# Patient Record
Sex: MALE | Race: OTHER | HISPANIC OR LATINO | Employment: OTHER | URBAN - METROPOLITAN AREA
[De-identification: names, ages, dates, MRNs, and addresses within clinical notes are randomized per-mention and may not be internally consistent; named-entity substitution may affect disease eponyms.]

---

## 2017-12-21 LAB — HCV AB SER-ACNC: NONREACTIVE

## 2018-03-23 LAB
ALBUMIN SERPL BCP-MCNC: 4.5 G/DL (ref 3–5.2)
ALP SERPL-CCNC: 78 U/L (ref 43–122)
ALT SERPL W P-5'-P-CCNC: 37 U/L (ref 9–52)
ANION GAP SERPL CALCULATED.3IONS-SCNC: 10 MMOL/L (ref 5–14)
AST SERPL W P-5'-P-CCNC: 27 U/L (ref 17–59)
BILIRUB SERPL-MCNC: 0.4 MG/DL
BILIRUBIN DIRECT (HISTORICAL): 0.1 MG/DL
BUN SERPL-MCNC: 12 MG/DL (ref 5–25)
CALCIUM SERPL-MCNC: 9.9 MG/DL (ref 8.4–10.2)
CHLORIDE SERPL-SCNC: 106 MEQ/L (ref 97–108)
CHOLEST SERPL-MCNC: 179 MG/DL
CHOLEST/HDLC SERPL: 3.2 {RATIO}
CO2 SERPL-SCNC: 26 MMOL/L (ref 22–30)
CREATINE, SERUM (HISTORICAL): 1 MG/DL (ref 0.7–1.5)
EGFR (HISTORICAL): >60 ML/MIN/1.73 M2
EST. AVERAGE GLUCOSE BLD GHB EST-MCNC: 103 MG/DL
GLUCOSE SERPL-MCNC: 87 MG/DL (ref 70–99)
HBA1C MFR BLD HPLC: 5.2 %
HDLC SERPL-MCNC: 56 MG/DL
LDL/HDL RATIO (HISTORICAL): 1.8
LDLC SERPL CALC-MCNC: 101 MG/DL
POTASSIUM SERPL-SCNC: 4.3 MEQ/L (ref 3.6–5)
SODIUM SERPL-SCNC: 142 MEQ/L (ref 137–147)
TOTAL PROTEIN (HISTORICAL): 7.6 G/DL (ref 5.9–8.4)
TRIGL SERPL-MCNC: 112 MG/DL
TSH SERPL DL<=0.05 MIU/L-ACNC: 1.64 UIU/ML (ref 0.47–4.68)
VLDLC SERPL CALC-MCNC: 22 MG/DL (ref 0–40)

## 2018-07-24 RX ORDER — TRAZODONE HYDROCHLORIDE 100 MG/1
TABLET ORAL
COMMUNITY
Start: 2017-06-12 | End: 2021-03-30 | Stop reason: HOSPADM

## 2018-07-24 RX ORDER — ARIPIPRAZOLE 5 MG/1
5 TABLET ORAL DAILY
COMMUNITY
Start: 2017-06-12 | End: 2020-01-01 | Stop reason: DRUGHIGH

## 2018-07-24 RX ORDER — SENNOSIDES 8.6 MG
CAPSULE ORAL EVERY 8 HOURS
COMMUNITY
Start: 2016-07-19 | End: 2019-01-16 | Stop reason: ALTCHOICE

## 2018-07-24 RX ORDER — TOPIRAMATE 25 MG/1
CAPSULE, COATED PELLETS ORAL
COMMUNITY
Start: 2018-05-23 | End: 2018-07-25 | Stop reason: SDUPTHER

## 2018-07-24 RX ORDER — MELOXICAM 15 MG/1
TABLET ORAL EVERY 24 HOURS
COMMUNITY
Start: 2016-12-21 | End: 2019-01-16 | Stop reason: ALTCHOICE

## 2018-07-24 RX ORDER — ZOLPIDEM TARTRATE 5 MG/1
TABLET ORAL EVERY 24 HOURS
COMMUNITY
End: 2019-04-18

## 2018-07-24 RX ORDER — LIDOCAINE AND PRILOCAINE 25; 25 MG/G; MG/G
CREAM TOPICAL
COMMUNITY
Start: 2017-05-04 | End: 2018-12-19 | Stop reason: ALTCHOICE

## 2018-07-25 ENCOUNTER — TRANSCRIBE ORDERS (OUTPATIENT)
Dept: ADMINISTRATIVE | Facility: HOSPITAL | Age: 53
End: 2018-07-25

## 2018-07-25 ENCOUNTER — OFFICE VISIT (OUTPATIENT)
Dept: ENDOCRINOLOGY | Facility: CLINIC | Age: 53
End: 2018-07-25
Payer: COMMERCIAL

## 2018-07-25 ENCOUNTER — TELEPHONE (OUTPATIENT)
Dept: ENDOCRINOLOGY | Facility: CLINIC | Age: 53
End: 2018-07-25

## 2018-07-25 ENCOUNTER — APPOINTMENT (OUTPATIENT)
Dept: LAB | Facility: HOSPITAL | Age: 53
End: 2018-07-25
Payer: COMMERCIAL

## 2018-07-25 VITALS
HEART RATE: 62 BPM | TEMPERATURE: 98 F | DIASTOLIC BLOOD PRESSURE: 82 MMHG | HEIGHT: 67 IN | BODY MASS INDEX: 32.96 KG/M2 | SYSTOLIC BLOOD PRESSURE: 130 MMHG | WEIGHT: 210 LBS

## 2018-07-25 DIAGNOSIS — E66.9 OBESITY (BMI 30-39.9): ICD-10-CM

## 2018-07-25 DIAGNOSIS — F50.81 BINGE-EATING DISORDER, MILD: ICD-10-CM

## 2018-07-25 DIAGNOSIS — E66.9 OBESITY (BMI 30-39.9): Primary | ICD-10-CM

## 2018-07-25 LAB
ALBUMIN SERPL BCP-MCNC: 4.1 G/DL (ref 3–5.2)
ALP SERPL-CCNC: 71 U/L (ref 43–122)
ALT SERPL W P-5'-P-CCNC: 36 U/L (ref 9–52)
ANION GAP SERPL CALCULATED.3IONS-SCNC: 8 MMOL/L (ref 5–14)
AST SERPL W P-5'-P-CCNC: 29 U/L (ref 17–59)
BILIRUB SERPL-MCNC: 0.6 MG/DL
BUN SERPL-MCNC: 14 MG/DL (ref 5–25)
CALCIUM SERPL-MCNC: 9.5 MG/DL (ref 8.4–10.2)
CHLORIDE SERPL-SCNC: 107 MMOL/L (ref 97–108)
CHOLEST SERPL-MCNC: 182 MG/DL
CO2 SERPL-SCNC: 28 MMOL/L (ref 22–30)
CREAT SERPL-MCNC: 1.06 MG/DL (ref 0.7–1.5)
GFR SERPL CREATININE-BSD FRML MDRD: 79 ML/MIN/1.73SQ M
GLUCOSE P FAST SERPL-MCNC: 94 MG/DL (ref 70–99)
HDLC SERPL-MCNC: 56 MG/DL (ref 40–59)
LDLC SERPL CALC-MCNC: 106 MG/DL
NONHDLC SERPL-MCNC: 126 MG/DL
POTASSIUM SERPL-SCNC: 4.9 MMOL/L (ref 3.6–5)
PROT SERPL-MCNC: 7.9 G/DL (ref 5.9–8.4)
SODIUM SERPL-SCNC: 143 MMOL/L (ref 137–147)
TRIGL SERPL-MCNC: 101 MG/DL
TSH SERPL DL<=0.05 MIU/L-ACNC: 1.55 UIU/ML (ref 0.47–4.68)

## 2018-07-25 PROCEDURE — 36415 COLL VENOUS BLD VENIPUNCTURE: CPT

## 2018-07-25 PROCEDURE — 84443 ASSAY THYROID STIM HORMONE: CPT

## 2018-07-25 PROCEDURE — 99213 OFFICE O/P EST LOW 20 MIN: CPT | Performed by: INTERNAL MEDICINE

## 2018-07-25 PROCEDURE — 80061 LIPID PANEL: CPT

## 2018-07-25 PROCEDURE — 80053 COMPREHEN METABOLIC PANEL: CPT

## 2018-07-25 RX ORDER — TOPIRAMATE 25 MG/1
50 CAPSULE, COATED PELLETS ORAL DAILY
Qty: 60 CAPSULE | Refills: 2 | Status: SHIPPED | OUTPATIENT
Start: 2018-07-25 | End: 2018-07-26 | Stop reason: SDUPTHER

## 2018-07-25 NOTE — PROGRESS NOTES
Nitin Montesinos 45 y o  male presents for follow up today  Last seen in 5 2018    Interim history    No new medical problems or hospitalizations  Taking topiramate for binge eating disorder  She is tolerating this well  medication compliance: compliant all of the time,   diabetic diet compliance: compliant most of the time,   Started topiramate 25 bid for binge eating in 1 2018- lost 19 lbs wt since, has modified diet, reduced portion size,  increased vegetable intake  weight has reduced by 6 lbs since 5 2018  Total wt loss since 1 2018- 25lbs    HPI obesity  Onset: gradually  The severity of the problem is moderate  The patient is losing weight  Risk factors include family  history of obesity  Prior h/o RYGB surgery +  Pertinent negatives include abdominal pain, acne, anhidrosis, anorexia, anxiety, cold intolerance, constipation, depression, delayed development,  facial plethora, fatigue, generalized weakness, hair loss, headache, hirsutism, hoarseness, lethargy, low self-esteem,  paresthesias, striae or vision changes  Prior weight loss efforts  RYGB 6 yrs ago, lost about 120lbs then regained about 75 lbs  Diet- 3 meals, eliminated carb rich, high fat foods  Exercise-none, he states he tried walking about 60 mins 5 times a week for 6 months, but did not lose any weight so  gave up      Comorbidities- Depression, No known diabetes, HTN CAD, heart failure, tachycardia/abnormal rhythm, stroke, GERD,  TINO, NAFLD, Cholelithiasis, seizure disorder, glaucoma, hyperthyroidism, kidney stones  No history of drug abuse;    Review of labs so far reveals 3 23 2018-  normal electrolytes and GFR, normal HgbA1c  no evidence of topiramate toxicity- normal CO2 level     Review of systems  Constitutional- denies fatigue, fever  Eyes:denies redness/swelling/itching/visual loss/diplopia  Ears:denies ear pain/hearing difficulty  Neck: denies neck swelling/pain/stridor  RS: denies wheezing/chest pain/cough/difficulty breathing  CVS: denies palpitations/chest pain/leg swelling/orthopnea/syncope  GI: denies abdominal pain/changes in bowel movements/changes in appetite/refux/nausea  : denies changes in urine color/flow/nocturia  Musculoskeletal: denies difficulty walking/ leg pain/back pain/lfocal weakness  Neuro: denies numbness/tingling/dysesthesias/tremors  Skin/hair: denies rash/dry skin/hair loss       Current Outpatient Prescriptions:     acetaminophen (TYLENOL) 650 mg CR tablet, every 8 (eight) hours, Disp: , Rfl:     ARIPiprazole (ABILIFY) 5 mg tablet, , Disp: , Rfl:     lidocaine-prilocaine (EMLA) cream, , Disp: , Rfl:     meloxicam (MOBIC) 15 mg tablet, every 24 hours, Disp: , Rfl:     Multiple Vitamins-Minerals (MULTIVITAMIN ADULT PO), take 2 tablets by oral route daily, Disp: , Rfl:     topiramate (TOPAMAX) 25 mg sprinkle capsule, Take 2 capsules (50 mg total) by mouth daily, Disp: 60 capsule, Rfl: 2    traZODone (DESYREL) 100 mg tablet, take 2 tablet by oral route  every night, Disp: , Rfl:     zolpidem (AMBIEN) 5 mg tablet, every 24 hours, Disp: , Rfl:     No Known Allergies    There is no problem list on file for this patient  No family history on file  Social History     Social History    Marital status: /Civil Union     Spouse name: N/A    Number of children: N/A    Years of education: N/A     Occupational History    Not on file  Social History Main Topics    Smoking status: Not on file    Smokeless tobacco: Not on file    Alcohol use Not on file    Drug use: Unknown    Sexual activity: Not on file     Other Topics Concern    Not on file     Social History Narrative    No narrative on file       PHYSICAL EXAM    Vitals:    07/25/18 1003   BP: 130/82   BP Location: Left arm   Patient Position: Sitting   Cuff Size: Adult   Pulse: 62   Temp: 98 °F (36 7 °C)   Weight: 95 3 kg (210 lb)   Height: 5' 7" (1 702 m)   Body mass index is 32 89 kg/m²        General: AAOx3, NAD, obese  Eyes; no conjunctival redness, cornea clear, no proptosis, pupils round, reactive to ight  Ears: normal external ears  Mouth/Throat: buccal mucosa moist, no patches, poor dental hygiene uvula midline  Neck: no abnormal cervical LN  Thyroid: no visible goiter, nontender, smooth surface, no palpable nodules, no thrill/bruit  RS: chest clear to auscultation, no added sounds  CVS: no JVD, RRR, no murmurs  Abdomen: obese, no palpable masses/free fluid, percussion tympanitic, normal BS  Extremities: no pedal edema, no cyanosis/clubbing  Neuro: no gross focal neurologic deficits, no tremors  Dermatologic: no skin rash/no wide purple striae, no ecchymoses, no focal hair loss, skin moist to touch  Psych: appropriate mood and affect      ASSESSMENT AND PLAN    1  OBESITY- BMI 33  Losing wt with topiramate rx for binge eating, and lifestyle measures  No evidence of toxicity- check labs now, encouraged hydration, counseled pt about adverse effects  Lifestyle changes:  -I counseled patient regarding recommendation to lose 5 to 10% of initial body weight through lifestyle changes, which should include a combination of dietary changes and regular exercise   -I recommend starting a food diary as a first step to beginning dietary changes  - Goal daily caloric intake is not to exceed 1200 calories  - There are multiple free websites available that can also help with counting calories  I gave some specific examples to the patient   -I also recommended he continue exercise regimen of moderate physical activity of 60 mins daily      2  Binge eating disorder responding well to topiramate rx      Orders Placed This Encounter   Procedures    Comprehensive metabolic panel     This is a patient instruction: Patient fasting for 8 hours or longer recommended  Standing Status:   Future     Number of Occurrences:   1     Standing Expiration Date:   7/25/2019    Lipid panel     This is a patient instruction:  This test requires patient fasting for 10-12 hours or longer  Drinking of black coffee or black tea is acceptable  Standing Status:   Future     Number of Occurrences:   1     Standing Expiration Date:   7/25/2019    TSH, 3rd generation     This is a patient instruction: This test is non-fasting  Please drink two glasses of water morning of bloodwork          Standing Status:   Future     Number of Occurrences:   1     Standing Expiration Date:   7/25/2019       RTC in 3 months

## 2018-07-26 RX ORDER — TOPIRAMATE 25 MG/1
50 CAPSULE, COATED PELLETS ORAL DAILY
Qty: 60 CAPSULE | Refills: 1 | Status: SHIPPED | OUTPATIENT
Start: 2018-07-26 | End: 2018-09-14 | Stop reason: RX

## 2018-08-28 ENCOUNTER — TELEPHONE (OUTPATIENT)
Dept: BARIATRICS | Facility: CLINIC | Age: 53
End: 2018-08-28

## 2018-09-13 PROBLEM — E66.9 OBESITY, CLASS II, BMI 35-39.9: Status: ACTIVE | Noted: 2018-09-13

## 2018-09-13 PROBLEM — Z98.84 S/P GASTRIC BYPASS: Status: ACTIVE | Noted: 2018-09-13

## 2018-09-13 PROBLEM — K91.2 POSTSURGICAL MALABSORPTION: Status: ACTIVE | Noted: 2018-09-13

## 2018-09-13 NOTE — ASSESSMENT & PLAN NOTE
-Last lipid panel July 2018 shows lipids WNL  -Continue with healthy diet/lifestyle changes  -To be monitored by PCP

## 2018-09-13 NOTE — PROGRESS NOTES
Assessment/Plan:    S/P gastric bypass  -s/p Daquan-En-Y Gastric Bypass in June 2008 (7 years ago) Dr Minna Martinez  Patient lost 120lbs s/p RYGB and slowly regained about 75lbs  Started Topiramate in January 2018 and lost 19 pounds as of May but now has regained 12# since his last visit with Dr Ashley Culp on 07/25/18  He has not had refills of Topomax in about a month  Per diet recall he is eating excessive refined CHO, limited fiber rich vegetables, snacking and grazing throughout the day, mindless eating, and skipping meals  He follows the 30/60 minute rule and drinks about 64oz  Fluids daily  He avoids NSAIDS, no smoking, but drinks occasional alcohol  Advised strict avoidance of the above  He does not exercise - encouraged walking program     -Patient agrees to f/u with RD and keep food records  Lengthy dietary education provided today (eat balanced regular meals, avoid grazing, increase exercise)  -F/U with [facility] for additional support      Postsurgical malabsorption  -At risk for malabsorption of vitamins/minerals secondary to malabsorption and restriction of intake from bariatric surgery  -Currently taking adequate postop bariatric surgery vitamin supplementation: MVI, calcium, B12, vitamin D  -No bariatric labs available for review  -Next set of bariatric labs ordered for approximately 2 weeks  -Patient received education about the importance of adhering to a lifelong supplementation regimen to avoid vitamin/mineral deficiencies     Mixed hyperlipidemia  -Last lipid panel July 2018 shows lipids WNL  -Continue with healthy diet/lifestyle changes  -To be monitored by PCP       Diagnoses and all orders for this visit:    S/P gastric bypass  -     CBC and differential; Future  -     Copper Level; Future  -     Ferritin; Future  -     Folate; Future  -     Iron; Future  -     PTH, intact; Future  -     Vitamin A; Future  -     Vitamin B1, whole blood;  Future  -     Vitamin B12; Future  -     Vitamin D 25 hydroxy; Future  -     Zinc; Future    Postsurgical malabsorption  -     CBC and differential; Future  -     Copper Level; Future  -     Ferritin; Future  -     Folate; Future  -     Iron; Future  -     PTH, intact; Future  -     Vitamin A; Future  -     Vitamin B1, whole blood; Future  -     Vitamin B12; Future  -     Vitamin D 25 hydroxy; Future  -     Zinc; Future    Mixed hyperlipidemia    Obesity (BMI 30 0-34  9)    Weight gain following gastric bypass surgery    Other orders  -     DULoxetine (CYMBALTA) 60 mg delayed release capsule; Take 20 mg by mouth daily  -     calcium citrate (CALCITRATE) 950 MG tablet; Take 1 tablet by mouth daily  -     cholecalciferol (VITAMIN D3) 1,000 units tablet; Take 1,000 Units by mouth daily  -     cyanocobalamin 500 MCG tablet; Take 500 mcg by mouth daily          Subjective:      Patient ID: Kai Castelan is a 45 y o  male  -s/p Daquan-En-Y Gastric Bypass with Dr Nereida Byers on 7 years ago  Patient lost about 120lbs s/p RYGB and slowly regained about 75lbs  Started Topiramate in January 2018 and lost 19 pounds in May but now has regained 12# since his last visit with Dr Angel Argueta on 07/25/18  He has not had refills of Topomax in about a month  Tolerating diet without issues; denies N/V, dysphagia, reflux  Initial: 290  Current: 222lbs  EWL: 47 8%  [place]: 170lbs  Current BMI is Body mass index is 34 77 kg/m²  B - 2 pieces toast with ham and cheese, iced tea unsweet  L - skips  D - snacks or eats rice, beans, chicken  Snacks - bread and butter, crackers    The following portions of the patient's history were reviewed and updated as appropriate: allergies, current medications, past family history, past medical history, past social history, past surgical history and problem list     Review of Systems   Constitutional: Positive for unexpected weight change (12lbs weight gain in last 1 5 months)  Negative for chills and fever  HENT: Negative for trouble swallowing  Respiratory: Negative for cough and shortness of breath  Cardiovascular: Negative for chest pain and palpitations  Gastrointestinal: Negative for abdominal pain, constipation, diarrhea, nausea and vomiting  Neurological: Negative for dizziness  Psychiatric/Behavioral:        Denies anxiety and depression         Objective:      /89 (BP Location: Left arm, Patient Position: Sitting, Cuff Size: Adult)   Pulse 89   Temp 98 6 °F (37 °C)   Resp 18   Ht 5' 7" (1 702 m)   Wt 101 kg (222 lb)   BMI 34 77 kg/m²          Physical Exam   Constitutional: He is oriented to person, place, and time  He appears well-developed and well-nourished  No distress  HENT:   Head: Normocephalic and atraumatic  Eyes: Pupils are equal, round, and reactive to light  No scleral icterus  Cardiovascular: Normal rate, regular rhythm and normal heart sounds  Pulmonary/Chest: Effort normal and breath sounds normal  No respiratory distress  Abdominal: Soft  Bowel sounds are normal  He exhibits no distension  There is no tenderness  No incisional hernias appreciated   Musculoskeletal: He exhibits no edema  Neurological: He is alert and oriented to person, place, and time  Skin: Skin is warm and dry  Psychiatric: He has a normal mood and affect  Nursing note and vitals reviewed  BARRIERS: none identified    GOALS:   · Continued/Maintain healthy weight loss with good nutrition intakes  · Adequate hydration with at least 64oz  fluid intake  · Normal vitamin and mineral levels  · Exercise as tolerated  · Follow up with RD  · Follow up with medical weight management in [facility] weight management    · Follow-up in 3 months unless you are connected with [facility]  We kindly ask that your arrive 15 minutes before your scheduled appointment time with your provider to allow our staff to room you, get your vital signs and update your chart  · Follow diet as discussed  · Get lab work done in the next 2 weeks  You have been given a lab slip today  Please call the office if you need a replacement  It is recommended to check with your insurance BEFORE getting labs done to make sure they are covered by your policy  Also, please check with your PCP and other providers before getting labs to avoid duplicate labs  Make sure to HOLD any multivitamins that may contain biotin and any biotin supplements FOR 5 DAYS before any labs since it can affect the results  · Follow vitamin and mineral recommendations as reviewed with you  · Call our office if you have any problems with abdominal pain especially associated with fever, chills, nausea, vomiting or any other concerns  · All  Post-bariatric surgery patients should be aware that very small quantities of any alcohol can cause impairment and it is very possible not to feel the effect  The effect can be in the system for several hours  It is also a stomach irritant  · It is advised to AVOID alcohol, Nonsteroidal antiinflammatory drugs (NSAIDS) and nicotine of all forms   Any of these can cause stomach irritation/pain

## 2018-09-13 NOTE — ASSESSMENT & PLAN NOTE
-At risk for malabsorption of vitamins/minerals secondary to malabsorption and restriction of intake from bariatric surgery  -Currently taking adequate postop bariatric surgery vitamin supplementation: MVI, calcium, B12, vitamin D  -No bariatric labs available for review  -Next set of bariatric labs ordered for approximately 2 weeks  -Patient received education about the importance of adhering to a lifelong supplementation regimen to avoid vitamin/mineral deficiencies

## 2018-09-13 NOTE — ASSESSMENT & PLAN NOTE
-s/p Daquan-En-Y Gastric Bypass in June 2008 (7 years ago) Dr Kai Hicks  Patient lost 120lbs s/p RYGB and slowly regained about 75lbs  Started Topiramate in January 2018 and lost 19 pounds as of May but now has regained 12# since his last visit with Dr Payal Hawk on 07/25/18  He has not had refills of Topomax in about a month    -Had an EGD with Dr Carlos Irwin on 09/14/15 that showed 5cm pouch and no G-G fistula, no abnormalities   -Per diet recall he is eating excessive refined CHO, limited fiber rich vegetables, snacking and grazing throughout the day, mindless eating, and skipping meals  He follows the 30/60 minute rule and drinks about 64oz  Fluids daily  He avoids NSAIDS, no smoking, but drinks occasional alcohol  Advised strict avoidance of the above  He does not exercise - encouraged walking program     -Patient agrees to f/u with RD and keep food records  Lengthy dietary education provided today (eat balanced regular meals, avoid grazing, increase exercise)     -F/U with [facility] for additional support

## 2018-09-14 ENCOUNTER — OFFICE VISIT (OUTPATIENT)
Dept: BARIATRICS | Facility: CLINIC | Age: 53
End: 2018-09-14
Payer: COMMERCIAL

## 2018-09-14 VITALS
RESPIRATION RATE: 18 BRPM | DIASTOLIC BLOOD PRESSURE: 89 MMHG | WEIGHT: 222 LBS | TEMPERATURE: 98.6 F | HEART RATE: 89 BPM | HEIGHT: 67 IN | BODY MASS INDEX: 34.84 KG/M2 | SYSTOLIC BLOOD PRESSURE: 162 MMHG

## 2018-09-14 DIAGNOSIS — R51.9 INCREASED SEVERITY OF HEADACHES: Primary | ICD-10-CM

## 2018-09-14 DIAGNOSIS — K91.2 POSTSURGICAL MALABSORPTION: ICD-10-CM

## 2018-09-14 DIAGNOSIS — Z98.84 S/P GASTRIC BYPASS: Primary | ICD-10-CM

## 2018-09-14 DIAGNOSIS — E66.9 OBESITY (BMI 30.0-34.9): ICD-10-CM

## 2018-09-14 DIAGNOSIS — R63.5 WEIGHT GAIN FOLLOWING GASTRIC BYPASS SURGERY: ICD-10-CM

## 2018-09-14 DIAGNOSIS — E78.2 MIXED HYPERLIPIDEMIA: ICD-10-CM

## 2018-09-14 DIAGNOSIS — Z98.84 WEIGHT GAIN FOLLOWING GASTRIC BYPASS SURGERY: ICD-10-CM

## 2018-09-14 PROCEDURE — 99214 OFFICE O/P EST MOD 30 MIN: CPT | Performed by: PHYSICIAN ASSISTANT

## 2018-09-14 RX ORDER — DULOXETIN HYDROCHLORIDE 60 MG/1
60 CAPSULE, DELAYED RELEASE ORAL 2 TIMES DAILY
COMMUNITY
End: 2021-03-30 | Stop reason: HOSPADM

## 2018-09-14 RX ORDER — IBUPROFEN 200 MG
1 CAPSULE ORAL DAILY
COMMUNITY
End: 2021-03-30 | Stop reason: HOSPADM

## 2018-09-14 RX ORDER — MELATONIN
1000 DAILY
COMMUNITY
End: 2019-01-16 | Stop reason: ALTCHOICE

## 2018-09-14 NOTE — PATIENT INSTRUCTIONS
GOALS:   · Continued/Maintain healthy weight loss with good nutrition intakes  · Adequate hydration with at least 64oz  fluid intake  · Normal vitamin and mineral levels  · Exercise as tolerated  · Follow up with RD  · Follow up with medical weight management in [facility] weight management    · Follow-up in 3 months  We kindly ask that your arrive 15 minutes before your scheduled appointment time with your provider to allow our staff to room you, get your vital signs and update your chart  · Follow diet as discussed  · Get lab work done in the next 2 weeks  You have been given a lab slip today  Please call the office if you need a replacement  It is recommended to check with your insurance BEFORE getting labs done to make sure they are covered by your policy  Also, please check with your PCP and other providers before getting labs to avoid duplicate labs  Make sure to HOLD any multivitamins that may contain biotin and any biotin supplements FOR 5 DAYS before any labs since it can affect the results  · Follow vitamin and mineral recommendations as reviewed with you  · Call our office if you have any problems with abdominal pain especially associated with fever, chills, nausea, vomiting or any other concerns  · All  Post-bariatric surgery patients should be aware that very small quantities of any alcohol can cause impairment and it is very possible not to feel the effect  The effect can be in the system for several hours  It is also a stomach irritant  · It is advised to AVOID alcohol, Nonsteroidal antiinflammatory drugs (NSAIDS) and nicotine of all forms   Any of these can cause stomach irritation/pain

## 2018-09-18 ENCOUNTER — APPOINTMENT (OUTPATIENT)
Dept: LAB | Facility: HOSPITAL | Age: 53
End: 2018-09-18
Payer: COMMERCIAL

## 2018-09-18 DIAGNOSIS — R51.9 INCREASED SEVERITY OF HEADACHES: ICD-10-CM

## 2018-09-18 DIAGNOSIS — K91.2 POSTSURGICAL MALABSORPTION: ICD-10-CM

## 2018-09-18 DIAGNOSIS — Z98.84 S/P GASTRIC BYPASS: ICD-10-CM

## 2018-09-18 LAB
25(OH)D3 SERPL-MCNC: 27 NG/ML (ref 30–100)
BASOPHILS # BLD AUTO: 0 THOUSANDS/ΜL (ref 0–0.1)
BASOPHILS NFR BLD AUTO: 1 % (ref 0–1)
EOSINOPHIL # BLD AUTO: 0.1 THOUSAND/ΜL (ref 0–0.4)
EOSINOPHIL NFR BLD AUTO: 1 % (ref 0–6)
ERYTHROCYTE [DISTWIDTH] IN BLOOD BY AUTOMATED COUNT: 17.5 %
FERRITIN SERPL-MCNC: 13 NG/ML (ref 8–388)
FOLATE SERPL-MCNC: 17.1 NG/ML (ref 3.1–17.5)
HCT VFR BLD AUTO: 44 % (ref 41–53)
HGB BLD-MCNC: 14.2 G/DL (ref 13.5–17.5)
IRON SERPL-MCNC: 75 UG/DL (ref 65–175)
LYMPHOCYTES # BLD AUTO: 1.3 THOUSANDS/ΜL (ref 0.5–4)
LYMPHOCYTES NFR BLD AUTO: 24 % (ref 20–50)
MCH RBC QN AUTO: 27.2 PG (ref 26–34)
MCHC RBC AUTO-ENTMCNC: 32.2 G/DL (ref 31–36)
MCV RBC AUTO: 85 FL (ref 80–100)
MONOCYTES # BLD AUTO: 0.6 THOUSAND/ΜL (ref 0.2–0.9)
MONOCYTES NFR BLD AUTO: 11 % (ref 1–10)
NEUTROPHILS # BLD AUTO: 3.5 THOUSANDS/ΜL (ref 1.8–7.8)
NEUTS SEG NFR BLD AUTO: 64 % (ref 45–65)
PLATELET # BLD AUTO: 133 THOUSANDS/UL (ref 150–450)
PMV BLD AUTO: 10 FL (ref 8.9–12.7)
PTH-INTACT SERPL-MCNC: 83.7 PG/ML (ref 16.7–78.9)
RBC # BLD AUTO: 5.2 MILLION/UL (ref 4.5–5.9)
VIT B12 SERPL-MCNC: 275 PG/ML (ref 100–900)
WBC # BLD AUTO: 5.4 THOUSAND/UL (ref 4.5–11)

## 2018-09-18 PROCEDURE — 36415 COLL VENOUS BLD VENIPUNCTURE: CPT

## 2018-09-18 PROCEDURE — 84590 ASSAY OF VITAMIN A: CPT

## 2018-09-18 PROCEDURE — 82607 VITAMIN B-12: CPT

## 2018-09-18 PROCEDURE — 84425 ASSAY OF VITAMIN B-1: CPT

## 2018-09-18 PROCEDURE — 82525 ASSAY OF COPPER: CPT

## 2018-09-18 PROCEDURE — 83970 ASSAY OF PARATHORMONE: CPT

## 2018-09-18 PROCEDURE — 82728 ASSAY OF FERRITIN: CPT

## 2018-09-18 PROCEDURE — 82746 ASSAY OF FOLIC ACID SERUM: CPT

## 2018-09-18 PROCEDURE — 85025 COMPLETE CBC W/AUTO DIFF WBC: CPT

## 2018-09-18 PROCEDURE — 83540 ASSAY OF IRON: CPT

## 2018-09-18 PROCEDURE — 84630 ASSAY OF ZINC: CPT

## 2018-09-18 PROCEDURE — 82306 VITAMIN D 25 HYDROXY: CPT

## 2018-09-18 NOTE — TELEPHONE ENCOUNTER
Medication sent to wrong pharmacy please send to [facility]

## 2018-09-19 DIAGNOSIS — R51.9 INCREASED SEVERITY OF HEADACHES: ICD-10-CM

## 2018-09-20 ENCOUNTER — OFFICE VISIT (OUTPATIENT)
Dept: BARIATRICS | Facility: CLINIC | Age: 53
End: 2018-09-20

## 2018-09-20 ENCOUNTER — TELEPHONE (OUTPATIENT)
Dept: BARIATRICS | Facility: CLINIC | Age: 53
End: 2018-09-20

## 2018-09-20 DIAGNOSIS — E66.9 OBESITY, CLASS I, BMI 30-34.9: Primary | ICD-10-CM

## 2018-09-20 DIAGNOSIS — K91.2 POSTSURGICAL MALABSORPTION: ICD-10-CM

## 2018-09-20 DIAGNOSIS — E53.8 VITAMIN B12 DEFICIENCY: Primary | ICD-10-CM

## 2018-09-20 LAB
COPPER SERPL-MCNC: 88 UG/DL (ref 72–166)
ZINC SERPL-MCNC: 66 UG/DL (ref 56–134)

## 2018-09-20 PROCEDURE — RECHECK

## 2018-09-20 NOTE — PROGRESS NOTES
Bariatric Follow Up Nutrition Note    Preop  None    Type of surgery  Gastric bypass: laparoscopic  Surgery Date: 06/2008  8 yrs  post-op  Surgeon: Vilma Pires    Nutrition Assessment   RENE Tory Gosselin  45 y o   male  There were no vitals taken for this visit      Weight on Day of Weight Loss Surgery: 290#  Weight in (lb) to have BMI = 25: 163#  Pre-Op Excess Wt: 127#  Post-Op Wt Loss: 120#initially 47 8% EBWL in 18 months    Review of History and Medications   Past Medical History:   Diagnosis Date    Hyperlipidemia     Hypertension     Vitamin B12 deficiency 2014    Vitamin D deficiency 2014     Past Surgical History:   Procedure Laterality Date    GASTRIC BYPASS      HEMORROIDECTOMY  06/2016    VASECTOMY       Social History     Social History    Marital status: /Civil Union     Spouse name: N/A    Number of children: N/A    Years of education: N/A     Social History Main Topics    Smoking status: Never Smoker    Smokeless tobacco: Never Used      Comment: NO TOBACCO USE    Alcohol use 1 8 oz/week     3 Cans of beer per week      Comment: "socially"    Drug use: No    Sexual activity: Not on file      Comment: HAD VASECTOMY     Other Topics Concern    Not on file     Social History Narrative    No narrative on file       Current Outpatient Prescriptions:     acetaminophen (TYLENOL) 650 mg CR tablet, every 8 (eight) hours, Disp: , Rfl:     ARIPiprazole (ABILIFY) 5 mg tablet, , Disp: , Rfl:     calcium citrate (CALCITRATE) 950 MG tablet, Take 1 tablet by mouth daily, Disp: , Rfl:     cholecalciferol (VITAMIN D3) 1,000 units tablet, Take 1,000 Units by mouth daily, Disp: , Rfl:     cyanocobalamin 500 MCG tablet, Take 500 mcg by mouth daily, Disp: , Rfl:     DULoxetine (CYMBALTA) 60 mg delayed release capsule, Take 20 mg by mouth daily, Disp: , Rfl:     lidocaine-prilocaine (EMLA) cream, , Disp: , Rfl:     meloxicam (MOBIC) 15 mg tablet, every 24 hours, Disp: , Rfl:     Multiple Vitamins-Minerals (MULTIVITAMIN ADULT PO), take 2 tablets by oral route daily, Disp: , Rfl:     Topiramate ER 25 MG CP24, Take 1 capsule (25 mg total) by mouth daily, Disp: 60 capsule, Rfl: 5    traZODone (DESYREL) 100 mg tablet, take 2 tablet by oral route  every night, Disp: , Rfl:     zolpidem (AMBIEN) 5 mg tablet, every 24 hours, Disp: , Rfl:     Food Intake and Lifestyle Assessment   Food Intake Assessment completed via usual diet recall  Breakfast: 2 pieces of bread, iced tea plain or water  Snack: crackers, cookies, bread   Lunch: none  Snack: crackers, cookies, bread  Dinner: none  Snack: picking  Beverage intake: water and sugar free beverages  Diet texture/stage: regular  Protein supplement: none  Estimated protein intake per day: 20gm  Estimated fluid intake per day: 90 oz/day  Meals eaten away from home: none  Typical meal pattern: 1 meals per day and 4 snacks per day  Eating Behaviors: Does not drink with meals and waits 30-minutes after meal before resuming drinking, Consumption of high calorie/ high fat foods, Mindless eating and Craves sweet foods    Food allergies or intolerances: none  Cultural or Sikhism considerations: none    Physical Assessment  Nutrition Related Findings  Return of Hunger    Physical Activity  Types of exercise: None  Current physical limitations: none    Psychosocial Assessment   Support systems: none  Socioeconomic factors: financial, no support    Nutrition Diagnosis  Diagnosis: Excessive energy intake (NI-1 5)  Related to: Limited adherence to nutrition-related recommendations  As Evidenced by: Unintentional weight gain     Interventions and Teaching   Patient educated on post-op nutrition guidelines  Patient educated and handouts provided    Exercise  Nutrition considerations after surgery  Meal planning and preparation  Dietary and lifestyle changes    Education provided to: patient    Barriers to learning: Desire/Motivation    Readiness to change: relapsing    Comprehension: verbalizes understanding     Expected Compliance: poor    Goals  Food journal, Exercise 30 minutes 5 times per week and Eat 3 meals per day     Time Spent:   30 Minutes

## 2018-09-20 NOTE — TELEPHONE ENCOUNTER
Spoke with the patient regarding most recent lab results  Advised patient to increase vitamin D to 3,-4,000IU daily with food and increase B12 to 1000mcg daily  Advised patient to change to MVI with iron, as iron and ferritin are low normal  Patient verbalizes understanding  Will f/u with thiamin and vitamin A when available

## 2018-09-21 LAB — VIT A SERPL-MCNC: 45.3 UG/DL (ref 33.1–100)

## 2018-09-22 LAB — VIT B1 BLD-SCNC: 118.4 NMOL/L (ref 66.5–200)

## 2018-10-10 DIAGNOSIS — Z20.2 EXPOSURE TO SEXUALLY TRANSMITTED DISEASE (STD): Primary | ICD-10-CM

## 2018-10-11 ENCOUNTER — TRANSCRIBE ORDERS (OUTPATIENT)
Dept: ADMINISTRATIVE | Facility: HOSPITAL | Age: 53
End: 2018-10-11

## 2018-10-11 ENCOUNTER — APPOINTMENT (OUTPATIENT)
Dept: LAB | Facility: HOSPITAL | Age: 53
End: 2018-10-11
Payer: COMMERCIAL

## 2018-10-11 DIAGNOSIS — Z20.2 EXPOSURE TO SEXUALLY TRANSMITTED DISEASE (STD): ICD-10-CM

## 2018-10-11 LAB
CHLAMYDIA DNA CVX QL NAA+PROBE: NORMAL
N GONORRHOEA DNA GENITAL QL NAA+PROBE: NORMAL

## 2018-10-11 PROCEDURE — 36415 COLL VENOUS BLD VENIPUNCTURE: CPT

## 2018-10-11 PROCEDURE — 87591 N.GONORRHOEAE DNA AMP PROB: CPT

## 2018-10-11 PROCEDURE — 87389 HIV-1 AG W/HIV-1&-2 AB AG IA: CPT

## 2018-10-11 PROCEDURE — 86592 SYPHILIS TEST NON-TREP QUAL: CPT

## 2018-10-11 PROCEDURE — 87491 CHLMYD TRACH DNA AMP PROBE: CPT

## 2018-10-12 ENCOUNTER — TELEPHONE (OUTPATIENT)
Dept: FAMILY MEDICINE CLINIC | Facility: CLINIC | Age: 53
End: 2018-10-12

## 2018-10-12 LAB
HIV 1+2 AB+HIV1 P24 AG SERPL QL IA: NORMAL
RPR SER QL: NORMAL

## 2019-01-16 ENCOUNTER — OFFICE VISIT (OUTPATIENT)
Dept: FAMILY MEDICINE CLINIC | Facility: CLINIC | Age: 54
End: 2019-01-16
Payer: COMMERCIAL

## 2019-01-16 VITALS
HEIGHT: 67 IN | WEIGHT: 221 LBS | RESPIRATION RATE: 16 BRPM | HEART RATE: 59 BPM | DIASTOLIC BLOOD PRESSURE: 70 MMHG | TEMPERATURE: 98 F | SYSTOLIC BLOOD PRESSURE: 120 MMHG | OXYGEN SATURATION: 96 % | BODY MASS INDEX: 34.69 KG/M2

## 2019-01-16 DIAGNOSIS — M25.562 CHRONIC PAIN OF LEFT KNEE: ICD-10-CM

## 2019-01-16 DIAGNOSIS — G89.29 CHRONIC PAIN OF LEFT KNEE: ICD-10-CM

## 2019-01-16 DIAGNOSIS — Z23 NEEDS FLU SHOT: Primary | ICD-10-CM

## 2019-01-16 DIAGNOSIS — Z00.01 ENCOUNTER FOR GENERAL ADULT MEDICAL EXAMINATION WITH ABNORMAL FINDINGS: ICD-10-CM

## 2019-01-16 DIAGNOSIS — E11.65 UNCONTROLLED TYPE 2 DIABETES MELLITUS WITH HYPERGLYCEMIA (HCC): ICD-10-CM

## 2019-01-16 DIAGNOSIS — E78.2 MIXED HYPERLIPIDEMIA: ICD-10-CM

## 2019-01-16 DIAGNOSIS — Z11.59 NEED FOR HEPATITIS C SCREENING TEST: ICD-10-CM

## 2019-01-16 PROCEDURE — 93000 ELECTROCARDIOGRAM COMPLETE: CPT | Performed by: FAMILY MEDICINE

## 2019-01-16 PROCEDURE — G0008 ADMIN INFLUENZA VIRUS VAC: HCPCS | Performed by: FAMILY MEDICINE

## 2019-01-16 PROCEDURE — 99213 OFFICE O/P EST LOW 20 MIN: CPT | Performed by: FAMILY MEDICINE

## 2019-01-16 PROCEDURE — G0439 PPPS, SUBSEQ VISIT: HCPCS | Performed by: FAMILY MEDICINE

## 2019-01-16 PROCEDURE — 90682 RIV4 VACC RECOMBINANT DNA IM: CPT | Performed by: FAMILY MEDICINE

## 2019-01-16 RX ORDER — ERGOCALCIFEROL 1.25 MG/1
50000 CAPSULE ORAL WEEKLY
Qty: 4 CAPSULE | Refills: 3 | Status: SHIPPED | OUTPATIENT
Start: 2019-01-16 | End: 2019-04-23

## 2019-01-16 RX ORDER — ERGOCALCIFEROL 1.25 MG/1
50000 CAPSULE ORAL WEEKLY
Qty: 4 CAPSULE | Refills: 5 | Status: SHIPPED | OUTPATIENT
Start: 2019-01-16 | End: 2019-01-16 | Stop reason: SDUPTHER

## 2019-01-16 NOTE — PATIENT INSTRUCTIONS
Tramadol (Por la boca)   Se [place] para tratar el dolor de moderado a severo  Velma medicamento es un narcótico para el dolor  Rasheeda(s) : ConZip, FusePaq Synapryn, [place], Ultram, Ultram ER, traMADol HCl   Existen muchas otras marcas de [employer]  Velma medicamento no debe ser usado cuando:   Velma medicamento no es adecuado para todas las personas  No lo use si ha tenido heather reacción alérgica a tramadol u otro medicamento narcótico, o si usted tiene heather obstrucción intestinal o estomacal (incluyendo íleo paralítico)  Forma de usar velma medicamento:   Cá[place] de liberación prolongada, [place], [place], [place] para disolver, Tableta de liberación prolongada  · [place] taqueria medicamentos maya se le haya indicado  Es probable que sea necesario cambiar rios dosis varias veces hasta encontrar la que funciona mejor para usted  · Si usted está usando la tableta desintegrable, asegúrese de e secar taqueria roselia antes de tocar la tableta  Despegue hacia atrás el aluminio del paquete y saque la tableta  No empuje la tableta a través del aluminio  Coloque la tableta en rios boca  Sorto pronto la tableta se haya derretido, tome un poco de agua  · [place] la tableta de liberación prolongada entera  No triture, rompa o mastique  · [place] muchos líquidos para evitar el estreñimiento  · [employer] debe venir con Marito Perryía del medicamento  Solicite heather copia con rios farmacéutico en thi de no tener la guía  · Si olvida heather dosis: Si olvida heather dosis de rios medicamento, tómelo lo más pronto posible  Si es aliza la hora para rios próxima dosis, espere hasta entonces para sboia rios dosis regular  No use medicamento adicional para reponer la dosis olvidada  · Guarde el medicamento en un recipiente cerrado a temperatura ambiente y alejado del calor, la humedad y la elmo directa    Medicamentos y [employer] que debe evitar:   Consulte con rios médico o farmacéutico antes de usar cualquier JW Lord que compra sin receta [facility] vitaminas y los productos herbales  · No use velma medicamento si usted ha usado un inhibidor de la monoaminooxidasa (IMAO) en los últimos 14 días  · Algunos medicamentos pueden afectar la eficacia de tramadol  Informe a rios médico si está usando cualquiera de los siguientes:  ¨ Hico, ciclobenzaprina, digoxina, eritromicina, ketoconazol, litio, mirtazapina, fenitoína, prometazina, rifampicina, ritonavir, quinidina o trazodona  ¨ Medicamentos para la presión arterial  ¨ Diurético  ¨ Medicamentos para la depresión (incluyendo bupropion, fluoxetina, paroxetina, quinidina)  ¨ Medicamentos con fenotiazina  ¨ Medicamento de triptán para tratar migrañas  · Informe a rios médico si usted [place] cualquier cosa que le provoca sueño  [place] son medicamentos para alergia o medicamentos narcóticos para el dolor y el alcohol  Infórmele también, si usted está usando relajantes musculares  · No consuma alcohol mientras esté   Precauciones radha el uso de velma medicamento:   · Dígale a rios médico si está embarazada o dando de mamar, o si usted tiene enfermedad renal, enfermedad hepática (incluyendo cirrosis), cálculos biliares, problemas respiratorios o pulmonares, problemas de páncreas o un antecedente de lesión en la kallie, convulsiones, adicción a las drogas, o depresión o problemas emocionales similares  Informe a rios médico si usted sufre de fenilcetonuria  · Velma medicamento puede causar los siguientes problemas:  ¨ Mayor riesgo de sobredosis, que puede conducir a la muerte  ¨ La depresión respiratoria (problema respiratorio grave que puede ser mortal)  ¨ Síndrome de la serotonina (cuando se [place] con otros medicamentos)  ¨ Cambios inusuales en el estado de ánimo o en el comportamiento  · Velma medicamento puede hacerlo sentir mareado, somnoliento o aturdido  No maneje ni roselyn nada que pueda ser peligroso hasta que usted sepa maya le afecta velma medicamento    · [employer] convertirse en un hábito  No use más de la dosis prescrita  Llame a rios médico si usted siente que el medicamento no le está funcionando  · No suspenda el uso de velma medicamento súbitamente  Rios médico necesitará disminuir rios dosis poco a poco antes de suspender el medicamento por completo  · Dígale a todo médico o dentista encargado de atenderle que usted está usando [place]  · Velma medicamento puede causar el estreñimiente, especialmente si usted lo [place] radha [employer]  Pregúntele a rios médico si usted también debería usar un laxante para prevenir y tratar el estreñimiento  · Puede que velma medicamento cause infertilidad  Hable con rios médico antes de usar velma medicamento si usted planea tener hijos  · Guarde todos los medicamentos fuera del alcance de los niños  Nunca comparta taqueria medicamentos con [employer]  Efectos secundarios que pueden presentarse radha el uso de velma medicamento:   Consulte inmediatamente con el médico si nota cualquiera de estos efectos secundarios:  · Reacción alérgica: Comezón o ronchas, hinchazón del daniele o las orselia, hinchazón u hormigueo en la boca o garganta, opresión en el pecho, dificultad para respirar  · Ansiedad, intranquilidad, latidos rápidos, fiebre, sudoración, espasmos musculares, náuseas, vómito, diarrea, oír o tello cosas que no existen  · Ampollas, despellejamiento, sarpullido nicole en la piel    · Piel, labios o uñas azuladas  · Mareos, somnolencia o debilidad extremos, respiración superficial, ritmo cardíaco lento, convulsiones y piel fría y pegajosa  · Desvanecimientos, mareos, desmayos  · Convulsiones  · Estado de ánimo o comportamiento inusual, pensamientos de lastimarse a sí mismo o a los demás  · Dificultad para respirar  Consulte con el médico si nota los siguientes efectos secundarios menos graves:   · Estreñimiento, pérdida del apetito, malestar estomacal  · La boca seca  · Dolor de kallie  Consulte con el médico si nota otros efectos secundarios que mikala son causados por salo medicamento  Llame a silvestre médico para consultarle Celina Sosa puede notificar taqueria efectos secundarios al FDA al [id number]  © 2017 [address] Information is for End User's use only and may not be sold, redistributed or otherwise used for commercial purposes  Esta información es sólo para uso en educación  Silvestre intención no es darle un consejo médico sobre enfermedades o tratamientos  Colsulte con silvestre [place] farmacéutico antes de seguir cualquier régimen médico para saber si es seguro y efectivo para usted

## 2019-01-16 NOTE — PROGRESS NOTES
Assessment and Plan:    Problem List Items Addressed This Visit     None      Visit Diagnoses     Needs flu shot    -  Primary    Encounter for general adult medical examination with abnormal findings            Health Maintenance Due   Topic Date Due    Hepatitis C Screening  1965    Depression Screening PHQ  1965    Medicare Annual Wellness Visit (AWV)  1965    DTaP,Tdap,and Td Vaccines (1 - Tdap) 10/30/1984    INFLUENZA VACCINE  07/01/2018         HPI:  Marsha Pritchett is a 52 y o  male here for his Subsequent Wellness Visit  Patient Active Problem List   Diagnosis    S/P gastric bypass    Obesity (BMI 30 0-34  9)    Postsurgical malabsorption    Mixed hyperlipidemia    Weight gain following gastric bypass surgery     Past Medical History:   Diagnosis Date    Hyperlipidemia     Hypertension     Vitamin B12 deficiency 2014    Vitamin D deficiency 2014     Past Surgical History:   Procedure Laterality Date    COLONOSCOPY  2014    normal exam    ESOPHAGOGASTRODUODENOSCOPY  2015    biopsy taken,     GASTRIC BYPASS      HEMORROIDECTOMY  06/2016    VASECTOMY       Family History   Problem Relation Age of Onset    Asthma Mother     Hypertension Mother     Diabetes Mother         MELLITUS    Coronary artery disease Mother     Lung cancer Mother         COD    Diabetes Father         MELLITUS    Hypertension Father     Cancer Sister     Cancer Brother      History   Smoking Status    Never Smoker   Smokeless Tobacco    Never Used     Comment: NO TOBACCO USE     History   Alcohol Use    1 8 oz/week    3 Cans of beer per week     Comment: "socially"      History   Drug Use No       Current Outpatient Prescriptions   Medication Sig Dispense Refill    acetaminophen (TYLENOL) 650 mg CR tablet every 8 (eight) hours      ARIPiprazole (ABILIFY) 5 mg tablet       calcium citrate (CALCITRATE) 950 MG tablet Take 1 tablet by mouth daily      cholecalciferol (VITAMIN D3) 1,000 units tablet Take 1,000 Units by mouth daily      cyanocobalamin 500 MCG tablet Take 500 mcg by mouth daily      DULoxetine (CYMBALTA) 60 mg delayed release capsule Take 20 mg by mouth daily      meloxicam (MOBIC) 15 mg tablet every 24 hours      Multiple Vitamins-Minerals (MULTIVITAMIN ADULT PO) take 2 tablets by oral route daily      Topiramate ER 25 MG CP24 Take 1 capsule (25 mg total) by mouth daily 60 capsule 5    traZODone (DESYREL) 100 mg tablet take 2 tablet by oral route  every night      zolpidem (AMBIEN) 5 mg tablet every 24 hours       No current facility-administered medications for this visit  No Known Allergies  Immunization History   Administered Date(s) Administered    Influenza 10/12/2015, 12/21/2017    Influenza Split 11/06/2013    Influenza Split High Dose Preservative Free IM 12/21/2016    Influenza TIV (IM) 10/06/2014       Patient Care Team:  Luis Street MD as PCP - General (Family Medicine)    Medicare Screening Tests and Risk Assessments:  LACY is here for his Subsequent Wellness visit  Health Risk Assessment:  Patient rates overall health as good  Patient feels that their physical health rating is Same  Eyesight was rated as Same  Hearing was rated as Same  Pain experienced by patient in the last 7 days has been A lot  Patient's pain rating has been 6/10  Patient states that he has experienced weight loss or gain in last 6 months  Emotional/Mental Health:  Patient has been feeling nervous/anxious  PHQ-9 Depression Screening:    Frequency of the following problems over the past two weeks:      1  Little interest or pleasure in doing things: 0 - not at all      2  Feeling down, depressed, or hopeless: 0 - not at all  PHQ-2 Score: 0          Broken Bones/Falls: Fall Risk Assessment:    In the past year, patient has experienced: No history of falling in past year          Bladder/Bowel:  Patient has not leaked urine accidently in the last six months  Patient reports no loss of bowel control  Immunizations:  Patient has had a flu vaccination within the last year  Patient has not received a pneumonia shot  Patient has not received a shingles shot  Patient has received tetanus/diphtheria shot  (Additional Comments: Patient believes he has Tdap over 10 years ago)    Home Safety:  Patient has trouble with stairs inside or outside of their home  Patient currently reports that there are no safety hazards present in home, working smoke alarms, working carbon monoxide detectors  Preventative Screenings:   no prostate cancer screen performed, no colon cancer screen completed, cholesterol screen completed, glaucoma eye exam completed, (Additional Comments: Patient states that he is due for colonoscopy this year)    Nutrition:  Current diet: Regular with servings of the following:    Medications:  Patient is not currently taking any over-the-counter supplements  Patient is able to manage medications  Lifestyle Choices:  Patient reports no tobacco use  Patient has smoked or used tobacco in the past   Patient has not stopped tobacco use  Patient reports alcohol use  Alcohol use per week: social  Patient drives a vehicle  Patient does not wear seat belt  Current level of exercise of physical activity described by patient as: Patient walks a couple hours a week  (Additional Comments: Patient smokes Hookah)    Activities of Daily Living:  Can get out of bed by his or her self, able to dress self, able to make own meals, able to do own shopping, able to bathe self, can do own laundry/housekeeping, can manage own money, pay bills and track expenses    Previous Hospitalizations:  No hospitalization or ED visit in past 12 months        Advanced Directives:  Patient has decided on a power of   Patient has spoken to designated power of   Patient has not completed advanced directive          Preventative Screening/Counseling: Cardiovascular:      General: Risks and Benefits Discussed      Counseling: Healthy Diet, Healthy Weight and Improve Cholesterol     Due for Labs/Analytes/Optional EKG: Lipid Panel          Diabetes:      General: Risks and Benefits Discussed      Counseling: Healthy Diet, Healthy Weight and Improve Physical Activity      Due for labs: Blood Glucose          Colorectal Cancer:      General: Screening Current      Counseling: high fiber diet          Prostate Cancer:      General: Risks and Benefits Discussed      Due for labs: PSA          Osteoporosis:      General: Screening Not Indicated          AAA:      General: Screening Not Indicated          Glaucoma:      General: Screening Current      Comments: Patient has glaucoma and was treated with laser  HIV:      General: Screening Current      Counseling: HIV Prevention and Condom Use          Hepatitis C:      General: Risks and Benefits Discussed      Counseling: has received general HCV counseling        Advanced Directives:   Patient has no living will for healthcare, does not have durable POA for healthcare, patient does not have an advanced directive  Information on ACP and/or AD not provided  5 wishes given  No end of life assessment reviewed with patient  Provider does not agree with end of life deisions  Additional Comments: · Wish 1: The Person I Want to Make Care Decisions for Me When I Can't    Wife  · Wish 2: The Kind of Medical Treatment I Want or Don't Want    none  · Wish 3: How Comfortable I Want to Be   no pain  · Wish 4: How I Want People to Treat Me   fair  · Wish 5: What I Want My Loved Ones to Know   If I have cancer      Immunizations:  Patient reviewed and up to date      Other Preventative Counseling (Non-Medicare):  Alcohol Use, Increase physical activity, Nutrition Counseling and Weight reduction discussed

## 2019-01-16 NOTE — PROGRESS NOTES
Assessment/Plan:          1  Chronic pain of left knee  I passed surgery in the past, is unable to take NSAIDs  Acetaminophen is not helping with pain of arms and knees  Combined some FM with tramadol for better pain control   - traMADol-acetaminophen (ULTRACET) 37 5-325 mg per tablet; Take 1 tablet by mouth every 8 (eight) hours as needed for moderate pain  Dispense: 60 tablet; Refill: 5  - ergocalciferol (VITAMIN D2) 50,000 units; Take 1 capsule (50,000 Units total) by mouth once a week  Dispense: 4 capsule; Refill: 5    2  Uncontrolled type 2 diabetes mellitus with hyperglycemia ([address] )  I explained to patient the goals of blood sugar levels during fasting  and after meals  Education given verbally and with written materials  Change of life style modification again stressed  The vascular complications secondary to diabetes poor control were explained with details  The renal function protection and the prevention of major vascular disease with the use of ACEI's and/or ARB  and the use of statins respectively and exercise/diet was also discussed  - HEMOGLOBIN A1C W/ EAG ESTIMATION; Future  - Microalbumin / creatinine urine ratio    No problem-specific Assessment & Plan notes found for this encounter  Diagnoses and all orders for this visit:    Needs flu shot  -     PREFERRED: influenza vaccine, [id number], quadrivalent, recombinant, PF, 0 5 mL, for patients 18 yr+ (FLUBLOK)    Encounter for general adult medical examination with abnormal findings  -     POCT ECG  -     Comprehensive metabolic panel; Future  -     CBC and differential; Future  -     Lipid panel; Future    Need for hepatitis C screening test  -     Hepatitis C antibody; Future    Mixed hyperlipidemia    Chronic pain of left knee  -     traMADol-acetaminophen (ULTRACET) 37 5-325 mg per tablet; Take 1 tablet by mouth every 8 (eight) hours as needed for moderate pain  -     ergocalciferol (VITAMIN D2) 50,000 units;  Take 1 capsule (50,000 Units total) by mouth once a week    Uncontrolled type 2 diabetes mellitus with hyperglycemia (HCC)  -     HEMOGLOBIN A1C W/ EAG ESTIMATION; Future  -     Microalbumin / creatinine urine ratio          Subjective:      Patient ID: Everardo Lema is a 52 y o  male  Patient suffers of osteoarthritis for 34 years  Today his major complaint is left knee pain  He also suffers of hands pain that is worsening with his new job  Level pain is 7/10  Despite the use of acetaminophen and meloxicam the is causing stomach issues  Patient has history of gastric bypass  The following portions of the patient's history were reviewed and updated as appropriate: allergies, current medications, past family history, past medical history, past social history, past surgical history and problem list     Review of Systems   Constitutional: Negative  HENT: Negative  Eyes: Negative  Respiratory: Negative  Cardiovascular: Negative  Musculoskeletal: Positive for arthralgias, back pain, gait problem, joint swelling, myalgias, neck pain and neck stiffness (All above from osteoarthritis  I reviewed three views bone scan that he has copy of the films that shows active osteoarthritis multiple places including cervical spine shoulders hands and knees)  Skin: Negative  Psychiatric/Behavioral: Negative  Objective:      /70 (BP Location: Left arm, Patient Position: Sitting, Cuff Size: Standard)   Pulse 59   Temp 98 °F (36 7 °C) (Oral)   Resp 16   Ht 5' 7" (1 702 m)   Wt 100 kg (221 lb)   SpO2 96%   BMI 34 61 kg/m²          Physical Exam   Constitutional: He is oriented to person, place, and time  He is not intubated  Neck: No JVD present  No tracheal tenderness present  Carotid bruit is not present  No neck rigidity  No edema present  No thyroid mass and no thyromegaly present  Cardiovascular: Normal rate, regular rhythm, normal heart sounds and normal pulses     No extrasystoles are present  Exam reveals no distant heart sounds and no friction rub  Pulmonary/Chest: Effort normal and breath sounds normal  No stridor  No apnea, no tachypnea and no bradypnea  He is not intubated  Abdominal: Soft  Bowel sounds are normal  He exhibits no abdominal bruit  There is no hepatosplenomegaly, splenomegaly or hepatomegaly  There is no CVA tenderness  No hernia  Musculoskeletal:        Right wrist: He exhibits decreased range of motion, tenderness and deformity  Left knee: He exhibits decreased range of motion, swelling and deformity  Tenderness found  Neurological: He is alert and oriented to person, place, and time  He has normal reflexes  Skin: Skin is warm and dry  Psychiatric: He has a normal mood and affect   His behavior is normal  Judgment and thought content normal

## 2019-01-17 ENCOUNTER — TELEPHONE (OUTPATIENT)
Dept: FAMILY MEDICINE CLINIC | Facility: CLINIC | Age: 54
End: 2019-01-17

## 2019-02-28 ENCOUNTER — HOSPITAL ENCOUNTER (EMERGENCY)
Facility: HOSPITAL | Age: 54
Discharge: HOME | End: 2019-02-28
Attending: EMERGENCY MEDICINE | Admitting: EMERGENCY MEDICINE
Payer: COMMERCIAL

## 2019-02-28 ENCOUNTER — APPOINTMENT (EMERGENCY)
Dept: CT IMAGING | Facility: HOSPITAL | Age: 54
End: 2019-02-28
Payer: COMMERCIAL

## 2019-02-28 VITALS
TEMPERATURE: 97.8 F | BODY MASS INDEX: 32.43 KG/M2 | HEART RATE: 72 BPM | OXYGEN SATURATION: 98 % | DIASTOLIC BLOOD PRESSURE: 66 MMHG | WEIGHT: 214 LBS | RESPIRATION RATE: 16 BRPM | SYSTOLIC BLOOD PRESSURE: 120 MMHG | HEIGHT: 68 IN

## 2019-02-28 DIAGNOSIS — K52.9 GASTROENTERITIS: Primary | ICD-10-CM

## 2019-02-28 LAB
ALBUMIN SERPL BCP-MCNC: 4.2 G/DL (ref 3–5.2)
ALP SERPL-CCNC: 83 U/L (ref 43–122)
ALT SERPL W P-5'-P-CCNC: 22 U/L (ref 9–52)
ANION GAP SERPL CALCULATED.3IONS-SCNC: 7 MMOL/L (ref 5–14)
AST SERPL W P-5'-P-CCNC: 28 U/L (ref 17–59)
BILIRUB SERPL-MCNC: 0.9 MG/DL
BUN SERPL-MCNC: 10 MG/DL (ref 5–25)
CALCIUM SERPL-MCNC: 9.2 MG/DL (ref 8.4–10.2)
CHLORIDE SERPL-SCNC: 101 MMOL/L (ref 97–108)
CO2 SERPL-SCNC: 28 MMOL/L (ref 22–30)
CREAT SERPL-MCNC: 1.19 MG/DL (ref 0.7–1.5)
ERYTHROCYTE [DISTWIDTH] IN BLOOD BY AUTOMATED COUNT: 16.3 %
GFR SERPL CREATININE-BSD FRML MDRD: 68 ML/MIN/1.73SQ M
GLUCOSE SERPL-MCNC: 84 MG/DL (ref 70–99)
HCT VFR BLD AUTO: 42.8 % (ref 41–53)
HGB BLD-MCNC: 14.1 G/DL (ref 13.5–17.5)
LIPASE SERPL-CCNC: 54 U/L (ref 23–300)
LYMPHOCYTES # BLD AUTO: 1.89 THOUSAND/UL (ref 0.5–4)
LYMPHOCYTES # BLD AUTO: 41 % (ref 25–45)
MCH RBC QN AUTO: 28.8 PG (ref 26–34)
MCHC RBC AUTO-ENTMCNC: 32.9 G/DL (ref 31–36)
MCV RBC AUTO: 88 FL (ref 80–100)
MONOCYTES # BLD AUTO: 0.69 THOUSAND/UL (ref 0.2–0.9)
MONOCYTES NFR BLD AUTO: 15 % (ref 1–10)
NEUTS BAND NFR BLD MANUAL: 6 % (ref 0–8)
NEUTS SEG # BLD: 2.02 THOUSAND/UL (ref 1.8–7.8)
NEUTS SEG NFR BLD AUTO: 38 %
PLATELET # BLD AUTO: 163 THOUSANDS/UL (ref 150–450)
PLATELET BLD QL SMEAR: ADEQUATE
PMV BLD AUTO: 8.8 FL (ref 8.9–12.7)
POTASSIUM SERPL-SCNC: 3.8 MMOL/L (ref 3.6–5)
PROT SERPL-MCNC: 7.7 G/DL (ref 5.9–8.4)
RBC # BLD AUTO: 4.88 MILLION/UL (ref 4.5–5.9)
RBC MORPH BLD: NORMAL
SODIUM SERPL-SCNC: 136 MMOL/L (ref 137–147)
TOTAL CELLS COUNTED SPEC: 100
WBC # BLD AUTO: 4.6 THOUSAND/UL (ref 4.5–11)

## 2019-02-28 PROCEDURE — 80053 COMPREHEN METABOLIC PANEL: CPT | Performed by: PHYSICIAN ASSISTANT

## 2019-02-28 PROCEDURE — 96361 HYDRATE IV INFUSION ADD-ON: CPT

## 2019-02-28 PROCEDURE — 99284 EMERGENCY DEPT VISIT MOD MDM: CPT

## 2019-02-28 PROCEDURE — 85007 BL SMEAR W/DIFF WBC COUNT: CPT | Performed by: PHYSICIAN ASSISTANT

## 2019-02-28 PROCEDURE — 96374 THER/PROPH/DIAG INJ IV PUSH: CPT

## 2019-02-28 PROCEDURE — 83690 ASSAY OF LIPASE: CPT | Performed by: PHYSICIAN ASSISTANT

## 2019-02-28 PROCEDURE — 36415 COLL VENOUS BLD VENIPUNCTURE: CPT | Performed by: PHYSICIAN ASSISTANT

## 2019-02-28 PROCEDURE — 85027 COMPLETE CBC AUTOMATED: CPT | Performed by: PHYSICIAN ASSISTANT

## 2019-02-28 PROCEDURE — 74177 CT ABD & PELVIS W/CONTRAST: CPT

## 2019-02-28 RX ORDER — MAGNESIUM HYDROXIDE/ALUMINUM HYDROXICE/SIMETHICONE 120; 1200; 1200 MG/30ML; MG/30ML; MG/30ML
30 SUSPENSION ORAL ONCE
Status: COMPLETED | OUTPATIENT
Start: 2019-02-28 | End: 2019-02-28

## 2019-02-28 RX ORDER — SODIUM CHLORIDE 9 MG/ML
250 INJECTION, SOLUTION INTRAVENOUS CONTINUOUS
Status: DISCONTINUED | OUTPATIENT
Start: 2019-02-28 | End: 2019-02-28 | Stop reason: HOSPADM

## 2019-02-28 RX ORDER — ACETAMINOPHEN 325 MG/1
650 TABLET ORAL ONCE
Status: COMPLETED | OUTPATIENT
Start: 2019-02-28 | End: 2019-02-28

## 2019-02-28 RX ADMIN — IOHEXOL 50 ML: 240 INJECTION, SOLUTION INTRATHECAL; INTRAVASCULAR; INTRAVENOUS; ORAL at 16:58

## 2019-02-28 RX ADMIN — IOHEXOL 100 ML: 350 INJECTION, SOLUTION INTRAVENOUS at 18:21

## 2019-02-28 RX ADMIN — ALUMINUM HYDROXIDE, MAGNESIUM HYDROXIDE, AND SIMETHICONE 30 ML: 200; 200; 20 SUSPENSION ORAL at 16:35

## 2019-02-28 RX ADMIN — SODIUM CHLORIDE 250 ML/HR: 9 INJECTION, SOLUTION INTRAVENOUS at 16:42

## 2019-02-28 RX ADMIN — ACETAMINOPHEN 650 MG: 325 TABLET ORAL at 16:35

## 2019-02-28 RX ADMIN — FAMOTIDINE 20 MG: 10 INJECTION, SOLUTION INTRAVENOUS at 16:42

## 2019-02-28 NOTE — ED PROVIDER NOTES
History  Chief Complaint   Patient presents with    Diarrhea     pt states he has had diarrhea x2 days  pt states he also has "gas pains"  pt denies N/V  History provided by:  Patient   used: No    Medical Problem   Location:  Pt with abdomen pain and diarrhea 5-6 times a day for 2-3 days   Severity:  Mild  Onset quality:  Gradual  Duration:  2 days  Timing:  Constant  Progression:  Unchanged  Chronicity:  New  Associated symptoms: abdominal pain and diarrhea    Associated symptoms: no chest pain, no congestion, no cough, no ear pain, no fatigue, no fever, no headaches, no loss of consciousness, no myalgias, no nausea, no rash, no rhinorrhea, no shortness of breath, no sore throat, no vomiting and no wheezing        Prior to Admission Medications   Prescriptions Last Dose Informant Patient Reported? Taking?    ARIPiprazole (ABILIFY) 5 mg tablet   Yes No   DULoxetine (CYMBALTA) 60 mg delayed release capsule   Yes No   Sig: Take 20 mg by mouth daily   Multiple Vitamins-Minerals (MULTIVITAMIN ADULT PO)   Yes No   Sig: take 2 tablets by oral route daily   Topiramate ER 25 MG CP24   No No   Sig: Take 1 capsule (25 mg total) by mouth daily   calcium citrate (CALCITRATE) 950 MG tablet   Yes No   Sig: Take 1 tablet by mouth daily   cyanocobalamin 500 MCG tablet   Yes No   Sig: Take 500 mcg by mouth daily   ergocalciferol (VITAMIN D2) 50,000 units   No No   Sig: Take 1 capsule (50,000 Units total) by mouth once a week   traMADol-acetaminophen (ULTRACET) 37 5-325 mg per tablet   No No   Sig: Take 1 tablet by mouth every 8 (eight) hours as needed for moderate pain   traZODone (DESYREL) 100 mg tablet   Yes No   Sig: take 2 tablet by oral route  every night   zolpidem (AMBIEN) 5 mg tablet   Yes No   Sig: every 24 hours      Facility-Administered Medications: None       Past Medical History:   Diagnosis Date    Hyperlipidemia     Hypertension     Vitamin B12 deficiency 2014    Vitamin D deficiency 2014       Past Surgical History:   Procedure Laterality Date    COLONOSCOPY  2014    normal exam    ESOPHAGOGASTRODUODENOSCOPY  2015    biopsy taken,     GASTRIC BYPASS      HEMORROIDECTOMY  06/2016    VASECTOMY         Family History   Problem Relation Age of Onset    Asthma Mother     Hypertension Mother     Diabetes Mother         MELLITUS    Coronary artery disease Mother     Lung cancer Mother         COD    Diabetes Father         MELLITUS    Hypertension Father     Cancer Sister     Cancer Brother      I have reviewed and agree with the history as documented  Social History     Tobacco Use    Smoking status: Never Smoker    Smokeless tobacco: Never Used    Tobacco comment: NO TOBACCO USE   Substance Use Topics    Alcohol use: Yes     Alcohol/week: 1 8 oz     Types: 3 Cans of beer per week     Comment: "socially"    Drug use: No        Review of Systems   Constitutional: Negative  Negative for fatigue and fever  HENT: Negative  Negative for congestion, ear pain, rhinorrhea and sore throat  Eyes: Negative  Respiratory: Negative  Negative for cough, shortness of breath and wheezing  Cardiovascular: Negative  Negative for chest pain  Gastrointestinal: Positive for abdominal pain and diarrhea  Negative for nausea and vomiting  Endocrine: Negative  Genitourinary: Negative  Musculoskeletal: Negative  Negative for myalgias  Skin: Negative  Negative for rash  Allergic/Immunologic: Negative  Neurological: Negative  Negative for loss of consciousness and headaches  Hematological: Negative  Psychiatric/Behavioral: Negative  All other systems reviewed and are negative  Physical Exam  Physical Exam   Constitutional: He appears well-developed and well-nourished  HENT:   Head: Normocephalic and atraumatic     Right Ear: External ear normal    Left Ear: External ear normal    Nose: Nose normal    Mouth/Throat: Oropharynx is clear and moist    Eyes: Pupils are equal, round, and reactive to light  Conjunctivae and EOM are normal    Neck: Normal range of motion  Neck supple  Cardiovascular: Normal rate, regular rhythm and normal heart sounds  Pulmonary/Chest: Effort normal and breath sounds normal    Abdominal: Soft  Bowel sounds are normal    Musculoskeletal: Normal range of motion  Neurological: He is alert  Skin: Skin is warm  Psychiatric: He has a normal mood and affect  His behavior is normal    Nursing note and vitals reviewed        Vital Signs  ED Triage Vitals   Temperature Pulse Respirations Blood Pressure SpO2   02/28/19 1512 02/28/19 1512 02/28/19 1512 02/28/19 1514 02/28/19 1512   97 8 °F (36 6 °C) 79 18 125/79 98 %      Temp Source Heart Rate Source Patient Position - Orthostatic VS BP Location FiO2 (%)   02/28/19 1512 02/28/19 1512 02/28/19 1512 02/28/19 1512 --   Tympanic Monitor Sitting Left arm       Pain Score       --                  Vitals:    02/28/19 1512 02/28/19 1514 02/28/19 1947   BP:  125/79 120/66   Pulse: 79  72   Patient Position - Orthostatic VS: Sitting Sitting Lying       Visual Acuity      ED Medications  Medications   sodium chloride 0 9 % infusion (0 mL/hr Intravenous Stopped 2/28/19 1937)   acetaminophen (TYLENOL) tablet 650 mg (650 mg Oral Given 2/28/19 1635)   aluminum-magnesium hydroxide-simethicone (MYLANTA) 200-200-20 mg/5 mL oral suspension 30 mL (30 mL Oral Given 2/28/19 1635)   famotidine (PEPCID) injection 20 mg (20 mg Intravenous Given 2/28/19 1642)   iohexol (OMNIPAQUE) 240 MG/ML solution 50 mL (50 mL Oral Given 2/28/19 1658)   iohexol (OMNIPAQUE) 350 MG/ML injection (MULTI-DOSE) 100 mL (100 mL Intravenous Given 2/28/19 1821)       Diagnostic Studies  Results Reviewed     Procedure Component Value Units Date/Time    Lipase [[id number]]  (Normal) Collected:  02/28/19 1641    Lab Status:  Final result Specimen:  Blood from Arm, Right Updated:  02/28/19 1707     Lipase 54 u/L     Comprehensive metabolic panel [[id number]]  (Abnormal) Collected:  02/28/19 1641    Lab Status:  Final result Specimen:  Blood from Arm, Right Updated:  02/28/19 1707     Sodium 136 mmol/L      Potassium 3 8 mmol/L      Chloride 101 mmol/L      CO2 28 mmol/L      ANION GAP 7 mmol/L      BUN 10 mg/dL      Creatinine 1 19 mg/dL      Glucose 84 mg/dL      Calcium 9 2 mg/dL      AST 28 U/L      ALT 22 U/L      Alkaline Phosphatase 83 U/L      Total Protein 7 7 g/dL      Albumin 4 2 g/dL      Total Bilirubin 0 90 mg/dL      eGFR 68 ml/min/1 73sq m     Narrative:       National Kidney Disease Education Program recommendations are as follows:  GFR calculation is accurate only with a steady state creatinine  Chronic Kidney disease less than 60 ml/min/1 73 sq  meters  Kidney failure less than 15 ml/min/1 73 sq  meters  CBC and differential [[id number]]  (Abnormal) Collected:  02/28/19 1641    Lab Status:  Final result Specimen:  Blood from Arm, Right Updated:  02/28/19 1707     WBC 4 60 Thousand/uL      RBC 4 88 Million/uL      Hemoglobin 14 1 g/dL      Hematocrit 42 8 %      MCV 88 fL      MCH 28 8 pg      MCHC 32 9 g/dL      RDW 16 3 %      MPV 8 8 fL      Platelets 199 Thousands/uL     UA w Reflex to Microscopic w Reflex to Culture [[id number]]     Lab Status:  No result Specimen:  Urine, Clean Catch     UA w Reflex to Microscopic w Reflex to Culture [[id number]]     Lab Status:  No result Specimen:  Urine, Clean Catch     Stool Enteric Bacterial Panel by PCR [[id number]]     Lab Status:  No result Specimen:  Stool                  CT abdomen pelvis with contrast   Final Result by Gemma Stokes MD (02/28 1840)      No acute pathology              Workstation performed: [id number]                    Procedures  Procedures       Phone Contacts  ED Phone Contact    ED Course                               MDM    Disposition  Final diagnoses:   Gastroenteritis     Time reflects when diagnosis was documented in both MDM as applicable and the Disposition within this note     Time User Action Codes Description Comment    2/28/2019  7:34 PM Shun Perez  Add [K52 9] Gastroenteritis       ED Disposition     ED Disposition Condition Date/Time Comment    Discharge Stable u Feb 28, 2019  7:35 PM [facility] discharge to home/self care  Follow-up Information     Follow up With Specialties Details Why Contact Info    Concetta Kelly MD [address] [facility]  [address] [facility]  [address]  [address] [address]            Discharge Medication List as of 2/28/2019  7:35 PM      CONTINUE these medications which have NOT CHANGED    Details   ARIPiprazole (ABILIFY) 5 mg tablet Starting Mon 6/12/2017, Historical Med      calcium citrate (CALCITRATE) 950 MG tablet Take 1 tablet by mouth daily, Historical Med      cyanocobalamin 500 MCG tablet Take 500 mcg by mouth daily, Historical Med      DULoxetine (CYMBALTA) 60 mg delayed release capsule Take 20 mg by mouth daily, Historical Med      ergocalciferol (VITAMIN D2) 50,000 units Take 1 capsule (50,000 Units total) by mouth once a week, Starting Wed 1/16/2019, Print      Multiple Vitamins-Minerals (MULTIVITAMIN ADULT PO) take 2 tablets by oral route daily, Historical Med      Topiramate ER 25 MG CP24 Take 1 capsule (25 mg total) by mouth daily, Starting Wed 9/19/2018, Print      traMADol-acetaminophen (ULTRACET) 37 5-325 mg per tablet Take 1 tablet by mouth every 8 (eight) hours as needed for moderate pain, Starting Wed 1/16/2019, Print      traZODone (DESYREL) 100 mg tablet take 2 tablet by oral route  every night, Historical Med      zolpidem (AMBIEN) 5 mg tablet every 24 hours, Historical Med           No discharge procedures on file      ED Provider  Electronically Signed by           Luisa Monteiro PA-C  02/28/19 5532

## 2019-03-04 ENCOUNTER — OFFICE VISIT (OUTPATIENT)
Dept: FAMILY MEDICINE CLINIC | Facility: CLINIC | Age: 54
End: 2019-03-04
Payer: COMMERCIAL

## 2019-03-04 VITALS
WEIGHT: 215 LBS | DIASTOLIC BLOOD PRESSURE: 80 MMHG | HEART RATE: 65 BPM | BODY MASS INDEX: 32.58 KG/M2 | SYSTOLIC BLOOD PRESSURE: 122 MMHG | HEIGHT: 68 IN | OXYGEN SATURATION: 98 % | RESPIRATION RATE: 16 BRPM | TEMPERATURE: 97.6 F

## 2019-03-04 DIAGNOSIS — M53.3 SACROILIAC JOINT PAIN: ICD-10-CM

## 2019-03-04 DIAGNOSIS — E66.9 OBESITY (BMI 30.0-34.9): Primary | ICD-10-CM

## 2019-03-04 PROCEDURE — 99214 OFFICE O/P EST MOD 30 MIN: CPT | Performed by: FAMILY MEDICINE

## 2019-03-04 PROCEDURE — 1036F TOBACCO NON-USER: CPT | Performed by: FAMILY MEDICINE

## 2019-03-04 PROCEDURE — 3008F BODY MASS INDEX DOCD: CPT | Performed by: FAMILY MEDICINE

## 2019-03-04 NOTE — PROGRESS NOTES
Assessment/Plan:    No problem-specific Assessment & Plan notes found for this encounter  Diagnoses and all orders for this visit:    Obesity (BMI 30 0-34  9)          Subjective:      Patient ID: Everardo Lema is a 52 y o  male  Cc  Per pt  Follow up gastritis and back pain  The pain is localized in the right side of the lower back and radiated to the right buttock and posterior area of the thigh  He tried ibuprofen because stoma this could four, patient is post bariatric surgery  Tylenol did not help  He went to the emergency room for this condition  Back Pain   Pertinent negatives include no abdominal pain, chest pain, dysuria, fever or headaches  The following portions of the patient's history were reviewed and updated as appropriate: allergies, current medications, past family history, past medical history, past social history, past surgical history and problem list     Review of Systems   Constitutional: Negative for activity change, appetite change, fatigue and fever  HENT: Negative for congestion, dental problem, ear pain, sinus pain and trouble swallowing  Eyes: Negative for discharge, redness and itching  Respiratory: Negative for cough, shortness of breath and wheezing  Cardiovascular: Negative for chest pain  Gastrointestinal: Negative for abdominal distention and abdominal pain  Genitourinary: Negative for decreased urine volume, difficulty urinating, dysuria, enuresis, frequency, penile pain, penile swelling, testicular pain and urgency  Musculoskeletal: Positive for back pain  Negative for arthralgias and gait problem  Neurological: Negative for dizziness and headaches  Hematological: Negative for adenopathy  Does not bruise/bleed easily  Psychiatric/Behavioral: Negative for behavioral problems and suicidal ideas           Objective:      /80 (BP Location: Left arm, Patient Position: Sitting, Cuff Size: Standard)   Pulse 65   Temp 97 6 °F (36 4 °C) (Oral)   Resp 16   Ht 5' 8" (1 727 m)   Wt 97 5 kg (215 lb)   SpO2 98%   BMI 32 69 kg/m²     Large joint arthrocentesis  Date/Time: 3/6/2019 9:22 AM  Consent given by: patient  Supporting Documentation  Indications: pain   Procedure Details  Location: hip -   Needle size: 25 G  Ultrasound guidance: no  Approach: posterior  Medications administered: 40 mg triamcinolone acetonide 40 mg/mL    Patient tolerance: patient tolerated the procedure well with no immediate complications  Dressing:  Sterile dressing applied           Physical Exam   Eyes: Pupils are equal, round, and reactive to light  No scleral icterus  Neck: No JVD present  No thyromegaly present  Pulmonary/Chest: No respiratory distress  He has no wheezes  He has no rales  Abdominal: Soft  He exhibits no distension  There is no tenderness  There is no rebound  Musculoskeletal: He exhibits tenderness (Right sacroiliac joint pain with induration in edema  )  Right shoulder: He exhibits decreased range of motion, tenderness, spasm and decreased strength  Lumbar back: He exhibits decreased range of motion, bony tenderness and deformity  He exhibits no spasm and normal pulse  Lymphadenopathy:     He has no cervical adenopathy  BMI Counseling: Body mass index is 32 69 kg/m²  Discussed the patient's BMI with him  The BMI is above average  BMI counseling and education was provided to the patient  Exercise recommendations include exercising 3-5 times per week

## 2019-03-06 PROBLEM — M53.3 SACROILIAC JOINT PAIN: Status: ACTIVE | Noted: 2019-03-06

## 2019-03-06 PROCEDURE — 20610 DRAIN/INJ JOINT/BURSA W/O US: CPT | Performed by: FAMILY MEDICINE

## 2019-03-06 RX ORDER — TRIAMCINOLONE ACETONIDE 40 MG/ML
40 INJECTION, SUSPENSION INTRA-ARTICULAR; INTRAMUSCULAR
Status: COMPLETED | OUTPATIENT
Start: 2019-03-06 | End: 2019-03-06

## 2019-03-06 RX ADMIN — TRIAMCINOLONE ACETONIDE 40 MG: 40 INJECTION, SUSPENSION INTRA-ARTICULAR; INTRAMUSCULAR at 09:22

## 2019-03-06 NOTE — ASSESSMENT & PLAN NOTE
INJECTED WITH TRIAMCINOLONE 80 MG AND LIDOCAINE 1% TO CC  PATIENT TOLERATED THE PROCEDURE WELL  PLEASE SEE PROCEDURE NOTE

## 2019-03-06 NOTE — ASSESSMENT & PLAN NOTE
He is a post bariatric patient  We discussed about the initial approach to the obese patient who desires to lose weight is diet and exercise, as recommended by the NIH Expert Panel in 1998   A combination of a reduced-calorie diet and exercise is more efficacious than either alone  Additional weight loss may be possible with some medication regimens  The initial goal of weight loss therapy (diet and exercise) is a 10% reduction in body weight over a 6-month period  After the initial 6-month period, we will reassess to determine the efficacy of the therapy, whether the patient needs to lose more weight, or whether a weight-maintenance program may be established

## 2019-03-07 DIAGNOSIS — M54.50 ACUTE BILATERAL LOW BACK PAIN WITHOUT SCIATICA: Primary | ICD-10-CM

## 2019-03-07 RX ORDER — METHOCARBAMOL 500 MG/1
500 TABLET, FILM COATED ORAL 4 TIMES DAILY
Qty: 30 TABLET | Refills: 0 | Status: SHIPPED | OUTPATIENT
Start: 2019-03-07 | End: 2019-04-17 | Stop reason: ALTCHOICE

## 2019-03-07 RX ORDER — OXYCODONE HYDROCHLORIDE AND ACETAMINOPHEN 5; 325 MG/1; MG/1
1 TABLET ORAL EVERY 8 HOURS PRN
Qty: 21 TABLET | Refills: 0 | Status: SHIPPED | OUTPATIENT
Start: 2019-03-07 | End: 2019-04-18

## 2019-03-07 NOTE — PROGRESS NOTES
Persistent pain in  Back      I reviewed [facility] no medications in file

## 2019-03-09 ENCOUNTER — APPOINTMENT (OUTPATIENT)
Dept: LAB | Facility: HOSPITAL | Age: 54
End: 2019-03-09
Payer: COMMERCIAL

## 2019-03-09 DIAGNOSIS — K91.2 POSTSURGICAL MALABSORPTION: ICD-10-CM

## 2019-03-09 DIAGNOSIS — Z11.59 NEED FOR HEPATITIS C SCREENING TEST: ICD-10-CM

## 2019-03-09 DIAGNOSIS — E11.65 UNCONTROLLED TYPE 2 DIABETES MELLITUS WITH HYPERGLYCEMIA (HCC): ICD-10-CM

## 2019-03-09 DIAGNOSIS — Z00.01 ENCOUNTER FOR GENERAL ADULT MEDICAL EXAMINATION WITH ABNORMAL FINDINGS: ICD-10-CM

## 2019-03-09 DIAGNOSIS — E53.8 VITAMIN B12 DEFICIENCY: ICD-10-CM

## 2019-03-09 LAB
ALBUMIN SERPL BCP-MCNC: 4.1 G/DL (ref 3–5.2)
ALP SERPL-CCNC: 69 U/L (ref 43–122)
ALT SERPL W P-5'-P-CCNC: 25 U/L (ref 9–52)
ANION GAP SERPL CALCULATED.3IONS-SCNC: 7 MMOL/L (ref 5–14)
AST SERPL W P-5'-P-CCNC: 27 U/L (ref 17–59)
BASOPHILS # BLD AUTO: 0 THOUSANDS/ΜL (ref 0–0.1)
BASOPHILS NFR BLD AUTO: 0 % (ref 0–1)
BILIRUB SERPL-MCNC: 0.5 MG/DL
BUN SERPL-MCNC: 13 MG/DL (ref 5–25)
CALCIUM SERPL-MCNC: 9.4 MG/DL (ref 8.4–10.2)
CHLORIDE SERPL-SCNC: 102 MMOL/L (ref 97–108)
CHOLEST SERPL-MCNC: 156 MG/DL
CO2 SERPL-SCNC: 30 MMOL/L (ref 22–30)
CREAT SERPL-MCNC: 1.01 MG/DL (ref 0.7–1.5)
EOSINOPHIL # BLD AUTO: 0.1 THOUSAND/ΜL (ref 0–0.4)
EOSINOPHIL NFR BLD AUTO: 1 % (ref 0–6)
ERYTHROCYTE [DISTWIDTH] IN BLOOD BY AUTOMATED COUNT: 16.8 %
EST. AVERAGE GLUCOSE BLD GHB EST-MCNC: 105 MG/DL
GFR SERPL CREATININE-BSD FRML MDRD: 83 ML/MIN/1.73SQ M
GLUCOSE P FAST SERPL-MCNC: 89 MG/DL (ref 70–99)
HBA1C MFR BLD: 5.3 % (ref 4.2–6.3)
HCT VFR BLD AUTO: 45.5 % (ref 41–53)
HCV AB SER QL: NORMAL
HDLC SERPL-MCNC: 41 MG/DL (ref 40–59)
HGB BLD-MCNC: 14.7 G/DL (ref 13.5–17.5)
LDLC SERPL CALC-MCNC: 100 MG/DL
LYMPHOCYTES # BLD AUTO: 2.1 THOUSANDS/ΜL (ref 0.5–4)
LYMPHOCYTES NFR BLD AUTO: 36 % (ref 25–45)
MCH RBC QN AUTO: 28.9 PG (ref 26–34)
MCHC RBC AUTO-ENTMCNC: 32.3 G/DL (ref 31–36)
MCV RBC AUTO: 90 FL (ref 80–100)
MONOCYTES # BLD AUTO: 0.6 THOUSAND/ΜL (ref 0.2–0.9)
MONOCYTES NFR BLD AUTO: 11 % (ref 1–10)
NEUTROPHILS # BLD AUTO: 3 THOUSANDS/ΜL (ref 1.8–7.8)
NEUTS SEG NFR BLD AUTO: 52 % (ref 45–65)
NONHDLC SERPL-MCNC: 115 MG/DL
PLATELET # BLD AUTO: 163 THOUSANDS/UL (ref 150–450)
PMV BLD AUTO: 9.9 FL (ref 8.9–12.7)
POTASSIUM SERPL-SCNC: 4.2 MMOL/L (ref 3.6–5)
PROT SERPL-MCNC: 7.5 G/DL (ref 5.9–8.4)
RBC # BLD AUTO: 5.08 MILLION/UL (ref 4.5–5.9)
SODIUM SERPL-SCNC: 139 MMOL/L (ref 137–147)
TRIGL SERPL-MCNC: 73 MG/DL
VIT B12 SERPL-MCNC: 380 PG/ML (ref 100–900)
WBC # BLD AUTO: 5.8 THOUSAND/UL (ref 4.5–11)

## 2019-03-09 PROCEDURE — 82607 VITAMIN B-12: CPT

## 2019-03-09 PROCEDURE — 85025 COMPLETE CBC W/AUTO DIFF WBC: CPT

## 2019-03-09 PROCEDURE — 36415 COLL VENOUS BLD VENIPUNCTURE: CPT

## 2019-03-09 PROCEDURE — 80053 COMPREHEN METABOLIC PANEL: CPT

## 2019-03-09 PROCEDURE — 86803 HEPATITIS C AB TEST: CPT

## 2019-03-09 PROCEDURE — 80061 LIPID PANEL: CPT

## 2019-03-09 PROCEDURE — 83036 HEMOGLOBIN GLYCOSYLATED A1C: CPT

## 2019-03-12 DIAGNOSIS — E53.8 LOW VITAMIN B12 LEVEL: Primary | ICD-10-CM

## 2019-03-14 DIAGNOSIS — E53.8 LOW VITAMIN B12 LEVEL: Primary | ICD-10-CM

## 2019-03-14 DIAGNOSIS — E53.8 LOW VITAMIN B12 LEVEL: ICD-10-CM

## 2019-03-14 RX ORDER — CYANOCOBALAMIN (VITAMIN B-12) 500 MCG
1000 TABLET ORAL DAILY
Qty: 90 TABLET | Refills: 3 | Status: ON HOLD | OUTPATIENT
Start: 2019-03-14 | End: 2019-04-26 | Stop reason: ALTCHOICE

## 2019-03-14 RX ORDER — LANOLIN ALCOHOL/MO/W.PET/CERES
1000 CREAM (GRAM) TOPICAL DAILY
Qty: 90 TABLET | Refills: 3 | Status: SHIPPED | OUTPATIENT
Start: 2019-03-14 | End: 2019-03-14 | Stop reason: CLARIF

## 2019-04-03 ENCOUNTER — TRANSCRIBE ORDERS (OUTPATIENT)
Dept: ADMINISTRATIVE | Facility: HOSPITAL | Age: 54
End: 2019-04-03

## 2019-04-03 ENCOUNTER — APPOINTMENT (OUTPATIENT)
Dept: LAB | Facility: HOSPITAL | Age: 54
End: 2019-04-03
Payer: COMMERCIAL

## 2019-04-03 DIAGNOSIS — F33.2 SEVERE RECURRENT MAJOR DEPRESSION WITHOUT PSYCHOTIC FEATURES (HCC): ICD-10-CM

## 2019-04-03 DIAGNOSIS — F33.2 SEVERE RECURRENT MAJOR DEPRESSION WITHOUT PSYCHOTIC FEATURES (HCC): Primary | ICD-10-CM

## 2019-04-03 DIAGNOSIS — Z79.899 ENCOUNTER FOR LONG-TERM (CURRENT) USE OF MEDICATIONS: ICD-10-CM

## 2019-04-03 DIAGNOSIS — E88.81 DYSMETABOLIC SYNDROME X: ICD-10-CM

## 2019-04-03 LAB
25(OH)D3 SERPL-MCNC: 19.3 NG/ML (ref 30–100)
ALBUMIN SERPL BCP-MCNC: 4.1 G/DL (ref 3–5.2)
ALP SERPL-CCNC: 75 U/L (ref 43–122)
ALT SERPL W P-5'-P-CCNC: 22 U/L (ref 9–52)
ANION GAP SERPL CALCULATED.3IONS-SCNC: 5 MMOL/L (ref 5–14)
AST SERPL W P-5'-P-CCNC: 27 U/L (ref 17–59)
BASOPHILS # BLD AUTO: 0 THOUSANDS/ΜL (ref 0–0.1)
BASOPHILS NFR BLD AUTO: 1 % (ref 0–1)
BILIRUB SERPL-MCNC: 0.7 MG/DL
BUN SERPL-MCNC: 13 MG/DL (ref 5–25)
CALCIUM SERPL-MCNC: 9.5 MG/DL (ref 8.4–10.2)
CAMPYLOBACTER DNA SPEC NAA+PROBE: DETECTED
CHLORIDE SERPL-SCNC: 101 MMOL/L (ref 97–108)
CHOLEST SERPL-MCNC: 210 MG/DL
CO2 SERPL-SCNC: 31 MMOL/L (ref 22–30)
CREAT SERPL-MCNC: 1.01 MG/DL (ref 0.7–1.5)
CREAT UR-MCNC: 108 MG/DL
EOSINOPHIL # BLD AUTO: 0 THOUSAND/ΜL (ref 0–0.4)
EOSINOPHIL NFR BLD AUTO: 1 % (ref 0–6)
ERYTHROCYTE [DISTWIDTH] IN BLOOD BY AUTOMATED COUNT: 15.8 %
GFR SERPL CREATININE-BSD FRML MDRD: 83 ML/MIN/1.73SQ M
GLUCOSE P FAST SERPL-MCNC: 93 MG/DL (ref 70–99)
HCT VFR BLD AUTO: 44.8 % (ref 41–53)
HDLC SERPL-MCNC: 62 MG/DL (ref 40–59)
HGB BLD-MCNC: 14.7 G/DL (ref 13.5–17.5)
LDLC SERPL CALC-MCNC: 130 MG/DL
LYMPHOCYTES # BLD AUTO: 1.9 THOUSANDS/ΜL (ref 0.5–4)
LYMPHOCYTES NFR BLD AUTO: 40 % (ref 25–45)
MCH RBC QN AUTO: 29.8 PG (ref 26–34)
MCHC RBC AUTO-ENTMCNC: 32.8 G/DL (ref 31–36)
MCV RBC AUTO: 91 FL (ref 80–100)
MICROALBUMIN UR-MCNC: 7.7 MG/L (ref 0–20)
MICROALBUMIN/CREAT 24H UR: 7 MG/G CREATININE (ref 0–30)
MONOCYTES # BLD AUTO: 0.4 THOUSAND/ΜL (ref 0.2–0.9)
MONOCYTES NFR BLD AUTO: 10 % (ref 1–10)
NEUTROPHILS # BLD AUTO: 2.3 THOUSANDS/ΜL (ref 1.8–7.8)
NEUTS SEG NFR BLD AUTO: 49 % (ref 45–65)
NONHDLC SERPL-MCNC: 148 MG/DL
PLATELET # BLD AUTO: 151 THOUSANDS/UL (ref 150–450)
PMV BLD AUTO: 9.5 FL (ref 8.9–12.7)
POTASSIUM SERPL-SCNC: 3.7 MMOL/L (ref 3.6–5)
PROT SERPL-MCNC: 7.5 G/DL (ref 5.9–8.4)
RBC # BLD AUTO: 4.93 MILLION/UL (ref 4.5–5.9)
SALMONELLA DNA SPEC QL NAA+PROBE: ABNORMAL
SHIGA TOXIN STX GENE SPEC NAA+PROBE: ABNORMAL
SHIGELLA DNA SPEC QL NAA+PROBE: ABNORMAL
SODIUM SERPL-SCNC: 137 MMOL/L (ref 137–147)
TRIGL SERPL-MCNC: 89 MG/DL
TSH SERPL DL<=0.05 MIU/L-ACNC: 2.45 UIU/ML (ref 0.47–4.68)
WBC # BLD AUTO: 4.7 THOUSAND/UL (ref 4.5–11)

## 2019-04-03 PROCEDURE — 80053 COMPREHEN METABOLIC PANEL: CPT

## 2019-04-03 PROCEDURE — 80061 LIPID PANEL: CPT

## 2019-04-03 PROCEDURE — 82306 VITAMIN D 25 HYDROXY: CPT

## 2019-04-03 PROCEDURE — 84443 ASSAY THYROID STIM HORMONE: CPT

## 2019-04-03 PROCEDURE — 82570 ASSAY OF URINE CREATININE: CPT | Performed by: FAMILY MEDICINE

## 2019-04-03 PROCEDURE — 36415 COLL VENOUS BLD VENIPUNCTURE: CPT

## 2019-04-03 PROCEDURE — 82043 UR ALBUMIN QUANTITATIVE: CPT | Performed by: FAMILY MEDICINE

## 2019-04-03 PROCEDURE — 85025 COMPLETE CBC W/AUTO DIFF WBC: CPT

## 2019-04-03 PROCEDURE — 87505 NFCT AGENT DETECTION GI: CPT | Performed by: PHYSICIAN ASSISTANT

## 2019-04-04 DIAGNOSIS — A04.5 CAMPYLOBACTER GASTROENTERITIS: Primary | ICD-10-CM

## 2019-04-04 RX ORDER — CIPROFLOXACIN 500 MG/1
500 TABLET, FILM COATED ORAL EVERY 12 HOURS SCHEDULED
Qty: 14 TABLET | Refills: 0 | Status: SHIPPED | OUTPATIENT
Start: 2019-04-04 | End: 2019-04-11

## 2019-04-04 RX ORDER — CIPROFLOXACIN 500 MG/1
500 TABLET, FILM COATED ORAL EVERY 12 HOURS SCHEDULED
Qty: 14 TABLET | Refills: 0 | Status: SHIPPED | OUTPATIENT
Start: 2019-04-04 | End: 2019-04-04 | Stop reason: SDUPTHER

## 2019-04-04 NOTE — ED RE-EVALUATION NOTE
Pt results from 4/3/19  Dr ramsey stool cult appeared on my results list    I called pt  For zithro for +campylobactor    Pt went to  pharm    Pharmacist called me because dr ramsey called in cipro   Discussed this scenario with pharmacist will give the cipro hold the zithromax      Johnnie Hall PA-C  04/04/19 3685

## 2019-04-17 ENCOUNTER — TELEPHONE (OUTPATIENT)
Dept: FAMILY MEDICINE CLINIC | Facility: CLINIC | Age: 54
End: 2019-04-17

## 2019-04-17 ENCOUNTER — OFFICE VISIT (OUTPATIENT)
Dept: FAMILY MEDICINE CLINIC | Facility: CLINIC | Age: 54
End: 2019-04-17
Payer: COMMERCIAL

## 2019-04-17 VITALS
HEART RATE: 72 BPM | OXYGEN SATURATION: 98 % | SYSTOLIC BLOOD PRESSURE: 118 MMHG | HEIGHT: 68 IN | DIASTOLIC BLOOD PRESSURE: 60 MMHG | RESPIRATION RATE: 16 BRPM | TEMPERATURE: 98.1 F | BODY MASS INDEX: 32.89 KG/M2 | WEIGHT: 217 LBS

## 2019-04-17 DIAGNOSIS — K21.9 GASTROESOPHAGEAL REFLUX DISEASE WITHOUT ESOPHAGITIS: Primary | ICD-10-CM

## 2019-04-17 PROCEDURE — 99213 OFFICE O/P EST LOW 20 MIN: CPT | Performed by: FAMILY MEDICINE

## 2019-04-17 RX ORDER — SUCRALFATE 1 G/1
1 TABLET ORAL 4 TIMES DAILY
Qty: 120 TABLET | Refills: 0 | Status: SHIPPED | OUTPATIENT
Start: 2019-04-17 | End: 2019-04-18 | Stop reason: SDUPTHER

## 2019-04-18 ENCOUNTER — APPOINTMENT (EMERGENCY)
Dept: ULTRASOUND IMAGING | Facility: HOSPITAL | Age: 54
End: 2019-04-18
Payer: COMMERCIAL

## 2019-04-18 ENCOUNTER — HOSPITAL ENCOUNTER (EMERGENCY)
Facility: HOSPITAL | Age: 54
Discharge: HOME | End: 2019-04-18
Attending: EMERGENCY MEDICINE | Admitting: EMERGENCY MEDICINE
Payer: COMMERCIAL

## 2019-04-18 ENCOUNTER — APPOINTMENT (EMERGENCY)
Dept: CT IMAGING | Facility: HOSPITAL | Age: 54
End: 2019-04-18
Payer: COMMERCIAL

## 2019-04-18 VITALS
TEMPERATURE: 98.5 F | WEIGHT: 216.49 LBS | SYSTOLIC BLOOD PRESSURE: 122 MMHG | HEART RATE: 61 BPM | BODY MASS INDEX: 32.92 KG/M2 | RESPIRATION RATE: 16 BRPM | DIASTOLIC BLOOD PRESSURE: 84 MMHG | OXYGEN SATURATION: 98 %

## 2019-04-18 DIAGNOSIS — S09.90XS HEADACHES DUE TO OLD HEAD INJURY: ICD-10-CM

## 2019-04-18 DIAGNOSIS — K29.70 GASTRITIS: ICD-10-CM

## 2019-04-18 DIAGNOSIS — G44.309 HEADACHES DUE TO OLD HEAD INJURY: ICD-10-CM

## 2019-04-18 DIAGNOSIS — K80.20 GALLSTONES: Primary | ICD-10-CM

## 2019-04-18 LAB
ALBUMIN SERPL BCP-MCNC: 4.2 G/DL (ref 3–5.2)
ALP SERPL-CCNC: 79 U/L (ref 43–122)
ALT SERPL W P-5'-P-CCNC: 31 U/L (ref 9–52)
ANION GAP SERPL CALCULATED.3IONS-SCNC: 8 MMOL/L (ref 5–14)
AST SERPL W P-5'-P-CCNC: 76 U/L (ref 17–59)
BASOPHILS # BLD AUTO: 0 THOUSANDS/ΜL (ref 0–0.1)
BASOPHILS NFR BLD AUTO: 0 % (ref 0–1)
BILIRUB SERPL-MCNC: 0.9 MG/DL
BILIRUB UR QL STRIP: NEGATIVE
BUN SERPL-MCNC: 19 MG/DL (ref 5–25)
CALCIUM SERPL-MCNC: 9.3 MG/DL (ref 8.4–10.2)
CHLORIDE SERPL-SCNC: 102 MMOL/L (ref 97–108)
CLARITY UR: CLEAR
CO2 SERPL-SCNC: 28 MMOL/L (ref 22–30)
COLOR UR: ABNORMAL
CREAT SERPL-MCNC: 1.01 MG/DL (ref 0.7–1.5)
EOSINOPHIL # BLD AUTO: 0 THOUSAND/ΜL (ref 0–0.4)
EOSINOPHIL NFR BLD AUTO: 1 % (ref 0–6)
ERYTHROCYTE [DISTWIDTH] IN BLOOD BY AUTOMATED COUNT: 15 %
GFR SERPL CREATININE-BSD FRML MDRD: 83 ML/MIN/1.73SQ M
GLUCOSE SERPL-MCNC: 104 MG/DL (ref 70–99)
GLUCOSE UR STRIP-MCNC: NEGATIVE MG/DL
HCT VFR BLD AUTO: 41.2 % (ref 41–53)
HGB BLD-MCNC: 14 G/DL (ref 13.5–17.5)
HGB UR QL STRIP.AUTO: NEGATIVE
KETONES UR STRIP-MCNC: NEGATIVE MG/DL
LEUKOCYTE ESTERASE UR QL STRIP: NEGATIVE
LIPASE SERPL-CCNC: 103 U/L (ref 23–300)
LYMPHOCYTES # BLD AUTO: 2.2 THOUSANDS/ΜL (ref 0.5–4)
LYMPHOCYTES NFR BLD AUTO: 29 % (ref 25–45)
MCH RBC QN AUTO: 30.5 PG (ref 26–34)
MCHC RBC AUTO-ENTMCNC: 34 G/DL (ref 31–36)
MCV RBC AUTO: 90 FL (ref 80–100)
MONOCYTES # BLD AUTO: 0.5 THOUSAND/ΜL (ref 0.2–0.9)
MONOCYTES NFR BLD AUTO: 7 % (ref 1–10)
NEUTROPHILS # BLD AUTO: 4.9 THOUSANDS/ΜL (ref 1.8–7.8)
NEUTS SEG NFR BLD AUTO: 64 % (ref 45–65)
NITRITE UR QL STRIP: NEGATIVE
PH UR STRIP.AUTO: 5 [PH]
PLATELET # BLD AUTO: 157 THOUSANDS/UL (ref 150–450)
PMV BLD AUTO: 9.2 FL (ref 8.9–12.7)
POTASSIUM SERPL-SCNC: 3.9 MMOL/L (ref 3.6–5)
PROT SERPL-MCNC: 7.7 G/DL (ref 5.9–8.4)
PROT UR STRIP-MCNC: NEGATIVE MG/DL
RBC # BLD AUTO: 4.6 MILLION/UL (ref 4.5–5.9)
SODIUM SERPL-SCNC: 138 MMOL/L (ref 137–147)
SP GR UR STRIP.AUTO: 1.02 (ref 1–1.04)
TROPONIN I SERPL-MCNC: <0.01 NG/ML (ref 0–0.03)
UROBILINOGEN UA: NEGATIVE MG/DL
WBC # BLD AUTO: 7.7 THOUSAND/UL (ref 4.5–11)

## 2019-04-18 PROCEDURE — 80053 COMPREHEN METABOLIC PANEL: CPT | Performed by: EMERGENCY MEDICINE

## 2019-04-18 PROCEDURE — 99284 EMERGENCY DEPT VISIT MOD MDM: CPT

## 2019-04-18 PROCEDURE — 93005 ELECTROCARDIOGRAM TRACING: CPT

## 2019-04-18 PROCEDURE — 96375 TX/PRO/DX INJ NEW DRUG ADDON: CPT

## 2019-04-18 PROCEDURE — 85025 COMPLETE CBC W/AUTO DIFF WBC: CPT | Performed by: EMERGENCY MEDICINE

## 2019-04-18 PROCEDURE — 99284 EMERGENCY DEPT VISIT MOD MDM: CPT | Performed by: EMERGENCY MEDICINE

## 2019-04-18 PROCEDURE — 96361 HYDRATE IV INFUSION ADD-ON: CPT

## 2019-04-18 PROCEDURE — 84484 ASSAY OF TROPONIN QUANT: CPT | Performed by: EMERGENCY MEDICINE

## 2019-04-18 PROCEDURE — 96374 THER/PROPH/DIAG INJ IV PUSH: CPT

## 2019-04-18 PROCEDURE — 83690 ASSAY OF LIPASE: CPT | Performed by: EMERGENCY MEDICINE

## 2019-04-18 PROCEDURE — 74177 CT ABD & PELVIS W/CONTRAST: CPT

## 2019-04-18 PROCEDURE — 81003 URINALYSIS AUTO W/O SCOPE: CPT | Performed by: EMERGENCY MEDICINE

## 2019-04-18 PROCEDURE — 76705 ECHO EXAM OF ABDOMEN: CPT

## 2019-04-18 RX ORDER — SUCRALFATE 1 G/1
1 TABLET ORAL 4 TIMES DAILY
Qty: 120 TABLET | Refills: 0 | Status: ON HOLD | OUTPATIENT
Start: 2019-04-18 | End: 2019-04-26 | Stop reason: SDUPTHER

## 2019-04-18 RX ORDER — SODIUM CHLORIDE 9 MG/ML
125 INJECTION, SOLUTION INTRAVENOUS CONTINUOUS
Status: DISCONTINUED | OUTPATIENT
Start: 2019-04-18 | End: 2019-04-18 | Stop reason: HOSPADM

## 2019-04-18 RX ORDER — ONDANSETRON 2 MG/ML
4 INJECTION INTRAMUSCULAR; INTRAVENOUS ONCE
Status: COMPLETED | OUTPATIENT
Start: 2019-04-18 | End: 2019-04-18

## 2019-04-18 RX ORDER — PANTOPRAZOLE SODIUM 20 MG/1
20 TABLET, DELAYED RELEASE ORAL DAILY
Qty: 30 TABLET | Refills: 0 | Status: SHIPPED | OUTPATIENT
Start: 2019-04-18 | End: 2019-05-22 | Stop reason: ALTCHOICE

## 2019-04-18 RX ORDER — SUCRALFATE 1 G/1
1 TABLET ORAL 4 TIMES DAILY
Qty: 120 TABLET | Refills: 0 | Status: CANCELLED | OUTPATIENT
Start: 2019-04-18

## 2019-04-18 RX ORDER — MORPHINE SULFATE 4 MG/ML
4 INJECTION, SOLUTION INTRAMUSCULAR; INTRAVENOUS ONCE
Status: COMPLETED | OUTPATIENT
Start: 2019-04-18 | End: 2019-04-18

## 2019-04-18 RX ORDER — OXYCODONE HYDROCHLORIDE AND ACETAMINOPHEN 5; 325 MG/1; MG/1
1-2 TABLET ORAL EVERY 6 HOURS PRN
Qty: 20 TABLET | Refills: 0 | Status: ON HOLD | OUTPATIENT
Start: 2019-04-18 | End: 2019-04-26 | Stop reason: ALTCHOICE

## 2019-04-18 RX ORDER — SUCRALFATE 1 G/1
1 TABLET ORAL 4 TIMES DAILY
Qty: 120 TABLET | Refills: 0 | Status: SHIPPED | OUTPATIENT
Start: 2019-04-18 | End: 2019-04-18 | Stop reason: SDUPTHER

## 2019-04-18 RX ORDER — ONDANSETRON 4 MG/1
4 TABLET, ORALLY DISINTEGRATING ORAL EVERY 8 HOURS PRN
Qty: 20 TABLET | Refills: 0 | Status: SHIPPED | OUTPATIENT
Start: 2019-04-18 | End: 2019-05-22 | Stop reason: ALTCHOICE

## 2019-04-18 RX ADMIN — ONDANSETRON HYDROCHLORIDE 4 MG: 2 INJECTION, SOLUTION INTRAMUSCULAR; INTRAVENOUS at 18:41

## 2019-04-18 RX ADMIN — IOHEXOL 100 ML: 350 INJECTION, SOLUTION INTRAVENOUS at 19:35

## 2019-04-18 RX ADMIN — MORPHINE SULFATE 4 MG: 4 INJECTION INTRAVENOUS at 18:40

## 2019-04-18 RX ADMIN — SODIUM CHLORIDE 125 ML/HR: 9 INJECTION, SOLUTION INTRAVENOUS at 18:36

## 2019-04-19 LAB
ATRIAL RATE: 60 BPM
P AXIS: 50 DEGREES
PR INTERVAL: 158 MS
QRS AXIS: 7 DEGREES
QRSD INTERVAL: 72 MS
QT INTERVAL: 424 MS
QTC INTERVAL: 424 MS
T WAVE AXIS: 26 DEGREES
VENTRICULAR RATE: 60 BPM

## 2019-04-19 PROCEDURE — 93010 ELECTROCARDIOGRAM REPORT: CPT | Performed by: INTERNAL MEDICINE

## 2019-04-23 ENCOUNTER — OFFICE VISIT (OUTPATIENT)
Dept: SURGERY | Facility: CLINIC | Age: 54
End: 2019-04-23
Payer: COMMERCIAL

## 2019-04-23 VITALS
HEART RATE: 68 BPM | DIASTOLIC BLOOD PRESSURE: 70 MMHG | WEIGHT: 216 LBS | BODY MASS INDEX: 32.74 KG/M2 | HEIGHT: 68 IN | RESPIRATION RATE: 18 BRPM | SYSTOLIC BLOOD PRESSURE: 122 MMHG

## 2019-04-23 DIAGNOSIS — K80.00 CALCULUS OF GALLBLADDER WITH ACUTE CHOLECYSTITIS WITHOUT OBSTRUCTION: Primary | ICD-10-CM

## 2019-04-23 PROCEDURE — 99204 OFFICE O/P NEW MOD 45 MIN: CPT | Performed by: SPECIALIST

## 2019-04-23 RX ORDER — SUCRALFATE 1 G/1
1 TABLET ORAL 4 TIMES DAILY
Qty: 120 TABLET | Refills: 5
Start: 2019-04-23 | End: 2019-05-22 | Stop reason: ALTCHOICE

## 2019-04-25 ENCOUNTER — ANESTHESIA EVENT (OUTPATIENT)
Dept: PERIOP | Facility: HOSPITAL | Age: 54
End: 2019-04-25
Payer: COMMERCIAL

## 2019-04-26 ENCOUNTER — HOSPITAL ENCOUNTER (OUTPATIENT)
Facility: HOSPITAL | Age: 54
Setting detail: OUTPATIENT SURGERY
Discharge: HOME | End: 2019-04-26
Attending: SPECIALIST | Admitting: SPECIALIST
Payer: COMMERCIAL

## 2019-04-26 ENCOUNTER — APPOINTMENT (OUTPATIENT)
Dept: RADIOLOGY | Facility: HOSPITAL | Age: 54
End: 2019-04-26
Payer: COMMERCIAL

## 2019-04-26 ENCOUNTER — ANESTHESIA (OUTPATIENT)
Dept: PERIOP | Facility: HOSPITAL | Age: 54
End: 2019-04-26
Payer: COMMERCIAL

## 2019-04-26 VITALS
TEMPERATURE: 97.6 F | DIASTOLIC BLOOD PRESSURE: 77 MMHG | OXYGEN SATURATION: 96 % | SYSTOLIC BLOOD PRESSURE: 114 MMHG | RESPIRATION RATE: 16 BRPM | HEART RATE: 47 BPM

## 2019-04-26 DIAGNOSIS — K80.00 CALCULUS OF GALLBLADDER WITH ACUTE CHOLECYSTITIS WITHOUT OBSTRUCTION: Primary | ICD-10-CM

## 2019-04-26 PROCEDURE — 88304 TISSUE EXAM BY PATHOLOGIST: CPT | Performed by: PATHOLOGY

## 2019-04-26 PROCEDURE — 47562 LAPAROSCOPIC CHOLECYSTECTOMY: CPT | Performed by: SPECIALIST

## 2019-04-26 RX ORDER — DEXAMETHASONE SODIUM PHOSPHATE 10 MG/ML
INJECTION, SOLUTION INTRAMUSCULAR; INTRAVENOUS AS NEEDED
Status: DISCONTINUED | OUTPATIENT
Start: 2019-04-26 | End: 2019-04-26 | Stop reason: SURG

## 2019-04-26 RX ORDER — MAGNESIUM HYDROXIDE 1200 MG/15ML
LIQUID ORAL AS NEEDED
Status: DISCONTINUED | OUTPATIENT
Start: 2019-04-26 | End: 2019-04-26 | Stop reason: HOSPADM

## 2019-04-26 RX ORDER — NEOSTIGMINE METHYLSULFATE 1 MG/ML
INJECTION INTRAVENOUS AS NEEDED
Status: DISCONTINUED | OUTPATIENT
Start: 2019-04-26 | End: 2019-04-26 | Stop reason: SURG

## 2019-04-26 RX ORDER — MIDAZOLAM HYDROCHLORIDE 1 MG/ML
INJECTION INTRAMUSCULAR; INTRAVENOUS AS NEEDED
Status: DISCONTINUED | OUTPATIENT
Start: 2019-04-26 | End: 2019-04-26 | Stop reason: SURG

## 2019-04-26 RX ORDER — CEFAZOLIN SODIUM 2 G/50ML
2000 SOLUTION INTRAVENOUS ONCE
Status: COMPLETED | OUTPATIENT
Start: 2019-04-26 | End: 2019-04-26

## 2019-04-26 RX ORDER — SODIUM CHLORIDE 9 MG/ML
INJECTION, SOLUTION INTRAVENOUS AS NEEDED
Status: DISCONTINUED | OUTPATIENT
Start: 2019-04-26 | End: 2019-04-26 | Stop reason: HOSPADM

## 2019-04-26 RX ORDER — OXYCODONE HYDROCHLORIDE AND ACETAMINOPHEN 5; 325 MG/1; MG/1
2 TABLET ORAL EVERY 4 HOURS PRN
Status: DISCONTINUED | OUTPATIENT
Start: 2019-04-26 | End: 2019-04-26 | Stop reason: HOSPADM

## 2019-04-26 RX ORDER — LIDOCAINE HYDROCHLORIDE 10 MG/ML
INJECTION, SOLUTION INFILTRATION; PERINEURAL AS NEEDED
Status: DISCONTINUED | OUTPATIENT
Start: 2019-04-26 | End: 2019-04-26 | Stop reason: SURG

## 2019-04-26 RX ORDER — SODIUM CHLORIDE, SODIUM LACTATE, POTASSIUM CHLORIDE, CALCIUM CHLORIDE 600; 310; 30; 20 MG/100ML; MG/100ML; MG/100ML; MG/100ML
INJECTION, SOLUTION INTRAVENOUS CONTINUOUS PRN
Status: DISCONTINUED | OUTPATIENT
Start: 2019-04-26 | End: 2019-04-26 | Stop reason: SURG

## 2019-04-26 RX ORDER — MEPERIDINE HYDROCHLORIDE 25 MG/ML
12.5 INJECTION INTRAMUSCULAR; INTRAVENOUS; SUBCUTANEOUS
Status: DISCONTINUED | OUTPATIENT
Start: 2019-04-26 | End: 2019-04-26 | Stop reason: HOSPADM

## 2019-04-26 RX ORDER — HEPARIN SODIUM 5000 [USP'U]/ML
INJECTION, SOLUTION INTRAVENOUS; SUBCUTANEOUS AS NEEDED
Status: DISCONTINUED | OUTPATIENT
Start: 2019-04-26 | End: 2019-04-26 | Stop reason: SURG

## 2019-04-26 RX ORDER — HYDROMORPHONE HCL/PF 1 MG/ML
0.5 SYRINGE (ML) INJECTION
Status: DISCONTINUED | OUTPATIENT
Start: 2019-04-26 | End: 2019-04-26 | Stop reason: HOSPADM

## 2019-04-26 RX ORDER — GLYCOPYRROLATE 0.2 MG/ML
INJECTION INTRAMUSCULAR; INTRAVENOUS AS NEEDED
Status: DISCONTINUED | OUTPATIENT
Start: 2019-04-26 | End: 2019-04-26 | Stop reason: SURG

## 2019-04-26 RX ORDER — KETOROLAC TROMETHAMINE 30 MG/ML
INJECTION, SOLUTION INTRAMUSCULAR; INTRAVENOUS AS NEEDED
Status: DISCONTINUED | OUTPATIENT
Start: 2019-04-26 | End: 2019-04-26 | Stop reason: SURG

## 2019-04-26 RX ORDER — ROCURONIUM BROMIDE 10 MG/ML
INJECTION, SOLUTION INTRAVENOUS AS NEEDED
Status: DISCONTINUED | OUTPATIENT
Start: 2019-04-26 | End: 2019-04-26 | Stop reason: SURG

## 2019-04-26 RX ORDER — ONDANSETRON 2 MG/ML
4 INJECTION INTRAMUSCULAR; INTRAVENOUS ONCE AS NEEDED
Status: DISCONTINUED | OUTPATIENT
Start: 2019-04-26 | End: 2019-04-26 | Stop reason: HOSPADM

## 2019-04-26 RX ORDER — OXYCODONE HYDROCHLORIDE AND ACETAMINOPHEN 5; 325 MG/1; MG/1
1 TABLET ORAL EVERY 4 HOURS PRN
Status: DISCONTINUED | OUTPATIENT
Start: 2019-04-26 | End: 2019-04-26 | Stop reason: HOSPADM

## 2019-04-26 RX ORDER — PROPOFOL 10 MG/ML
INJECTION, EMULSION INTRAVENOUS AS NEEDED
Status: DISCONTINUED | OUTPATIENT
Start: 2019-04-26 | End: 2019-04-26 | Stop reason: SURG

## 2019-04-26 RX ORDER — BUPIVACAINE HYDROCHLORIDE 5 MG/ML
INJECTION, SOLUTION PERINEURAL AS NEEDED
Status: DISCONTINUED | OUTPATIENT
Start: 2019-04-26 | End: 2019-04-26 | Stop reason: HOSPADM

## 2019-04-26 RX ORDER — SODIUM CHLORIDE, SODIUM LACTATE, POTASSIUM CHLORIDE, CALCIUM CHLORIDE 600; 310; 30; 20 MG/100ML; MG/100ML; MG/100ML; MG/100ML
50 INJECTION, SOLUTION INTRAVENOUS CONTINUOUS
Status: DISCONTINUED | OUTPATIENT
Start: 2019-04-27 | End: 2019-04-26 | Stop reason: HOSPADM

## 2019-04-26 RX ORDER — PROMETHAZINE HYDROCHLORIDE 25 MG/ML
12.5 INJECTION, SOLUTION INTRAMUSCULAR; INTRAVENOUS ONCE AS NEEDED
Status: DISCONTINUED | OUTPATIENT
Start: 2019-04-26 | End: 2019-04-26 | Stop reason: HOSPADM

## 2019-04-26 RX ORDER — ONDANSETRON 2 MG/ML
4 INJECTION INTRAMUSCULAR; INTRAVENOUS EVERY 8 HOURS PRN
Status: DISCONTINUED | OUTPATIENT
Start: 2019-04-26 | End: 2019-04-26 | Stop reason: HOSPADM

## 2019-04-26 RX ORDER — FENTANYL CITRATE 50 UG/ML
INJECTION, SOLUTION INTRAMUSCULAR; INTRAVENOUS AS NEEDED
Status: DISCONTINUED | OUTPATIENT
Start: 2019-04-26 | End: 2019-04-26 | Stop reason: SURG

## 2019-04-26 RX ORDER — OXYCODONE HYDROCHLORIDE AND ACETAMINOPHEN 5; 325 MG/1; MG/1
1 TABLET ORAL EVERY 4 HOURS PRN
Qty: 20 TABLET | Refills: 0 | Status: SHIPPED | OUTPATIENT
Start: 2019-04-26 | End: 2019-05-06

## 2019-04-26 RX ORDER — ONDANSETRON 2 MG/ML
INJECTION INTRAMUSCULAR; INTRAVENOUS AS NEEDED
Status: DISCONTINUED | OUTPATIENT
Start: 2019-04-26 | End: 2019-04-26 | Stop reason: SURG

## 2019-04-26 RX ADMIN — CEFAZOLIN SODIUM 2000 MG: 2 SOLUTION INTRAVENOUS at 08:00

## 2019-04-26 RX ADMIN — GLYCOPYRROLATE 0.4 MG: 0.2 INJECTION, SOLUTION INTRAMUSCULAR; INTRAVENOUS at 09:16

## 2019-04-26 RX ADMIN — SODIUM CHLORIDE, POTASSIUM CHLORIDE, SODIUM LACTATE AND CALCIUM CHLORIDE: 600; 310; 30; 20 INJECTION, SOLUTION INTRAVENOUS at 08:30

## 2019-04-26 RX ADMIN — SODIUM CHLORIDE, POTASSIUM CHLORIDE, SODIUM LACTATE AND CALCIUM CHLORIDE: 600; 310; 30; 20 INJECTION, SOLUTION INTRAVENOUS at 07:20

## 2019-04-26 RX ADMIN — SODIUM CHLORIDE, POTASSIUM CHLORIDE, SODIUM LACTATE AND CALCIUM CHLORIDE 1000 ML: 600; 310; 30; 20 INJECTION, SOLUTION INTRAVENOUS at 07:28

## 2019-04-26 RX ADMIN — DEXAMETHASONE SODIUM PHOSPHATE 4 MG: 10 INJECTION, SOLUTION INTRAMUSCULAR; INTRAVENOUS at 08:00

## 2019-04-26 RX ADMIN — ONDANSETRON HYDROCHLORIDE 4 MG: 2 INJECTION, SOLUTION INTRAMUSCULAR; INTRAVENOUS at 09:10

## 2019-04-26 RX ADMIN — LIDOCAINE HYDROCHLORIDE 30 MG: 10 INJECTION, SOLUTION INFILTRATION; PERINEURAL at 07:55

## 2019-04-26 RX ADMIN — FENTANYL CITRATE 50 MCG: 50 INJECTION INTRAMUSCULAR; INTRAVENOUS at 09:17

## 2019-04-26 RX ADMIN — HEPARIN SODIUM 5000 UNITS: 5000 INJECTION, SOLUTION INTRAVENOUS; SUBCUTANEOUS at 08:05

## 2019-04-26 RX ADMIN — GLYCOPYRROLATE 0.4 MG: 0.2 INJECTION, SOLUTION INTRAMUSCULAR; INTRAVENOUS at 08:20

## 2019-04-26 RX ADMIN — ROCURONIUM BROMIDE 40 MG: 10 INJECTION, SOLUTION INTRAVENOUS at 07:55

## 2019-04-26 RX ADMIN — PROPOFOL 200 MG: 10 INJECTION, EMULSION INTRAVENOUS at 07:55

## 2019-04-26 RX ADMIN — MIDAZOLAM HYDROCHLORIDE 2 MG: 1 INJECTION, SOLUTION INTRAMUSCULAR; INTRAVENOUS at 07:49

## 2019-04-26 RX ADMIN — NEOSTIGMINE METHYLSULFATE 4 MG: 1 INJECTION INTRAVENOUS at 09:16

## 2019-04-26 RX ADMIN — HYDROMORPHONE HYDROCHLORIDE 0.5 MG: 1 INJECTION, SOLUTION INTRAMUSCULAR; INTRAVENOUS; SUBCUTANEOUS at 09:53

## 2019-04-26 RX ADMIN — ROCURONIUM BROMIDE 10 MG: 10 INJECTION, SOLUTION INTRAVENOUS at 08:41

## 2019-04-26 RX ADMIN — FENTANYL CITRATE 50 MCG: 50 INJECTION INTRAMUSCULAR; INTRAVENOUS at 08:55

## 2019-04-26 RX ADMIN — FENTANYL CITRATE 100 MCG: 50 INJECTION INTRAMUSCULAR; INTRAVENOUS at 08:00

## 2019-04-26 RX ADMIN — OXYCODONE HYDROCHLORIDE AND ACETAMINOPHEN 1 TABLET: 5; 325 TABLET ORAL at 12:04

## 2019-04-26 RX ADMIN — KETOROLAC TROMETHAMINE 30 MG: 30 INJECTION, SOLUTION INTRAMUSCULAR; INTRAVENOUS at 09:13

## 2019-05-15 ENCOUNTER — OFFICE VISIT (OUTPATIENT)
Dept: SURGERY | Facility: CLINIC | Age: 54
End: 2019-05-15

## 2019-05-15 VITALS
BODY MASS INDEX: 31.26 KG/M2 | HEIGHT: 68 IN | SYSTOLIC BLOOD PRESSURE: 110 MMHG | RESPIRATION RATE: 16 BRPM | HEART RATE: 66 BPM | WEIGHT: 206.25 LBS | DIASTOLIC BLOOD PRESSURE: 68 MMHG

## 2019-05-15 DIAGNOSIS — K80.00 CALCULUS OF GALLBLADDER WITH ACUTE CHOLECYSTITIS WITHOUT OBSTRUCTION: Primary | ICD-10-CM

## 2019-05-15 PROCEDURE — 3044F HG A1C LEVEL LT 7.0%: CPT | Performed by: SPECIALIST

## 2019-05-15 PROCEDURE — 99024 POSTOP FOLLOW-UP VISIT: CPT | Performed by: SPECIALIST

## 2019-05-22 ENCOUNTER — OFFICE VISIT (OUTPATIENT)
Dept: FAMILY MEDICINE CLINIC | Facility: CLINIC | Age: 54
End: 2019-05-22
Payer: COMMERCIAL

## 2019-05-22 VITALS
DIASTOLIC BLOOD PRESSURE: 60 MMHG | HEIGHT: 68 IN | OXYGEN SATURATION: 98 % | RESPIRATION RATE: 16 BRPM | HEART RATE: 66 BPM | SYSTOLIC BLOOD PRESSURE: 102 MMHG | WEIGHT: 203 LBS | BODY MASS INDEX: 30.77 KG/M2 | TEMPERATURE: 98.1 F

## 2019-05-22 DIAGNOSIS — Z23 NEED FOR TDAP VACCINATION: ICD-10-CM

## 2019-05-22 DIAGNOSIS — M25.562 CHRONIC PAIN OF LEFT KNEE: ICD-10-CM

## 2019-05-22 DIAGNOSIS — Z90.49 S/P CHOLECYSTECTOMY: Primary | ICD-10-CM

## 2019-05-22 DIAGNOSIS — G89.29 CHRONIC PAIN OF LEFT KNEE: ICD-10-CM

## 2019-05-22 PROCEDURE — 1036F TOBACCO NON-USER: CPT | Performed by: FAMILY MEDICINE

## 2019-05-22 PROCEDURE — 99213 OFFICE O/P EST LOW 20 MIN: CPT | Performed by: FAMILY MEDICINE

## 2019-05-22 PROCEDURE — 90471 IMMUNIZATION ADMIN: CPT | Performed by: FAMILY MEDICINE

## 2019-05-22 PROCEDURE — 90715 TDAP VACCINE 7 YRS/> IM: CPT | Performed by: FAMILY MEDICINE

## 2019-08-20 DIAGNOSIS — R51.9 INCREASED SEVERITY OF HEADACHES: ICD-10-CM

## 2019-10-16 ENCOUNTER — OFFICE VISIT (OUTPATIENT)
Dept: BARIATRICS | Facility: CLINIC | Age: 54
End: 2019-10-16
Payer: COMMERCIAL

## 2019-10-16 VITALS
SYSTOLIC BLOOD PRESSURE: 120 MMHG | RESPIRATION RATE: 18 BRPM | HEIGHT: 68 IN | DIASTOLIC BLOOD PRESSURE: 82 MMHG | WEIGHT: 190.6 LBS | BODY MASS INDEX: 28.89 KG/M2 | TEMPERATURE: 98.4 F

## 2019-10-16 DIAGNOSIS — Z98.84 BARIATRIC SURGERY STATUS: Primary | ICD-10-CM

## 2019-10-16 DIAGNOSIS — K91.2 POSTSURGICAL MALABSORPTION: ICD-10-CM

## 2019-10-16 DIAGNOSIS — Z98.84 BARIATRIC SURGERY STATUS: ICD-10-CM

## 2019-10-16 DIAGNOSIS — E66.01 MORBID (SEVERE) OBESITY DUE TO EXCESS CALORIES (HCC): Primary | ICD-10-CM

## 2019-10-16 PROCEDURE — 99214 OFFICE O/P EST MOD 30 MIN: CPT | Performed by: PHYSICIAN ASSISTANT

## 2019-10-16 NOTE — PROGRESS NOTES
Assessment/Plan:  Patient seen at [facility]      Patient ID: Gama Glasgow is a 52 y o  male  Bariatric Surgery Status  - s/p RNGYB in 0476 with Dr Eusebia Mayers  Present today for routine follow up  Overall doing fair  Patient complaints of weight regain  · Continued/Maintain healthy weight loss with good nutrition intakes  · Adequate hydration with at least 64oz  fluid intake  · Follow diet as discussed  · Follow vitamin and mineral recommendations as reviewed with you  · Exercise as tolerated  · Colonoscopy referral made: no    · Follow-up in 1 month  We kindly ask that your arrive 15 minutes before your scheduled appointment time with your provider to allow our staff to room you, get your vital signs and update your chart  · Get lab work done prior to next visit  Please call the office if you need a script  It is recommended to check with your insurance BEFORE getting labs done to make sure they are covered by your policy  · Call our office if you have any problems with abdominal pain especially associated with fever, chills, nausea, vomiting or any other concerns  · All  Post-bariatric surgery patients should be aware that very small quantities of any alcohol can cause impairment and it is very possible not to feel the effect  The effect can be in the system for several hours  It is also a stomach irritant  · It is advised to AVOID alcohol, Nonsteroidal antiinflammatory drugs (NSAIDS) and nicotine of all forms   Any of these can cause stomach irritation/pain  · Discussed the effects of alcohol on a bariatric patient and the increased impairment risk  · Keep up the good work!      Postsurgical Malabsorption   -At risk for malabsorption of vitamins/minerals secondary to malabsorption and restriction of intake from bariatric surgery  -Currently taking multivitamins   -Next set of bariatric labs ordered for approximately 2 months  -Patient received education about the importance of adhering to a lifelong supplementation regimen to avoid vitamin/mineral deficiencies       Morbid (severe) obesity due to excess calories (HCC)  -     [place] UPPER GI UGI; Future  - offered patient follow up with dietician however states he already sees a dietician elsewhere  - encouraged continued diet and lifestyles changes : eating protein first, 30/60 minute rule, daily exercise, etc   - consider MWM referral for continued weight gain and unremarkable UGI      Diagnoses and all orders for this visit:    Bariatric surgery status  -     [place] UPPER GI UGI; Future    Postsurgical malabsorption    Morbid (severe) obesity due to excess calories ([address] )  -     [place] UPPER GI UGI; Future       Subjective:      Patient ID: Neel Huerta is a 52 y o  male  -s/p Daquan-En-Y Gastric Bypass with Dr Neyda Lyon in 3787  Presents to the office today for routine follow up  Tolerating diet without issues; denies N/V, dysphagia, reflux  Overall doing Fair  He complains of weight regain for the past few years  He reports + restriction with meals however states he feels hungry almost immediately after eating  Patient states he is currently on a diet and has lost weight however is concerned about his frequent hunger  Initial:n/a  Current:190 6 lb  EWL: n/a  [place]: n/a  Current BMI is Body mass index is 28 98 kg/m²  · Tolerating a regular diet-yes  · Eating at least 60 grams of protein per day-yes  · Following 30/60 minute rule with liquids-yes  · Drinking at least 64 ounces of fluid per day-yes (water and iced tea)  · Drinking carbonated beverages-no  · Sufficient exercise-occasional   · Using NSAIDs regularly-no  · Using nicotine-no  · Using alcohol- occasionally   · Supplements: Multivitamins    · EWL is 50%       The following portions of the patient's history were reviewed and updated as appropriate: allergies, current medications, past family history, past medical history, past social history, past surgical history and problem list     Review of Systems   Constitutional: Negative for chills and fever  HENT: Negative for trouble swallowing  Respiratory: Negative  Cardiovascular: Negative  Gastrointestinal: Negative  Musculoskeletal: Positive for arthralgias  Neurological: Negative  Psychiatric/Behavioral: Negative for suicidal ideas  History of depression/anxiety   Follows with psychiatrist and therapist; on medication          Objective:    /82   Temp 98 4 °F (36 9 °C)   Resp 18   Ht 5' 8" (1 727 m)   Wt 86 5 kg (190 lb 9 6 oz)   BMI 28 98 kg/m²      Physical Exam   Constitutional: He is oriented to person, place, and time  He appears well-developed and well-nourished  HENT:   Head: Normocephalic and atraumatic  Neck: Normal range of motion  Cardiovascular: Normal rate and regular rhythm  Pulmonary/Chest: Effort normal and breath sounds normal  No respiratory distress  He has no wheezes  Abdominal: Soft  Bowel sounds are normal  He exhibits no distension  There is no tenderness  Musculoskeletal: Normal range of motion  Neurological: He is alert and oriented to person, place, and time  Skin: Skin is warm and dry  Psychiatric: He has a normal mood and affect  Nursing note and vitals reviewed

## 2019-10-23 ENCOUNTER — HOSPITAL ENCOUNTER (OUTPATIENT)
Dept: RADIOLOGY | Facility: HOSPITAL | Age: 54
Discharge: HOME | End: 2019-10-23
Payer: COMMERCIAL

## 2019-10-23 ENCOUNTER — APPOINTMENT (OUTPATIENT)
Dept: LAB | Facility: HOSPITAL | Age: 54
End: 2019-10-23
Payer: COMMERCIAL

## 2019-10-23 DIAGNOSIS — E66.01 MORBID (SEVERE) OBESITY DUE TO EXCESS CALORIES (HCC): ICD-10-CM

## 2019-10-23 DIAGNOSIS — Z98.84 BARIATRIC SURGERY STATUS: ICD-10-CM

## 2019-10-23 DIAGNOSIS — E53.8 LOW VITAMIN B12 LEVEL: ICD-10-CM

## 2019-10-23 DIAGNOSIS — K91.2 POSTSURGICAL MALABSORPTION: ICD-10-CM

## 2019-10-23 LAB
25(OH)D3 SERPL-MCNC: 23.8 NG/ML (ref 30–100)
ALBUMIN SERPL BCP-MCNC: 4.6 G/DL (ref 3–5.2)
ALP SERPL-CCNC: 84 U/L (ref 43–122)
ALT SERPL W P-5'-P-CCNC: 24 U/L (ref 9–52)
ANION GAP SERPL CALCULATED.3IONS-SCNC: 7 MMOL/L (ref 5–14)
AST SERPL W P-5'-P-CCNC: 27 U/L (ref 17–59)
BILIRUB SERPL-MCNC: 0.8 MG/DL
BUN SERPL-MCNC: 12 MG/DL (ref 5–25)
CALCIUM SERPL-MCNC: 9.9 MG/DL (ref 8.4–10.2)
CHLORIDE SERPL-SCNC: 105 MMOL/L (ref 97–108)
CO2 SERPL-SCNC: 30 MMOL/L (ref 22–30)
CREAT SERPL-MCNC: 1.13 MG/DL (ref 0.7–1.5)
ERYTHROCYTE [DISTWIDTH] IN BLOOD BY AUTOMATED COUNT: 15.5 %
FERRITIN SERPL-MCNC: 9 NG/ML (ref 8–388)
FOLATE SERPL-MCNC: >20 NG/ML (ref 3.1–17.5)
GFR SERPL CREATININE-BSD FRML MDRD: 73 ML/MIN/1.73SQ M
GLUCOSE P FAST SERPL-MCNC: 99 MG/DL (ref 70–99)
HCT VFR BLD AUTO: 46.1 % (ref 41–53)
HGB BLD-MCNC: 15.1 G/DL (ref 13.5–17.5)
IRON SATN MFR SERPL: 18 %
IRON SERPL-MCNC: 76 UG/DL (ref 65–175)
MCH RBC QN AUTO: 29.9 PG (ref 26–34)
MCHC RBC AUTO-ENTMCNC: 32.7 G/DL (ref 31–36)
MCV RBC AUTO: 91 FL (ref 80–100)
PLATELET # BLD AUTO: 150 THOUSANDS/UL (ref 150–450)
PMV BLD AUTO: 10.1 FL (ref 8.9–12.7)
POTASSIUM SERPL-SCNC: 3.7 MMOL/L (ref 3.6–5)
PROT SERPL-MCNC: 8.7 G/DL (ref 5.9–8.4)
PTH-INTACT SERPL-MCNC: 37.9 PG/ML (ref 16.7–78.9)
RBC # BLD AUTO: 5.05 MILLION/UL (ref 4.5–5.9)
SODIUM SERPL-SCNC: 142 MMOL/L (ref 137–147)
TIBC SERPL-MCNC: 424 UG/DL (ref 250–450)
VIT B12 SERPL-MCNC: 358 PG/ML (ref 100–900)
WBC # BLD AUTO: 4.6 THOUSAND/UL (ref 4.5–11)

## 2019-10-23 PROCEDURE — 74240 X-RAY XM UPR GI TRC 1CNTRST: CPT

## 2019-10-23 PROCEDURE — 84630 ASSAY OF ZINC: CPT

## 2019-10-23 PROCEDURE — 83540 ASSAY OF IRON: CPT

## 2019-10-23 PROCEDURE — 36415 COLL VENOUS BLD VENIPUNCTURE: CPT

## 2019-10-23 PROCEDURE — 80053 COMPREHEN METABOLIC PANEL: CPT

## 2019-10-23 PROCEDURE — 82728 ASSAY OF FERRITIN: CPT

## 2019-10-23 PROCEDURE — 83918 ORGANIC ACIDS TOTAL QUANT: CPT

## 2019-10-23 PROCEDURE — 82525 ASSAY OF COPPER: CPT

## 2019-10-23 PROCEDURE — 82746 ASSAY OF FOLIC ACID SERUM: CPT

## 2019-10-23 PROCEDURE — 85027 COMPLETE CBC AUTOMATED: CPT

## 2019-10-23 PROCEDURE — 83550 IRON BINDING TEST: CPT

## 2019-10-23 PROCEDURE — 84425 ASSAY OF VITAMIN B-1: CPT

## 2019-10-23 PROCEDURE — 83970 ASSAY OF PARATHORMONE: CPT

## 2019-10-23 PROCEDURE — 82607 VITAMIN B-12: CPT

## 2019-10-23 PROCEDURE — 84590 ASSAY OF VITAMIN A: CPT

## 2019-10-23 PROCEDURE — 82306 VITAMIN D 25 HYDROXY: CPT

## 2019-10-25 LAB
COPPER SERPL-MCNC: 98 UG/DL (ref 72–166)
ZINC SERPL-MCNC: 86 UG/DL (ref 56–134)

## 2019-10-26 LAB
METHYLMALONATE SERPL-SCNC: 300 NMOL/L (ref 0–378)
SL AMB DISCLAIMER: NORMAL
VIT A SERPL-MCNC: 34.4 UG/DL (ref 20.1–62)

## 2019-10-27 LAB — VIT B1 BLD-SCNC: 101.3 NMOL/L (ref 66.5–200)

## 2019-11-20 ENCOUNTER — OFFICE VISIT (OUTPATIENT)
Dept: BARIATRICS | Facility: CLINIC | Age: 54
End: 2019-11-20
Payer: COMMERCIAL

## 2019-11-20 VITALS
HEART RATE: 73 BPM | BODY MASS INDEX: 28.85 KG/M2 | WEIGHT: 190.4 LBS | HEIGHT: 68 IN | TEMPERATURE: 98 F | DIASTOLIC BLOOD PRESSURE: 70 MMHG | RESPIRATION RATE: 16 BRPM | SYSTOLIC BLOOD PRESSURE: 112 MMHG

## 2019-11-20 DIAGNOSIS — Z98.84 BARIATRIC SURGERY STATUS: Primary | ICD-10-CM

## 2019-11-20 DIAGNOSIS — E55.9 VITAMIN D DEFICIENCY: ICD-10-CM

## 2019-11-20 DIAGNOSIS — R63.5 WEIGHT GAIN FOLLOWING GASTRIC BYPASS SURGERY: ICD-10-CM

## 2019-11-20 DIAGNOSIS — Z98.84 WEIGHT GAIN FOLLOWING GASTRIC BYPASS SURGERY: ICD-10-CM

## 2019-11-20 DIAGNOSIS — K91.2 POSTSURGICAL MALABSORPTION: ICD-10-CM

## 2019-11-20 PROCEDURE — 99214 OFFICE O/P EST MOD 30 MIN: CPT | Performed by: PHYSICIAN ASSISTANT

## 2019-11-20 RX ORDER — TOPIRAMATE 25 MG/1
25 TABLET ORAL DAILY
COMMUNITY
Start: 2019-11-01 | End: 2019-12-04 | Stop reason: SDUPTHER

## 2019-11-20 RX ORDER — ERGOCALCIFEROL 1.25 MG/1
50000 CAPSULE ORAL
Qty: 8 CAPSULE | Refills: 2 | Status: SHIPPED | OUTPATIENT
Start: 2019-11-21 | End: 2021-01-01 | Stop reason: ALTCHOICE

## 2019-11-20 NOTE — PROGRESS NOTES
Assessment/Plan:     Diagnoses and all orders for this visit:    Bariatric surgery status  · Continued/Maintain healthy weight loss with good nutrition intakes  · Adequate hydration with at least 64oz  fluid intake  · Follow diet as discussed  · Follow vitamin and mineral recommendations as reviewed with you  · Exercise as tolerated  · Follow up for annual appointments  Weight gain following gastric bypass surgery  - UGI 10/23/2019 revealed unremarkable study status post gastric bypass with no pouch enlargement, hiatal hernia, reflux, or fistula noted  - Refused at this time to meet with dietitian/  for evaluation for MWM  - Currently is being seen by Dr Lyssa Guerra for weight loss medications   - Patient very adamant that he has a "hole" in his gastric pouch which is causing him to gain weight  What he is referring to is an EGD report from 2015 that states that his gastric anastomosis is wide open at 2 5cm  After discussion for clarification patient became agitated that I was not understanding his concern and he was demanding of a repeat EGD    - EGD; future   - Discussed with patient that he is currently taking Abilify and Trazodone which are both medications known to have a side effect of weight gain and to discuss with KIRK Tobin alternative medications  Postsurgical malabsorption  -At risk for malabsorption of vitamins/minerals secondary to malabsorption and restriction of intake from bariatric surgery  -Last set of bariatric labs completed on 10/23/2019 and showed low vitamin D  -Next set of bariatric labs ordered for approximately 3 months  -Patient received education about the importance of adhering to a lifelong supplementation regimen to avoid vitamin/mineral deficiencies     Your vitamin D is very low  Both vitamin D and calcium are important for bone health      Recommend take 50,000 IU twice per week for 12 weeks in the form of cholecalciferol (vitamin D3) or ergocalciferol (vitamin D2)  Enclosed is information for OTC Replesta (vitamin D3)  You may also order ergocalciferol (vitamin D2) at the [facility]  Also take 1981-4620 mg calcium citrate per day (taken 600 mg at a time, twice per day or 500 mg at a time, three times per day)  It is very important that you separate each dose by at least 2 hours  After finishing this regimen, start 2000 IU vitamin D3 per day for maintenance  This can be taken as 2000 IU vitamin D3 OTC  Your labs should be rechecked in 4 months  A repeat lab has been ordered  Subjective:      Patient ID: Chad Ortez is a 62 y o  male seen independently by Surgical PA-C today at [address] [place] [address]   HPI: Patient presenting to clinic for 1 month follow up regarding UGI results and weight regain  Minimal rapport established with patient due to patient's frustration  Patient was rolling his eyes when  was talking and frustrated with misunderstanding of previous EGD report stating his anastomosis was wide open at 2 5cm  Patient believes there is a "hole" in his pouch that should be evaluated as a cause of weight regain  Patient refusing MWM referral at this time  States that he is being seen by Dr Ada Saldana for weight management  Does not need to meet with dietitian because he is seeing a dietitian at another clinic   #[id number] utilized  Review of Systems   Constitutional: Positive for unexpected weight change (Weight gain)  Gastrointestinal: Positive for abdominal pain (Chronic epigastric pain)  Psychiatric/Behavioral: Positive for agitation and behavioral problems  Objective:    /70   Pulse 73   Temp 98 °F (36 7 °C)   Resp 16   Ht 5' 8" (1 727 m)   Wt 86 4 kg (190 lb 6 4 oz)   BMI 28 95 kg/m²        Physical Exam   Constitutional: He is oriented to person, place, and time  He appears well-developed and well-nourished  No distress     HENT:   Head: Normocephalic and atraumatic  Eyes: Conjunctivae and EOM are normal  No scleral icterus  Neck: Normal range of motion  Neck supple  Cardiovascular: Normal rate  Pulmonary/Chest: Effort normal  No respiratory distress  Musculoskeletal: Normal range of motion  Neurological: He is alert and oriented to person, place, and time  Skin: Skin is warm and dry  Psychiatric: He has a normal mood and affect  His behavior is normal    Vitals reviewed

## 2019-11-20 NOTE — H&P (VIEW-ONLY)
Assessment/Plan:     Diagnoses and all orders for this visit:    Bariatric surgery status  · Continued/Maintain healthy weight loss with good nutrition intakes  · Adequate hydration with at least 64oz  fluid intake  · Follow diet as discussed  · Follow vitamin and mineral recommendations as reviewed with you  · Exercise as tolerated  · Follow up for annual appointments  Weight gain following gastric bypass surgery  - UGI 10/23/2019 revealed unremarkable study status post gastric bypass with no pouch enlargement, hiatal hernia, reflux, or fistula noted  - Refused at this time to meet with dietitian/  for evaluation for MWM  - Currently is being seen by Dr Tip Jeffries for weight loss medications   - Patient very adamant that he has a "hole" in his gastric pouch which is causing him to gain weight  What he is referring to is an EGD report from 2015 that states that his gastric anastomosis is wide open at 2 5cm  After discussion for clarification patient became agitated that I was not understanding his concern and he was demanding of a repeat EGD    - EGD; future   - Discussed with patient that he is currently taking Abilify and Trazodone which are both medications known to have a side effect of weight gain and to discuss with KIRK Tobin alternative medications  Postsurgical malabsorption  -At risk for malabsorption of vitamins/minerals secondary to malabsorption and restriction of intake from bariatric surgery  -Last set of bariatric labs completed on 10/23/2019 and showed low vitamin D  -Next set of bariatric labs ordered for approximately 3 months  -Patient received education about the importance of adhering to a lifelong supplementation regimen to avoid vitamin/mineral deficiencies     Your vitamin D is very low  Both vitamin D and calcium are important for bone health      Recommend take 50,000 IU twice per week for 12 weeks in the form of cholecalciferol (vitamin D3) or ergocalciferol (vitamin D2)  Enclosed is information for OTC Replesta (vitamin D3)  You may also order ergocalciferol (vitamin D2) at the [facility]  Also take 5705-3736 mg calcium citrate per day (taken 600 mg at a time, twice per day or 500 mg at a time, three times per day)  It is very important that you separate each dose by at least 2 hours  After finishing this regimen, start 2000 IU vitamin D3 per day for maintenance  This can be taken as 2000 IU vitamin D3 OTC  Your labs should be rechecked in 4 months  A repeat lab has been ordered  Subjective:      Patient ID: Sis Rivera is a 62 y o  male seen independently by Surgical PA-C today at [address] [place] [address]   HPI: Patient presenting to clinic for 1 month follow up regarding UGI results and weight regain  Minimal rapport established with patient due to patient's frustration  Patient was rolling his eyes when  was talking and frustrated with misunderstanding of previous EGD report stating his anastomosis was wide open at 2 5cm  Patient believes there is a "hole" in his pouch that should be evaluated as a cause of weight regain  Patient refusing MWM referral at this time  States that he is being seen by Dr Dominic Perez for weight management  Does not need to meet with dietitian because he is seeing a dietitian at another clinic   #[id number] utilized  Review of Systems   Constitutional: Positive for unexpected weight change (Weight gain)  Gastrointestinal: Positive for abdominal pain (Chronic epigastric pain)  Psychiatric/Behavioral: Positive for agitation and behavioral problems  Objective:    /70   Pulse 73   Temp 98 °F (36 7 °C)   Resp 16   Ht 5' 8" (1 727 m)   Wt 86 4 kg (190 lb 6 4 oz)   BMI 28 95 kg/m²        Physical Exam   Constitutional: He is oriented to person, place, and time  He appears well-developed and well-nourished  No distress     HENT:   Head: Normocephalic and atraumatic  Eyes: Conjunctivae and EOM are normal  No scleral icterus  Neck: Normal range of motion  Neck supple  Cardiovascular: Normal rate  Pulmonary/Chest: Effort normal  No respiratory distress  Musculoskeletal: Normal range of motion  Neurological: He is alert and oriented to person, place, and time  Skin: Skin is warm and dry  Psychiatric: He has a normal mood and affect  His behavior is normal    Vitals reviewed

## 2019-12-03 ENCOUNTER — ANESTHESIA EVENT (OUTPATIENT)
Dept: GASTROENTEROLOGY | Facility: HOSPITAL | Age: 54
End: 2019-12-03

## 2019-12-04 ENCOUNTER — ANESTHESIA (OUTPATIENT)
Dept: GASTROENTEROLOGY | Facility: HOSPITAL | Age: 54
End: 2019-12-04

## 2019-12-04 ENCOUNTER — HOSPITAL ENCOUNTER (OUTPATIENT)
Dept: GASTROENTEROLOGY | Facility: HOSPITAL | Age: 54
Setting detail: OUTPATIENT SURGERY
Discharge: HOME | End: 2019-12-04
Attending: SURGERY | Admitting: SURGERY
Payer: COMMERCIAL

## 2019-12-04 VITALS
TEMPERATURE: 98.2 F | DIASTOLIC BLOOD PRESSURE: 82 MMHG | SYSTOLIC BLOOD PRESSURE: 142 MMHG | HEART RATE: 63 BPM | WEIGHT: 190 LBS | HEIGHT: 69 IN | BODY MASS INDEX: 28.14 KG/M2 | RESPIRATION RATE: 20 BRPM | OXYGEN SATURATION: 99 %

## 2019-12-04 DIAGNOSIS — S09.90XS HEADACHES DUE TO OLD HEAD INJURY: Primary | ICD-10-CM

## 2019-12-04 DIAGNOSIS — M25.562 CHRONIC PAIN OF LEFT KNEE: ICD-10-CM

## 2019-12-04 DIAGNOSIS — G44.309 HEADACHES DUE TO OLD HEAD INJURY: Primary | ICD-10-CM

## 2019-12-04 DIAGNOSIS — G89.29 CHRONIC PAIN OF LEFT KNEE: ICD-10-CM

## 2019-12-04 DIAGNOSIS — Z98.84 BARIATRIC SURGERY STATUS: ICD-10-CM

## 2019-12-04 PROCEDURE — 43239 EGD BIOPSY SINGLE/MULTIPLE: CPT | Performed by: SURGERY

## 2019-12-04 PROCEDURE — 88305 TISSUE EXAM BY PATHOLOGIST: CPT | Performed by: PATHOLOGY

## 2019-12-04 RX ORDER — SODIUM CHLORIDE 9 MG/ML
125 INJECTION, SOLUTION INTRAVENOUS CONTINUOUS
Status: DISCONTINUED | OUTPATIENT
Start: 2019-12-04 | End: 2019-12-08 | Stop reason: HOSPADM

## 2019-12-04 RX ORDER — PROPOFOL 10 MG/ML
INJECTION, EMULSION INTRAVENOUS AS NEEDED
Status: DISCONTINUED | OUTPATIENT
Start: 2019-12-04 | End: 2019-12-04 | Stop reason: SURG

## 2019-12-04 RX ADMIN — SODIUM CHLORIDE: 0.9 INJECTION, SOLUTION INTRAVENOUS at 11:47

## 2019-12-04 RX ADMIN — PROPOFOL 40 MG: 10 INJECTION, EMULSION INTRAVENOUS at 11:55

## 2019-12-04 RX ADMIN — LIDOCAINE HYDROCHLORIDE 100 MG: 20 INJECTION, SOLUTION INTRAVENOUS at 11:53

## 2019-12-04 RX ADMIN — PROPOFOL 160 MG: 10 INJECTION, EMULSION INTRAVENOUS at 11:53

## 2019-12-04 NOTE — DISCHARGE INSTRUCTIONS
Gastritis   LO QUE NECESITA SABER:   ¿Qué es la gastritis? La gastritis es heather inflamación o irritación del revestimiento del estómago  ¿Qué aumenta mi riesgo de presentar gastritis? · Infección provocada por bacterias, virus o parásitos    · Analgésicos antiinflamatorios no esteroides, aspirina o medicamentos esteroides    · Uso de productos con tabaco o alcohol    · Traumatismo, maya heather lesión en el estómago o el intestino    · Enfermedades autoinmunes, maya diabetes, enfermedad de la tiroides o enfermedad de Crohn    · Estrés    · Ser mayor de 61 años    · Consumo de drogas ilegales, maya la cocaína  ¿Cuáles son los signos y síntomas de la gastritis? · Dolor, ardor o dolor con la palpación en el estómago    · Sensación de [place] y opresión    · Náuseas o vómito    · Falta de apetito o rápidamente se siente lleno mientras está comiendo    · Mal aliento    · Fatiga o más cansancio que de costumbre    · Acidez estomacal  ¿Cómo se diagnostica la gastritis? Rios médico le preguntará sobre taqueria signos y síntomas y lo examinará  Es posible que usted necesite alguno de los siguientes:  · Los análisis de cande:  pueden utilizarse para detectar heather infección, deshidratación o anemia (niveles bajos de glóbulos rojos)  · Coronado de materia fecal  se puede analizar para detectar la presencia de cande o gérmenes que podrían estar causando la gastritis  · Heather prueba del aliento  podría mostrar si el H pylori es el causante de rios gastritis  A usted le darán un líquido para sobia  Luego usted respira dentro de [place]  Rios médico medirá la cantidad de dióxido de carbono en rios aliento  La cantidad extra de dióxido de carbono puede significar que usted tiene heather infección por H pylori  · Heather endoscopia  puede utilizarse para buscar irritación o sangrado en el estómago  Rios médico usará un endoscopio (tubo con [facility] elmo y [place] en el extremo) radha el procedimiento   Se podría sobia Coronado del estómago para examinarlo  ¿Cuál es el tratamiento para la gastritis? Kaitlynn síntomas podrían desaparecer sin tratamiento  El tratamiento dependerá de lo que le está causando rios gastritis  Rios médico le podría recomendar cambios a los [place] jody  Los medicamentos podrían ser recetados para ayudar a tratar la infección bacteriana o para disminuir el ácido estomacal    ¿Cómo puedo controlar o evitar la gastritis? · No fume  La nicotina y otras sustancias químicas de los cigarrillos y los cigarros pueden empeorar kaitlynn síntomas y causar daño pulmonar  Pida información a rios médico si usted actualmente fuma y necesita ayuda para dejar de fumar  Los cigarrillos electrónicos o tabaco sin humo todavía contienen nicotina  Consulte con rios médico antes de [employer]  · No consuma alcohol  El alcohol puede evitar la cicatrización y empeorar la gastritis  Consulte con rios médico si usted necesita ayuda para dejar de sobia alcohol  · No tome medicamentos CHEKO o aspirina a menos que así se lo indiquen  Estos y otros medicamentos similares pueden causar irritación en el revestimiento del estómago  Si rios médico lo autoriza a sobia The vinod [place] con la comida  · No coma alimentos que le provocan irritación:  Los alimentos maya las naranjas y la salsa pueden causar ardor o dolor  Consuma alimentos saludables y variados  Unos [address] frutas (no las cítricas), verduras, productos lácteos descremados, legumbres, pan integral al [employer] las fredy [place] y pescado  Trate de comer porciones más pequeñas y sobia agua con kaitlynn comidas  No coma nada al menos por 3 horas antes de acostarse  · Encuentre maneras de relajarse y reducir el estrés  El estrés puede aumentar el ácido estomacal y empeorar la gastritis  Las [employer] yoga, la meditación o el escuchar música pueden ayudarlo a [place]  Pase tiempo con amigos, o roselyn cosas que disfruta    Llame al 911 en thi de presentar lo siguiente:   · Tiene dolor de pecho o le falta el aire  ¿Cuándo iris buscar atención inmediata? · Usted vomita cande  · Usted tiene evacuaciones intestinales negras o con cande  · Usted tiene un elizabeth dolor de estómago o de espalda  ¿Cuándo iris comunicarme con mi médico?   · Usted tiene fiebre  · Usted tiene síntomas nuevos o estos empeoran, aun después del [place]  · Usted tiene preguntas o inquietudes acerca de marcos condición o cuidado  ACUERDOS SOBRE MARCOS CUIDADO:   Usted tiene el derecho de ayudar a planear marcos cuidado  Aprenda todo lo que pueda sobre marcos condición y maya darle tratamiento  Discuta taqueria opciones de tratamiento con taqueria médicos para decidir el cuidado que usted desea recibir  Usted siempre tiene el derecho de rechazar el tratamiento  Esta información es sólo para uso en educación  Marcos intención no es darle un consejo médico sobre enfermedades o tratamientos  Colsulte con marcos Yovany Elvis farmacéutico antes de seguir cualquier régimen médico para saber si es seguro y efectivo para usted  © 2017 [address] Information is for End User's use only and may not be sold, redistributed or otherwise used for commercial purposes  All illustrations and images included in CareNotes® are the copyrighted property of A D A M , Inc  or Jerson Estrada

## 2019-12-04 NOTE — ANESTHESIA PREPROCEDURE EVALUATION
Review of Systems/Medical History  Patient summary reviewed  Chart reviewed  No history of anesthetic complications     Cardiovascular  Hyperlipidemia,    Pulmonary  Negative pulmonary ROS        GI/Hepatic    GERD well controlled, Bariatric surgery,        Negative  ROS        Endo/Other  Negative endo/other ROS      GYN       Hematology  Anemia ,     Musculoskeletal  Negative musculoskeletal ROS        Neurology  Negative neurology ROS      Psychology   Depression , being treated for depression,              Physical Exam    Airway    Mallampati score: I  TM Distance: >3 FB  Neck ROM: full     Dental   No notable dental hx     Cardiovascular  Rhythm: regular, Rate: normal, Cardiovascular exam normal    Pulmonary  Pulmonary exam normal Breath sounds clear to auscultation,     Other Findings        Anesthesia Plan  ASA Score- 2     Anesthesia Type- IV sedation with anesthesia with ASA Monitors  Additional Monitors:   Airway Plan:         Plan Factors-Patient not instructed to abstain from smoking on day of procedure  Patient did not smoke on day of surgery  Induction- intravenous  Postoperative Plan-     Informed Consent- Anesthetic plan and risks discussed with patient  I personally reviewed this patient with the CRNA  Discussed and agreed on the Anesthesia Plan with the CRNA               Lab Results   Component Value Date    HGBA1C 5 3 03/09/2019       Lab Results   Component Value Date     03/23/2018    K 3 7 10/23/2019     10/23/2019    CO2 30 10/23/2019    ANIONGAP 10 03/23/2018    BUN 12 10/23/2019    CREATININE 1 13 10/23/2019    GLUCOSE 87 03/23/2018    GLUF 99 10/23/2019    CALCIUM 9 9 10/23/2019    AST 27 10/23/2019    ALT 24 10/23/2019    ALKPHOS 84 10/23/2019    PROT 7 6 03/23/2018    BILITOT 0 4 03/23/2018    EGFR 73 10/23/2019       Lab Results   Component Value Date    WBC 4 60 10/23/2019    HGB 15 1 10/23/2019    HCT 46 1 10/23/2019    MCV 91 10/23/2019     10/23/2019   antonio rm

## 2019-12-04 NOTE — INTERVAL H&P NOTE
H&P reviewed  After examining the patient I find no changes in the patients condition since the H&P had been written      Vitals:    12/04/19 1133   BP: 132/83   Pulse: 61   Resp: 16   Temp: 98 2 °F (36 8 °C)   SpO2: 100%

## 2019-12-05 ENCOUNTER — OFFICE VISIT (OUTPATIENT)
Dept: FAMILY MEDICINE CLINIC | Facility: CLINIC | Age: 54
End: 2019-12-05
Payer: COMMERCIAL

## 2019-12-05 VITALS
WEIGHT: 195 LBS | OXYGEN SATURATION: 99 % | RESPIRATION RATE: 16 BRPM | DIASTOLIC BLOOD PRESSURE: 70 MMHG | BODY MASS INDEX: 29.55 KG/M2 | SYSTOLIC BLOOD PRESSURE: 120 MMHG | HEIGHT: 68 IN | TEMPERATURE: 97.9 F | HEART RATE: 72 BPM

## 2019-12-05 DIAGNOSIS — G89.29 CHRONIC PAIN OF RIGHT KNEE: ICD-10-CM

## 2019-12-05 DIAGNOSIS — M05.741 RHEUMATOID ARTHRITIS INVOLVING BOTH HANDS WITH POSITIVE RHEUMATOID FACTOR (HCC): ICD-10-CM

## 2019-12-05 DIAGNOSIS — G89.29 CHRONIC PAIN OF LEFT KNEE: ICD-10-CM

## 2019-12-05 DIAGNOSIS — M25.562 CHRONIC PAIN OF LEFT KNEE: ICD-10-CM

## 2019-12-05 DIAGNOSIS — E78.2 MIXED HYPERLIPIDEMIA: ICD-10-CM

## 2019-12-05 DIAGNOSIS — E21.3 HYPERPARATHYROIDISM (HCC): ICD-10-CM

## 2019-12-05 DIAGNOSIS — M05.742 RHEUMATOID ARTHRITIS INVOLVING BOTH HANDS WITH POSITIVE RHEUMATOID FACTOR (HCC): ICD-10-CM

## 2019-12-05 DIAGNOSIS — M25.561 CHRONIC PAIN OF RIGHT KNEE: ICD-10-CM

## 2019-12-05 DIAGNOSIS — J30.9 ALLERGIC SINUSITIS: ICD-10-CM

## 2019-12-05 DIAGNOSIS — Z23 NEEDS FLU SHOT: Primary | ICD-10-CM

## 2019-12-05 PROCEDURE — 96372 THER/PROPH/DIAG INJ SC/IM: CPT | Performed by: FAMILY MEDICINE

## 2019-12-05 PROCEDURE — 99214 OFFICE O/P EST MOD 30 MIN: CPT | Performed by: FAMILY MEDICINE

## 2019-12-05 PROCEDURE — G0008 ADMIN INFLUENZA VIRUS VAC: HCPCS | Performed by: FAMILY MEDICINE

## 2019-12-05 PROCEDURE — 90682 RIV4 VACC RECOMBINANT DNA IM: CPT | Performed by: FAMILY MEDICINE

## 2019-12-05 RX ORDER — TRIAMCINOLONE ACETONIDE 40 MG/ML
80 INJECTION, SUSPENSION INTRA-ARTICULAR; INTRAMUSCULAR ONCE
Status: COMPLETED | OUTPATIENT
Start: 2019-12-05 | End: 2019-12-05

## 2019-12-05 RX ORDER — FLUTICASONE PROPIONATE 50 MCG
1 SPRAY, SUSPENSION (ML) NASAL DAILY
Qty: 16 G | Refills: 0 | Status: SHIPPED | OUTPATIENT
Start: 2019-12-05 | End: 2020-01-01 | Stop reason: ALTCHOICE

## 2019-12-05 RX ADMIN — TRIAMCINOLONE ACETONIDE 80 MG: 40 INJECTION, SUSPENSION INTRA-ARTICULAR; INTRAMUSCULAR at 17:43

## 2019-12-05 NOTE — ASSESSMENT & PLAN NOTE
Will repeat parathyroid hormone and check for hypercalcemia  This is possible related to vitamin D and calcium deficiency after bariatric surgery  We discussed about the need of vitamins and calcium replacement

## 2019-12-05 NOTE — ASSESSMENT & PLAN NOTE
He does not have any signs of rheumatoid arthritis activities  I am going to check ccp antibodies and rheumatoid factor

## 2019-12-06 RX ORDER — TOPIRAMATE 25 MG/1
25 TABLET ORAL 2 TIMES DAILY
Qty: 60 TABLET | Refills: 5 | Status: SHIPPED | OUTPATIENT
Start: 2019-12-06 | End: 2020-02-20 | Stop reason: SDUPTHER

## 2019-12-11 ENCOUNTER — HOSPITAL ENCOUNTER (OUTPATIENT)
Dept: RADIOLOGY | Facility: HOSPITAL | Age: 54
Discharge: HOME | End: 2019-12-11
Payer: COMMERCIAL

## 2019-12-11 ENCOUNTER — LAB (OUTPATIENT)
Dept: LAB | Facility: HOSPITAL | Age: 54
End: 2019-12-11
Payer: COMMERCIAL

## 2019-12-11 DIAGNOSIS — M05.741 RHEUMATOID ARTHRITIS INVOLVING BOTH HANDS WITH POSITIVE RHEUMATOID FACTOR (HCC): ICD-10-CM

## 2019-12-11 DIAGNOSIS — M25.561 CHRONIC PAIN OF RIGHT KNEE: ICD-10-CM

## 2019-12-11 DIAGNOSIS — G89.29 CHRONIC PAIN OF RIGHT KNEE: ICD-10-CM

## 2019-12-11 DIAGNOSIS — E55.9 VITAMIN D DEFICIENCY: ICD-10-CM

## 2019-12-11 DIAGNOSIS — E21.3 HYPERPARATHYROIDISM (HCC): ICD-10-CM

## 2019-12-11 DIAGNOSIS — K91.2 POSTSURGICAL MALABSORPTION: ICD-10-CM

## 2019-12-11 DIAGNOSIS — M05.742 RHEUMATOID ARTHRITIS INVOLVING BOTH HANDS WITH POSITIVE RHEUMATOID FACTOR (HCC): ICD-10-CM

## 2019-12-11 DIAGNOSIS — G89.29 CHRONIC PAIN OF LEFT KNEE: ICD-10-CM

## 2019-12-11 DIAGNOSIS — E78.2 MIXED HYPERLIPIDEMIA: ICD-10-CM

## 2019-12-11 DIAGNOSIS — M25.562 CHRONIC PAIN OF LEFT KNEE: ICD-10-CM

## 2019-12-11 LAB
25(OH)D3 SERPL-MCNC: 21.7 NG/ML (ref 30–100)
CHOLEST SERPL-MCNC: 216 MG/DL
CRP SERPL QL: <5 MG/L
HDLC SERPL-MCNC: 71 MG/DL
LDLC SERPL CALC-MCNC: 135 MG/DL
NONHDLC SERPL-MCNC: 145 MG/DL
PTH-INTACT SERPL-MCNC: 50.2 PG/ML (ref 16.7–78.9)
TRIGL SERPL-MCNC: 51 MG/DL
URATE SERPL-MCNC: 4.2 MG/DL (ref 3.5–8.5)

## 2019-12-11 PROCEDURE — 86430 RHEUMATOID FACTOR TEST QUAL: CPT

## 2019-12-11 PROCEDURE — 82306 VITAMIN D 25 HYDROXY: CPT

## 2019-12-11 PROCEDURE — 84550 ASSAY OF BLOOD/URIC ACID: CPT

## 2019-12-11 PROCEDURE — 86431 RHEUMATOID FACTOR QUANT: CPT

## 2019-12-11 PROCEDURE — 36415 COLL VENOUS BLD VENIPUNCTURE: CPT

## 2019-12-11 PROCEDURE — 83970 ASSAY OF PARATHORMONE: CPT

## 2019-12-11 PROCEDURE — 80061 LIPID PANEL: CPT

## 2019-12-11 PROCEDURE — 86200 CCP ANTIBODY: CPT

## 2019-12-11 PROCEDURE — 86140 C-REACTIVE PROTEIN: CPT

## 2019-12-11 PROCEDURE — 73562 X-RAY EXAM OF KNEE 3: CPT

## 2019-12-12 LAB
CRYOGLOB RF SER-ACNC: ABNORMAL [IU]/ML
RHEUMATOID FACT SER QL LA: POSITIVE

## 2019-12-13 ENCOUNTER — OFFICE VISIT (OUTPATIENT)
Dept: BARIATRICS | Facility: CLINIC | Age: 54
End: 2019-12-13
Payer: COMMERCIAL

## 2019-12-13 VITALS
TEMPERATURE: 97.2 F | DIASTOLIC BLOOD PRESSURE: 78 MMHG | WEIGHT: 192.5 LBS | BODY MASS INDEX: 30.21 KG/M2 | RESPIRATION RATE: 18 BRPM | SYSTOLIC BLOOD PRESSURE: 118 MMHG | HEART RATE: 68 BPM | HEIGHT: 67 IN

## 2019-12-13 DIAGNOSIS — E66.9 OBESITY, CLASS I, BMI 30-34.9: Primary | ICD-10-CM

## 2019-12-13 DIAGNOSIS — K21.9 GERD (GASTROESOPHAGEAL REFLUX DISEASE): Primary | ICD-10-CM

## 2019-12-13 DIAGNOSIS — K91.2 POSTSURGICAL MALABSORPTION: ICD-10-CM

## 2019-12-13 DIAGNOSIS — Z98.84 S/P GASTRIC BYPASS: ICD-10-CM

## 2019-12-13 LAB — CCP IGA+IGG SERPL IA-ACNC: 7 UNITS (ref 0–19)

## 2019-12-13 PROCEDURE — 99213 OFFICE O/P EST LOW 20 MIN: CPT | Performed by: SURGERY

## 2019-12-13 RX ORDER — OMEPRAZOLE 20 MG/1
20 CAPSULE, DELAYED RELEASE ORAL DAILY
Qty: 90 CAPSULE | Refills: 1 | Status: SHIPPED | OUTPATIENT
Start: 2019-12-13 | End: 2021-01-01 | Stop reason: ALTCHOICE

## 2019-12-13 NOTE — PROGRESS NOTES
FOLLOW UP VISIT - BARIATRIC SURGERY  Renetta Lane 62 y o  male MRN: [id number]  Unit/Bed#:  Encounter: [id number]      HPI:  Renetta Lane is a 62 y o  male who presents with a gastric bypass in the past here to review the results of his EGD  Review of Systems   Gastrointestinal: Positive for abdominal pain  Intermittent   All other systems reviewed and are negative        Historical Information   Past Medical History:   Diagnosis Date    Anemia     b12    Back pain     Depression     GERD (gastroesophageal reflux disease)     Headache     Herniated cervical disc     Herniated lumbar intervertebral disc     Malabsorption syndrome     post gastric bypass    Vitamin B12 deficiency 2014    Vitamin D deficiency 2014    Wears glasses      Past Surgical History:   Procedure Laterality Date    CARPAL TUNNEL RELEASE Bilateral     CHOLECYSTECTOMY      COLONOSCOPY  2014    normal exam    EGD  12/2019    pouchitis    ESOPHAGOGASTRODUODENOSCOPY  2015    biopsy taken,     GASTRIC BYPASS  2010    HEMORROIDECTOMY  06/2016    GA LAP,CHOLECYSTECTOMY/GRAPH N/A 4/26/2019    Procedure: LAPAROSCOPIC CHOLECYSTECTOMY;  Surgeon: Axel Naqvi MD;  Location: [address] [place] [address];  Service: General    VASECTOMY       Social History   Social History     Substance and Sexual Activity   Alcohol Use Yes    Alcohol/week: 3 0 standard drinks    Types: 3 Cans of beer per week    Frequency: Monthly or less    Drinks per session: 1 or 2    Binge frequency: Never    Comment: "socially"     Social History     Substance and Sexual Activity   Drug Use No     Social History     Tobacco Use   Smoking Status Never Smoker   Smokeless Tobacco Never Used   Tobacco Comment    NO TOBACCO USE     Family History: non-contributory    Meds/Allergies   all medications and allergies reviewed  No Known Allergies    Objective   First Vitals:   @VSFIRST2(5,8,6,7,9,11,14,10:FIRST)@    Current Vitals:   Blood Pressure: 118/78 (12/13/19 1315)  Pulse: 68 (12/13/19 1315)  Temperature: (!) 97 2 °F (36 2 °C) (12/13/19 1315)  Temp Source: Tympanic (12/13/19 1315)  Respirations: 18 (12/13/19 1315)  Height: 5' 7" (170 2 cm) (12/13/19 1315)  Weight - Scale: 87 3 kg (192 lb 8 oz) (12/13/19 1315)        Invasive Devices     None                 Physical Exam    Lab Results: I have personally reviewed pertinent lab results  Imaging: I have personally reviewed pertinent reports  EKG, Pathology, and Other Studies: I have personally reviewed pertinent reports  A  Gastric pouch biopsy:  - Mild chronic inactive oxyntic gastritis, negative for curvilinear Helicobacter pylori organisms by routine H&E stains   - No atrophy or intestinal metaplasia identified   - No epithelial dysplasia and no evidence of malignancy  Assessment/PLAN:    62 y o  yo male with a history of laparoscopic Daquan-en-Y gastric bypass in 2009 at [facility] by Dr Shipman  Adhesive highest weight he was 290 lb and at his theresa he went down to 170  Over the last couple years he has regained 22 lb  He has been having pain and he was seen in our office [facility]   He was scheduled to have an endoscopy to further evaluate the anatomy of his bypass  His endoscopy revealed a normal gastric bypass anatomy  Mildly prominent pouch not unusual this many years after the surgery  I am prescribing him PPI to help with this pain and I have encouraged him to enroll in our medical weight management program to help him lose weight  I had a detailed discussion with him stressing the fact that long-term success is largely dependent on compliance and abidance to the recommendations of the program as well as participation within the support groups  He is committed to continue to observe his diet and to increase his physical activity  He will follow up with us as scheduled      Arline Roblero MD  12/13/2019  1:22 PM

## 2019-12-18 ENCOUNTER — OFFICE VISIT (OUTPATIENT)
Dept: FAMILY MEDICINE CLINIC | Facility: CLINIC | Age: 54
End: 2019-12-18
Payer: COMMERCIAL

## 2019-12-18 VITALS
RESPIRATION RATE: 20 BRPM | DIASTOLIC BLOOD PRESSURE: 70 MMHG | BODY MASS INDEX: 28.44 KG/M2 | HEART RATE: 64 BPM | OXYGEN SATURATION: 98 % | HEIGHT: 69 IN | WEIGHT: 192 LBS | SYSTOLIC BLOOD PRESSURE: 120 MMHG

## 2019-12-18 DIAGNOSIS — M05.742 RHEUMATOID ARTHRITIS INVOLVING BOTH HANDS WITH POSITIVE RHEUMATOID FACTOR (HCC): Primary | ICD-10-CM

## 2019-12-18 DIAGNOSIS — M05.741 RHEUMATOID ARTHRITIS INVOLVING BOTH HANDS WITH POSITIVE RHEUMATOID FACTOR (HCC): Primary | ICD-10-CM

## 2019-12-18 PROCEDURE — 99213 OFFICE O/P EST LOW 20 MIN: CPT | Performed by: FAMILY MEDICINE

## 2019-12-18 RX ORDER — PREDNISONE 20 MG/1
TABLET ORAL
Qty: 22 TABLET | Refills: 0 | Status: SHIPPED | OUTPATIENT
Start: 2019-12-18 | End: 2019-12-18 | Stop reason: SDUPTHER

## 2019-12-18 RX ORDER — PREDNISONE 20 MG/1
TABLET ORAL
Qty: 22 TABLET | Refills: 0 | Status: SHIPPED | OUTPATIENT
Start: 2019-12-18 | End: 2020-01-17

## 2019-12-18 RX ORDER — HYDROXYCHLOROQUINE SULFATE 200 MG/1
200 TABLET, FILM COATED ORAL
Qty: 30 TABLET | Refills: 5 | Status: SHIPPED | OUTPATIENT
Start: 2019-12-18 | End: 2020-01-20 | Stop reason: SDUPTHER

## 2019-12-18 NOTE — PROGRESS NOTES
Assessment/Plan:  1  Rheumatoid arthritis involving both hands with positive rheumatoid factor (HCC)  His CRp is normal  RF low positive  He has clinical criteria for AR  Placing patient in a anti-inflammatory therapy and DMAR agent  reassess after 1 month/  - Hepatitis B surface antibody; Future  - hydroxychloroquine (PLAQUENIL) 200 mg tablet; Take 1 tablet (200 mg total) by mouth daily with breakfast  Dispense: 30 tablet; Refill: 5  - predniSONE 20 mg tablet; Take 1 tablet (20 mg total) by mouth daily for 15 days, THEN 0 5 tablets (10 mg total) daily for 15 days  Dispense: 22 tablet; Refill: 0    No problem-specific Assessment & Plan notes found for this encounter  There are no diagnoses linked to this encounter  Subjective:      Patient ID: Elisa Freeman is a 62 y o  male  Rheumatoid arthritis: labs with signs of active disease  Patient complaining of multiple areas of pain,   Difficulty increased to move hands in am lasting for more than 1/2 hr       The following portions of the patient's history were reviewed and updated as appropriate: allergies, current medications, past family history, past medical history, past social history, past surgical history and problem list     Review of Systems   Constitutional: Negative for diaphoresis, fatigue, fever and unexpected weight change  Respiratory: Negative for apnea, cough, choking, chest tightness and shortness of breath  Cardiovascular: Negative for chest pain, palpitations and leg swelling  Gastrointestinal: Negative for abdominal distention, abdominal pain, anal bleeding, blood in stool and constipation  Musculoskeletal: Positive for arthralgias (affecting shoulders, elbows, knees and hand ) and joint swelling (hands)  Negative for back pain and gait problem  Neurological: Negative for dizziness, facial asymmetry, light-headedness and headaches     Psychiatric/Behavioral: Negative for behavioral problems, dysphoric mood and self-injury  The patient is not nervous/anxious  Objective:      /70 (BP Location: Left arm, Patient Position: Sitting, Cuff Size: Standard)   Pulse 64   Resp 20   Ht 5' 9" (1 753 m)   Wt 87 1 kg (192 lb)   SpO2 98%   BMI 28 35 kg/m²          Physical Exam   Constitutional: He is oriented to person, place, and time  He is not intubated  Neck: No JVD present  No tracheal tenderness present  Carotid bruit is not present  No neck rigidity  No edema present  No thyroid mass and no thyromegaly present  Cardiovascular: Normal rate, regular rhythm, normal heart sounds and normal pulses  No extrasystoles are present  Exam reveals no distant heart sounds and no friction rub  Pulmonary/Chest: Effort normal and breath sounds normal  No stridor  No apnea, no tachypnea and no bradypnea  He is not intubated  Abdominal: Soft  Bowel sounds are normal  He exhibits no abdominal bruit  There is no hepatosplenomegaly, splenomegaly or hepatomegaly  There is no CVA tenderness  No hernia  Musculoskeletal: Normal range of motion  He exhibits tenderness and deformity (of hands)  Neurological: He is alert and oriented to person, place, and time  He has normal reflexes  Skin: Skin is warm and dry  Psychiatric: He has a normal mood and affect  His behavior is normal  Judgment and thought content normal    Nursing note and vitals reviewed

## 2019-12-24 NOTE — ASSESSMENT & PLAN NOTE
Patient complains 2-10 large joints - 1 point and 4-10 small joints (large joints excluded) - 3 points  Stiffness for 1/2 every morning

## 2020-01-01 ENCOUNTER — OFFICE VISIT (OUTPATIENT)
Dept: FAMILY MEDICINE CLINIC | Facility: CLINIC | Age: 55
End: 2020-01-01
Payer: COMMERCIAL

## 2020-01-01 ENCOUNTER — LAB (OUTPATIENT)
Dept: LAB | Facility: HOSPITAL | Age: 55
End: 2020-01-01
Payer: COMMERCIAL

## 2020-01-01 ENCOUNTER — HOSPITAL ENCOUNTER (OUTPATIENT)
Dept: ULTRASOUND IMAGING | Facility: HOSPITAL | Age: 55
Discharge: HOME | End: 2020-12-23
Payer: COMMERCIAL

## 2020-01-01 ENCOUNTER — TELEMEDICINE (OUTPATIENT)
Dept: FAMILY MEDICINE CLINIC | Facility: CLINIC | Age: 55
End: 2020-01-01
Payer: COMMERCIAL

## 2020-01-01 ENCOUNTER — OFFICE VISIT (OUTPATIENT)
Dept: BARIATRICS | Facility: CLINIC | Age: 55
End: 2020-01-01
Payer: COMMERCIAL

## 2020-01-01 ENCOUNTER — TELEMEDICINE (OUTPATIENT)
Dept: BARIATRICS | Facility: CLINIC | Age: 55
End: 2020-01-01
Payer: COMMERCIAL

## 2020-01-01 VITALS
OXYGEN SATURATION: 98 % | RESPIRATION RATE: 16 BRPM | WEIGHT: 197 LBS | HEART RATE: 62 BPM | DIASTOLIC BLOOD PRESSURE: 70 MMHG | SYSTOLIC BLOOD PRESSURE: 110 MMHG | TEMPERATURE: 97.9 F | BODY MASS INDEX: 29.18 KG/M2 | HEIGHT: 69 IN

## 2020-01-01 VITALS
DIASTOLIC BLOOD PRESSURE: 70 MMHG | BODY MASS INDEX: 29.03 KG/M2 | HEART RATE: 66 BPM | WEIGHT: 196 LBS | SYSTOLIC BLOOD PRESSURE: 102 MMHG | OXYGEN SATURATION: 97 % | HEIGHT: 69 IN | TEMPERATURE: 97.9 F | RESPIRATION RATE: 16 BRPM

## 2020-01-01 VITALS
TEMPERATURE: 98.6 F | HEIGHT: 69 IN | RESPIRATION RATE: 12 BRPM | HEART RATE: 88 BPM | BODY MASS INDEX: 26.96 KG/M2 | OXYGEN SATURATION: 98 % | DIASTOLIC BLOOD PRESSURE: 60 MMHG | SYSTOLIC BLOOD PRESSURE: 100 MMHG | WEIGHT: 182 LBS

## 2020-01-01 VITALS
HEART RATE: 53 BPM | WEIGHT: 195.1 LBS | DIASTOLIC BLOOD PRESSURE: 64 MMHG | TEMPERATURE: 97.3 F | HEIGHT: 68 IN | RESPIRATION RATE: 18 BRPM | BODY MASS INDEX: 29.57 KG/M2 | SYSTOLIC BLOOD PRESSURE: 118 MMHG

## 2020-01-01 VITALS
DIASTOLIC BLOOD PRESSURE: 70 MMHG | OXYGEN SATURATION: 98 % | BODY MASS INDEX: 28.44 KG/M2 | SYSTOLIC BLOOD PRESSURE: 106 MMHG | TEMPERATURE: 97.9 F | HEIGHT: 69 IN | RESPIRATION RATE: 16 BRPM | WEIGHT: 192 LBS | HEART RATE: 80 BPM

## 2020-01-01 VITALS
RESPIRATION RATE: 16 BRPM | HEART RATE: 64 BPM | WEIGHT: 199 LBS | SYSTOLIC BLOOD PRESSURE: 100 MMHG | HEIGHT: 69 IN | DIASTOLIC BLOOD PRESSURE: 60 MMHG | BODY MASS INDEX: 29.47 KG/M2 | TEMPERATURE: 97.9 F | OXYGEN SATURATION: 99 %

## 2020-01-01 VITALS
HEIGHT: 68 IN | DIASTOLIC BLOOD PRESSURE: 68 MMHG | SYSTOLIC BLOOD PRESSURE: 114 MMHG | TEMPERATURE: 98.7 F | HEART RATE: 60 BPM | WEIGHT: 188.5 LBS | BODY MASS INDEX: 28.57 KG/M2

## 2020-01-01 VITALS — WEIGHT: 190 LBS | HEIGHT: 67 IN | BODY MASS INDEX: 29.82 KG/M2

## 2020-01-01 DIAGNOSIS — E65 ABDOMINAL PANNUS: ICD-10-CM

## 2020-01-01 DIAGNOSIS — K91.2 POSTSURGICAL MALABSORPTION: ICD-10-CM

## 2020-01-01 DIAGNOSIS — G44.309 HEADACHES DUE TO OLD HEAD INJURY: ICD-10-CM

## 2020-01-01 DIAGNOSIS — M05.741 RHEUMATOID ARTHRITIS INVOLVING BOTH HANDS WITH POSITIVE RHEUMATOID FACTOR (HCC): ICD-10-CM

## 2020-01-01 DIAGNOSIS — E21.3 HYPERPARATHYROIDISM (HCC): ICD-10-CM

## 2020-01-01 DIAGNOSIS — Z98.84 WEIGHT GAIN FOLLOWING GASTRIC BYPASS SURGERY: ICD-10-CM

## 2020-01-01 DIAGNOSIS — Z98.84 BARIATRIC SURGERY STATUS: ICD-10-CM

## 2020-01-01 DIAGNOSIS — M25.562 CHRONIC PAIN OF LEFT KNEE: ICD-10-CM

## 2020-01-01 DIAGNOSIS — Z23 NEEDS FLU SHOT: ICD-10-CM

## 2020-01-01 DIAGNOSIS — M05.742 RHEUMATOID ARTHRITIS INVOLVING BOTH HANDS WITH POSITIVE RHEUMATOID FACTOR (HCC): Primary | ICD-10-CM

## 2020-01-01 DIAGNOSIS — R63.5 WEIGHT GAIN: Primary | ICD-10-CM

## 2020-01-01 DIAGNOSIS — F33.2 SEVERE RECURRENT MAJOR DEPRESSION WITHOUT PSYCHOTIC FEATURES (HCC): ICD-10-CM

## 2020-01-01 DIAGNOSIS — M05.742 RHEUMATOID ARTHRITIS INVOLVING BOTH HANDS WITH POSITIVE RHEUMATOID FACTOR (HCC): ICD-10-CM

## 2020-01-01 DIAGNOSIS — S09.90XS HEADACHES DUE TO OLD HEAD INJURY: ICD-10-CM

## 2020-01-01 DIAGNOSIS — Z98.84 S/P GASTRIC BYPASS: ICD-10-CM

## 2020-01-01 DIAGNOSIS — M05.741 RHEUMATOID ARTHRITIS INVOLVING BOTH HANDS WITH POSITIVE RHEUMATOID FACTOR (HCC): Primary | ICD-10-CM

## 2020-01-01 DIAGNOSIS — M54.16 LUMBAR RADICULOPATHY: ICD-10-CM

## 2020-01-01 DIAGNOSIS — Z23 NEED FOR HEPATITIS B VACCINATION: ICD-10-CM

## 2020-01-01 DIAGNOSIS — R63.5 WEIGHT GAIN FOLLOWING GASTRIC BYPASS SURGERY: ICD-10-CM

## 2020-01-01 DIAGNOSIS — E66.3 OVERWEIGHT: Primary | ICD-10-CM

## 2020-01-01 DIAGNOSIS — J06.9 ACUTE UPPER RESPIRATORY INFECTION: ICD-10-CM

## 2020-01-01 DIAGNOSIS — R10.31 RIGHT INGUINAL PAIN: ICD-10-CM

## 2020-01-01 DIAGNOSIS — Z98.84 WEIGHT GAIN FOLLOWING GASTRIC BYPASS SURGERY: Primary | ICD-10-CM

## 2020-01-01 DIAGNOSIS — L05.91 PILONIDAL CYST: ICD-10-CM

## 2020-01-01 DIAGNOSIS — K91.850 POUCHITIS (HCC): ICD-10-CM

## 2020-01-01 DIAGNOSIS — G89.29 CHRONIC PAIN OF LEFT KNEE: ICD-10-CM

## 2020-01-01 DIAGNOSIS — E78.2 MIXED HYPERLIPIDEMIA: ICD-10-CM

## 2020-01-01 DIAGNOSIS — Z20.822 CLOSE EXPOSURE TO COVID-19 VIRUS: ICD-10-CM

## 2020-01-01 DIAGNOSIS — Z20.822 CLOSE EXPOSURE TO COVID-19 VIRUS: Primary | ICD-10-CM

## 2020-01-01 DIAGNOSIS — E66.3 OVERWEIGHT: ICD-10-CM

## 2020-01-01 DIAGNOSIS — R63.5 WEIGHT GAIN FOLLOWING GASTRIC BYPASS SURGERY: Primary | ICD-10-CM

## 2020-01-01 DIAGNOSIS — R10.31 RIGHT INGUINAL PAIN: Primary | ICD-10-CM

## 2020-01-01 LAB
ALBUMIN SERPL BCP-MCNC: 4.3 G/DL (ref 3–5.2)
ALP SERPL-CCNC: 78 U/L (ref 43–122)
ALT SERPL W P-5'-P-CCNC: 18 U/L (ref 9–52)
ANION GAP SERPL CALCULATED.3IONS-SCNC: 7 MMOL/L (ref 5–14)
AST SERPL W P-5'-P-CCNC: 25 U/L (ref 17–59)
BASOPHILS # BLD AUTO: 0 THOUSANDS/ΜL (ref 0–0.1)
BASOPHILS NFR BLD AUTO: 1 % (ref 0–1)
BILIRUB SERPL-MCNC: 0.6 MG/DL
BUN SERPL-MCNC: 20 MG/DL (ref 5–25)
CALCIUM SERPL-MCNC: 9.5 MG/DL (ref 8.4–10.2)
CHLORIDE SERPL-SCNC: 108 MMOL/L (ref 97–108)
CHOLEST SERPL-MCNC: 215 MG/DL
CO2 SERPL-SCNC: 25 MMOL/L (ref 22–30)
CREAT SERPL-MCNC: 1.21 MG/DL (ref 0.7–1.5)
EOSINOPHIL # BLD AUTO: 0.1 THOUSAND/ΜL (ref 0–0.4)
EOSINOPHIL NFR BLD AUTO: 2 % (ref 0–6)
ERYTHROCYTE [DISTWIDTH] IN BLOOD BY AUTOMATED COUNT: 13.9 %
FLUAV RNA NPH QL NAA+PROBE: NOT DETECTED
FLUBV RNA NPH QL NAA+PROBE: NOT DETECTED
GAMMA INTERFERON BACKGROUND BLD IA-ACNC: 0.1 IU/ML
GFR SERPL CREATININE-BSD FRML MDRD: 67 ML/MIN/1.73SQ M
GLUCOSE P FAST SERPL-MCNC: 96 MG/DL (ref 70–99)
HBA1C MFR BLD HPLC: 4.9 %
HCT VFR BLD AUTO: 45 % (ref 41–53)
HDLC SERPL-MCNC: 62 MG/DL
HGB BLD-MCNC: 15 G/DL (ref 13.5–17.5)
LDLC SERPL CALC-MCNC: 137 MG/DL
LYMPHOCYTES # BLD AUTO: 1.6 THOUSANDS/ΜL (ref 0.5–4)
LYMPHOCYTES NFR BLD AUTO: 33 % (ref 25–45)
M TB IFN-G BLD-IMP: NEGATIVE
M TB IFN-G CD4+ BCKGRND COR BLD-ACNC: -0.01 IU/ML
M TB IFN-G CD4+ BCKGRND COR BLD-ACNC: -0.01 IU/ML
MCH RBC QN AUTO: 31.1 PG (ref 26–34)
MCHC RBC AUTO-ENTMCNC: 33.3 G/DL (ref 31–36)
MCV RBC AUTO: 94 FL (ref 80–100)
MITOGEN IGNF BCKGRD COR BLD-ACNC: >10 IU/ML
MONOCYTES # BLD AUTO: 0.4 THOUSAND/ΜL (ref 0.2–0.9)
MONOCYTES NFR BLD AUTO: 9 % (ref 1–10)
NEUTROPHILS # BLD AUTO: 2.8 THOUSANDS/ΜL (ref 1.8–7.8)
NEUTS SEG NFR BLD AUTO: 57 % (ref 45–65)
NONHDLC SERPL-MCNC: 153 MG/DL
PLATELET # BLD AUTO: 152 THOUSANDS/UL (ref 150–450)
PMV BLD AUTO: 11.7 FL (ref 8.9–12.7)
POTASSIUM SERPL-SCNC: 4.5 MMOL/L (ref 3.6–5)
PROT SERPL-MCNC: 7.8 G/DL (ref 5.9–8.4)
RBC # BLD AUTO: 4.81 MILLION/UL (ref 4.5–5.9)
RSV RNA NPH QL NAA+PROBE: NOT DETECTED
SARS-COV-2 RNA NPH QL NAA+PROBE: NOT DETECTED
SODIUM SERPL-SCNC: 140 MMOL/L (ref 137–147)
TRIGL SERPL-MCNC: 78 MG/DL
TSH SERPL DL<=0.05 MIU/L-ACNC: 1.63 UIU/ML (ref 0.47–4.68)
WBC # BLD AUTO: 5 THOUSAND/UL (ref 4.5–11)

## 2020-01-01 PROCEDURE — 85025 COMPLETE CBC W/AUTO DIFF WBC: CPT

## 2020-01-01 PROCEDURE — 3008F BODY MASS INDEX DOCD: CPT | Performed by: FAMILY MEDICINE

## 2020-01-01 PROCEDURE — 3725F SCREEN DEPRESSION PERFORMED: CPT | Performed by: FAMILY MEDICINE

## 2020-01-01 PROCEDURE — 90682 RIV4 VACC RECOMBINANT DNA IM: CPT | Performed by: FAMILY MEDICINE

## 2020-01-01 PROCEDURE — 76705 ECHO EXAM OF ABDOMEN: CPT

## 2020-01-01 PROCEDURE — 90746 HEPB VACCINE 3 DOSE ADULT IM: CPT

## 2020-01-01 PROCEDURE — 1036F TOBACCO NON-USER: CPT | Performed by: FAMILY MEDICINE

## 2020-01-01 PROCEDURE — 80061 LIPID PANEL: CPT

## 2020-01-01 PROCEDURE — 99214 OFFICE O/P EST MOD 30 MIN: CPT | Performed by: FAMILY MEDICINE

## 2020-01-01 PROCEDURE — G0010 ADMIN HEPATITIS B VACCINE: HCPCS | Performed by: FAMILY MEDICINE

## 2020-01-01 PROCEDURE — G0010 ADMIN HEPATITIS B VACCINE: HCPCS

## 2020-01-01 PROCEDURE — 96372 THER/PROPH/DIAG INJ SC/IM: CPT | Performed by: FAMILY MEDICINE

## 2020-01-01 PROCEDURE — G2012 BRIEF CHECK IN BY MD/QHP: HCPCS | Performed by: FAMILY MEDICINE

## 2020-01-01 PROCEDURE — 90746 HEPB VACCINE 3 DOSE ADULT IM: CPT | Performed by: FAMILY MEDICINE

## 2020-01-01 PROCEDURE — 86480 TB TEST CELL IMMUN MEASURE: CPT

## 2020-01-01 PROCEDURE — 36415 COLL VENOUS BLD VENIPUNCTURE: CPT

## 2020-01-01 PROCEDURE — 80053 COMPREHEN METABOLIC PANEL: CPT

## 2020-01-01 PROCEDURE — 99213 OFFICE O/P EST LOW 20 MIN: CPT | Performed by: FAMILY MEDICINE

## 2020-01-01 PROCEDURE — 84443 ASSAY THYROID STIM HORMONE: CPT

## 2020-01-01 PROCEDURE — 87637 SARSCOV2&INF A&B&RSV AMP PRB: CPT | Performed by: FAMILY MEDICINE

## 2020-01-01 PROCEDURE — G0008 ADMIN INFLUENZA VIRUS VAC: HCPCS | Performed by: FAMILY MEDICINE

## 2020-01-01 RX ORDER — TOPIRAMATE 50 MG/1
TABLET, FILM COATED ORAL
Qty: 90 TABLET | Refills: 1 | Status: SHIPPED | OUTPATIENT
Start: 2020-01-01 | End: 2020-01-01 | Stop reason: SDUPTHER

## 2020-01-01 RX ORDER — HYDROXYCHLOROQUINE SULFATE 200 MG/1
200 TABLET, FILM COATED ORAL 2 TIMES DAILY WITH MEALS
Qty: 60 TABLET | Refills: 5 | Status: SHIPPED | OUTPATIENT
Start: 2020-01-01 | End: 2021-01-01 | Stop reason: SDUPTHER

## 2020-01-01 RX ORDER — ARIPIPRAZOLE 10 MG/1
TABLET ORAL
COMMUNITY
Start: 2020-01-01 | End: 2021-03-30 | Stop reason: HOSPADM

## 2020-01-01 RX ORDER — TRIAMCINOLONE ACETONIDE 40 MG/ML
80 INJECTION, SUSPENSION INTRA-ARTICULAR; INTRAMUSCULAR ONCE
Status: COMPLETED | OUTPATIENT
Start: 2020-01-01 | End: 2020-01-01

## 2020-01-01 RX ORDER — CEPHALEXIN 500 MG/1
500 CAPSULE ORAL EVERY 8 HOURS SCHEDULED
Qty: 30 CAPSULE | Refills: 0 | Status: SHIPPED | OUTPATIENT
Start: 2020-01-01 | End: 2020-01-01 | Stop reason: ALTCHOICE

## 2020-01-01 RX ORDER — TRIAMCINOLONE ACETONIDE 40 MG/ML
40 INJECTION, SUSPENSION INTRA-ARTICULAR; INTRAMUSCULAR
Status: COMPLETED | OUTPATIENT
Start: 2020-03-20 | End: 2020-03-20

## 2020-01-01 RX ORDER — TOPIRAMATE 100 MG/1
TABLET, FILM COATED ORAL
Qty: 60 TABLET | Refills: 1 | Status: SHIPPED | OUTPATIENT
Start: 2020-01-01 | End: 2020-01-01 | Stop reason: SDUPTHER

## 2020-01-01 RX ORDER — TOPIRAMATE 100 MG/1
TABLET, FILM COATED ORAL
Qty: 60 TABLET | Refills: 3 | Status: SHIPPED | OUTPATIENT
Start: 2020-01-01 | End: 2021-01-01 | Stop reason: ALTCHOICE

## 2020-01-01 RX ADMIN — TRIAMCINOLONE ACETONIDE 80 MG: 40 INJECTION, SUSPENSION INTRA-ARTICULAR; INTRAMUSCULAR at 16:00

## 2020-01-03 ENCOUNTER — OFFICE VISIT (OUTPATIENT)
Dept: BARIATRICS | Facility: CLINIC | Age: 55
End: 2020-01-03
Payer: COMMERCIAL

## 2020-01-03 VITALS
HEIGHT: 67 IN | DIASTOLIC BLOOD PRESSURE: 68 MMHG | SYSTOLIC BLOOD PRESSURE: 110 MMHG | RESPIRATION RATE: 18 BRPM | WEIGHT: 191 LBS | TEMPERATURE: 97.8 F | BODY MASS INDEX: 29.98 KG/M2 | HEART RATE: 78 BPM

## 2020-01-03 DIAGNOSIS — Z98.84 BARIATRIC SURGERY STATUS: Primary | ICD-10-CM

## 2020-01-03 PROCEDURE — 99213 OFFICE O/P EST LOW 20 MIN: CPT | Performed by: SURGERY

## 2020-01-03 NOTE — PROGRESS NOTES
POST OP UP VISIT - BARIATRIC SURGERY  Frank Kim 62 y o  male MRN: [id number]  Unit/Bed#:  Encounter: [id number]      HPI:  Frank Kim is a 62 y o  male status post laparoscopic Daquan-en-Y gastric bypass in 2009 at [facility] by Dr Barron Payson  He comes to clinic today to re-consult for severe intermittent periumbilical abdominal pain  Subjective     The patient returns to clinic complaining of the same episodic abdominal pain for which he has been extensively worked up in the past by our practice  He again reports that is unrelated to food intake and there are no other associated findings such as nausea or diarrhea  He dates his pain back to as far as 2015 and even immediately after his original surgery  A cause has never been determined based on his extensive workup  He was recently seen by Dr Hutchins July on 12/13/2019 with the results of her recent endoscopy were reviewed with him  There were no abnormalities found  He was started on a PPI daily which has moderately helped him control his symptoms  He reports intermittent alcohol use to help with pain  He denies any tobacco or NSAID use  At the moment she reports pain is not severe but is still present  Review of Systems   Constitutional: Negative  HENT: Negative  Eyes: Negative  Respiratory: Negative  Cardiovascular: Negative  Gastrointestinal: Positive for abdominal pain  Endocrine: Negative  Genitourinary: Negative  Musculoskeletal: Negative  Skin: Negative  Allergic/Immunologic: Negative  Neurological: Negative  Hematological: Negative  Psychiatric/Behavioral: Negative  All other systems reviewed and are negative        Historical Information   Past Medical History:   Diagnosis Date    Anemia     b12    Back pain     Depression     GERD (gastroesophageal reflux disease)     Headache     Herniated cervical disc     Herniated lumbar intervertebral disc     Malabsorption syndrome     post gastric bypass    Vitamin B12 deficiency 2014    Vitamin D deficiency 2014    Wears glasses      Past Surgical History:   Procedure Laterality Date    CARPAL TUNNEL RELEASE Bilateral     CHOLECYSTECTOMY      COLONOSCOPY  2014    normal exam    EGD  12/2019    pouchitis    ESOPHAGOGASTRODUODENOSCOPY  2015    biopsy taken,     GASTRIC BYPASS  2010    HEMORROIDECTOMY  06/2016    [facility] LAP,CHOLECYSTECTOMY/GRAPH N/A 4/26/2019    Procedure: LAPAROSCOPIC CHOLECYSTECTOMY;  Surgeon: Liseth Noguera MD;  Location: [address] [place] [address] MAIN OR;  Service: General    VASECTOMY       Social History   Social History     Substance and Sexual Activity   Alcohol Use Yes    Alcohol/week: 3 0 standard drinks    Types: 3 Cans of beer per week    Frequency: Monthly or less    Drinks per session: 1 or 2    Binge frequency: Never    Comment: "socially"     Social History     Substance and Sexual Activity   Drug Use No     Social History     Tobacco Use   Smoking Status Never Smoker   Smokeless Tobacco Never Used   Tobacco Comment    NO TOBACCO USE       Objective       Current Vitals:   Blood Pressure: 110/68 (01/03/20 1016)  Pulse: 78 (01/03/20 1016)  Temperature: 97 8 °F (36 6 °C) (01/03/20 1016)  Temp Source: Tympanic (01/03/20 1016)  Respirations: 18 (01/03/20 1016)  Height: 5' 7" (170 2 cm) (01/03/20 1016)  Weight - Scale: 86 6 kg (191 lb) (01/03/20 1016)    Invasive Devices     None                 Physical Exam   Constitutional: He is oriented to person, place, and time  He appears well-developed and well-nourished  HENT:   Head: Normocephalic and atraumatic  Nose: Nose normal    Mouth/Throat: Oropharynx is clear and moist    Eyes: Conjunctivae and EOM are normal    Neck: Normal range of motion  No tracheal deviation present  Cardiovascular: Normal rate and regular rhythm  Pulmonary/Chest: Effort normal and breath sounds normal  No respiratory distress  Abdominal: Soft   Bowel sounds are normal  He exhibits no distension  There is no rebound and no guarding  No hernia  The abdomen is soft and benign  There is mild pain to deep palpation of the epigastrium in the umbilical area  There are no palpable incisional hernias  Musculoskeletal: Normal range of motion  He exhibits no edema  Neurological: He is alert and oriented to person, place, and time  Skin: Skin is warm and dry  Psychiatric: He has a normal mood and affect  His behavior is normal  Judgment and thought content normal    Vitals reviewed  Pathology, and Other Studies: I have personally reviewed pertinent films in PACS      Assessment/PLAN:    Dante Epperson is a 62 y o  male status post laparoscopic Daquan-en-Y gastric bypass in 2009 at [facility] by Dr Lissett Tanner  He comes to clinic today to re-consult for severe intermittent periumbilical abdominal pain of unclear origin  His abdominal pain has been mildly improved with the use of PPI therapy started on his last office visit  The patient's workup includes a recent grossly normal upper GI and normal upper endoscopy with negative pathology  CT scan from April of 2019 was reviewed without any abnormality  At this point we will elect to repeat the CT of the abdomen and pelvis with p  O  And IV contrast to elucidate any anatomical explanation for his pain  If this is negative a diagnostic laparoscopy may be in order  We will increase the patient's PPI to b i d  And prescribed him a small amount of tramadol to use p r n  For pain  We will follow up with him next week in the office with the results of the CT scan                Paul Felix MD  Bariatric Surgery Fellow  1/3/2020  10:32 AM

## 2020-01-14 ENCOUNTER — CLINICAL SUPPORT (OUTPATIENT)
Dept: BARIATRICS | Facility: CLINIC | Age: 55
End: 2020-01-14

## 2020-01-14 VITALS — BODY MASS INDEX: 30.38 KG/M2 | WEIGHT: 194 LBS

## 2020-01-14 DIAGNOSIS — Z98.84 WEIGHT GAIN FOLLOWING GASTRIC BYPASS SURGERY: ICD-10-CM

## 2020-01-14 DIAGNOSIS — R63.5 WEIGHT GAIN FOLLOWING GASTRIC BYPASS SURGERY: ICD-10-CM

## 2020-01-14 DIAGNOSIS — K91.2 POSTSURGICAL MALABSORPTION: Primary | ICD-10-CM

## 2020-01-14 DIAGNOSIS — E66.9 OBESITY (BMI 30.0-34.9): ICD-10-CM

## 2020-01-14 DIAGNOSIS — Z98.84 S/P GASTRIC BYPASS: ICD-10-CM

## 2020-01-14 PROCEDURE — RECHECK: Performed by: DIETITIAN, REGISTERED

## 2020-01-14 NOTE — PROGRESS NOTES
Bariatric Nutrition Assessment Note    Type of surgery    Gastric bypass: laparoscopic  Surgery Date: 2008 or 2011?  9-11 years  post-op  Surgeon: Dr Billy Canales  62 y o   male     [place] with BMI of 25: 159 3lbs  Pre-Op Excess Wt: Pt weighed 290lbs prior to surgery per pt report  Wt 88 kg (194 lb)   BMI 30 38 kg/m²     [place]  [place]or Equation:  2002 kcal/day for weight maintenance at sedentary activity level  1500 kcal/day for 1 lb/week wt loss  Calculated bariatric-appropriate macronutrient goals:  45% CHO=169 g/day  30% fats=50g/day  25% pro=94 g/day    Weight History   Onset of Obesity: Adult  Family history of obesity: Yes  Wt Loss Attempts: High Protein/Low CHO diets (Atkins, Union, etc )  Meal Replacements (Medifast, Slim Fast, etc )  OTC meds/supplements  Self Created Diets (Portion Control, Healthy Food Choices, etc )  Patient has tried the above for 6 months or more with insufficient weight loss or weight regain, which is why patient has requested to be evaluated for weight loss surgery today  Maximum Wt Lost: lowest weight 170lbs, regained up to 255lbs within 1-2 years  Pt reports he would like to maintain 155-170lbs        Review of History and Medications   Past Medical History:   Diagnosis Date    Anemia     b12    Back pain     Depression     GERD (gastroesophageal reflux disease)     Headache     Herniated cervical disc     Herniated lumbar intervertebral disc     Malabsorption syndrome     post gastric bypass    Vitamin B12 deficiency 2014    Vitamin D deficiency 2014    Wears glasses      Past Surgical History:   Procedure Laterality Date    CARPAL TUNNEL RELEASE Bilateral     CHOLECYSTECTOMY      COLONOSCOPY  2014    normal exam    EGD  12/2019    pouchitis    ESOPHAGOGASTRODUODENOSCOPY  2015    biopsy taken,     GASTRIC BYPASS  2010    HEMORROIDECTOMY  06/2016    [facility] LAP,CHOLECYSTECTOMY/GRAPH N/A 4/26/2019    Procedure: LAPAROSCOPIC CHOLECYSTECTOMY;  Surgeon: Cleo Porter MD;  Location: [address] [place] [address] OR;  Service: General    VASECTOMY       Social History     Socioeconomic History    Marital status: /Civil Union     Spouse name: Not on file    Number of children: Not on file    Years of education: Not on file    Highest education level: Not on file   Occupational History    Not on file   Social Needs    Financial resource strain: Not on file    Food insecurity:     Worry: Not on file     Inability: Not on file    Transportation needs:     Medical: Not on file     Non-medical: Not on file   Tobacco Use    Smoking status: Never Smoker    Smokeless tobacco: Never Used    Tobacco comment: NO TOBACCO USE   Substance and Sexual Activity    Alcohol use:  Yes     Alcohol/week: 3 0 standard drinks     Types: 3 Cans of beer per week     Frequency: Monthly or less     Drinks per session: 1 or 2     Binge frequency: Never     Comment: "socially"    Drug use: No    Sexual activity: Yes     Partners: Female   Lifestyle    Physical activity:     Days per week: Not on file     Minutes per session: Not on file    Stress: Not on file   Relationships    Social connections:     Talks on phone: Not on file     Gets together: Not on file     Attends Adventism service: Not on file     Active member of club or organization: Not on file     Attends meetings of clubs or organizations: Not on file     Relationship status: Not on file    Intimate partner violence:     Fear of current or ex partner: Not on file     Emotionally abused: Not on file     Physically abused: Not on file     Forced sexual activity: Not on file   Other Topics Concern    Not on file   Social History Narrative    Not on file       Current Outpatient Medications:     ARIPiprazole (ABILIFY) 5 mg tablet, Take 5 mg by mouth daily , Disp: , Rfl:     calcium citrate (CALCITRATE) 950 MG tablet, Take 1 tablet by mouth daily, Disp: , Rfl:     DULoxetine (CYMBALTA) 60 mg delayed release capsule, Take 20 mg by mouth daily, Disp: , Rfl:     ergocalciferol (VITAMIN D2) 50,000 units, Take 1 capsule (50,000 Units total) by mouth 2 (two) times a week with meals, Disp: 8 capsule, Rfl: 2    fluticasone (FLONASE) 50 mcg/act nasal spray, 1 spray into each nostril daily, Disp: 16 g, Rfl: 0    hydroxychloroquine (PLAQUENIL) 200 mg tablet, Take 1 tablet (200 mg total) by mouth daily with breakfast, Disp: 30 tablet, Rfl: 5    Multiple Vitamins-Minerals (MULTIVITAMIN ADULT PO), take 2 tablets by oral route daily, Disp: , Rfl:     omeprazole (PriLOSEC) 20 mg delayed release capsule, Take 1 capsule (20 mg total) by mouth daily, Disp: 90 capsule, Rfl: 1    predniSONE 20 mg tablet, Take 1 tablet (20 mg total) by mouth daily for 15 days, THEN 0 5 tablets (10 mg total) daily for 15 days  , Disp: 22 tablet, Rfl: 0    topiramate (TOPAMAX) 25 mg tablet, Take 1 tablet (25 mg total) by mouth 2 (two) times a day, Disp: 60 tablet, Rfl: 5    traMADol-acetaminophen (ULTRACET) 37 5-325 mg per tablet, Take 1 tablet by mouth every 8 (eight) hours as needed for moderate pain, Disp: 60 tablet, Rfl: 3    traMADol-acetaminophen (ULTRACET) 37 5-325 mg per tablet, Take 1 tablet by mouth every 6 (six) hours as needed for moderate pain, Disp: 20 tablet, Rfl: 0    traZODone (DESYREL) 100 mg tablet, take 2 tablet by oral route  every night, Disp: , Rfl:   Food Intake and Lifestyle Assessment   Food Intake Assessment completed via usual diet recall  Breakfast: Rx with water, piece of bread, eggs, OR ham and cheese sandwich    Snack: sometimes protein bar   Lunch: tries not to eat carbohydrates, shrimp mangu: plantains smashed with shrimp  Snack: not usually  Dinner: vegetables with meat  Snack: a cookie with meds  Beverage intake: water and unsweetened iced tea  Protein supplement: occasional protein bar  Estimated protein intake per day: 60-80g  Estimated fluid intake per day: half gallon unsweetened tea, 1 cup water  Meals eaten away from home: pt reports frequently recently as his plumbing in his home has been broken for a couple months  Does not eat fast food  Typical meal pattern: 3 meals per day and 0-1 snacks per day  Eating Behaviors: Consumption of high calorie/ high fat foods and Mindless eating  Food allergies or intolerances: No Known Allergies  Cultural or Temple considerations:   Physical Assessment  Physical Activity  Types of exercise: None  On his feet all day for work  Current physical limitations: none    Psychosocial Assessment   Support systems: none  Socioeconomic factors: pt reports plumbing in his home has been non-functioning for 2+ months  Nutrition Diagnosis  Diagnosis: Overweight / Obesity (NC-3 3) and Altered GI function (NC-1 4)  Related to: Limited adherence to nutrition-related recommendations, Physical inactivity and Altered GI function  As Evidenced by: BMI >25, Unintentional weight gain and s/p bariatric surgery     Nutrition Prescription: Recommend the following diet  Regular    Interventions and Teaching   Discussed pre-op and post-op nutrition guidelines  Patient educated and handouts provided    Surgical changes to stomach / GI  Capacity of post-surgery stomach  Adequate hydration  Sugar and fat restriction to decrease "dumping syndrome"  Fat restriction to decrease steatorrhea  Weight loss plateaus/ possibility of weight regain  Exercise  Nutrition considerations after surgery  Appropriate carbohydrate, protein, and fat intake, and food/fluid choices to maximize safe weight loss, nutrient intake, and tolerance   Dietary and lifestyle changes  Possible problems with poor eating habits  Techniques for self monitoring and keeping daily food journal  Vitamin / Mineral supplementation of Multivitamin with minerals, Calcium, Vitamin B12 and Vitamin D    Patient provided with gym coupon for [place] Assessment: Yes    Education provided to: patient    Barriers to learning: Language  Pt is Turkmen-speaking   phone line utilized   #[phone]    Readiness to change: action    Prior research on procedure: discussed with provider and previous wt  loss surgery    Comprehension: needs reinforcement and verbalizes understanding     Expected Compliance: fair  Recommendations  Pt is an appropriate candidate for surgery  Not applicable    Evaluation / Monitoring  Dietitian to Monitor: Eating pattern as discussed Tolerance of nutrition prescription Body weight Lab values Physical activity Bowel pattern adherence to recommended vitamin/mineral supplements      Goals  Food journal, Exercise 30 minutes 5 times per week, Complete lession plans 1-6 and Eat 3 meals per day    Time Spent:   1 Hour

## 2020-01-14 NOTE — PROGRESS NOTES
Bariatric Behavioral Health Evaluation    Presenting Problem:  Medina Aguilar, :  1965    Is the patient seeking Bariatric Surgery Eval? No If yes how long have you researched this surgery option  Patient here for post op weight regain  Surgeon recommended    Realizes Post- Op Requirements? Yes     Pre-morbid level of function and history of present illness: patient has arthritis that is impacted by weight regain    Psychiatric/Psychological Treatment Diagnosis: patient states he is diagnosed with depression    Outpatient Counselor {YES (DEF)  MYRTLE Yuan    Psychiatrist Yes  [address] [place] [address]    Have you had Inpatient Treatment? Yes  for suicide ideation ([facility])   Family Constellation (include relationship with each and Psych/Med HX)    Mother  obesity, Father  obesity, Siblings  history of addiction and Other  history of addiction    Domestic Violence No    The patient denies any history of childhood or adult trauma    Additional comments/stressors related to family/relationships/peer support :  Health, weight, [place] crisis at this time    Physical/Psychological Assessment:     Appearance: appropriate  Sociability: average  Affect: appropriate  Mood: calm  Thought Process: coherent  Speech: normal  Content: no impairment  Orientation: person  Yes , place  Yes , time  Yes , normal attention span  Yes , normal memory  Yes  , decreased in concentration ability  Yes  and normal judgement  Yes   Insight: emotional  fair    Risk Assessment:     none    Recommendations: patient being referred to medical weight management    Risk of Harm to Self or Others: denies SI and HI    Observation:     Interviews this interview only    Access to weapons no       Based on the previous information, the client presents the following risk of harm to self or others: low     Note :Patient admits to [place] I diagnosis and treatment of depression    When Asked, patient denied any diagnosis of bipolar disorder  He sees both a psychiatrist and a therapist at the [email or web address] in [facility]  Patient states his medications are effective  He has one psychiatric placement at [facility] in 2009 and stated that at the time he was experiencing SI with no plan or attempt  Patient is here today to address his weight regain post bariatric surgery  Patient is willing to attend the Medical Weight Management program as per the referral   Patient does not smoke but states that   His spouse smokes in the home in the evenings  Patient admits to drinking alcohol but when asked states that he is currently following physician recommendation and is not drinking any alcohol  Patient will schedule appointment with JOHNY       line utilized--Sabine #[id number]

## 2020-01-15 ENCOUNTER — LAB (OUTPATIENT)
Dept: LAB | Facility: HOSPITAL | Age: 55
End: 2020-01-15
Payer: COMMERCIAL

## 2020-01-15 ENCOUNTER — TRANSCRIBE ORDERS (OUTPATIENT)
Dept: ADMINISTRATIVE | Facility: HOSPITAL | Age: 55
End: 2020-01-15

## 2020-01-15 ENCOUNTER — HOSPITAL ENCOUNTER (OUTPATIENT)
Dept: CT IMAGING | Facility: HOSPITAL | Age: 55
Discharge: HOME | End: 2020-01-15
Payer: COMMERCIAL

## 2020-01-15 DIAGNOSIS — M05.742 RHEUMATOID ARTHRITIS INVOLVING BOTH HANDS WITH POSITIVE RHEUMATOID FACTOR (HCC): ICD-10-CM

## 2020-01-15 DIAGNOSIS — Z98.84 BARIATRIC SURGERY STATUS: ICD-10-CM

## 2020-01-15 DIAGNOSIS — M05.741 RHEUMATOID ARTHRITIS INVOLVING BOTH HANDS WITH POSITIVE RHEUMATOID FACTOR (HCC): ICD-10-CM

## 2020-01-15 LAB — HBV SURFACE AB SER-ACNC: <3.1 MIU/ML

## 2020-01-15 PROCEDURE — 36415 COLL VENOUS BLD VENIPUNCTURE: CPT

## 2020-01-15 PROCEDURE — 86706 HEP B SURFACE ANTIBODY: CPT

## 2020-01-15 PROCEDURE — 74177 CT ABD & PELVIS W/CONTRAST: CPT

## 2020-01-15 RX ADMIN — IOHEXOL 100 ML: 350 INJECTION, SOLUTION INTRAVENOUS at 09:47

## 2020-01-20 ENCOUNTER — OFFICE VISIT (OUTPATIENT)
Dept: FAMILY MEDICINE CLINIC | Facility: CLINIC | Age: 55
End: 2020-01-20
Payer: COMMERCIAL

## 2020-01-20 VITALS
TEMPERATURE: 98 F | OXYGEN SATURATION: 97 % | SYSTOLIC BLOOD PRESSURE: 110 MMHG | WEIGHT: 191 LBS | HEART RATE: 73 BPM | HEIGHT: 69 IN | BODY MASS INDEX: 28.29 KG/M2 | RESPIRATION RATE: 16 BRPM | DIASTOLIC BLOOD PRESSURE: 70 MMHG

## 2020-01-20 DIAGNOSIS — K91.850 POUCHITIS (HCC): ICD-10-CM

## 2020-01-20 DIAGNOSIS — K80.00 CALCULUS OF GALLBLADDER WITH ACUTE CHOLECYSTITIS WITHOUT OBSTRUCTION: ICD-10-CM

## 2020-01-20 DIAGNOSIS — E21.3 HYPERPARATHYROIDISM (HCC): ICD-10-CM

## 2020-01-20 DIAGNOSIS — M05.741 RHEUMATOID ARTHRITIS INVOLVING BOTH HANDS WITH POSITIVE RHEUMATOID FACTOR (HCC): ICD-10-CM

## 2020-01-20 DIAGNOSIS — F33.2 SEVERE RECURRENT MAJOR DEPRESSION WITHOUT PSYCHOTIC FEATURES (HCC): ICD-10-CM

## 2020-01-20 DIAGNOSIS — E11.65 UNCONTROLLED TYPE 2 DIABETES MELLITUS WITH HYPERGLYCEMIA (HCC): ICD-10-CM

## 2020-01-20 DIAGNOSIS — Z00.00 MEDICARE ANNUAL WELLNESS VISIT, SUBSEQUENT: Primary | ICD-10-CM

## 2020-01-20 DIAGNOSIS — M05.742 RHEUMATOID ARTHRITIS INVOLVING BOTH HANDS WITH POSITIVE RHEUMATOID FACTOR (HCC): ICD-10-CM

## 2020-01-20 DIAGNOSIS — E78.2 MIXED HYPERLIPIDEMIA: ICD-10-CM

## 2020-01-20 DIAGNOSIS — M25.562 CHRONIC PAIN OF LEFT KNEE: ICD-10-CM

## 2020-01-20 DIAGNOSIS — Z23 NEED FOR PNEUMOCOCCAL VACCINE: ICD-10-CM

## 2020-01-20 DIAGNOSIS — G89.29 CHRONIC PAIN OF LEFT KNEE: ICD-10-CM

## 2020-01-20 DIAGNOSIS — E66.01 MORBID (SEVERE) OBESITY DUE TO EXCESS CALORIES (HCC): ICD-10-CM

## 2020-01-20 PROBLEM — E66.9 OBESITY (BMI 30.0-34.9): Status: RESOLVED | Noted: 2018-09-13 | Resolved: 2020-01-20

## 2020-01-20 PROBLEM — M53.3 SACROILIAC JOINT PAIN: Status: RESOLVED | Noted: 2019-03-06 | Resolved: 2020-01-20

## 2020-01-20 PROBLEM — E66.9 OBESITY, CLASS I, BMI 30-34.9: Status: RESOLVED | Noted: 2019-12-13 | Resolved: 2020-01-20

## 2020-01-20 PROCEDURE — 90732 PPSV23 VACC 2 YRS+ SUBQ/IM: CPT | Performed by: FAMILY MEDICINE

## 2020-01-20 PROCEDURE — G0439 PPPS, SUBSEQ VISIT: HCPCS | Performed by: FAMILY MEDICINE

## 2020-01-20 PROCEDURE — G0009 ADMIN PNEUMOCOCCAL VACCINE: HCPCS | Performed by: FAMILY MEDICINE

## 2020-01-20 PROCEDURE — 3725F SCREEN DEPRESSION PERFORMED: CPT | Performed by: FAMILY MEDICINE

## 2020-01-20 RX ORDER — HYDROXYCHLOROQUINE SULFATE 200 MG/1
TABLET, FILM COATED ORAL
COMMUNITY
Start: 2020-01-20 | End: 2020-01-01 | Stop reason: SDUPTHER

## 2020-01-20 NOTE — ASSESSMENT & PLAN NOTE
During this visit we have a goal to personalize prevention  I discussed the patient about abdominal pain and the need mayur ontinue follow up with Dr Garcia  , the need for a life style plan and decrease the impact of current problems  Health risk assessment was discussed with patient also and the ways to stay healthier  We reviewed also the current medications, the need to avoid polypharmacy in his current treatment; also about how the chronic conditions are impacting now and later  Recommended a healthy diet and exercising frequently will help to control better patient's current chronic conditions;  Immunizations, and the need to compliance with current CDC's recommendations  Patient declined at this time advanced directives  I encouraged against the use alcohol, tobacco, recreational illegal prescribed and non-prescribed drugs, Smoking status Not applicable and the use of cell phone while driving, safe sex

## 2020-01-20 NOTE — ASSESSMENT & PLAN NOTE
Lab Results   Component Value Date/Time    PTH 50 2 12/11/2019 11:43 AM     Condition is stable with current treatment, to  continue the same

## 2020-01-20 NOTE — PROGRESS NOTES
Assessment and Plan:     Problem List Items Addressed This Visit        Digestive    Pouchitis ([place] [address] )    RESOLVED: Calculus of gallbladder with acute cholecystitis without obstruction       Endocrine    Hyperparathyroidism ([place] [address] )     Lab Results   Component Value Date/Time    PTH 50 2 12/11/2019 11:43 AM     Condition is stable with current treatment, to  continue the same           RESOLVED: Uncontrolled type 2 diabetes mellitus with hyperglycemia ([place] [address] )       Lab Results   Component Value Date    HGBA1C 5 3 03/09/2019     Lab Results   Component Value Date/Time    HGBA1C 5 3 03/09/2019 07:07 AM    HGBA1C 5 2 03/23/2018 03:19 PM               Musculoskeletal and Integument    Rheumatoid arthritis involving both hands with positive rheumatoid factor ([place] [address] )     Lab Results   Component Value Date/Time    RF Positive (A) 12/11/2019 11:43 AM                 Other    Mixed hyperlipidemia     Cholesterol   Date Value Ref Range Status   12/11/2019 216 (H) <200 mg/dL Final     Comment:       Cholesterol:       Desirable         <200 mg/dl       Borderline         200-239 mg/dl       High              >239                     Severe recurrent major depression without psychotic features ([place] [address] )    Medicare annual wellness visit, subsequent - Primary     During this visit we have a goal to personalize prevention  I discussed the patient about abdominal pain and the need mayur ontinue follow up with Dr Clare Sutherland , the need for a life style plan and decrease the impact of current problems  Health risk assessment was discussed with patient also and the ways to stay healthier  We reviewed also the current medications, the need to avoid polypharmacy in his current treatment; also about how the chronic conditions are impacting now and later  Recommended a healthy diet and exercising frequently will help to control better patient's current chronic conditions;  Immunizations, and the need to compliance with current CDC's recommendations  Patient declined at this time advanced directives  I encouraged against the use alcohol, tobacco, recreational illegal prescribed and non-prescribed drugs, Smoking status Not applicable and the use of cell phone while driving, safe sex  RESOLVED: Morbid (severe) obesity due to excess calories ([facility] )      Other Visit Diagnoses     Need for pneumococcal vaccine        Relevant Orders    PNEUMOCOCCAL POLYSACCHARIDE VACCINE 23-VALENT =>1YO SQ IM (Completed)    BMI 28 0-28 9,adult        Chronic pain of left knee        Relevant Medications    traMADol-acetaminophen (ULTRACET) 37 5-325 mg per tablet           Preventive health issues were discussed with patient, and age appropriate screening tests were ordered as noted in patient's After Visit Summary  Personalized health advice and appropriate referrals for health education or preventive services given if needed, as noted in patient's After Visit Summary       History of Present Illness:     Patient presents for Medicare Annual Wellness visit    Patient Care Team:  Cb Balderrama MD as PCP - General (Family Medicine)     Problem List:     Patient Active Problem List   Diagnosis    S/P gastric bypass    Postsurgical malabsorption    Mixed hyperlipidemia    Weight gain following gastric bypass surgery    Rheumatoid arthritis involving both hands with positive rheumatoid factor ([facility] )    [facility]roidism [facility] )    Pouchitis ([facility] )    Severe recurrent major depression without psychotic features ([facility] )    Medicare annual wellness visit, subsequent      Past Medical and Surgical History:     Past Medical History:   Diagnosis Date    Anemia     b12    Back pain     Depression     GERD (gastroesophageal reflux disease)     Headache     Herniated cervical disc     Herniated lumbar intervertebral disc     Malabsorption syndrome     post gastric bypass    Vitamin B12 deficiency 2014    Vitamin D deficiency 2014    Wears glasses      Past Surgical History:   Procedure Laterality Date    CARPAL TUNNEL RELEASE Bilateral     CHOLECYSTECTOMY      COLONOSCOPY  2014    normal exam    EGD  12/2019    pouchitis    ESOPHAGOGASTRODUODENOSCOPY  2015    biopsy taken,     GASTRIC BYPASS  2010    HEMORROIDECTOMY  06/2016    [place] LAP,CHOLECYSTECTOMY/GRAPH N/A 4/26/2019    Procedure: LAPAROSCOPIC CHOLECYSTECTOMY;  Surgeon: Lobo Wallis MD;  Location: [address] [place] [address] OR;  Service: General    VASECTOMY        Family History:     Family History   Problem Relation Age of Onset    Asthma Mother     Hypertension Mother     Diabetes Mother         MELLITUS    Coronary artery disease Mother     Lung cancer Mother         COD    Diabetes Father         MELLITUS    Hypertension Father     Cancer Sister     Cancer Brother       Social History:     Social History     Socioeconomic History    Marital status: /Civil Union     Spouse name: None    Number of children: None    Years of education: None    Highest education level: None   Occupational History    None   Social Needs    Financial resource strain: None    Food insecurity:     Worry: None     Inability: None    Transportation needs:     Medical: None     Non-medical: None   Tobacco Use    Smoking status: Never Smoker    Smokeless tobacco: Never Used    Tobacco comment: NO TOBACCO USE   Substance and Sexual Activity    Alcohol use:  Yes     Alcohol/week: 3 0 standard drinks     Types: 3 Cans of beer per week     Frequency: Monthly or less     Drinks per session: 1 or 2     Binge frequency: Never     Comment: "socially"    Drug use: No    Sexual activity: Yes     Partners: Female   Lifestyle    Physical activity:     Days per week: None     Minutes per session: None    Stress: None   Relationships    Social connections:     Talks on phone: None     Gets together: None     Attends Restoration service: None     Active member of club or organization: None     Attends meetings of clubs or organizations: None Relationship status: None    Intimate partner violence:     Fear of current or ex partner: None     Emotionally abused: None     Physically abused: None     Forced sexual activity: None   Other Topics Concern    None   Social History Narrative    None       Medications and Allergies:     Current Outpatient Medications   Medication Sig Dispense Refill    ARIPiprazole (ABILIFY) 5 mg tablet Take 5 mg by mouth daily       calcium citrate (CALCITRATE) 950 MG tablet Take 1 tablet by mouth daily      DULoxetine (CYMBALTA) 60 mg delayed release capsule Take 20 mg by mouth daily      ergocalciferol (VITAMIN D2) 50,000 units Take 1 capsule (50,000 Units total) by mouth 2 (two) times a week with meals 8 capsule 2    fluticasone (FLONASE) 50 mcg/act nasal spray 1 spray into each nostril daily 16 g 0    hydroxychloroquine (PLAQUENIL) 200 mg tablet       Multiple Vitamins-Minerals (MULTIVITAMIN ADULT PO) take 2 tablets by oral route daily      omeprazole (PriLOSEC) 20 mg delayed release capsule Take 1 capsule (20 mg total) by mouth daily 90 capsule 1    topiramate (TOPAMAX) 25 mg tablet Take 1 tablet (25 mg total) by mouth 2 (two) times a day 60 tablet 5    traMADol-acetaminophen (ULTRACET) 37 5-325 mg per tablet Take 1 tablet by mouth every 8 (eight) hours as needed for moderate pain 60 tablet 3    traZODone (DESYREL) 100 mg tablet take 2 tablet by oral route  every night       No current facility-administered medications for this visit        Allergies   Allergen Reactions    No Known Allergies       Immunizations:     Immunization History   Administered Date(s) Administered    INFLUENZA 10/12/2015, 12/21/2017    Influenza Split 11/06/2013    Influenza Split High Dose Preservative Free IM 12/21/2016    Influenza TIV (IM) 10/06/2014    Influenza, recombinant, quadrivalent,injectable, preservative free 01/16/2019, 12/05/2019    Pneumococcal Polysaccharide PPV23 01/20/2020    Tdap 05/22/2019      Health Maintenance:         Topic Date Due    CRC Screening: Colonoscopy  12/03/2024    Hepatitis C Screening  Completed         Topic Date Due    Hepatitis B Vaccine (1 of 3 - Risk 3-dose series) 10/30/1982      Medicare Health Risk Assessment:     /70 (BP Location: Left arm, Patient Position: Sitting, Cuff Size: Standard)   Pulse 73   Temp 98 °F (36 7 °C) (Oral)   Resp 16   Ht 5' 9" (1 753 m)   Wt 86 6 kg (191 lb)   SpO2 97%   BMI 28 21 kg/m²      Jose De Jesus Elizabeth is here for his Subsequent Wellness visit  Health Risk Assessment:   Patient rates overall health as fair  Patient feels that their physical health rating is same  Eyesight was rated as same  Hearing was rated as same  Patient feels that their emotional and mental health rating is same  Pain experienced in the last 7 days has been some  Patient's pain rating has been 7/10  Patient states that he has experienced no weight loss or gain in last 6 months  Depression Screening:   PHQ-2 Score: 0      Fall Risk Screening: In the past year, patient has experienced: no history of falling in past year      Home Safety:  Patient does not have trouble with stairs inside or outside of their home  Patient has working smoke alarms and has working carbon monoxide detector  Home safety hazards include: none  Nutrition:   Current diet is Regular  Medications:   Patient is currently taking over-the-counter supplements  OTC medications include: see medication list  Patient is able to manage medications  Activities of Daily Living (ADLs)/Instrumental Activities of Daily Living (IADLs):   Walk and transfer into and out of bed and chair?: Yes  Dress and groom yourself?: Yes    Bathe or shower yourself?: Yes    Feed yourself?  Yes  Do your laundry/housekeeping?: Yes  Manage your money, pay your bills and track your expenses?: Yes  Make your own meals?: Yes    Do your own shopping?: Yes    Previous Hospitalizations:   Any hospitalizations or ED visits within the last 12 months?: Yes    How many hospitalizations have you had in the last year?: 1-2    Advance Care Planning:   Living will: No    Durable POA for healthcare: No    Advanced directive: No    Advanced directive counseling given: Yes    Five wishes given: Yes    Patient declined ACP directive: No    End of Life Decisions reviewed with patient: Yes    Provider agrees with end of life decisions: Yes      Cognitive Screening:   Provider or family/friend/caregiver concerned regarding cognition?: No    PREVENTIVE SCREENINGS      Cardiovascular Screening:    General: Screening Not Indicated and History Lipid Disorder      Diabetes Screening:     General: Screening Not Indicated and History Diabetes      Colorectal Cancer Screening:     General: Screening Current      Prostate Cancer Screening:    General: Screening Not Indicated      Abdominal Aortic Aneurysm (AAA) Screening:        General: Screening Not Indicated      Lung Cancer Screening:     General: Screening Not Indicated      Hepatitis C Screening:    General: Screening Current    Other Counseling Topics:   Alcohol use counseling, car/seat belt/driving safety, skin self-exam, sunscreen and calcium and vitamin D intake and regular weightbearing exercise  Mateo Hernandez MD  BMI Counseling: Body mass index is 28 21 kg/m²  The BMI is above normal  Exercise recommendations include exercising 3-5 times per week

## 2020-01-20 NOTE — ASSESSMENT & PLAN NOTE
Lab Results   Component Value Date    HGBA1C 5 3 03/09/2019     Lab Results   Component Value Date/Time    HGBA1C 5 3 03/09/2019 07:07 AM    HGBA1C 5 2 03/23/2018 03:19 PM

## 2020-01-20 NOTE — PATIENT INSTRUCTIONS
Calorie Counting Diet   WHAT YOU NEED TO KNOW:   What is a calorie counting diet? It is a meal plan based on counting calories each day to reach a healthy body weight  You will need to eat fewer calories if you are trying to lose weight  Weight loss may decrease your risk for certain health problems or improve your health if you have health problems  Some of these health problems include heart disease, high blood pressure, and diabetes  What foods should I avoid? Your dietitian will tell you if you need to avoid certain foods based on your body weight and health condition  You may need to avoid high-fat foods if you are at risk for or have heart disease  You may need to eat fewer foods from the breads and starches food group if you have diabetes  How many calories are in foods? The following is a list of foods and drinks with the approximate number of calories in each  Check the food label to find the exact number of calories  A dietitian can tell you how many calories you should have from each food group each day    · Carbohydrate:      ¨ ½ of a 3-inch bagel, 1 slice of bread, or ½ of a hamburger bun or hot dog bun (80)    ¨ 1 (8-inch) flour tortilla or ½ cup of cooked rice (100)    ¨ 1 (6-inch) corn tortilla (80)    ¨ 1 (6-inch) pancake or 1 cup of bran flakes cereal (110)    ¨ ½ cup of cooked cereal (80)    ¨ ½ cup of cooked pasta (85)    ¨ 1 ounce of pretzels (100)    ¨ 3 cups of air-popped popcorn without butter or oil (80)    · Dairy:      ¨ 1 cup of skim or 1% milk (90)    ¨ 1 cup of 2% milk (120)    ¨ 1 cup of whole milk (160)    ¨ 1 cup of 2% chocolate milk (220)    ¨ 1 ounce of low-fat cheese with 3 grams of fat per ounce (70)    ¨ 1 ounce of cheddar cheese (114)    ¨ ½ cup of 1% fat cottage cheese (80)    ¨ 1 cup of plain or sugar-free, fat-free yogurt (90)    · Protein foods:      ¨ 3 ounces of fish (not breaded or fried) (95)    ¨ 3 ounces of breaded, fried fish (195)    ¨ ¾ cup of tuna canned in water (105)    ¨ 3 ounces of chicken breast without skin (105)    ¨ 1 fried chicken breast with skin (350)    ¨ ¼ cup of fat free egg substitute (40)    ¨ 1 large egg (75)    ¨ 3 ounces of lean beef or pork (165)    ¨ 3 ounces of fried pork chop or ham (185)    ¨ ½ cup of cooked dried beans, such as kidney, giordano, lentils, or navy (115)    ¨ 3 ounces of bologna or lunch meat (225)    ¨ 2 links of breakfast sausage (140)    · Vegetables:      ¨ ½ cup of sliced mushrooms (10)    ¨ 1 cup of salad greens, such as lettuce, spinach, or camron (15)    ¨ ½ cup of steamed asparagus (20)    ¨ ½ cup of cooked summer squash, zucchini squash, or green or wax beans (25)    ¨ 1 cup of broccoli or cauliflower florets, or 1 medium tomato (25)    ¨ 1 large raw carrot or ½ cup of cooked carrots (40)    ¨ ? of a medium cucumber or 1 stalk of celery (5)    ¨ 1 small baked potato (160)    ¨ 1 cup of breaded, fried vegetables (230)    · Fruit:      ¨ 1 (6-inch) banana (55)     ¨ ½ of a 4-inch grapefruit (55)    ¨ 15 grapes (60)    ¨ 1 medium orange or apple (70)    ¨ 1 large peach (65)    ¨ 1 cup of fresh pineapple chunks (75)    ¨ 1 cup of melon cubes (50)    ¨ 1¼ cups of whole strawberries (45)    ¨ ½ cup of fruit canned in juice (55)    ¨ ½ cup of fruit canned in heavy syrup (110)    ¨ ?  cup of raisins (130)    ¨ ½ cup of unsweetened fruit juice (60)    ¨ ½ cup of grape, cranberry, or prune juice (90)    · Fat:      ¨ 10 peanuts or 2 teaspoons of peanut butter (55)    ¨ 2 tablespoons of avocado or 1 tablespoon of regular salad dressing (45)    ¨ 2 slices of fischer (90)    ¨ 1 teaspoon of oil, such as safflower, canola, corn, or olive oil (45)    ¨ 2 teaspoons of low-fat margarine, or 1 tablespoon of low-fat mayonnaise (50)    ¨ 1 teaspoon of regular margarine (40)    ¨ 1 tablespoon of regular mayonnaise (135)    ¨ 1 tablespoon of cream cheese or 2 tablespoons of low-fat cream cheese (45)    ¨ 2 tablespoons of vegetable shortening (215)    · Dessert and sweets:      ¨ 8 animal crackers or 5 vanilla wafers (80)    ¨ 1 frozen fruit juice bar (80)    ¨ ½ cup of ice milk or low-fat frozen yogurt (90)    ¨ ½ cup of sherbet or sorbet (125)    ¨ ½ cup of sugar-free pudding or custard (60)    ¨ ½ cup of ice cream (140)    ¨ ½ cup of pudding or custard (175)    ¨ 1 (2-inch) square chocolate brownie (185)    · Combination foods:      ¨ Bean burrito made with an 8-inch tortilla, without cheese (275)    ¨ Chicken breast sandwich with lettuce and tomato (325)    ¨ 1 cup of chicken noodle soup (60)    ¨ 1 beef taco (175)    ¨ Regular hamburger with lettuce and tomato (310)    ¨ Regular cheeseburger with lettuce and tomato (410)     ¨ ¼ of a 12-inch cheese pizza (280)    ¨ Fried fish sandwich with lettuce and tomato (425)    ¨ Hot dog and bun (275)    ¨ 1½ cups of macaroni and cheese (310)    ¨ Taco salad with a fried tortilla shell (870)    · Low-calorie foods:      ¨ 1 tablespoon of ketchup or 1 tablespoon of fat free sour cream (15)    ¨ 1 teaspoon of mustard (5)    ¨ ¼ cup of salsa (20)    ¨ 1 large dill pickle (15)    ¨ 1 tablespoon of fat free salad dressing (10)    ¨ 2 teaspoons of low-sugar, light jam or jelly, or 1 tablespoon of sugar-free syrup (15)    ¨ 1 sugar-free popsicle (15)    ¨ 1 cup of club soda, seltzer water, or diet soda (0)  CARE AGREEMENT:   You have the right to help plan your care  Discuss treatment options with your caregivers to decide what care you want to receive  You always have the right to refuse treatment  The above information is an  only  It is not intended as medical advice for individual conditions or treatments  Talk to your doctor, nurse or pharmacist before following any medical regimen to see if it is safe and effective for you  © 2017 [employer] Information is for End User's use only and may not be sold, redistributed or otherwise used for commercial purposes   All illustrations and images included in CareNotes® are the copyrighted property of [employer]  or [employer] E[employer]

## 2020-01-20 NOTE — ASSESSMENT & PLAN NOTE
Cholesterol   Date Value Ref Range Status   12/11/2019 216 (H) <200 mg/dL Final     Comment:       Cholesterol:       Desirable         <200 mg/dl       Borderline         200-239 mg/dl       High              >239

## 2020-01-22 ENCOUNTER — OFFICE VISIT (OUTPATIENT)
Dept: BARIATRICS | Facility: CLINIC | Age: 55
End: 2020-01-22
Payer: COMMERCIAL

## 2020-01-22 VITALS
SYSTOLIC BLOOD PRESSURE: 104 MMHG | BODY MASS INDEX: 28.87 KG/M2 | HEIGHT: 68 IN | HEART RATE: 76 BPM | WEIGHT: 190.5 LBS | TEMPERATURE: 97.2 F | DIASTOLIC BLOOD PRESSURE: 68 MMHG

## 2020-01-22 DIAGNOSIS — Z98.84 S/P GASTRIC BYPASS: Primary | ICD-10-CM

## 2020-01-22 DIAGNOSIS — R10.84 GENERALIZED ABDOMINAL PAIN: ICD-10-CM

## 2020-01-22 DIAGNOSIS — K91.2 POSTSURGICAL MALABSORPTION: ICD-10-CM

## 2020-01-22 PROCEDURE — 99213 OFFICE O/P EST LOW 20 MIN: CPT | Performed by: SURGERY

## 2020-01-22 NOTE — PROGRESS NOTES
FOLLOW UP VISIT - BARIATRIC SURGERY  Jalen Monge 62 y o  male MRN: [id number]  Unit/Bed#:  Encounter: [id number]      HPI:  Jalen Monge is a 62 y o  male who presents with a history gastric bypass and abdominal pain  Here to review the results of his CT scan      Review of Systems   Gastrointestinal: Positive for abdominal pain  Intermittent   All other systems reviewed and are negative        Historical Information   Past Medical History:   Diagnosis Date    Anemia     b12    Back pain     Depression     GERD (gastroesophageal reflux disease)     Headache     Herniated cervical disc     Herniated lumbar intervertebral disc     Malabsorption syndrome     post gastric bypass    Vitamin B12 deficiency 2014    Vitamin D deficiency 2014    Wears glasses      Past Surgical History:   Procedure Laterality Date    CARPAL TUNNEL RELEASE Bilateral     CHOLECYSTECTOMY      COLONOSCOPY  2014    normal exam    EGD  12/2019    pouchitis    ESOPHAGOGASTRODUODENOSCOPY  2015    biopsy taken,     GASTRIC BYPASS  2010    HEMORROIDECTOMY  06/2016    [place] LAP,CHOLECYSTECTOMY/GRAPH N/A 4/26/2019    Procedure: LAPAROSCOPIC CHOLECYSTECTOMY;  Surgeon: Candice Ruiz MD;  Location: [address] [place] [address];  Service: General    VASECTOMY       Social History   Social History     Substance and Sexual Activity   Alcohol Use Yes    Alcohol/week: 3 0 standard drinks    Types: 3 Cans of beer per week    Frequency: Monthly or less    Drinks per session: 1 or 2    Binge frequency: Never    Comment: "socially"     Social History     Substance and Sexual Activity   Drug Use No     Social History     Tobacco Use   Smoking Status Never Smoker   Smokeless Tobacco Never Used   Tobacco Comment    NO TOBACCO USE     Family History: non-contributory    Meds/Allergies   all medications and allergies reviewed  Allergies   Allergen Reactions    No Known Allergies        Objective   First Vitals: @VSFIRST2(5,8,6,7,9,11,14,10:FIRST)@    Current Vitals:   Blood Pressure: 104/68 (01/22/20 0947)  Pulse: 76 (01/22/20 0947)  Temperature: (!) 97 2 °F (36 2 °C) (01/22/20 0947)  Temp Source: Tympanic (01/22/20 0947)  Height: 5' 8" (172 7 cm) (01/22/20 0947)  Weight - Scale: 86 4 kg (190 lb 8 oz) (01/22/20 0947)        Invasive Devices     None                 Physical Exam   Constitutional: He is oriented to person, place, and time  He appears well-developed  No distress  Abdominal: Soft  Bowel sounds are normal  He exhibits no distension and no mass  There is no tenderness  There is no rebound and no guarding  Abdomen is soft and benign  Well-healed laparoscopic incisions  Neurological: He is alert and oriented to person, place, and time  Psychiatric: He has a normal mood and affect  His behavior is normal  Judgment and thought content normal    Vitals reviewed  Lab Results: I have personally reviewed pertinent lab results  Imaging: I have personally reviewed pertinent reports  EKG, Pathology, and Other Studies: I have personally reviewed pertinent reports  Assessment/PLAN:    62 y o  male status post laparoscopic Daquan-en-Y gastric bypass in 2009 at [facility] by Dr Jaclyn Johnson  I did an endoscopy within the last month that revealed a relatively normal gastric bypass anatomy  A couple weeks ago he came back to re-consult for severe intermittent periumbilical abdominal pain of unclear origin       His abdominal pain has been mildly improved with the use of PPI therapy started on his last office visit      The patient's workup includes a recent grossly normal upper GI and normal upper endoscopy with negative pathology  CT scan from April of 2019 was reviewed without any abnormality      A repeat CT scan with p o  and IV contrast was ordered and this was negative for any acute pathology      I have explained and reviewed the results with him and told him that I would like him to be seen by a gastroenterologist     As I have explained to him I cannot really identify the etiology of his intermittent abdominal pain but we have ruled out all the dangerous an emergency pathologies  I had a detailed discussion with him stressing the fact that long-term success is largely dependent on compliance and abidance to the recommendations of the program as well as participation within the support groups  He is committed to continue to observe his diet and to increase his physical activity  He will follow up with us as scheduled      Jose San MD  1/22/2020  10:47 AM

## 2020-02-20 ENCOUNTER — OFFICE VISIT (OUTPATIENT)
Dept: BARIATRICS | Facility: CLINIC | Age: 55
End: 2020-02-20
Payer: COMMERCIAL

## 2020-02-20 VITALS
HEART RATE: 62 BPM | BODY MASS INDEX: 30.49 KG/M2 | WEIGHT: 194.3 LBS | DIASTOLIC BLOOD PRESSURE: 64 MMHG | SYSTOLIC BLOOD PRESSURE: 118 MMHG | HEIGHT: 67 IN | TEMPERATURE: 97.4 F | RESPIRATION RATE: 18 BRPM

## 2020-02-20 DIAGNOSIS — Z98.84 S/P GASTRIC BYPASS: ICD-10-CM

## 2020-02-20 DIAGNOSIS — K91.2 POSTSURGICAL MALABSORPTION: ICD-10-CM

## 2020-02-20 DIAGNOSIS — Z98.84 WEIGHT GAIN FOLLOWING GASTRIC BYPASS SURGERY: Primary | ICD-10-CM

## 2020-02-20 DIAGNOSIS — S09.90XS HEADACHES DUE TO OLD HEAD INJURY: ICD-10-CM

## 2020-02-20 DIAGNOSIS — M05.742 RHEUMATOID ARTHRITIS INVOLVING BOTH HANDS WITH POSITIVE RHEUMATOID FACTOR (HCC): ICD-10-CM

## 2020-02-20 DIAGNOSIS — G44.309 HEADACHES DUE TO OLD HEAD INJURY: ICD-10-CM

## 2020-02-20 DIAGNOSIS — M05.741 RHEUMATOID ARTHRITIS INVOLVING BOTH HANDS WITH POSITIVE RHEUMATOID FACTOR (HCC): ICD-10-CM

## 2020-02-20 DIAGNOSIS — F33.2 SEVERE RECURRENT MAJOR DEPRESSION WITHOUT PSYCHOTIC FEATURES (HCC): ICD-10-CM

## 2020-02-20 DIAGNOSIS — R63.5 WEIGHT GAIN FOLLOWING GASTRIC BYPASS SURGERY: Primary | ICD-10-CM

## 2020-02-20 PROCEDURE — 3008F BODY MASS INDEX DOCD: CPT | Performed by: FAMILY MEDICINE

## 2020-02-20 PROCEDURE — 99214 OFFICE O/P EST MOD 30 MIN: CPT | Performed by: FAMILY MEDICINE

## 2020-02-20 PROCEDURE — 1036F TOBACCO NON-USER: CPT | Performed by: FAMILY MEDICINE

## 2020-02-20 RX ORDER — TOPIRAMATE 50 MG/1
50 TABLET, FILM COATED ORAL 2 TIMES DAILY
Qty: 60 TABLET | Refills: 1 | Status: SHIPPED | OUTPATIENT
Start: 2020-02-20 | End: 2020-01-01 | Stop reason: SDUPTHER

## 2020-02-20 NOTE — PROGRESS NOTES
Assessment/Plan:    No problem-specific Assessment & Plan notes found for this encounter  Diagnoses and all orders for this visit:    Weight gain following gastric bypass surgery    Postsurgical malabsorption    Rheumatoid arthritis involving both hands with positive rheumatoid factor (HCC)    S/P gastric bypass    Severe recurrent major depression without psychotic features ([facility] [address] )        -Patient is pursuing Conservative Program  -Initial weight loss goal of 5-10% weight loss for improved health  -Screening labs- UTD  - will increase topamax to 50mg bid  - he will start food login  - also encouraged to restart exercise he joined the gym but only was going for 2 weeks then stopped  Initial: 194lbs  Change:  Goal: 165lbs    Goals:  Food log (ie ) www myfitnesspal com,sparkpeople  com,loseit com,calorieking  com,etc  baritastic  No sugary beverages  At least 64oz of water daily  Increase physical activity by 10 minutes daily  Gradually increase physical activity to a goal of 5 days per week for 30 minutes of MODERATE intensity PLUS 2 days per week of FULL BODY resistance training  He will start food login and follow the surgical Rd recommendations  Follow up in approximately 2 months with Non-Surgical Physician/Advanced Practitioner  Subjective:   Chief Complaint   Patient presents with    Consult     mwm consult       Patient ID: Marjo Gilford  is a 62 y o  male with excess weight/obesity here to pursue weight management  HPI    Status post laparoscopic Daquan-en-Y gastric bypass in 2009 at [facility] by Dr Jie Mclain  He was evaluated by our surgical team including Dr Abhi Cai  The patient's workup includes a recent grossly normal upper GI and normal upper endoscopy with negative pathology   CT scan from April of 2019 was reviewed without any abnormality  A repeat CT scan with p o  and IV contrast was ordered and this was negative for any acute pathology      He presents today for evaluation and treatment of weight regain  Highest et preop: 296lbs  [place]: 165  Goal: 165lbs    He is taking Topamax 25mg bid by his PCP to help with weight loss, he has taken for years, he states it helped early on but not anymore  Tolerating well  The following portions of the patient's history were reviewed and updated as appropriate: allergies, current medications, past family history, past medical history, past social history, past surgical history and problem list     Review of Systems   Constitutional: Negative for activity change and appetite change  HENT: Negative  Respiratory: Negative  Cardiovascular: Negative  Gastrointestinal: Negative  Genitourinary: Negative  Musculoskeletal: Negative for arthralgias  Skin: Negative for rash  Neurological: Negative  Psychiatric/Behavioral: Negative  Objective:    /64 (BP Location: Left arm, Patient Position: Sitting, Cuff Size: Large)   Pulse 62   Temp (!) 97 4 °F (36 3 °C) (Tympanic)   Resp 18   Ht 5' 7" (1 702 m)   Wt 88 1 kg (194 lb 4 8 oz)   BMI 30 43 kg/m²      Physical Exam    Constitutional   General appearance: Abnormal   well developed and overweight  Eyes No conjunctival pallor  Ears, Nose, Mouth, and Throat Oral mucosa moist    Pulmonary   Respiratory effort: No increased work of breathing or signs of respiratory distress  Auscultation of lungs: Clear to auscultation, equal breath sounds bilaterally, no wheezes, no rales, no rhonci  Cardiovascular   Auscultation of heart: Normal rate and rhythm, normal S1 and S2, without murmurs  Examination of extremities for edema and/or varicosities: Normal   no edema  Abdomen   Abdomen: Abnormal   Bowel sounds were normal  The abdomen was soft and nontender     Musculoskeletal   Gait and station: Normal     Psychiatric   Orientation to person, place and time: Normal     Affect: appropriate

## 2020-03-20 ENCOUNTER — OFFICE VISIT (OUTPATIENT)
Dept: FAMILY MEDICINE CLINIC | Facility: CLINIC | Age: 55
End: 2020-03-20
Payer: COMMERCIAL

## 2020-03-20 VITALS
OXYGEN SATURATION: 99 % | RESPIRATION RATE: 16 BRPM | TEMPERATURE: 98 F | BODY MASS INDEX: 29.03 KG/M2 | WEIGHT: 196 LBS | SYSTOLIC BLOOD PRESSURE: 102 MMHG | HEIGHT: 69 IN | DIASTOLIC BLOOD PRESSURE: 60 MMHG | HEART RATE: 87 BPM

## 2020-03-20 DIAGNOSIS — M25.561 ACUTE PAIN OF RIGHT KNEE: Primary | ICD-10-CM

## 2020-03-20 PROCEDURE — 20610 DRAIN/INJ JOINT/BURSA W/O US: CPT | Performed by: FAMILY MEDICINE

## 2020-03-20 PROCEDURE — 99213 OFFICE O/P EST LOW 20 MIN: CPT | Performed by: FAMILY MEDICINE

## 2020-03-20 PROCEDURE — 1036F TOBACCO NON-USER: CPT | Performed by: FAMILY MEDICINE

## 2020-03-20 RX ADMIN — TRIAMCINOLONE ACETONIDE 40 MG: 40 INJECTION, SUSPENSION INTRA-ARTICULAR; INTRAMUSCULAR at 14:53

## 2020-03-26 ENCOUNTER — TELEMEDICINE (OUTPATIENT)
Dept: FAMILY MEDICINE CLINIC | Facility: CLINIC | Age: 55
End: 2020-03-26
Payer: COMMERCIAL

## 2020-03-26 DIAGNOSIS — M25.561 ACUTE PAIN OF RIGHT KNEE: Primary | ICD-10-CM

## 2020-03-26 PROCEDURE — 99212 OFFICE O/P EST SF 10 MIN: CPT | Performed by: FAMILY MEDICINE

## 2020-03-26 RX ORDER — NABUMETONE 500 MG/1
500 TABLET, FILM COATED ORAL 2 TIMES DAILY
Qty: 60 TABLET | Refills: 0 | Status: SHIPPED | OUTPATIENT
Start: 2020-03-26 | End: 2020-01-01 | Stop reason: ALTCHOICE

## 2020-03-26 NOTE — PROGRESS NOTES
Virtual Regular Visit  1  Acute pain of right knee  *The choice of NSAID's in this patient depends of her current pain syndrome  I explained to patient that response of NSAIDs varies between patient's and also is differs in regarding to the area of the body that is affected in the progression of the damage  The drug interactions and comorbidities affected  by these medications were explained to the patient also; the caution in toxicity in the GI tract was also discussed  Prevent the long-term use of these medications may be wise  The use ice and physical therapy is necessary in order to get the best results  NSAID's might elevate BP, or block effect of certain antihypertensive agents  It is to used with caution  Always use ice and therapy to decrease or avoid the need for a Pharmacological agent  - nabumetone (RELAFEN) 500 mg tablet; Take 1 tablet (500 mg total) by mouth 2 (two) times a day  Dispense: 60 tablet;  Refill: 0    Problem List Items Addressed This Visit     None      Visit Diagnoses     Acute pain of right knee    -  Primary    Relevant Medications    nabumetone (RELAFEN) 500 mg tablet               Reason for visit is right knee pain    Encounter provider Reji Dunaway MD    Provider located at [address] [place] [id number]      Recent Visits  Date Type Provider Dept   03/20/20 Office Visit Reji Dunaway MD [facility] recent visits within past 7 days and meeting all other requirements     Today's Visits  Date Type Provider Dept   03/26/20 Telemedicine Reji Dunaway MD [facility]   Showing today's visits and meeting all other requirements     Future Appointments  Date Type Provider Dept   03/26/20 Telemedicine Reji Dunaway MD [facility]   Showing future appointments within next 150 days and meeting all other requirements        After connecting through [email or web address], the patient was identified by name and date of birth  Marjo Gilford was informed that this is a telemedicine visit and that the visit is being conducted through Sencha and patient was informed that this is not a secure, HIPAA-complaint platform  he agrees to proceed  which may not be secure and therefore, might not be HIPAA-compliant  Other methods to assure confidentiality were taken   No one else was in the room  He acknowledged consent and understanding of privacy and security of the video platform  The patient has agreed to participate and understands they can discontinue the visit at any time  Subjective  Marjo Gilford is a 62 y o  male   Virtual Regular Visit      Problem List Items Addressed This Visit     None               Reason for visit is right knee pain, failure to improve after injection 1 week ago  Encounter provider Dipti Preciado MD    Provider located at [facility] [address]  [place]  [id number]      Recent Visits  Date Type Provider Dept   03/26/20 Telemedicine Dipti Preciado MD [facility]   Showing recent visits within past 7 days and meeting all other requirements     Future Appointments  No visits were found meeting these conditions  Showing future appointments within next 150 days and meeting all other requirements        The patient was identified by name and date of birth  Marjo Gilford was informed that this is a telemedicine visit and that the visit is being conducted through Sencha and patient was informed that this is not a secure, HIPAA-complaint platform  he agrees to proceed     Other methods to assure confidentiality were taken   No one else was in the room  He acknowledged consent and understanding of privacy and security of the video platform   The patient has agreed to participate and understands they can discontinue the visit at any time  Patient is aware this is a billable service  Subjective  Pascale Mack is a 62 y o  male   Pascale Mack 62 y o  complaining of   Acute on chronic pain in knees; started about 4 week(s) ago  The pain is  intermittent, occurring about every day affecting more the right  day and lasting 6  hour per episode and has been gradually worsening since onset  The quality of the pain is described as aching  The pain does not radiate  The pain is at a severity of 5/10  The symptoms are aggravated by movement  Associated symptoms include numbness, paresthesias and tingling  He had tried multiple alternatives without improved him symptoms        Past Medical History:   Diagnosis Date    Anemia     b12    Back pain     Depression     GERD (gastroesophageal reflux disease)     Headache     Herniated cervical disc     Herniated lumbar intervertebral disc     Malabsorption syndrome     post gastric bypass    Vitamin B12 deficiency 2014    Vitamin D deficiency 2014    Wears glasses        Past Surgical History:   Procedure Laterality Date    CARPAL TUNNEL RELEASE Bilateral     CHOLECYSTECTOMY      COLONOSCOPY  2014    normal exam    EGD  12/2019    pouchitis    ESOPHAGOGASTRODUODENOSCOPY  2015    biopsy taken,     GASTRIC BYPASS  2010    HEMORROIDECTOMY  06/2016    GA LAP,CHOLECYSTECTOMY/GRAPH N/A 4/26/2019    Procedure: LAPAROSCOPIC CHOLECYSTECTOMY;  Surgeon: Silverio Arreola MD;  Location: [facility] MAIN OR;  Service: General    VASECTOMY         Current Outpatient Medications   Medication Sig Dispense Refill    ARIPiprazole (ABILIFY) 5 mg tablet Take 5 mg by mouth daily       calcium citrate (CALCITRATE) 950 MG tablet Take 1 tablet by mouth daily      DULoxetine (CYMBALTA) 60 mg delayed release capsule Take 20 mg by mouth daily      ergocalciferol (VITAMIN D2) 50,000 units Take 1 capsule (50,000 Units total) by mouth 2 (two) times a week with meals 8 capsule 2    fluticasone (FLONASE) 50 mcg/act nasal spray 1 spray into each nostril daily 16 g 0    hydroxychloroquine (PLAQUENIL) 200 mg tablet       Multiple Vitamins-Minerals (MULTIVITAMIN ADULT PO) take 2 tablets by oral route daily      nabumetone (RELAFEN) 500 mg tablet Take 1 tablet (500 mg total) by mouth 2 (two) times a day 60 tablet 0    omeprazole (PriLOSEC) 20 mg delayed release capsule Take 1 capsule (20 mg total) by mouth daily 90 capsule 1    topiramate (TOPAMAX) 50 MG tablet Take 1 tablet (50 mg total) by mouth 2 (two) times a day 60 tablet 1    traMADol-acetaminophen (ULTRACET) 37 5-325 mg per tablet Take 1 tablet by mouth every 8 (eight) hours as needed for moderate pain 60 tablet 3    traZODone (DESYREL) 100 mg tablet take 2 tablet by oral route  every night       No current facility-administered medications for this visit  Allergies   Allergen Reactions    No Known Allergies        Review of Systems   Constitutional: Negative for diaphoresis, fatigue, fever and unexpected weight change  Respiratory: Negative for apnea, cough, choking, chest tightness and shortness of breath  Cardiovascular: Negative for chest pain, palpitations and leg swelling  Gastrointestinal: Negative for abdominal distention, abdominal pain, anal bleeding, blood in stool and constipation  Musculoskeletal: Positive for arthralgias and gait problem  Negative for back pain and joint swelling  Neurological: Negative for dizziness, facial asymmetry, light-headedness and headaches  Psychiatric/Behavioral: Negative for behavioral problems, dysphoric mood and self-injury  The patient is not nervous/anxious  Physical Exam   Constitutional: He is oriented to person, place, and time  He appears well-developed  No distress  Neurological: He is alert and oriented to person, place, and time  Skin: He is not diaphoretic          I spent 10 minutes with the patient during this visit

## 2020-03-29 NOTE — PROGRESS NOTES
Assessment/Plan:  1  Acute pain of right knee  This joint was injected with 40 mg of triamcinolone  Patient tolerated the procedure very well  No complications  Recommended to put ice  home I will follow up the patient in the needed basis  - Large joint arthrocentesis: R knee    No problem-specific Assessment & Plan notes found for this encounter  There are no diagnoses linked to this encounter  Subjective:      Patient ID: Mary Powell is a 62 y o  male  Worsening Right knee pain, limping, level of pain 7/10  Depression   Associated symptoms include arthralgias  Pertinent negatives include no abdominal pain, chest pain, coughing, diaphoresis, fatigue, fever, headaches or joint swelling  Knee Pain          The following portions of the patient's history were reviewed and updated as appropriate: allergies, current medications, past family history, past medical history, past social history, past surgical history and problem list     Review of Systems   Constitutional: Negative for diaphoresis, fatigue, fever and unexpected weight change  Respiratory: Negative for apnea, cough, choking, chest tightness and shortness of breath  Cardiovascular: Negative for chest pain, palpitations and leg swelling  Gastrointestinal: Negative for abdominal distention, abdominal pain, anal bleeding, blood in stool and constipation  Musculoskeletal: Positive for arthralgias and gait problem  Negative for back pain and joint swelling  Neurological: Negative for dizziness, facial asymmetry, light-headedness and headaches  Psychiatric/Behavioral: Positive for depression  Negative for behavioral problems, dysphoric mood and self-injury  The patient is not nervous/anxious            Objective:      /60 (BP Location: Left arm, Patient Position: Sitting, Cuff Size: Standard)   Pulse 87   Temp 98 °F (36 7 °C) (Oral)   Resp 16   Ht 5' 9" (1 753 m)   Wt 88 9 kg (196 lb)   SpO2 99%   BMI 28 94 kg/m²          Physical Exam   Constitutional: He is oriented to person, place, and time  Neck: No JVD present  No tracheal tenderness present  Carotid bruit is not present  No neck rigidity  No edema present  No thyroid mass and no thyromegaly present  Cardiovascular: Normal rate, regular rhythm, normal heart sounds and normal pulses  No extrasystoles are present  Exam reveals no distant heart sounds and no friction rub  Pulmonary/Chest: Effort normal and breath sounds normal  No stridor  No apnea, no tachypnea and no bradypnea  Abdominal: Soft  Bowel sounds are normal  He exhibits no abdominal bruit  There is no hepatosplenomegaly, splenomegaly or hepatomegaly  There is no CVA tenderness  No hernia  Musculoskeletal: Normal range of motion  He exhibits tenderness (right knee painfull in exam, mild effusion  )  Neurological: He is oriented to person, place, and time  He has normal reflexes  Skin: Skin is warm and dry  Psychiatric: He has a normal mood and affect  His behavior is normal  Judgment and thought content normal    Nursing note and vitals reviewed            Large joint arthrocentesis: R knee  Date/Time: 3/20/2020 2:53 PM  Consent given by: patient  Supporting Documentation  Indications: pain   Procedure Details  Location: knee - R knee  Needle size: 25 G  Ultrasound guidance: no  Approach: anterior  Medications administered: 40 mg triamcinolone acetonide 40 mg/mL    Patient tolerance: patient tolerated the procedure well with no immediate complications  Dressing:  Sterile dressing applied

## 2020-07-27 PROBLEM — E66.3 OVERWEIGHT: Status: ACTIVE | Noted: 2020-01-01

## 2020-07-27 PROBLEM — G44.309 HEADACHES DUE TO OLD HEAD INJURY: Status: ACTIVE | Noted: 2020-01-01

## 2020-07-27 PROBLEM — E65 ABDOMINAL PANNUS: Status: ACTIVE | Noted: 2020-01-01

## 2020-07-27 PROBLEM — S09.90XS HEADACHES DUE TO OLD HEAD INJURY: Status: ACTIVE | Noted: 2020-01-01

## 2020-07-27 NOTE — PROGRESS NOTES
Assessment/Plan:    No problem-specific Assessment & Plan notes found for this encounter  Diagnoses and all orders for this visit:    Overweight    Weight gain following gastric bypass surgery  -     topiramate (TOPAMAX) 100 mg tablet; 1 tab bid    S/P gastric bypass  -     Ambulatory referral to Plastic Surgery; Future    Rheumatoid arthritis involving both hands with positive rheumatoid factor (HCC)    Severe recurrent major depression without psychotic features (HCC)    Headaches due to old head injury  -     topiramate (TOPAMAX) 100 mg tablet; 1 tab bid    Abdominal pannus  -     Ambulatory referral to Plastic Surgery; Future    Other orders  -     ARIPiprazole (ABILIFY) 10 mg tablet        -Patient is pursuing Conservative Program/ post op regain   -Initial weight loss goal of 5-10% weight loss for improved health  -Screening labs- vit 10/23/19  - will increase topamax further to 100mg BID  - he will start food login  - also encouraged to restart exercise   - he is doing really well BMI 28  He wants at least 10lbs but discussed that could be difficult due to his inability to exercise due to his RA/knee pain  - he is also taking multiple wt positive medicines  - referred to plastic surgery bc of extra skin in abdominal area       Initial MWM: 194lb  Current: 188 5lbs  Change: -5 5lbs  Goal: 165lbs     Goals:  Food log (ie ) www myfitnesspal com,sparkpeople  com,loseit com,calorieking  com,etc  baritastic  No sugary beverages  At least 64oz of water daily  Increase physical activity by 10 minutes daily  Gradually increase physical activity to a goal of 5 days per week for 30 minutes of MODERATE intensity PLUS 2 days per week of FULL BODY resistance training  Nutrition as per surgical RD       Follow up in approximately 2 months with Non-Surgical Physician/Advanced Practitioner      Subjective:   Chief Complaint   Patient presents with    Follow-up     mwm 3-4 month follow up       Patient ID: Gray Kofi Jorge Keen  is a 62 y o  male with excess weight/obesity here to pursue weight management  Patient is pursuing Conservative Program      HPI     3 mo follow up  Has lost -5 5lbs    Exercise: no due to RA/knee pain  Nutrition: states doing the same    He is frustrated with plateau and wants more weight loss  The following portions of the patient's history were reviewed and updated as appropriate: allergies, current medications, past family history, past medical history, past social history, past surgical history and problem list     Review of Systems   Constitutional: Negative for activity change and appetite change  Respiratory: Negative  Cardiovascular: Negative  Gastrointestinal: Negative  Genitourinary: Negative  Musculoskeletal: Negative for arthralgias  Skin: Negative for rash  Psychiatric/Behavioral: Negative  Objective:    /68 (BP Location: Left arm, Patient Position: Sitting, Cuff Size: Large)   Pulse 60   Temp 98 7 °F (37 1 °C)   Ht 5' 8" (1 727 m)   Wt 85 5 kg (188 lb 8 oz)   BMI 28 66 kg/m²      Physical Exam    Constitutional   General appearance: Abnormal   well developed and overweight  Eyes No conjunctival pallor  Ears, Nose, Mouth, and Throat Oral mucosa moist    Pulmonary   Respiratory effort: No increased work of breathing or signs of respiratory distress  Auscultation of lungs: Clear to auscultation, equal breath sounds bilaterally, no wheezes, no rales, no rhonci  Cardiovascular   Auscultation of heart: Normal rate and rhythm, normal S1 and S2, without murmurs  Examination of extremities for edema and/or varicosities: Normal   no edema  Abdomen   Abdomen: Abnormal   Bowel sounds were normal  The abdomen was soft and nontender     Musculoskeletal   Gait and station: Normal     Psychiatric   Orientation to person, place and time: Normal     Affect: appropriate

## 2020-08-03 NOTE — PROGRESS NOTES
Assessment/Plan:  1  Rheumatoid arthritis involving both hands with positive rheumatoid factor (HCC)    - hydroxychloroquine (PLAQUENIL) 200 mg tablet; Take 1 tablet (200 mg total) by mouth 2 (two) times a day with meals  Dispense: 60 tablet; Refill: 5    2  Chronic pain of left knee  The choice of NSAID's in this patient depends of her current pain syndrome  I explained to patient that response of NSAIDs varies between patient's and also is differs in regarding to the area of the body that is affected in the progression of the damage  The drug interactions and comorbidities affected  by these medications were explained to the patient also; the caution in toxicity in the GI tract was also discussed  Prevent the long-term use of these medications may be wise  The use ice and physical therapy is necessary in order to get the best results  NSAID's might elevate BP, or block effect of certain antihypertensive agents  It is to used with caution  Always use ice and therapy to decrease or avoid the need for a Pharmacological agent  - traMADol-acetaminophen (ULTRACET) 37 5-325 mg per tablet; Take 1 tablet by mouth every 8 (eight) hours as needed for moderate pain  Dispense: 60 tablet; Refill: 3    No problem-specific Assessment & Plan notes found for this encounter  Diagnoses and all orders for this visit:    Rheumatoid arthritis involving both hands with positive rheumatoid factor (HCC)  -     hydroxychloroquine (PLAQUENIL) 200 mg tablet; Take 1 tablet (200 mg total) by mouth 2 (two) times a day with meals    Chronic pain of left knee  -     traMADol-acetaminophen (ULTRACET) 37 5-325 mg per tablet; Take 1 tablet by mouth every 8 (eight) hours as needed for moderate pain          Subjective:      Patient ID: Clair Gallegos is a 62 y o  male  HPI  Clair Gallegos 62 y o  complaining of   Chronic pain in knees; started about 3 year(s) ago   The pain is  continuous and has been gradually worsening since onset  The quality of the pain is described as heaviness  The pain does not radiate  The pain is at a severity of 5/10  The symptoms are aggravated by movement  Associated symptoms include numbness, paresthesias and tingling  He had tried multiple alternatives without improved him symptoms  He failed to follow up with rheumatologist    The following portions of the patient's history were reviewed and updated as appropriate: allergies, current medications, past family history, past medical history, past social history, past surgical history and problem list     Review of Systems   Constitutional: Negative for diaphoresis, fatigue, fever and unexpected weight change  Respiratory: Negative for apnea, cough, choking, chest tightness and shortness of breath  Cardiovascular: Negative for chest pain, palpitations and leg swelling  Gastrointestinal: Negative for abdominal distention, abdominal pain, anal bleeding, blood in stool and constipation  Musculoskeletal: Positive for arthralgias (Mainly in his knees) and back pain  Negative for gait problem and joint swelling  Skin:        He has hanging skin on his abdomen wants to get plastic surgery  Neurological: Negative for dizziness, facial asymmetry, light-headedness and headaches  Psychiatric/Behavioral: Negative for behavioral problems, dysphoric mood and self-injury  The patient is not nervous/anxious  Objective:      /60 (Patient Position: Sitting, Cuff Size: Large)   Pulse 88   Temp 98 6 °F (37 °C) (Oral)   Resp 12   Ht 5' 9" (1 753 m)   Wt 82 6 kg (182 lb)   SpO2 98%   BMI 26 88 kg/m²          Physical Exam  Vitals signs and nursing note reviewed  Neck:      Musculoskeletal: No edema or neck rigidity  Thyroid: No thyroid mass or thyromegaly  Vascular: No carotid bruit or JVD  Trachea: No tracheal tenderness  Cardiovascular:      Rate and Rhythm: Normal rate and regular rhythm    No extrasystoles are present  Pulses: Normal pulses  Heart sounds: Normal heart sounds  Heart sounds not distant  No friction rub  Pulmonary:      Effort: Pulmonary effort is normal  No tachypnea or bradypnea  Breath sounds: Normal breath sounds  No stridor  Abdominal:      General: Bowel sounds are normal  There is no abdominal bruit  Palpations: Abdomen is soft  There is no hepatomegaly or splenomegaly  Hernia: No hernia is present  Musculoskeletal:         General: Tenderness (Of lower back and knees) present  No deformity ( MCPs are not affected)  Skin:     General: Skin is warm and dry  Neurological:      Mental Status: He is oriented to person, place, and time  Deep Tendon Reflexes: Reflexes are normal and symmetric  Psychiatric:         Behavior: Behavior normal          Thought Content:  Thought content normal          Judgment: Judgment normal

## 2020-09-23 NOTE — LETTER
September 24, 2020     Patient: Nora Qiu   YOB: 1965   Date of Visit: 9/23/2020       To Whom it May Concern:    Nora Qiu had a keflex prescription sent in error yesterday  Please discard that prescription      Sincerely,          Lakeisha Fowler MD

## 2020-09-23 NOTE — PROGRESS NOTES
Assessment/Plan:  1  Rheumatoid arthritis involving both hands with positive rheumatoid factor ([address] )  Patient is stable in current treatment, he will start hepatitis-B as a preventive  immunization in a patient getting immunomodulators  He needs to have QuantiFERON gold as screening for tuberculosis  He will continue same treatment  - Quantiferon TB Gold Plus; Future    2  Mixed hyperlipidemia  Checking lipids, patient had been gaining weight lately after bariatric surgery  I encouraged patient to control carbs and fat in diet  - Comprehensive metabolic panel; Future  - Lipid panel; Future    3  Bariatric surgery status  Checking hemoglobin  - CBC and differential; Future    4  Need for hepatitis B vaccination  Immunization needed  - HEPATITIS B VACCINE ADULT IM    5  Needs flu shot  Immunization needed  - influenza vaccine, quadrivalent, recombinant, PF, 0 5 mL, for patients 18 yr+ (FLUBLOK)        No problem-specific Assessment & Plan notes found for this encounter  Diagnoses and all orders for this visit:    Mixed hyperlipidemia  -     Comprehensive metabolic panel; Future  -     Lipid panel; Future    Bariatric surgery status  -     CBC and differential; Future          Subjective:      Patient ID: Juan Carlos Paris is a 62 y o  male  HPI  Juan Carlos Paris 62 y o  With history of bariatric surgery, gaining weight recently 15 lb in the last three months, complaining of   Chronic pain in ankle, arms and knees; started about 3 year(s) ago  The pain is continuos and has been gradually worsening since onset  The quality of the pain is described as aching  The pain improve under current treatment  The pain is at a severity of 5/10  The symptoms are aggravated by movement  Associated symptoms include numbness, paresthesias and tingling  He had tried multiple alternatives without improved him symptoms  Hepatitis B antibodies was negative previous labs      The following portions of the patient's history were reviewed and updated as appropriate: allergies, current medications, past family history, past medical history, past social history, past surgical history and problem list     Review of Systems   Constitutional: Negative for diaphoresis, fatigue, fever and unexpected weight change  Respiratory: Negative for apnea, cough, choking, chest tightness and shortness of breath  Cardiovascular: Negative for chest pain, palpitations and leg swelling  Gastrointestinal: Negative for abdominal distention, abdominal pain, anal bleeding, blood in stool and constipation  Musculoskeletal: Positive for arthralgias (Multiple areas hands, elbows, shoulders, knees, ankles bilaterally)  Negative for back pain, gait problem and joint swelling  Neurological: Negative for dizziness, facial asymmetry, light-headedness and headaches  Psychiatric/Behavioral: Negative for behavioral problems, dysphoric mood and self-injury  The patient is not nervous/anxious  Objective:      /70 (BP Location: Left arm, Patient Position: Sitting, Cuff Size: Standard)   Pulse 62   Temp 97 9 °F (36 6 °C) (Temporal)   Resp 16   Ht 5' 9" (1 753 m)   Wt 89 4 kg (197 lb)   SpO2 98%   BMI 29 09 kg/m²          Physical Exam  Vitals signs and nursing note reviewed  Neck:      Musculoskeletal: No edema or neck rigidity  Thyroid: No thyroid mass or thyromegaly  Vascular: No carotid bruit or JVD  Trachea: No tracheal tenderness  Cardiovascular:      Rate and Rhythm: Normal rate and regular rhythm  No extrasystoles are present  Pulses: Normal pulses  Heart sounds: Normal heart sounds  Heart sounds not distant  No friction rub  Pulmonary:      Effort: Pulmonary effort is normal  No tachypnea or bradypnea  Breath sounds: Normal breath sounds  No stridor  Abdominal:      General: Bowel sounds are normal  There is no abdominal bruit  Palpations: Abdomen is soft   There is no hepatomegaly or splenomegaly  Hernia: No hernia is present  Musculoskeletal: Normal range of motion  Skin:     General: Skin is warm and dry  Neurological:      Mental Status: He is oriented to person, place, and time  Deep Tendon Reflexes: Reflexes are normal and symmetric  Psychiatric:         Behavior: Behavior normal          Thought Content: Thought content normal          Judgment: Judgment normal        BMI Counseling: Body mass index is 29 09 kg/m²  The BMI is above normal  Exercise recommendations include exercising 3-5 times per week

## 2021-01-01 ENCOUNTER — APPOINTMENT (INPATIENT)
Dept: RADIOLOGY | Facility: HOSPITAL | Age: 56
DRG: 208 | End: 2021-01-01
Payer: COMMERCIAL

## 2021-01-01 ENCOUNTER — HOSPITAL ENCOUNTER (EMERGENCY)
Facility: HOSPITAL | Age: 56
DRG: 208 | End: 2021-03-13
Attending: EMERGENCY MEDICINE
Payer: COMMERCIAL

## 2021-01-01 ENCOUNTER — OFFICE VISIT (OUTPATIENT)
Dept: FAMILY MEDICINE CLINIC | Facility: CLINIC | Age: 56
End: 2021-01-01
Payer: COMMERCIAL

## 2021-01-01 ENCOUNTER — APPOINTMENT (EMERGENCY)
Dept: CT IMAGING | Facility: HOSPITAL | Age: 56
DRG: 208 | End: 2021-01-01
Payer: COMMERCIAL

## 2021-01-01 ENCOUNTER — OFFICE VISIT (OUTPATIENT)
Dept: URGENT CARE | Age: 56
End: 2021-01-01
Payer: COMMERCIAL

## 2021-01-01 ENCOUNTER — HOSPITAL ENCOUNTER (INPATIENT)
Facility: HOSPITAL | Age: 56
LOS: 16 days | DRG: 208 | End: 2021-03-29
Attending: STUDENT IN AN ORGANIZED HEALTH CARE EDUCATION/TRAINING PROGRAM | Admitting: INTERNAL MEDICINE
Payer: COMMERCIAL

## 2021-01-01 ENCOUNTER — APPOINTMENT (EMERGENCY)
Dept: RADIOLOGY | Facility: HOSPITAL | Age: 56
DRG: 208 | End: 2021-01-01
Payer: COMMERCIAL

## 2021-01-01 ENCOUNTER — HOSPITAL ENCOUNTER (OUTPATIENT)
Facility: HOSPITAL | Age: 56
Setting detail: OBSERVATION
Discharge: HOME | DRG: 208 | End: 2021-03-11
Attending: EMERGENCY MEDICINE | Admitting: INTERNAL MEDICINE
Payer: COMMERCIAL

## 2021-01-01 ENCOUNTER — TELEPHONE (OUTPATIENT)
Dept: FAMILY MEDICINE CLINIC | Facility: CLINIC | Age: 56
End: 2021-01-01

## 2021-01-01 ENCOUNTER — TRANSITIONAL CARE MANAGEMENT (OUTPATIENT)
Dept: FAMILY MEDICINE CLINIC | Facility: CLINIC | Age: 56
End: 2021-01-01

## 2021-01-01 ENCOUNTER — HOSPITAL ENCOUNTER (OUTPATIENT)
Dept: MRI IMAGING | Facility: HOSPITAL | Age: 56
Discharge: HOME | End: 2021-01-11
Payer: COMMERCIAL

## 2021-01-01 ENCOUNTER — APPOINTMENT (INPATIENT)
Dept: NON INVASIVE DIAGNOSTICS | Facility: HOSPITAL | Age: 56
DRG: 208 | End: 2021-01-01
Payer: COMMERCIAL

## 2021-01-01 ENCOUNTER — APPOINTMENT (INPATIENT)
Dept: CT IMAGING | Facility: HOSPITAL | Age: 56
DRG: 208 | End: 2021-01-01
Payer: COMMERCIAL

## 2021-01-01 VITALS
HEART RATE: 50 BPM | HEIGHT: 68 IN | OXYGEN SATURATION: 99 % | BODY MASS INDEX: 29.86 KG/M2 | WEIGHT: 197 LBS | RESPIRATION RATE: 17 BRPM | TEMPERATURE: 96.8 F

## 2021-01-01 VITALS
OXYGEN SATURATION: 93 % | WEIGHT: 181.44 LBS | TEMPERATURE: 99.1 F | BODY MASS INDEX: 26.87 KG/M2 | HEART RATE: 146 BPM | HEIGHT: 69 IN | RESPIRATION RATE: 51 BRPM | DIASTOLIC BLOOD PRESSURE: 85 MMHG | SYSTOLIC BLOOD PRESSURE: 132 MMHG

## 2021-01-01 VITALS
BODY MASS INDEX: 29.78 KG/M2 | RESPIRATION RATE: 18 BRPM | HEART RATE: 71 BPM | OXYGEN SATURATION: 95 % | WEIGHT: 201.06 LBS | TEMPERATURE: 99.4 F | SYSTOLIC BLOOD PRESSURE: 117 MMHG | DIASTOLIC BLOOD PRESSURE: 70 MMHG | HEIGHT: 69 IN

## 2021-01-01 VITALS
RESPIRATION RATE: 37 BRPM | SYSTOLIC BLOOD PRESSURE: 100 MMHG | OXYGEN SATURATION: 94 % | WEIGHT: 189.6 LBS | HEART RATE: 83 BPM | BODY MASS INDEX: 28 KG/M2 | TEMPERATURE: 100.1 F | DIASTOLIC BLOOD PRESSURE: 62 MMHG

## 2021-01-01 VITALS
RESPIRATION RATE: 16 BRPM | HEART RATE: 84 BPM | TEMPERATURE: 99.6 F | BODY MASS INDEX: 30.51 KG/M2 | WEIGHT: 206 LBS | SYSTOLIC BLOOD PRESSURE: 90 MMHG | HEIGHT: 69 IN | DIASTOLIC BLOOD PRESSURE: 60 MMHG | OXYGEN SATURATION: 98 %

## 2021-01-01 DIAGNOSIS — U07.1 COVID-19: Primary | ICD-10-CM

## 2021-01-01 DIAGNOSIS — J96.01 ACUTE RESPIRATORY FAILURE WITH HYPOXIA (HCC): ICD-10-CM

## 2021-01-01 DIAGNOSIS — R09.02 HYPOXIA: ICD-10-CM

## 2021-01-01 DIAGNOSIS — U07.1 DYSPNEA DUE TO COVID-19: ICD-10-CM

## 2021-01-01 DIAGNOSIS — R06.00 DYSPNEA: ICD-10-CM

## 2021-01-01 DIAGNOSIS — J06.9 ACUTE UPPER RESPIRATORY INFECTION: ICD-10-CM

## 2021-01-01 DIAGNOSIS — M05.741 RHEUMATOID ARTHRITIS INVOLVING BOTH HANDS WITH POSITIVE RHEUMATOID FACTOR (HCC): Primary | ICD-10-CM

## 2021-01-01 DIAGNOSIS — M05.742 RHEUMATOID ARTHRITIS INVOLVING BOTH HANDS WITH POSITIVE RHEUMATOID FACTOR (HCC): Primary | ICD-10-CM

## 2021-01-01 DIAGNOSIS — R05.9 COUGH: Primary | ICD-10-CM

## 2021-01-01 DIAGNOSIS — M54.16 LUMBAR RADICULOPATHY: ICD-10-CM

## 2021-01-01 DIAGNOSIS — R04.0 EPISTAXIS: ICD-10-CM

## 2021-01-01 DIAGNOSIS — R06.00 DYSPNEA DUE TO COVID-19: ICD-10-CM

## 2021-01-01 LAB
ABO GROUP BLD BPU: NORMAL
ABO GROUP BLD: NORMAL
ABO GROUP BLD: NORMAL
ALBUMIN SERPL BCP-MCNC: 2.7 G/DL (ref 3.5–5)
ALBUMIN SERPL BCP-MCNC: 3.8 G/DL (ref 3–5.2)
ALBUMIN SERPL BCP-MCNC: 3.9 G/DL (ref 3–5.2)
ALP SERPL-CCNC: 103 U/L (ref 46–116)
ALP SERPL-CCNC: 66 U/L (ref 43–122)
ALP SERPL-CCNC: 67 U/L (ref 46–116)
ALP SERPL-CCNC: 67 U/L (ref 46–116)
ALP SERPL-CCNC: 76 U/L (ref 43–122)
ALP SERPL-CCNC: 86 U/L (ref 46–116)
ALT SERPL W P-5'-P-CCNC: 34 U/L (ref 9–52)
ALT SERPL W P-5'-P-CCNC: 37 U/L (ref 9–52)
ALT SERPL W P-5'-P-CCNC: 44 U/L (ref 12–78)
ALT SERPL W P-5'-P-CCNC: 62 U/L (ref 12–78)
ALT SERPL W P-5'-P-CCNC: 78 U/L (ref 12–78)
ALT SERPL W P-5'-P-CCNC: 87 U/L (ref 12–78)
ANION GAP SERPL CALCULATED.3IONS-SCNC: 10 MMOL/L (ref 4–13)
ANION GAP SERPL CALCULATED.3IONS-SCNC: 10 MMOL/L (ref 4–13)
ANION GAP SERPL CALCULATED.3IONS-SCNC: 11 MMOL/L (ref 4–13)
ANION GAP SERPL CALCULATED.3IONS-SCNC: 11 MMOL/L (ref 4–13)
ANION GAP SERPL CALCULATED.3IONS-SCNC: 7 MMOL/L (ref 4–13)
ANION GAP SERPL CALCULATED.3IONS-SCNC: 7 MMOL/L (ref 4–13)
ANION GAP SERPL CALCULATED.3IONS-SCNC: 7 MMOL/L (ref 5–14)
ANION GAP SERPL CALCULATED.3IONS-SCNC: 7 MMOL/L (ref 5–14)
ANION GAP SERPL CALCULATED.3IONS-SCNC: 8 MMOL/L (ref 4–13)
ANION GAP SERPL CALCULATED.3IONS-SCNC: 9 MMOL/L (ref 4–13)
ANION GAP SERPL CALCULATED.3IONS-SCNC: 9 MMOL/L (ref 4–13)
ANION GAP SERPL CALCULATED.3IONS-SCNC: 9 MMOL/L (ref 5–14)
APTT PPP: 29 SECONDS (ref 23–37)
ARTERIAL PATENCY WRIST A: YES
AST SERPL W P-5'-P-CCNC: 36 U/L (ref 5–45)
AST SERPL W P-5'-P-CCNC: 39 U/L (ref 5–45)
AST SERPL W P-5'-P-CCNC: 48 U/L (ref 5–45)
AST SERPL W P-5'-P-CCNC: 53 U/L (ref 17–59)
AST SERPL W P-5'-P-CCNC: 53 U/L (ref 5–45)
AST SERPL W P-5'-P-CCNC: 68 U/L (ref 17–59)
ATRIAL RATE: 73 BPM
ATRIAL RATE: 73 BPM
ATRIAL RATE: 92 BPM
BACTERIA BLD CULT: ABNORMAL
BACTERIA BLD CULT: NORMAL
BASE EXCESS BLDA CALC-SCNC: -21 MMOL/L
BASOPHILS # BLD AUTO: 0 THOUSANDS/ΜL (ref 0–0.1)
BASOPHILS # BLD AUTO: 0 THOUSANDS/ΜL (ref 0–0.1)
BASOPHILS # BLD AUTO: 0.01 THOUSANDS/ΜL (ref 0–0.1)
BASOPHILS # BLD AUTO: 0.01 THOUSANDS/ΜL (ref 0–0.1)
BASOPHILS # BLD AUTO: 0.03 THOUSANDS/ΜL (ref 0–0.1)
BASOPHILS # BLD AUTO: 0.06 THOUSANDS/ΜL (ref 0–0.1)
BASOPHILS # BLD MANUAL: 0 THOUSAND/UL (ref 0–0.1)
BASOPHILS # BLD MANUAL: 0 THOUSAND/UL (ref 0–0.1)
BASOPHILS NFR BLD AUTO: 0 % (ref 0–1)
BASOPHILS NFR MAR MANUAL: 0 % (ref 0–1)
BASOPHILS NFR MAR MANUAL: 0 % (ref 0–1)
BILIRUB SERPL-MCNC: 0.39 MG/DL (ref 0.2–1)
BILIRUB SERPL-MCNC: 0.47 MG/DL (ref 0.2–1)
BILIRUB SERPL-MCNC: 0.48 MG/DL (ref 0.2–1)
BILIRUB SERPL-MCNC: 0.6 MG/DL
BILIRUB SERPL-MCNC: 0.7 MG/DL
BILIRUB SERPL-MCNC: 0.78 MG/DL (ref 0.2–1)
BLD GP AB SCN SERPL QL: NEGATIVE
BPU ID: NORMAL
BUN SERPL-MCNC: 12 MG/DL (ref 5–25)
BUN SERPL-MCNC: 14 MG/DL (ref 5–25)
BUN SERPL-MCNC: 15 MG/DL (ref 5–25)
BUN SERPL-MCNC: 16 MG/DL (ref 5–25)
BUN SERPL-MCNC: 20 MG/DL (ref 5–25)
BUN SERPL-MCNC: 20 MG/DL (ref 5–25)
BUN SERPL-MCNC: 21 MG/DL (ref 5–25)
BUN SERPL-MCNC: 22 MG/DL (ref 5–25)
BUN SERPL-MCNC: 23 MG/DL (ref 5–25)
BUN SERPL-MCNC: 24 MG/DL (ref 5–25)
BUN SERPL-MCNC: 26 MG/DL (ref 5–25)
BUN SERPL-MCNC: 37 MG/DL (ref 5–25)
CALCIUM ALBUM COR SERPL-MCNC: 10.2 MG/DL (ref 8.3–10.1)
CALCIUM ALBUM COR SERPL-MCNC: 10.6 MG/DL (ref 8.3–10.1)
CALCIUM ALBUM COR SERPL-MCNC: 10.7 MG/DL (ref 8.3–10.1)
CALCIUM ALBUM COR SERPL-MCNC: 9.6 MG/DL (ref 8.3–10.1)
CALCIUM SERPL-MCNC: 8.6 MG/DL (ref 8.3–10.1)
CALCIUM SERPL-MCNC: 8.8 MG/DL (ref 8.3–10.1)
CALCIUM SERPL-MCNC: 8.8 MG/DL (ref 8.4–10.2)
CALCIUM SERPL-MCNC: 9 MG/DL (ref 8.3–10.1)
CALCIUM SERPL-MCNC: 9 MG/DL (ref 8.4–10.2)
CALCIUM SERPL-MCNC: 9 MG/DL (ref 8.4–10.2)
CALCIUM SERPL-MCNC: 9.1 MG/DL (ref 8.3–10.1)
CALCIUM SERPL-MCNC: 9.1 MG/DL (ref 8.3–10.1)
CALCIUM SERPL-MCNC: 9.2 MG/DL (ref 8.3–10.1)
CALCIUM SERPL-MCNC: 9.3 MG/DL (ref 8.3–10.1)
CALCIUM SERPL-MCNC: 9.6 MG/DL (ref 8.3–10.1)
CALCIUM SERPL-MCNC: 9.7 MG/DL (ref 8.3–10.1)
CHLORIDE SERPL-SCNC: 101 MMOL/L (ref 100–108)
CHLORIDE SERPL-SCNC: 102 MMOL/L (ref 100–108)
CHLORIDE SERPL-SCNC: 102 MMOL/L (ref 97–108)
CHLORIDE SERPL-SCNC: 103 MMOL/L (ref 100–108)
CHLORIDE SERPL-SCNC: 103 MMOL/L (ref 97–108)
CHLORIDE SERPL-SCNC: 104 MMOL/L (ref 100–108)
CHLORIDE SERPL-SCNC: 99 MMOL/L (ref 97–108)
CK SERPL-CCNC: 67 U/L (ref 39–308)
CO2 SERPL-SCNC: 25 MMOL/L (ref 21–32)
CO2 SERPL-SCNC: 25 MMOL/L (ref 21–32)
CO2 SERPL-SCNC: 26 MMOL/L (ref 21–32)
CO2 SERPL-SCNC: 27 MMOL/L (ref 21–32)
CO2 SERPL-SCNC: 27 MMOL/L (ref 22–30)
CO2 SERPL-SCNC: 29 MMOL/L (ref 21–32)
CO2 SERPL-SCNC: 30 MMOL/L (ref 22–30)
CO2 SERPL-SCNC: 33 MMOL/L (ref 22–30)
CREAT SERPL-MCNC: 0.69 MG/DL (ref 0.6–1.3)
CREAT SERPL-MCNC: 0.83 MG/DL (ref 0.6–1.3)
CREAT SERPL-MCNC: 0.97 MG/DL (ref 0.6–1.3)
CREAT SERPL-MCNC: 0.97 MG/DL (ref 0.7–1.5)
CREAT SERPL-MCNC: 0.99 MG/DL (ref 0.6–1.3)
CREAT SERPL-MCNC: 0.99 MG/DL (ref 0.6–1.3)
CREAT SERPL-MCNC: 1 MG/DL (ref 0.6–1.3)
CREAT SERPL-MCNC: 1.01 MG/DL (ref 0.6–1.3)
CREAT SERPL-MCNC: 1.04 MG/DL (ref 0.6–1.3)
CREAT SERPL-MCNC: 1.04 MG/DL (ref 0.7–1.5)
CREAT SERPL-MCNC: 1.06 MG/DL (ref 0.7–1.5)
CREAT SERPL-MCNC: 1.08 MG/DL (ref 0.6–1.3)
CRP SERPL QL: 128.5 MG/L
CRP SERPL QL: 135.8 MG/L
CRP SERPL QL: 142.1 MG/L
CRP SERPL QL: 144.4 MG/L
CRP SERPL QL: 162.9 MG/L
CRP SERPL QL: 259.4 MG/L
CRP SERPL QL: 3.5 MG/L
CRP SERPL QL: 51.8 MG/L
CRP SERPL QL: 66.8 MG/L
CRP SERPL QL: <3 MG/L
D DIMER PPP FEU-MCNC: 0.53 UG/ML FEU
D DIMER PPP FEU-MCNC: 0.9 UG/ML FEU
D DIMER PPP FEU-MCNC: 0.9 UG/ML FEU
D DIMER PPP FEU-MCNC: 1.04 UG/ML FEU
D DIMER PPP FEU-MCNC: 1.81 UG/ML FEU
D DIMER PPP FEU-MCNC: 1.86 UG/ML FEU
D DIMER PPP FEU-MCNC: 1.89 UG/ML FEU
D DIMER PPP FEU-MCNC: 2.26 UG/ML FEU
D DIMER PPP FEU-MCNC: 2.76 UG/ML FEU
D DIMER PPP FEU-MCNC: 6.37 UG/ML FEU
D DIMER PPP FEU-MCNC: 7.46 UG/ML FEU
EOSINOPHIL # BLD AUTO: 0 THOUSAND/ΜL (ref 0–0.4)
EOSINOPHIL # BLD AUTO: 0 THOUSAND/ΜL (ref 0–0.4)
EOSINOPHIL # BLD AUTO: 0 THOUSAND/ΜL (ref 0–0.61)
EOSINOPHIL # BLD AUTO: 0.01 THOUSAND/ΜL (ref 0–0.61)
EOSINOPHIL # BLD AUTO: 0.08 THOUSAND/ΜL (ref 0–0.61)
EOSINOPHIL # BLD AUTO: 0.1 THOUSAND/ΜL (ref 0–0.61)
EOSINOPHIL # BLD MANUAL: 0 THOUSAND/UL (ref 0–0.4)
EOSINOPHIL # BLD MANUAL: 0 THOUSAND/UL (ref 0–0.4)
EOSINOPHIL NFR BLD AUTO: 0 % (ref 0–6)
EOSINOPHIL NFR BLD AUTO: 1 % (ref 0–6)
EOSINOPHIL NFR BLD AUTO: 1 % (ref 0–6)
EOSINOPHIL NFR BLD MANUAL: 0 % (ref 0–6)
EOSINOPHIL NFR BLD MANUAL: 0 % (ref 0–6)
ERYTHROCYTE [DISTWIDTH] IN BLOOD BY AUTOMATED COUNT: 12.3 % (ref 11.6–15.1)
ERYTHROCYTE [DISTWIDTH] IN BLOOD BY AUTOMATED COUNT: 12.6 % (ref 11.6–15.1)
ERYTHROCYTE [DISTWIDTH] IN BLOOD BY AUTOMATED COUNT: 12.8 % (ref 11.6–15.1)
ERYTHROCYTE [DISTWIDTH] IN BLOOD BY AUTOMATED COUNT: 12.8 % (ref 11.6–15.1)
ERYTHROCYTE [DISTWIDTH] IN BLOOD BY AUTOMATED COUNT: 12.9 % (ref 11.6–15.1)
ERYTHROCYTE [DISTWIDTH] IN BLOOD BY AUTOMATED COUNT: 13.3 %
ERYTHROCYTE [DISTWIDTH] IN BLOOD BY AUTOMATED COUNT: 13.4 %
ERYTHROCYTE [DISTWIDTH] IN BLOOD BY AUTOMATED COUNT: 13.6 %
FERRITIN SERPL-MCNC: 268 NG/ML (ref 8–388)
FERRITIN SERPL-MCNC: 270 NG/ML (ref 8–388)
FERRITIN SERPL-MCNC: 274 NG/ML (ref 8–388)
FERRITIN SERPL-MCNC: 289 NG/ML (ref 8–388)
FERRITIN SERPL-MCNC: 314 NG/ML (ref 8–388)
FERRITIN SERPL-MCNC: 336 NG/ML (ref 8–388)
FERRITIN SERPL-MCNC: 394 NG/ML (ref 8–388)
GFR SERPL CREATININE-BSD FRML MDRD: 105 ML/MIN/1.73SQ M
GFR SERPL CREATININE-BSD FRML MDRD: 76 ML/MIN/1.73SQ M
GFR SERPL CREATININE-BSD FRML MDRD: 78 ML/MIN/1.73SQ M
GFR SERPL CREATININE-BSD FRML MDRD: 79 ML/MIN/1.73SQ M
GFR SERPL CREATININE-BSD FRML MDRD: 79 ML/MIN/1.73SQ M
GFR SERPL CREATININE-BSD FRML MDRD: 82 ML/MIN/1.73SQ M
GFR SERPL CREATININE-BSD FRML MDRD: 83 ML/MIN/1.73SQ M
GFR SERPL CREATININE-BSD FRML MDRD: 84 ML/MIN/1.73SQ M
GFR SERPL CREATININE-BSD FRML MDRD: 84 ML/MIN/1.73SQ M
GFR SERPL CREATININE-BSD FRML MDRD: 86 ML/MIN/1.73SQ M
GFR SERPL CREATININE-BSD FRML MDRD: 86 ML/MIN/1.73SQ M
GFR SERPL CREATININE-BSD FRML MDRD: 98 ML/MIN/1.73SQ M
GIANT PLATELETS BLD QL SMEAR: PRESENT
GLUCOSE SERPL-MCNC: 110 MG/DL (ref 70–99)
GLUCOSE SERPL-MCNC: 112 MG/DL (ref 65–140)
GLUCOSE SERPL-MCNC: 114 MG/DL (ref 65–140)
GLUCOSE SERPL-MCNC: 116 MG/DL (ref 70–99)
GLUCOSE SERPL-MCNC: 124 MG/DL (ref 65–140)
GLUCOSE SERPL-MCNC: 129 MG/DL (ref 65–140)
GLUCOSE SERPL-MCNC: 131 MG/DL (ref 65–140)
GLUCOSE SERPL-MCNC: 132 MG/DL (ref 65–140)
GLUCOSE SERPL-MCNC: 141 MG/DL (ref 65–140)
GLUCOSE SERPL-MCNC: 149 MG/DL (ref 70–99)
GLUCOSE SERPL-MCNC: 155 MG/DL (ref 65–140)
GLUCOSE SERPL-MCNC: 99 MG/DL (ref 65–140)
GRAM STN SPEC: ABNORMAL
HBV CORE AB SER QL: NORMAL
HBV CORE IGM SER QL: NORMAL
HBV SURFACE AG SER QL: NORMAL
HCO3 BLDA-SCNC: 13.5 MMOL/L (ref 22–28)
HCT VFR BLD AUTO: 41 % (ref 36.5–49.3)
HCT VFR BLD AUTO: 41.1 % (ref 41–53)
HCT VFR BLD AUTO: 41.4 % (ref 41–53)
HCT VFR BLD AUTO: 41.4 % (ref 41–53)
HCT VFR BLD AUTO: 42 % (ref 36.5–49.3)
HCT VFR BLD AUTO: 43.5 % (ref 36.5–49.3)
HCT VFR BLD AUTO: 43.7 % (ref 36.5–49.3)
HCT VFR BLD AUTO: 43.9 % (ref 36.5–49.3)
HCT VFR BLD AUTO: 44.7 % (ref 36.5–49.3)
HCT VFR BLD AUTO: 44.8 % (ref 36.5–49.3)
HCT VFR BLD AUTO: 47.7 % (ref 36.5–49.3)
HCV AB SER QL: NORMAL
HGB BLD-MCNC: 13.3 G/DL (ref 12–17)
HGB BLD-MCNC: 13.5 G/DL (ref 12–17)
HGB BLD-MCNC: 13.5 G/DL (ref 13.5–17.5)
HGB BLD-MCNC: 13.5 G/DL (ref 13.5–17.5)
HGB BLD-MCNC: 13.7 G/DL (ref 13.5–17.5)
HGB BLD-MCNC: 14.4 G/DL (ref 12–17)
HGB BLD-MCNC: 14.5 G/DL (ref 12–17)
HGB BLD-MCNC: 14.7 G/DL (ref 12–17)
HGB BLD-MCNC: 14.9 G/DL (ref 12–17)
HGB BLD-MCNC: 15 G/DL (ref 12–17)
HGB BLD-MCNC: 15.8 G/DL (ref 12–17)
HGB BLD-MCNC: 16.4 G/DL (ref 12–17)
HIV 1+2 AB+HIV1 P24 AG SERPL QL IA: NORMAL
HIV1 P24 AG SER QL: NORMAL
IMM GRANULOCYTES # BLD AUTO: 0.15 THOUSAND/UL (ref 0–0.2)
IMM GRANULOCYTES # BLD AUTO: 0.15 THOUSAND/UL (ref 0–0.2)
IMM GRANULOCYTES # BLD AUTO: 0.22 THOUSAND/UL (ref 0–0.2)
IMM GRANULOCYTES # BLD AUTO: >0.5 THOUSAND/UL (ref 0–0.2)
IMM GRANULOCYTES NFR BLD AUTO: 1 % (ref 0–2)
IMM GRANULOCYTES NFR BLD AUTO: 2 % (ref 0–2)
IMM GRANULOCYTES NFR BLD AUTO: 2 % (ref 0–2)
IMM GRANULOCYTES NFR BLD AUTO: 5 % (ref 0–2)
INR PPP: 1.08 (ref 0.84–1.19)
INR PPP: 1.23 (ref 0.84–1.19)
LACTATE SERPL-SCNC: 1.5 MMOL/L (ref 0.7–2)
LG PLATELETS BLD QL SMEAR: PRESENT
LYMPHOCYTES # BLD AUTO: 0.46 THOUSANDS/ΜL (ref 0.6–4.47)
LYMPHOCYTES # BLD AUTO: 0.52 THOUSANDS/ΜL (ref 0.6–4.47)
LYMPHOCYTES # BLD AUTO: 0.6 THOUSANDS/ΜL (ref 0.6–4.47)
LYMPHOCYTES # BLD AUTO: 0.68 THOUSAND/UL (ref 0.5–4)
LYMPHOCYTES # BLD AUTO: 0.68 THOUSAND/UL (ref 0.6–4.47)
LYMPHOCYTES # BLD AUTO: 0.76 THOUSANDS/ΜL (ref 0.6–4.47)
LYMPHOCYTES # BLD AUTO: 0.8 THOUSANDS/ΜL (ref 0.5–4)
LYMPHOCYTES # BLD AUTO: 0.9 THOUSANDS/ΜL (ref 0.5–4)
LYMPHOCYTES # BLD AUTO: 1.64 THOUSAND/UL (ref 0.6–4.47)
LYMPHOCYTES # BLD AUTO: 10 % (ref 14–44)
LYMPHOCYTES # BLD AUTO: 5 % (ref 14–44)
LYMPHOCYTES # BLD AUTO: 6 % (ref 25–45)
LYMPHOCYTES NFR BLD AUTO: 18 % (ref 25–45)
LYMPHOCYTES NFR BLD AUTO: 21 % (ref 25–45)
LYMPHOCYTES NFR BLD AUTO: 4 % (ref 14–44)
LYMPHOCYTES NFR BLD AUTO: 5 % (ref 14–44)
LYMPHOCYTES NFR BLD AUTO: 5 % (ref 14–44)
LYMPHOCYTES NFR BLD AUTO: 6 % (ref 14–44)
MAGNESIUM SERPL-MCNC: 2.1 MG/DL (ref 1.6–2.6)
MAGNESIUM SERPL-MCNC: 2.4 MG/DL (ref 1.6–2.6)
MCH RBC QN AUTO: 29 PG (ref 26.8–34.3)
MCH RBC QN AUTO: 29 PG (ref 26–34)
MCH RBC QN AUTO: 29.2 PG (ref 26.8–34.3)
MCH RBC QN AUTO: 29.3 PG (ref 26–34)
MCH RBC QN AUTO: 29.3 PG (ref 26–34)
MCH RBC QN AUTO: 29.4 PG (ref 26.8–34.3)
MCH RBC QN AUTO: 29.5 PG (ref 26.8–34.3)
MCH RBC QN AUTO: 29.5 PG (ref 26.8–34.3)
MCH RBC QN AUTO: 29.6 PG (ref 26.8–34.3)
MCH RBC QN AUTO: 29.7 PG (ref 26.8–34.3)
MCH RBC QN AUTO: 29.7 PG (ref 26.8–34.3)
MCHC RBC AUTO-ENTMCNC: 32.1 G/DL (ref 31.4–37.4)
MCHC RBC AUTO-ENTMCNC: 32.4 G/DL (ref 31.4–37.4)
MCHC RBC AUTO-ENTMCNC: 32.7 G/DL (ref 31–36)
MCHC RBC AUTO-ENTMCNC: 32.8 G/DL (ref 31–36)
MCHC RBC AUTO-ENTMCNC: 33 G/DL (ref 31–36)
MCHC RBC AUTO-ENTMCNC: 33.1 G/DL (ref 31.4–37.4)
MCHC RBC AUTO-ENTMCNC: 33.1 G/DL (ref 31.4–37.4)
MCHC RBC AUTO-ENTMCNC: 33.2 G/DL (ref 31.4–37.4)
MCHC RBC AUTO-ENTMCNC: 33.3 G/DL (ref 31.4–37.4)
MCHC RBC AUTO-ENTMCNC: 33.5 G/DL (ref 31.4–37.4)
MCHC RBC AUTO-ENTMCNC: 33.6 G/DL (ref 31.4–37.4)
MCV RBC AUTO: 88 FL (ref 82–98)
MCV RBC AUTO: 88 FL (ref 82–98)
MCV RBC AUTO: 89 FL (ref 80–100)
MCV RBC AUTO: 89 FL (ref 80–100)
MCV RBC AUTO: 89 FL (ref 82–98)
MCV RBC AUTO: 90 FL (ref 80–100)
MCV RBC AUTO: 90 FL (ref 82–98)
MCV RBC AUTO: 91 FL (ref 82–98)
METAMYELOCYTES NFR BLD MANUAL: 1 % (ref 0–1)
METAMYELOCYTES NFR BLD MANUAL: 2 % (ref 0–1)
MICROCYTES BLD QL AUTO: PRESENT
MONOCYTES # BLD AUTO: 0.2 THOUSAND/ΜL (ref 0.17–1.22)
MONOCYTES # BLD AUTO: 0.23 THOUSAND/UL (ref 0.2–0.9)
MONOCYTES # BLD AUTO: 0.28 THOUSAND/ΜL (ref 0.17–1.22)
MONOCYTES # BLD AUTO: 0.3 THOUSAND/ΜL (ref 0.2–0.9)
MONOCYTES # BLD AUTO: 0.3 THOUSAND/ΜL (ref 0.2–0.9)
MONOCYTES # BLD AUTO: 0.33 THOUSAND/UL (ref 0–1.22)
MONOCYTES # BLD AUTO: 0.35 THOUSAND/ΜL (ref 0.17–1.22)
MONOCYTES # BLD AUTO: 0.36 THOUSAND/ΜL (ref 0.17–1.22)
MONOCYTES # BLD AUTO: 0.54 THOUSAND/UL (ref 0–1.22)
MONOCYTES NFR BLD AUTO: 2 % (ref 1–10)
MONOCYTES NFR BLD AUTO: 2 % (ref 4–12)
MONOCYTES NFR BLD AUTO: 3 % (ref 4–12)
MONOCYTES NFR BLD AUTO: 7 % (ref 1–10)
MONOCYTES NFR BLD AUTO: 7 % (ref 1–10)
MONOCYTES NFR BLD: 2 % (ref 4–12)
MONOCYTES NFR BLD: 4 % (ref 4–12)
MYELOCYTES NFR BLD MANUAL: 3 % (ref 0–1)
NEUTROPHILS # BLD AUTO: 11.46 THOUSANDS/ΜL (ref 1.85–7.62)
NEUTROPHILS # BLD AUTO: 11.84 THOUSANDS/ΜL (ref 1.85–7.62)
NEUTROPHILS # BLD AUTO: 13.63 THOUSANDS/ΜL (ref 1.85–7.62)
NEUTROPHILS # BLD AUTO: 3 THOUSANDS/ΜL (ref 1.8–7.8)
NEUTROPHILS # BLD AUTO: 3.4 THOUSANDS/ΜL (ref 1.8–7.8)
NEUTROPHILS # BLD AUTO: 8.44 THOUSANDS/ΜL (ref 1.85–7.62)
NEUTROPHILS # BLD MANUAL: 11.65 THOUSAND/UL (ref 1.85–7.62)
NEUTROPHILS # BLD MANUAL: 13.8 THOUSAND/UL (ref 1.85–7.62)
NEUTS BAND NFR BLD MANUAL: 1 % (ref 0–8)
NEUTS BAND NFR BLD MANUAL: 3 % (ref 0–8)
NEUTS BAND NFR BLD MANUAL: 3 % (ref 0–8)
NEUTS SEG # BLD: 10.28 THOUSAND/UL (ref 1.8–7.8)
NEUTS SEG NFR BLD AUTO: 72 % (ref 45–65)
NEUTS SEG NFR BLD AUTO: 75 % (ref 45–65)
NEUTS SEG NFR BLD AUTO: 83 % (ref 43–75)
NEUTS SEG NFR BLD AUTO: 83 % (ref 43–75)
NEUTS SEG NFR BLD AUTO: 88 %
NEUTS SEG NFR BLD AUTO: 88 % (ref 43–75)
NEUTS SEG NFR BLD AUTO: 90 % (ref 43–75)
NEUTS SEG NFR BLD AUTO: 90 % (ref 43–75)
NEUTS SEG NFR BLD AUTO: 91 % (ref 43–75)
NON VENT- BIPAP: ABNORMAL
NRBC BLD AUTO-RTO: 0 /100 WBCS
NT-PROBNP SERPL-MCNC: 117 PG/ML
O2 CT BLDA-SCNC: 21.8 ML/DL (ref 16–23)
OXYHGB MFR BLDA: 89.4 % (ref 94–97)
P AXIS: 38 DEGREES
P AXIS: 46 DEGREES
P AXIS: 52 DEGREES
PCO2 BLDA: 71.7 MM HG (ref 36–44)
PH BLDA: 6.89 [PH] (ref 7.35–7.45)
PHOSPHATE SERPL-MCNC: 3.3 MG/DL (ref 2.7–4.5)
PLATELET # BLD AUTO: 108 THOUSANDS/UL (ref 150–450)
PLATELET # BLD AUTO: 118 THOUSANDS/UL (ref 150–450)
PLATELET # BLD AUTO: 150 THOUSANDS/UL (ref 150–450)
PLATELET # BLD AUTO: 163 THOUSANDS/UL (ref 149–390)
PLATELET # BLD AUTO: 182 THOUSANDS/UL (ref 149–390)
PLATELET # BLD AUTO: 197 THOUSANDS/UL (ref 149–390)
PLATELET # BLD AUTO: 215 THOUSANDS/UL (ref 149–390)
PLATELET # BLD AUTO: 263 THOUSANDS/UL (ref 149–390)
PLATELET # BLD AUTO: 304 THOUSANDS/UL (ref 149–390)
PLATELET # BLD AUTO: 343 THOUSANDS/UL (ref 149–390)
PLATELET # BLD AUTO: 358 THOUSANDS/UL (ref 149–390)
PLATELET BLD QL SMEAR: ADEQUATE
PMV BLD AUTO: 10.5 FL (ref 8.9–12.7)
PMV BLD AUTO: 10.5 FL (ref 8.9–12.7)
PMV BLD AUTO: 10.7 FL (ref 8.9–12.7)
PMV BLD AUTO: 11.3 FL (ref 8.9–12.7)
PMV BLD AUTO: 11.3 FL (ref 8.9–12.7)
PMV BLD AUTO: 11.6 FL (ref 8.9–12.7)
PMV BLD AUTO: 11.9 FL (ref 8.9–12.7)
PMV BLD AUTO: 12 FL (ref 8.9–12.7)
PMV BLD AUTO: 12.1 FL (ref 8.9–12.7)
PMV BLD AUTO: 12.6 FL (ref 8.9–12.7)
PMV BLD AUTO: 12.7 FL (ref 8.9–12.7)
PO2 BLDA: 95.4 MM HG (ref 75–129)
POLYCHROMASIA BLD QL SMEAR: PRESENT
POTASSIUM SERPL-SCNC: 3.5 MMOL/L (ref 3.6–5)
POTASSIUM SERPL-SCNC: 3.5 MMOL/L (ref 3.6–5)
POTASSIUM SERPL-SCNC: 4 MMOL/L (ref 3.6–5)
POTASSIUM SERPL-SCNC: 4.3 MMOL/L (ref 3.5–5.3)
POTASSIUM SERPL-SCNC: 4.4 MMOL/L (ref 3.5–5.3)
POTASSIUM SERPL-SCNC: 4.5 MMOL/L (ref 3.5–5.3)
POTASSIUM SERPL-SCNC: 4.5 MMOL/L (ref 3.5–5.3)
POTASSIUM SERPL-SCNC: 4.6 MMOL/L (ref 3.5–5.3)
POTASSIUM SERPL-SCNC: 4.7 MMOL/L (ref 3.5–5.3)
POTASSIUM SERPL-SCNC: 4.7 MMOL/L (ref 3.5–5.3)
PR INTERVAL: 140 MS
PR INTERVAL: 146 MS
PR INTERVAL: 154 MS
PROCALCITONIN SERPL-MCNC: 0.06 NG/ML
PROCALCITONIN SERPL-MCNC: 0.06 NG/ML
PROCALCITONIN SERPL-MCNC: 0.07 NG/ML
PROCALCITONIN SERPL-MCNC: 0.1 NG/ML
PROCALCITONIN SERPL-MCNC: 0.11 NG/ML
PROCALCITONIN SERPL-MCNC: 0.13 NG/ML
PROCALCITONIN SERPL-MCNC: 0.14 NG/ML
PROCALCITONIN SERPL-MCNC: 0.16 NG/ML
PROCALCITONIN SERPL-MCNC: 0.17 NG/ML
PROMYELOCYTES NFR BLD MANUAL: 1 % (ref 0–0)
PROT SERPL-MCNC: 6.3 G/DL (ref 6.4–8.2)
PROT SERPL-MCNC: 7.5 G/DL (ref 6.4–8.2)
PROT SERPL-MCNC: 7.7 G/DL (ref 5.9–8.4)
PROT SERPL-MCNC: 7.7 G/DL (ref 6.4–8.2)
PROT SERPL-MCNC: 7.7 G/DL (ref 6.4–8.2)
PROT SERPL-MCNC: 7.9 G/DL (ref 5.9–8.4)
PROTHROMBIN TIME: 14.1 SECONDS (ref 11.6–14.5)
PROTHROMBIN TIME: 15.3 SECONDS (ref 11.6–14.5)
QRS AXIS: -5 DEGREES
QRS AXIS: 18 DEGREES
QRS AXIS: 8 DEGREES
QRSD INTERVAL: 74 MS
QRSD INTERVAL: 76 MS
QRSD INTERVAL: 78 MS
QT INTERVAL: 370 MS
QT INTERVAL: 380 MS
QT INTERVAL: 416 MS
QTC INTERVAL: 418 MS
QTC INTERVAL: 457 MS
QTC INTERVAL: 458 MS
RBC # BLD AUTO: 4.5 MILLION/UL (ref 3.88–5.62)
RBC # BLD AUTO: 4.59 MILLION/UL (ref 4.5–5.9)
RBC # BLD AUTO: 4.65 MILLION/UL (ref 3.88–5.62)
RBC # BLD AUTO: 4.66 MILLION/UL (ref 4.5–5.9)
RBC # BLD AUTO: 4.67 MILLION/UL (ref 4.5–5.9)
RBC # BLD AUTO: 4.9 MILLION/UL (ref 3.88–5.62)
RBC # BLD AUTO: 4.91 MILLION/UL (ref 3.88–5.62)
RBC # BLD AUTO: 4.95 MILLION/UL (ref 3.88–5.62)
RBC # BLD AUTO: 5.01 MILLION/UL (ref 3.88–5.62)
RBC # BLD AUTO: 5.09 MILLION/UL (ref 3.88–5.62)
RBC # BLD AUTO: 5.41 MILLION/UL (ref 3.88–5.62)
RBC MORPH BLD: NORMAL
RBC MORPH BLD: PRESENT
RH BLD: POSITIVE
RH BLD: POSITIVE
SARS-COV-2 RNA RESP QL NAA+PROBE: NEGATIVE
SARS-COV-2 RNA RESP QL NAA+PROBE: POSITIVE
SODIUM SERPL-SCNC: 135 MMOL/L (ref 136–145)
SODIUM SERPL-SCNC: 137 MMOL/L (ref 136–145)
SODIUM SERPL-SCNC: 138 MMOL/L (ref 136–145)
SODIUM SERPL-SCNC: 138 MMOL/L (ref 136–145)
SODIUM SERPL-SCNC: 138 MMOL/L (ref 137–147)
SODIUM SERPL-SCNC: 139 MMOL/L (ref 137–147)
SODIUM SERPL-SCNC: 140 MMOL/L (ref 137–147)
SODIUM SERPL-SCNC: 141 MMOL/L (ref 136–145)
SODIUM SERPL-SCNC: 141 MMOL/L (ref 136–145)
SPECIMEN EXPIRATION DATE: NORMAL
SPECIMEN SOURCE: ABNORMAL
T WAVE AXIS: 27 DEGREES
T WAVE AXIS: 49 DEGREES
T WAVE AXIS: 50 DEGREES
TOTAL CELLS COUNTED SPEC: 100
TROPONIN I SERPL-MCNC: <0.01 NG/ML (ref 0–0.03)
TROPONIN I SERPL-MCNC: <0.01 NG/ML (ref 0–0.03)
TROPONIN I SERPL-MCNC: <0.02 NG/ML
UNIT DISPENSE STATUS: NORMAL
UNIT PRODUCT CODE: NORMAL
UNIT RH: NORMAL
VARIANT LYMPHS # BLD AUTO: 1 % (ref 0–0)
VARIANT LYMPHS # BLD AUTO: 2 %
VENT BIPAP FIO2: 100 %
VENTRICULAR RATE: 73 BPM
VENTRICULAR RATE: 73 BPM
VENTRICULAR RATE: 92 BPM
WBC # BLD AUTO: 11.3 THOUSAND/UL (ref 4.5–11)
WBC # BLD AUTO: 12.55 THOUSAND/UL (ref 4.31–10.16)
WBC # BLD AUTO: 13.03 THOUSAND/UL (ref 4.31–10.16)
WBC # BLD AUTO: 13.55 THOUSAND/UL (ref 4.31–10.16)
WBC # BLD AUTO: 15.57 THOUSAND/UL (ref 4.31–10.16)
WBC # BLD AUTO: 16.43 THOUSAND/UL (ref 4.31–10.16)
WBC # BLD AUTO: 17.35 THOUSAND/UL (ref 4.31–10.16)
WBC # BLD AUTO: 24.65 THOUSAND/UL (ref 4.31–10.16)
WBC # BLD AUTO: 4.2 THOUSAND/UL (ref 4.5–11)
WBC # BLD AUTO: 4.5 THOUSAND/UL (ref 4.5–11)
WBC # BLD AUTO: 9.32 THOUSAND/UL (ref 4.31–10.16)

## 2021-01-01 PROCEDURE — 93010 ELECTROCARDIOGRAM REPORT: CPT | Performed by: INTERNAL MEDICINE

## 2021-01-01 PROCEDURE — 86900 BLOOD TYPING SEROLOGIC ABO: CPT | Performed by: INTERNAL MEDICINE

## 2021-01-01 PROCEDURE — 80053 COMPREHEN METABOLIC PANEL: CPT | Performed by: STUDENT IN AN ORGANIZED HEALTH CARE EDUCATION/TRAINING PROGRAM

## 2021-01-01 PROCEDURE — 97167 OT EVAL HIGH COMPLEX 60 MIN: CPT

## 2021-01-01 PROCEDURE — 99285 EMERGENCY DEPT VISIT HI MDM: CPT | Performed by: EMERGENCY MEDICINE

## 2021-01-01 PROCEDURE — 86140 C-REACTIVE PROTEIN: CPT | Performed by: STUDENT IN AN ORGANIZED HEALTH CARE EDUCATION/TRAINING PROGRAM

## 2021-01-01 PROCEDURE — 97535 SELF CARE MNGMENT TRAINING: CPT

## 2021-01-01 PROCEDURE — 99291 CRITICAL CARE FIRST HOUR: CPT | Performed by: STUDENT IN AN ORGANIZED HEALTH CARE EDUCATION/TRAINING PROGRAM

## 2021-01-01 PROCEDURE — 99232 SBSQ HOSP IP/OBS MODERATE 35: CPT | Performed by: STUDENT IN AN ORGANIZED HEALTH CARE EDUCATION/TRAINING PROGRAM

## 2021-01-01 PROCEDURE — 85379 FIBRIN DEGRADATION QUANT: CPT | Performed by: STUDENT IN AN ORGANIZED HEALTH CARE EDUCATION/TRAINING PROGRAM

## 2021-01-01 PROCEDURE — 99233 SBSQ HOSP IP/OBS HIGH 50: CPT | Performed by: INTERNAL MEDICINE

## 2021-01-01 PROCEDURE — 85025 COMPLETE CBC W/AUTO DIFF WBC: CPT | Performed by: INTERNAL MEDICINE

## 2021-01-01 PROCEDURE — 72148 MRI LUMBAR SPINE W/O DYE: CPT

## 2021-01-01 PROCEDURE — 99223 1ST HOSP IP/OBS HIGH 75: CPT | Performed by: STUDENT IN AN ORGANIZED HEALTH CARE EDUCATION/TRAINING PROGRAM

## 2021-01-01 PROCEDURE — 71045 X-RAY EXAM CHEST 1 VIEW: CPT

## 2021-01-01 PROCEDURE — U0005 INFEC AGEN DETEC AMPLI PROBE: HCPCS | Performed by: FAMILY MEDICINE

## 2021-01-01 PROCEDURE — 82728 ASSAY OF FERRITIN: CPT | Performed by: STUDENT IN AN ORGANIZED HEALTH CARE EDUCATION/TRAINING PROGRAM

## 2021-01-01 PROCEDURE — 0BH17EZ INSERTION OF ENDOTRACHEAL AIRWAY INTO TRACHEA, VIA NATURAL OR ARTIFICIAL OPENING: ICD-10-PCS | Performed by: INTERNAL MEDICINE

## 2021-01-01 PROCEDURE — 84484 ASSAY OF TROPONIN QUANT: CPT | Performed by: EMERGENCY MEDICINE

## 2021-01-01 PROCEDURE — 85025 COMPLETE CBC W/AUTO DIFF WBC: CPT | Performed by: EMERGENCY MEDICINE

## 2021-01-01 PROCEDURE — 93970 EXTREMITY STUDY: CPT

## 2021-01-01 PROCEDURE — U0003 INFECTIOUS AGENT DETECTION BY NUCLEIC ACID (DNA OR RNA); SEVERE ACUTE RESPIRATORY SYNDROME CORONAVIRUS 2 (SARS-COV-2) (CORONAVIRUS DISEASE [COVID-19]), AMPLIFIED PROBE TECHNIQUE, MAKING USE OF HIGH THROUGHPUT TECHNOLOGIES AS DESCRIBED BY CMS-2020-01-R: HCPCS | Performed by: FAMILY MEDICINE

## 2021-01-01 PROCEDURE — 97116 GAIT TRAINING THERAPY: CPT

## 2021-01-01 PROCEDURE — 99233 SBSQ HOSP IP/OBS HIGH 50: CPT | Performed by: STUDENT IN AN ORGANIZED HEALTH CARE EDUCATION/TRAINING PROGRAM

## 2021-01-01 PROCEDURE — 93970 EXTREMITY STUDY: CPT | Performed by: SURGERY

## 2021-01-01 PROCEDURE — 86704 HEP B CORE ANTIBODY TOTAL: CPT | Performed by: STUDENT IN AN ORGANIZED HEALTH CARE EDUCATION/TRAINING PROGRAM

## 2021-01-01 PROCEDURE — 71275 CT ANGIOGRAPHY CHEST: CPT

## 2021-01-01 PROCEDURE — U0005 INFEC AGEN DETEC AMPLI PROBE: HCPCS | Performed by: PHYSICIAN ASSISTANT

## 2021-01-01 PROCEDURE — XW13325 TRANSFUSION OF CONVALESCENT PLASMA (NONAUTOLOGOUS) INTO PERIPHERAL VEIN, PERCUTANEOUS APPROACH, NEW TECHNOLOGY GROUP 5: ICD-10-PCS | Performed by: STUDENT IN AN ORGANIZED HEALTH CARE EDUCATION/TRAINING PROGRAM

## 2021-01-01 PROCEDURE — 99214 OFFICE O/P EST MOD 30 MIN: CPT | Performed by: FAMILY MEDICINE

## 2021-01-01 PROCEDURE — 86850 RBC ANTIBODY SCREEN: CPT | Performed by: STUDENT IN AN ORGANIZED HEALTH CARE EDUCATION/TRAINING PROGRAM

## 2021-01-01 PROCEDURE — NC001 PR NO CHARGE: Performed by: NURSE PRACTITIONER

## 2021-01-01 PROCEDURE — G1004 CDSM NDSC: HCPCS

## 2021-01-01 PROCEDURE — 5A1935Z RESPIRATORY VENTILATION, LESS THAN 24 CONSECUTIVE HOURS: ICD-10-PCS | Performed by: INTERNAL MEDICINE

## 2021-01-01 PROCEDURE — 99232 SBSQ HOSP IP/OBS MODERATE 35: CPT | Performed by: NURSE PRACTITIONER

## 2021-01-01 PROCEDURE — 87040 BLOOD CULTURE FOR BACTERIA: CPT | Performed by: STUDENT IN AN ORGANIZED HEALTH CARE EDUCATION/TRAINING PROGRAM

## 2021-01-01 PROCEDURE — 84145 PROCALCITONIN (PCT): CPT | Performed by: STUDENT IN AN ORGANIZED HEALTH CARE EDUCATION/TRAINING PROGRAM

## 2021-01-01 PROCEDURE — 94002 VENT MGMT INPAT INIT DAY: CPT

## 2021-01-01 PROCEDURE — 85025 COMPLETE CBC W/AUTO DIFF WBC: CPT | Performed by: STUDENT IN AN ORGANIZED HEALTH CARE EDUCATION/TRAINING PROGRAM

## 2021-01-01 PROCEDURE — 83605 ASSAY OF LACTIC ACID: CPT | Performed by: EMERGENCY MEDICINE

## 2021-01-01 PROCEDURE — 80048 BASIC METABOLIC PNL TOTAL CA: CPT | Performed by: NURSE PRACTITIONER

## 2021-01-01 PROCEDURE — 85027 COMPLETE CBC AUTOMATED: CPT | Performed by: STUDENT IN AN ORGANIZED HEALTH CARE EDUCATION/TRAINING PROGRAM

## 2021-01-01 PROCEDURE — 85379 FIBRIN DEGRADATION QUANT: CPT | Performed by: NURSE PRACTITIONER

## 2021-01-01 PROCEDURE — 093K8ZZ CONTROL BLEEDING IN NASAL MUCOSA AND SOFT TISSUE, VIA NATURAL OR ARTIFICIAL OPENING ENDOSCOPIC: ICD-10-PCS | Performed by: OTOLARYNGOLOGY

## 2021-01-01 PROCEDURE — 87340 HEPATITIS B SURFACE AG IA: CPT | Performed by: STUDENT IN AN ORGANIZED HEALTH CARE EDUCATION/TRAINING PROGRAM

## 2021-01-01 PROCEDURE — 99232 SBSQ HOSP IP/OBS MODERATE 35: CPT | Performed by: INTERNAL MEDICINE

## 2021-01-01 PROCEDURE — 86140 C-REACTIVE PROTEIN: CPT | Performed by: NURSE PRACTITIONER

## 2021-01-01 PROCEDURE — 80048 BASIC METABOLIC PNL TOTAL CA: CPT | Performed by: INTERNAL MEDICINE

## 2021-01-01 PROCEDURE — 97110 THERAPEUTIC EXERCISES: CPT

## 2021-01-01 PROCEDURE — 5A0955A ASSISTANCE WITH RESPIRATORY VENTILATION, GREATER THAN 96 CONSECUTIVE HOURS, HIGH NASAL FLOW/VELOCITY: ICD-10-PCS | Performed by: INTERNAL MEDICINE

## 2021-01-01 PROCEDURE — 97530 THERAPEUTIC ACTIVITIES: CPT

## 2021-01-01 PROCEDURE — 86140 C-REACTIVE PROTEIN: CPT

## 2021-01-01 PROCEDURE — 85007 BL SMEAR W/DIFF WBC COUNT: CPT | Performed by: STUDENT IN AN ORGANIZED HEALTH CARE EDUCATION/TRAINING PROGRAM

## 2021-01-01 PROCEDURE — 97163 PT EVAL HIGH COMPLEX 45 MIN: CPT

## 2021-01-01 PROCEDURE — 36415 COLL VENOUS BLD VENIPUNCTURE: CPT | Performed by: EMERGENCY MEDICINE

## 2021-01-01 PROCEDURE — 85730 THROMBOPLASTIN TIME PARTIAL: CPT | Performed by: EMERGENCY MEDICINE

## 2021-01-01 PROCEDURE — 85379 FIBRIN DEGRADATION QUANT: CPT | Performed by: HOSPITALIST

## 2021-01-01 PROCEDURE — 80048 BASIC METABOLIC PNL TOTAL CA: CPT | Performed by: EMERGENCY MEDICINE

## 2021-01-01 PROCEDURE — 86901 BLOOD TYPING SEROLOGIC RH(D): CPT | Performed by: STUDENT IN AN ORGANIZED HEALTH CARE EDUCATION/TRAINING PROGRAM

## 2021-01-01 PROCEDURE — 80053 COMPREHEN METABOLIC PANEL: CPT | Performed by: INTERNAL MEDICINE

## 2021-01-01 PROCEDURE — 85025 COMPLETE CBC W/AUTO DIFF WBC: CPT | Performed by: NURSE PRACTITIONER

## 2021-01-01 PROCEDURE — 84145 PROCALCITONIN (PCT): CPT | Performed by: NURSE PRACTITIONER

## 2021-01-01 PROCEDURE — 93005 ELECTROCARDIOGRAM TRACING: CPT

## 2021-01-01 PROCEDURE — 86140 C-REACTIVE PROTEIN: CPT | Performed by: HOSPITALIST

## 2021-01-01 PROCEDURE — 85610 PROTHROMBIN TIME: CPT | Performed by: NURSE PRACTITIONER

## 2021-01-01 PROCEDURE — 84145 PROCALCITONIN (PCT): CPT | Performed by: INTERNAL MEDICINE

## 2021-01-01 PROCEDURE — 82728 ASSAY OF FERRITIN: CPT | Performed by: NURSE PRACTITIONER

## 2021-01-01 PROCEDURE — 84100 ASSAY OF PHOSPHORUS: CPT | Performed by: STUDENT IN AN ORGANIZED HEALTH CARE EDUCATION/TRAINING PROGRAM

## 2021-01-01 PROCEDURE — 80053 COMPREHEN METABOLIC PANEL: CPT | Performed by: EMERGENCY MEDICINE

## 2021-01-01 PROCEDURE — 83880 ASSAY OF NATRIURETIC PEPTIDE: CPT | Performed by: STUDENT IN AN ORGANIZED HEALTH CARE EDUCATION/TRAINING PROGRAM

## 2021-01-01 PROCEDURE — 31500 INSERT EMERGENCY AIRWAY: CPT | Performed by: NURSE PRACTITIONER

## 2021-01-01 PROCEDURE — 85007 BL SMEAR W/DIFF WBC COUNT: CPT | Performed by: EMERGENCY MEDICINE

## 2021-01-01 PROCEDURE — 99223 1ST HOSP IP/OBS HIGH 75: CPT | Performed by: INTERNAL MEDICINE

## 2021-01-01 PROCEDURE — 99291 CRITICAL CARE FIRST HOUR: CPT | Performed by: INTERNAL MEDICINE

## 2021-01-01 PROCEDURE — 85610 PROTHROMBIN TIME: CPT | Performed by: EMERGENCY MEDICINE

## 2021-01-01 PROCEDURE — 87040 BLOOD CULTURE FOR BACTERIA: CPT | Performed by: EMERGENCY MEDICINE

## 2021-01-01 PROCEDURE — 99285 EMERGENCY DEPT VISIT HI MDM: CPT

## 2021-01-01 PROCEDURE — 86901 BLOOD TYPING SEROLOGIC RH(D): CPT | Performed by: INTERNAL MEDICINE

## 2021-01-01 PROCEDURE — 85027 COMPLETE CBC AUTOMATED: CPT | Performed by: INTERNAL MEDICINE

## 2021-01-01 PROCEDURE — 87806 HIV AG W/HIV1&2 ANTB W/OPTIC: CPT | Performed by: STUDENT IN AN ORGANIZED HEALTH CARE EDUCATION/TRAINING PROGRAM

## 2021-01-01 PROCEDURE — 85027 COMPLETE CBC AUTOMATED: CPT | Performed by: EMERGENCY MEDICINE

## 2021-01-01 PROCEDURE — 85027 COMPLETE CBC AUTOMATED: CPT | Performed by: HOSPITALIST

## 2021-01-01 PROCEDURE — 83735 ASSAY OF MAGNESIUM: CPT | Performed by: HOSPITALIST

## 2021-01-01 PROCEDURE — 86803 HEPATITIS C AB TEST: CPT | Performed by: STUDENT IN AN ORGANIZED HEALTH CARE EDUCATION/TRAINING PROGRAM

## 2021-01-01 PROCEDURE — 86140 C-REACTIVE PROTEIN: CPT | Performed by: INTERNAL MEDICINE

## 2021-01-01 PROCEDURE — 80048 BASIC METABOLIC PNL TOTAL CA: CPT | Performed by: HOSPITALIST

## 2021-01-01 PROCEDURE — 99232 SBSQ HOSP IP/OBS MODERATE 35: CPT | Performed by: HOSPITALIST

## 2021-01-01 PROCEDURE — 85379 FIBRIN DEGRADATION QUANT: CPT | Performed by: EMERGENCY MEDICINE

## 2021-01-01 PROCEDURE — 80048 BASIC METABOLIC PNL TOTAL CA: CPT | Performed by: STUDENT IN AN ORGANIZED HEALTH CARE EDUCATION/TRAINING PROGRAM

## 2021-01-01 PROCEDURE — 84484 ASSAY OF TROPONIN QUANT: CPT | Performed by: STUDENT IN AN ORGANIZED HEALTH CARE EDUCATION/TRAINING PROGRAM

## 2021-01-01 PROCEDURE — 82805 BLOOD GASES W/O2 SATURATION: CPT | Performed by: NURSE PRACTITIONER

## 2021-01-01 PROCEDURE — 94760 N-INVAS EAR/PLS OXIMETRY 1: CPT

## 2021-01-01 PROCEDURE — 86705 HEP B CORE ANTIBODY IGM: CPT | Performed by: STUDENT IN AN ORGANIZED HEALTH CARE EDUCATION/TRAINING PROGRAM

## 2021-01-01 PROCEDURE — 84145 PROCALCITONIN (PCT): CPT | Performed by: EMERGENCY MEDICINE

## 2021-01-01 PROCEDURE — U0003 INFECTIOUS AGENT DETECTION BY NUCLEIC ACID (DNA OR RNA); SEVERE ACUTE RESPIRATORY SYNDROME CORONAVIRUS 2 (SARS-COV-2) (CORONAVIRUS DISEASE [COVID-19]), AMPLIFIED PROBE TECHNIQUE, MAKING USE OF HIGH THROUGHPUT TECHNOLOGIES AS DESCRIBED BY CMS-2020-01-R: HCPCS | Performed by: PHYSICIAN ASSISTANT

## 2021-01-01 PROCEDURE — 2Y41X5Z PACKING OF NASAL REGION USING PACKING MATERIAL: ICD-10-PCS | Performed by: OTOLARYNGOLOGY

## 2021-01-01 PROCEDURE — 85007 BL SMEAR W/DIFF WBC COUNT: CPT | Performed by: HOSPITALIST

## 2021-01-01 PROCEDURE — 83735 ASSAY OF MAGNESIUM: CPT | Performed by: STUDENT IN AN ORGANIZED HEALTH CARE EDUCATION/TRAINING PROGRAM

## 2021-01-01 PROCEDURE — 82728 ASSAY OF FERRITIN: CPT | Performed by: HOSPITALIST

## 2021-01-01 PROCEDURE — 99213 OFFICE O/P EST LOW 20 MIN: CPT | Performed by: PHYSICIAN ASSISTANT

## 2021-01-01 PROCEDURE — 99220 PR INITIAL OBSERVATION CARE/DAY 70 MINUTES: CPT | Performed by: INTERNAL MEDICINE

## 2021-01-01 PROCEDURE — 85018 HEMOGLOBIN: CPT | Performed by: INTERNAL MEDICINE

## 2021-01-01 PROCEDURE — 82550 ASSAY OF CK (CPK): CPT | Performed by: STUDENT IN AN ORGANIZED HEALTH CARE EDUCATION/TRAINING PROGRAM

## 2021-01-01 PROCEDURE — 93005 ELECTROCARDIOGRAM TRACING: CPT | Performed by: STUDENT IN AN ORGANIZED HEALTH CARE EDUCATION/TRAINING PROGRAM

## 2021-01-01 PROCEDURE — 94640 AIRWAY INHALATION TREATMENT: CPT

## 2021-01-01 PROCEDURE — 99217 PR OBSERVATION CARE DISCHARGE MANAGEMENT: CPT | Performed by: INTERNAL MEDICINE

## 2021-01-01 PROCEDURE — 86900 BLOOD TYPING SEROLOGIC ABO: CPT | Performed by: STUDENT IN AN ORGANIZED HEALTH CARE EDUCATION/TRAINING PROGRAM

## 2021-01-01 RX ORDER — PREDNISONE 20 MG/1
20 TABLET ORAL DAILY
Status: DISCONTINUED | OUTPATIENT
Start: 2021-01-01 | End: 2021-01-01 | Stop reason: HOSPADM

## 2021-01-01 RX ORDER — DEXAMETHASONE SODIUM PHOSPHATE 10 MG/ML
0.1 INJECTION, SOLUTION INTRAMUSCULAR; INTRAVENOUS EVERY 12 HOURS
Status: COMPLETED | OUTPATIENT
Start: 2021-01-01 | End: 2021-01-01

## 2021-01-01 RX ORDER — POLYETHYLENE GLYCOL 3350 17 G/17G
17 POWDER, FOR SOLUTION ORAL DAILY PRN
Status: DISCONTINUED | OUTPATIENT
Start: 2021-01-01 | End: 2021-03-30 | Stop reason: HOSPADM

## 2021-01-01 RX ORDER — CEFTRIAXONE 1 G/50ML
1000 INJECTION, SOLUTION INTRAVENOUS EVERY 24 HOURS
Status: DISCONTINUED | OUTPATIENT
Start: 2021-01-01 | End: 2021-01-01

## 2021-01-01 RX ORDER — MELATONIN
2000 DAILY
Status: DISCONTINUED | OUTPATIENT
Start: 2021-01-01 | End: 2021-01-01 | Stop reason: HOSPADM

## 2021-01-01 RX ORDER — HYDROXYCHLOROQUINE SULFATE 200 MG/1
200 TABLET, FILM COATED ORAL DAILY
Qty: 30 TABLET | Refills: 5 | Status: SHIPPED | OUTPATIENT
Start: 2021-01-01 | End: 2021-03-30 | Stop reason: HOSPADM

## 2021-01-01 RX ORDER — POTASSIUM CHLORIDE 20 MEQ/1
40 TABLET, EXTENDED RELEASE ORAL ONCE
Status: COMPLETED | OUTPATIENT
Start: 2021-01-01 | End: 2021-01-01

## 2021-01-01 RX ORDER — CHLORHEXIDINE GLUCONATE 0.12 MG/ML
15 RINSE ORAL EVERY 12 HOURS SCHEDULED
Status: DISCONTINUED | OUTPATIENT
Start: 2021-01-01 | End: 2021-03-30 | Stop reason: HOSPADM

## 2021-01-01 RX ORDER — GUAIFENESIN/DEXTROMETHORPHAN 100-10MG/5
10 SYRUP ORAL EVERY 4 HOURS PRN
Status: DISCONTINUED | OUTPATIENT
Start: 2021-01-01 | End: 2021-01-01 | Stop reason: HOSPADM

## 2021-01-01 RX ORDER — MIDAZOLAM HYDROCHLORIDE 2 MG/2ML
1 INJECTION, SOLUTION INTRAMUSCULAR; INTRAVENOUS ONCE
Status: COMPLETED | OUTPATIENT
Start: 2021-01-01 | End: 2021-01-01

## 2021-01-01 RX ORDER — BENZONATATE 100 MG/1
200 CAPSULE ORAL 3 TIMES DAILY PRN
Status: DISCONTINUED | OUTPATIENT
Start: 2021-01-01 | End: 2021-01-01 | Stop reason: HOSPADM

## 2021-01-01 RX ORDER — ACETAMINOPHEN 325 MG/1
650 TABLET ORAL EVERY 6 HOURS PRN
Status: DISCONTINUED | OUTPATIENT
Start: 2021-01-01 | End: 2021-01-01 | Stop reason: HOSPADM

## 2021-01-01 RX ORDER — OXYMETAZOLINE HYDROCHLORIDE 0.05 G/100ML
2 SPRAY NASAL EVERY 12 HOURS SCHEDULED
Status: COMPLETED | OUTPATIENT
Start: 2021-01-01 | End: 2021-01-01

## 2021-01-01 RX ORDER — DOXYCYCLINE HYCLATE 100 MG/1
100 CAPSULE ORAL EVERY 12 HOURS
Status: DISCONTINUED | OUTPATIENT
Start: 2021-01-01 | End: 2021-01-01

## 2021-01-01 RX ORDER — ARIPIPRAZOLE 10 MG/1
10 TABLET ORAL
Status: DISCONTINUED | OUTPATIENT
Start: 2021-01-01 | End: 2021-01-01 | Stop reason: HOSPADM

## 2021-01-01 RX ORDER — FUROSEMIDE 10 MG/ML
40 INJECTION INTRAMUSCULAR; INTRAVENOUS ONCE
Status: COMPLETED | OUTPATIENT
Start: 2021-01-01 | End: 2021-01-01

## 2021-01-01 RX ORDER — ARIPIPRAZOLE 10 MG/1
10 TABLET ORAL
Status: DISCONTINUED | OUTPATIENT
Start: 2021-01-01 | End: 2021-03-30 | Stop reason: HOSPADM

## 2021-01-01 RX ORDER — TRANEXAMIC ACID 100 MG/ML
1000 INJECTION, SOLUTION INTRAVENOUS ONCE
Status: COMPLETED | OUTPATIENT
Start: 2021-01-01 | End: 2021-01-01

## 2021-01-01 RX ORDER — ECHINACEA PURPUREA EXTRACT 125 MG
1 TABLET ORAL EVERY 2 HOUR PRN
Status: DISCONTINUED | OUTPATIENT
Start: 2021-01-01 | End: 2021-03-30 | Stop reason: HOSPADM

## 2021-01-01 RX ORDER — AMOXICILLIN 250 MG
1 CAPSULE ORAL
Status: DISCONTINUED | OUTPATIENT
Start: 2021-01-01 | End: 2021-03-30 | Stop reason: HOSPADM

## 2021-01-01 RX ORDER — TRAZODONE HYDROCHLORIDE 100 MG/1
100 TABLET ORAL
Status: DISCONTINUED | OUTPATIENT
Start: 2021-01-01 | End: 2021-03-30 | Stop reason: HOSPADM

## 2021-01-01 RX ORDER — FENTANYL CITRATE 50 UG/ML
25 INJECTION, SOLUTION INTRAMUSCULAR; INTRAVENOUS ONCE
Status: COMPLETED | OUTPATIENT
Start: 2021-01-01 | End: 2021-01-01

## 2021-01-01 RX ORDER — HYDROXYCHLOROQUINE SULFATE 200 MG/1
200 TABLET, FILM COATED ORAL DAILY
Status: DISCONTINUED | OUTPATIENT
Start: 2021-01-01 | End: 2021-01-01 | Stop reason: HOSPADM

## 2021-01-01 RX ORDER — ZINC SULFATE 50(220)MG
220 CAPSULE ORAL DAILY
Status: COMPLETED | OUTPATIENT
Start: 2021-01-01 | End: 2021-01-01

## 2021-01-01 RX ORDER — ZINC SULFATE 50(220)MG
220 CAPSULE ORAL DAILY
Status: DISCONTINUED | OUTPATIENT
Start: 2021-01-01 | End: 2021-01-01 | Stop reason: HOSPADM

## 2021-01-01 RX ORDER — FUROSEMIDE 10 MG/ML
20 INJECTION INTRAMUSCULAR; INTRAVENOUS ONCE
Status: COMPLETED | OUTPATIENT
Start: 2021-01-01 | End: 2021-01-01

## 2021-01-01 RX ORDER — DULOXETIN HYDROCHLORIDE 20 MG/1
20 CAPSULE, DELAYED RELEASE ORAL DAILY
Status: DISCONTINUED | OUTPATIENT
Start: 2021-01-01 | End: 2021-01-01 | Stop reason: HOSPADM

## 2021-01-01 RX ORDER — POTASSIUM CHLORIDE 750 MG/1
40 TABLET, EXTENDED RELEASE ORAL ONCE
Status: COMPLETED | OUTPATIENT
Start: 2021-01-01 | End: 2021-01-01

## 2021-01-01 RX ORDER — DULOXETIN HYDROCHLORIDE 30 MG/1
60 CAPSULE, DELAYED RELEASE ORAL 2 TIMES DAILY
Status: DISCONTINUED | OUTPATIENT
Start: 2021-01-01 | End: 2021-03-30 | Stop reason: HOSPADM

## 2021-01-01 RX ORDER — MAGNESIUM HYDROXIDE/ALUMINUM HYDROXICE/SIMETHICONE 120; 1200; 1200 MG/30ML; MG/30ML; MG/30ML
30 SUSPENSION ORAL EVERY 6 HOURS PRN
Qty: 355 ML | Refills: 0 | Status: SHIPPED | OUTPATIENT
Start: 2021-01-01 | End: 2021-03-30 | Stop reason: HOSPADM

## 2021-01-01 RX ORDER — FENTANYL CITRATE/PF 50 MCG/ML
50 SYRINGE (ML) INJECTION ONCE
Status: COMPLETED | OUTPATIENT
Start: 2021-01-01 | End: 2021-01-01

## 2021-01-01 RX ORDER — MELATONIN
2000 DAILY
Qty: 6 TABLET | Refills: 0 | Status: SHIPPED | OUTPATIENT
Start: 2021-01-01 | End: 2021-03-30 | Stop reason: HOSPADM

## 2021-01-01 RX ORDER — ATORVASTATIN CALCIUM 40 MG/1
40 TABLET, FILM COATED ORAL
Status: DISCONTINUED | OUTPATIENT
Start: 2021-01-01 | End: 2021-03-30 | Stop reason: HOSPADM

## 2021-01-01 RX ORDER — COCAINE HYDROCHLORIDE 40 MG/ML
SOLUTION NASAL ONCE
Status: COMPLETED | OUTPATIENT
Start: 2021-01-01 | End: 2021-01-01

## 2021-01-01 RX ORDER — BISACODYL 10 MG
10 SUPPOSITORY, RECTAL RECTAL DAILY PRN
Status: DISCONTINUED | OUTPATIENT
Start: 2021-01-01 | End: 2021-03-30 | Stop reason: HOSPADM

## 2021-01-01 RX ORDER — FAMOTIDINE 20 MG/1
20 TABLET, FILM COATED ORAL 2 TIMES DAILY
Status: DISCONTINUED | OUTPATIENT
Start: 2021-01-01 | End: 2021-03-30 | Stop reason: HOSPADM

## 2021-01-01 RX ORDER — IBUPROFEN 600 MG/1
600 TABLET ORAL ONCE
Status: COMPLETED | OUTPATIENT
Start: 2021-01-01 | End: 2021-01-01

## 2021-01-01 RX ORDER — PREDNISONE 20 MG/1
20 TABLET ORAL DAILY
Qty: 30 TABLET | Refills: 0 | Status: SHIPPED | OUTPATIENT
Start: 2021-01-01 | End: 2021-03-30 | Stop reason: HOSPADM

## 2021-01-01 RX ORDER — MELATONIN
2000 DAILY
Status: DISCONTINUED | OUTPATIENT
Start: 2021-01-01 | End: 2021-03-30 | Stop reason: HOSPADM

## 2021-01-01 RX ORDER — HYDROXYCHLOROQUINE SULFATE 200 MG/1
200 TABLET, FILM COATED ORAL DAILY
Status: DISCONTINUED | OUTPATIENT
Start: 2021-01-01 | End: 2021-03-30 | Stop reason: HOSPADM

## 2021-01-01 RX ORDER — METHYLPREDNISOLONE SODIUM SUCCINATE 40 MG/ML
40 INJECTION, POWDER, LYOPHILIZED, FOR SOLUTION INTRAMUSCULAR; INTRAVENOUS EVERY 12 HOURS SCHEDULED
Status: DISCONTINUED | OUTPATIENT
Start: 2021-01-01 | End: 2021-01-01

## 2021-01-01 RX ORDER — ZINC SULFATE 50(220)MG
220 CAPSULE ORAL DAILY
Qty: 6 CAPSULE | Refills: 0 | Status: SHIPPED | OUTPATIENT
Start: 2021-01-01 | End: 2021-03-30 | Stop reason: HOSPADM

## 2021-01-01 RX ORDER — MIDAZOLAM HYDROCHLORIDE 2 MG/2ML
INJECTION, SOLUTION INTRAMUSCULAR; INTRAVENOUS
Status: DISCONTINUED
Start: 2021-01-01 | End: 2021-03-30 | Stop reason: HOSPADM

## 2021-01-01 RX ORDER — BENZONATATE 200 MG/1
200 CAPSULE ORAL 3 TIMES DAILY PRN
Qty: 20 CAPSULE | Refills: 0 | Status: SHIPPED | OUTPATIENT
Start: 2021-01-01 | End: 2021-03-30 | Stop reason: HOSPADM

## 2021-01-01 RX ORDER — MAGNESIUM HYDROXIDE/ALUMINUM HYDROXICE/SIMETHICONE 120; 1200; 1200 MG/30ML; MG/30ML; MG/30ML
30 SUSPENSION ORAL EVERY 6 HOURS PRN
Status: DISCONTINUED | OUTPATIENT
Start: 2021-01-01 | End: 2021-01-01 | Stop reason: HOSPADM

## 2021-01-01 RX ORDER — METHYLPREDNISOLONE SODIUM SUCCINATE 125 MG/2ML
125 INJECTION, POWDER, LYOPHILIZED, FOR SOLUTION INTRAMUSCULAR; INTRAVENOUS EVERY 6 HOURS SCHEDULED
Status: DISCONTINUED | OUTPATIENT
Start: 2021-01-01 | End: 2021-01-01

## 2021-01-01 RX ORDER — ASCORBIC ACID 500 MG
1000 TABLET ORAL EVERY 12 HOURS SCHEDULED
Status: COMPLETED | OUTPATIENT
Start: 2021-01-01 | End: 2021-01-01

## 2021-01-01 RX ORDER — PANTOPRAZOLE SODIUM 40 MG/1
40 TABLET, DELAYED RELEASE ORAL
Status: DISCONTINUED | OUTPATIENT
Start: 2021-01-01 | End: 2021-01-01 | Stop reason: HOSPADM

## 2021-01-01 RX ORDER — ACETAMINOPHEN 325 MG/1
650 TABLET ORAL EVERY 6 HOURS PRN
Qty: 30 TABLET | Refills: 0 | Status: SHIPPED | OUTPATIENT
Start: 2021-01-01 | End: 2021-03-30 | Stop reason: HOSPADM

## 2021-01-01 RX ORDER — ONDANSETRON 2 MG/ML
4 INJECTION INTRAMUSCULAR; INTRAVENOUS EVERY 6 HOURS PRN
Status: DISCONTINUED | OUTPATIENT
Start: 2021-01-01 | End: 2021-01-01 | Stop reason: HOSPADM

## 2021-01-01 RX ORDER — TRAZODONE HYDROCHLORIDE 100 MG/1
100 TABLET ORAL
Status: DISCONTINUED | OUTPATIENT
Start: 2021-01-01 | End: 2021-01-01 | Stop reason: HOSPADM

## 2021-01-01 RX ORDER — MULTIVITAMIN/IRON/FOLIC ACID 18MG-0.4MG
1 TABLET ORAL DAILY
Status: DISCONTINUED | OUTPATIENT
Start: 2021-01-01 | End: 2021-03-30 | Stop reason: HOSPADM

## 2021-01-01 RX ORDER — GUAIFENESIN/DEXTROMETHORPHAN 100-10MG/5
10 SYRUP ORAL EVERY 4 HOURS PRN
Qty: 118 ML | Refills: 0 | Status: SHIPPED | OUTPATIENT
Start: 2021-01-01 | End: 2021-03-30 | Stop reason: HOSPADM

## 2021-01-01 RX ORDER — ALBUTEROL SULFATE 90 UG/1
2 AEROSOL, METERED RESPIRATORY (INHALATION) 4 TIMES DAILY
Status: DISCONTINUED | OUTPATIENT
Start: 2021-01-01 | End: 2021-03-30 | Stop reason: HOSPADM

## 2021-01-01 RX ORDER — ALBUTEROL SULFATE 2.5 MG/3ML
2.5 SOLUTION RESPIRATORY (INHALATION) ONCE
Status: COMPLETED | OUTPATIENT
Start: 2021-01-01 | End: 2021-01-01

## 2021-01-01 RX ORDER — ASCORBIC ACID 500 MG
1000 TABLET ORAL EVERY 12 HOURS SCHEDULED
Status: DISCONTINUED | OUTPATIENT
Start: 2021-01-01 | End: 2021-01-01 | Stop reason: HOSPADM

## 2021-01-01 RX ORDER — METHYLPREDNISOLONE SODIUM SUCCINATE 40 MG/ML
40 INJECTION, POWDER, LYOPHILIZED, FOR SOLUTION INTRAMUSCULAR; INTRAVENOUS DAILY
Status: DISCONTINUED | OUTPATIENT
Start: 2021-01-01 | End: 2021-01-01

## 2021-01-01 RX ADMIN — ENOXAPARIN SODIUM 30 MG: 30 INJECTION, SOLUTION INTRAVENOUS; SUBCUTANEOUS at 08:15

## 2021-01-01 RX ADMIN — DULOXETINE HYDROCHLORIDE 60 MG: 60 CAPSULE, DELAYED RELEASE ORAL at 08:50

## 2021-01-01 RX ADMIN — Medication 2000 UNITS: at 08:07

## 2021-01-01 RX ADMIN — TOCILIZUMAB 600 MG: 20 INJECTION, SOLUTION, CONCENTRATE INTRAVENOUS at 17:34

## 2021-01-01 RX ADMIN — ALBUTEROL SULFATE 2 PUFF: 90 AEROSOL, METERED RESPIRATORY (INHALATION) at 12:05

## 2021-01-01 RX ADMIN — DEXAMETHASONE SODIUM PHOSPHATE 8.6 MG: 10 INJECTION, SOLUTION INTRAMUSCULAR; INTRAVENOUS at 06:00

## 2021-01-01 RX ADMIN — METHYLPREDNISOLONE SODIUM SUCCINATE 40 MG: 40 INJECTION, POWDER, FOR SOLUTION INTRAMUSCULAR; INTRAVENOUS at 21:52

## 2021-01-01 RX ADMIN — ATORVASTATIN CALCIUM 40 MG: 40 TABLET, FILM COATED ORAL at 21:00

## 2021-01-01 RX ADMIN — DOCUSATE SODIUM 50 MG AND SENNOSIDES 8.6 MG 1 TABLET: 8.6; 5 TABLET, FILM COATED ORAL at 21:13

## 2021-01-01 RX ADMIN — EPINEPHRINE 1 MG: 0.1 INJECTION, SOLUTION ENDOTRACHEAL; INTRACARDIAC; INTRAVENOUS at 18:28

## 2021-01-01 RX ADMIN — ENOXAPARIN SODIUM 30 MG: 30 INJECTION, SOLUTION INTRAVENOUS; SUBCUTANEOUS at 21:35

## 2021-01-01 RX ADMIN — EPINEPHRINE 1 MG: 0.1 INJECTION, SOLUTION ENDOTRACHEAL; INTRACARDIAC; INTRAVENOUS at 18:31

## 2021-01-01 RX ADMIN — ENOXAPARIN SODIUM 90 MG: 100 INJECTION SUBCUTANEOUS at 08:49

## 2021-01-01 RX ADMIN — IOHEXOL 65 ML: 350 INJECTION, SOLUTION INTRAVENOUS at 13:29

## 2021-01-01 RX ADMIN — ENOXAPARIN SODIUM 90 MG: 100 INJECTION SUBCUTANEOUS at 08:16

## 2021-01-01 RX ADMIN — HYDROXYCHLOROQUINE SULFATE 200 MG: 200 TABLET, FILM COATED ORAL at 08:05

## 2021-01-01 RX ADMIN — METHYLPREDNISOLONE SODIUM SUCCINATE 40 MG: 40 INJECTION, POWDER, FOR SOLUTION INTRAMUSCULAR; INTRAVENOUS at 08:59

## 2021-01-01 RX ADMIN — COCAINE HYDROCHLORIDE: 40 SOLUTION NASAL at 21:58

## 2021-01-01 RX ADMIN — DOCUSATE SODIUM 50 MG AND SENNOSIDES 8.6 MG 1 TABLET: 8.6; 5 TABLET, FILM COATED ORAL at 21:36

## 2021-01-01 RX ADMIN — OXYCODONE HYDROCHLORIDE AND ACETAMINOPHEN 1000 MG: 500 TABLET ORAL at 21:14

## 2021-01-01 RX ADMIN — ZINC SULFATE 220 MG (50 MG) CAPSULE 220 MG: CAPSULE at 08:37

## 2021-01-01 RX ADMIN — METHYLPREDNISOLONE SODIUM SUCCINATE 40 MG: 40 INJECTION, POWDER, FOR SOLUTION INTRAMUSCULAR; INTRAVENOUS at 21:13

## 2021-01-01 RX ADMIN — FAMOTIDINE 20 MG: 20 TABLET ORAL at 17:29

## 2021-01-01 RX ADMIN — OXYCODONE HYDROCHLORIDE AND ACETAMINOPHEN 1000 MG: 500 TABLET ORAL at 08:37

## 2021-01-01 RX ADMIN — ARIPIPRAZOLE 10 MG: 10 TABLET ORAL at 21:18

## 2021-01-01 RX ADMIN — ALBUTEROL SULFATE 2 PUFF: 90 AEROSOL, METERED RESPIRATORY (INHALATION) at 13:39

## 2021-01-01 RX ADMIN — FUROSEMIDE 40 MG: 10 INJECTION, SOLUTION INTRAVENOUS at 13:19

## 2021-01-01 RX ADMIN — DEXAMETHASONE SODIUM PHOSPHATE 8.6 MG: 10 INJECTION, SOLUTION INTRAMUSCULAR; INTRAVENOUS at 17:22

## 2021-01-01 RX ADMIN — DEXAMETHASONE SODIUM PHOSPHATE 8.6 MG: 10 INJECTION, SOLUTION INTRAMUSCULAR; INTRAVENOUS at 05:13

## 2021-01-01 RX ADMIN — DULOXETINE HYDROCHLORIDE 60 MG: 60 CAPSULE, DELAYED RELEASE ORAL at 08:05

## 2021-01-01 RX ADMIN — FAMOTIDINE 20 MG: 20 TABLET ORAL at 08:07

## 2021-01-01 RX ADMIN — ENOXAPARIN SODIUM 90 MG: 100 INJECTION SUBCUTANEOUS at 21:00

## 2021-01-01 RX ADMIN — HYDROXYCHLOROQUINE SULFATE 200 MG: 200 TABLET, FILM COATED ORAL at 08:23

## 2021-01-01 RX ADMIN — ENOXAPARIN SODIUM 90 MG: 100 INJECTION SUBCUTANEOUS at 08:07

## 2021-01-01 RX ADMIN — ENOXAPARIN SODIUM 30 MG: 30 INJECTION, SOLUTION INTRAVENOUS; SUBCUTANEOUS at 08:23

## 2021-01-01 RX ADMIN — FUROSEMIDE 20 MG: 10 INJECTION, SOLUTION INTRAVENOUS at 19:23

## 2021-01-01 RX ADMIN — ALBUTEROL SULFATE 2 PUFF: 90 AEROSOL, METERED RESPIRATORY (INHALATION) at 12:01

## 2021-01-01 RX ADMIN — FUROSEMIDE 40 MG: 10 INJECTION, SOLUTION INTRAVENOUS at 19:48

## 2021-01-01 RX ADMIN — DOCUSATE SODIUM 50 MG AND SENNOSIDES 8.6 MG 1 TABLET: 8.6; 5 TABLET, FILM COATED ORAL at 20:54

## 2021-01-01 RX ADMIN — FAMOTIDINE 20 MG: 20 TABLET ORAL at 17:58

## 2021-01-01 RX ADMIN — OXYCODONE HYDROCHLORIDE AND ACETAMINOPHEN 1000 MG: 500 TABLET ORAL at 20:24

## 2021-01-01 RX ADMIN — FAMOTIDINE 20 MG: 20 TABLET ORAL at 08:16

## 2021-01-01 RX ADMIN — DEXAMETHASONE SODIUM PHOSPHATE 8.6 MG: 10 INJECTION, SOLUTION INTRAMUSCULAR; INTRAVENOUS at 17:37

## 2021-01-01 RX ADMIN — FAMOTIDINE 20 MG: 20 TABLET ORAL at 08:04

## 2021-01-01 RX ADMIN — FAMOTIDINE 20 MG: 20 TABLET ORAL at 17:24

## 2021-01-01 RX ADMIN — HYDROXYCHLOROQUINE SULFATE 200 MG: 200 TABLET, FILM COATED ORAL at 08:07

## 2021-01-01 RX ADMIN — DULOXETINE HYDROCHLORIDE 60 MG: 60 CAPSULE, DELAYED RELEASE ORAL at 08:18

## 2021-01-01 RX ADMIN — Medication 2000 UNITS: at 08:01

## 2021-01-01 RX ADMIN — ARIPIPRAZOLE 10 MG: 10 TABLET ORAL at 21:11

## 2021-01-01 RX ADMIN — ENOXAPARIN SODIUM 90 MG: 100 INJECTION SUBCUTANEOUS at 08:23

## 2021-01-01 RX ADMIN — ALBUTEROL SULFATE 2.5 MG: 2.5 SOLUTION RESPIRATORY (INHALATION) at 12:37

## 2021-01-01 RX ADMIN — DEXAMETHASONE SODIUM PHOSPHATE 8.6 MG: 10 INJECTION, SOLUTION INTRAMUSCULAR; INTRAVENOUS at 17:02

## 2021-01-01 RX ADMIN — DEXAMETHASONE SODIUM PHOSPHATE 8.6 MG: 10 INJECTION, SOLUTION INTRAMUSCULAR; INTRAVENOUS at 05:27

## 2021-01-01 RX ADMIN — TRANEXAMIC ACID 1000 MG: 1 INJECTION, SOLUTION INTRAVENOUS at 08:29

## 2021-01-01 RX ADMIN — Medication 1 TABLET: at 08:01

## 2021-01-01 RX ADMIN — ALBUTEROL SULFATE 2 PUFF: 90 AEROSOL, METERED RESPIRATORY (INHALATION) at 09:19

## 2021-01-01 RX ADMIN — DULOXETINE HYDROCHLORIDE 60 MG: 60 CAPSULE, DELAYED RELEASE ORAL at 17:04

## 2021-01-01 RX ADMIN — ATORVASTATIN CALCIUM 40 MG: 40 TABLET, FILM COATED ORAL at 21:19

## 2021-01-01 RX ADMIN — ENOXAPARIN SODIUM 90 MG: 100 INJECTION SUBCUTANEOUS at 20:55

## 2021-01-01 RX ADMIN — FENTANYL CITRATE 50 MCG: 50 INJECTION, SOLUTION INTRAMUSCULAR; INTRAVENOUS at 06:03

## 2021-01-01 RX ADMIN — FAMOTIDINE 20 MG: 20 TABLET ORAL at 08:23

## 2021-01-01 RX ADMIN — SODIUM BICARBONATE 50 MEQ: 84 INJECTION, SOLUTION INTRAVENOUS at 17:55

## 2021-01-01 RX ADMIN — Medication 2000 UNITS: at 08:16

## 2021-01-01 RX ADMIN — ARIPIPRAZOLE 10 MG: 10 TABLET ORAL at 21:19

## 2021-01-01 RX ADMIN — ATORVASTATIN CALCIUM 40 MG: 40 TABLET, FILM COATED ORAL at 21:36

## 2021-01-01 RX ADMIN — DULOXETINE HYDROCHLORIDE 60 MG: 60 CAPSULE, DELAYED RELEASE ORAL at 17:32

## 2021-01-01 RX ADMIN — DOXYCYCLINE 100 MG: 100 CAPSULE ORAL at 05:55

## 2021-01-01 RX ADMIN — FUROSEMIDE 40 MG: 10 INJECTION, SOLUTION INTRAVENOUS at 17:07

## 2021-01-01 RX ADMIN — ATORVASTATIN CALCIUM 40 MG: 40 TABLET, FILM COATED ORAL at 21:47

## 2021-01-01 RX ADMIN — ALBUTEROL SULFATE 2 PUFF: 90 AEROSOL, METERED RESPIRATORY (INHALATION) at 17:08

## 2021-01-01 RX ADMIN — ZINC SULFATE 220 MG (50 MG) CAPSULE 220 MG: CAPSULE at 08:42

## 2021-01-01 RX ADMIN — ENOXAPARIN SODIUM 90 MG: 100 INJECTION SUBCUTANEOUS at 20:29

## 2021-01-01 RX ADMIN — HYDROXYCHLOROQUINE SULFATE 200 MG: 200 TABLET, FILM COATED ORAL at 08:03

## 2021-01-01 RX ADMIN — ALBUTEROL SULFATE 2 PUFF: 90 AEROSOL, METERED RESPIRATORY (INHALATION) at 12:22

## 2021-01-01 RX ADMIN — ZINC SULFATE 220 MG (50 MG) CAPSULE 220 MG: CAPSULE at 08:16

## 2021-01-01 RX ADMIN — Medication 1 TABLET: at 08:24

## 2021-01-01 RX ADMIN — OXYMETAZOLINE HYDROCHLORIDE 2 SPRAY: 0.05 SPRAY NASAL at 20:56

## 2021-01-01 RX ADMIN — HYDROXYCHLOROQUINE SULFATE 200 MG: 200 TABLET, FILM COATED ORAL at 08:08

## 2021-01-01 RX ADMIN — ATORVASTATIN CALCIUM 40 MG: 40 TABLET, FILM COATED ORAL at 22:12

## 2021-01-01 RX ADMIN — HYDROXYCHLOROQUINE SULFATE 200 MG: 200 TABLET, FILM COATED ORAL at 08:51

## 2021-01-01 RX ADMIN — ACETAMINOPHEN 650 MG: 325 TABLET ORAL at 08:41

## 2021-01-01 RX ADMIN — DOCUSATE SODIUM 50 MG AND SENNOSIDES 8.6 MG 1 TABLET: 8.6; 5 TABLET, FILM COATED ORAL at 21:19

## 2021-01-01 RX ADMIN — Medication 2000 UNITS: at 08:05

## 2021-01-01 RX ADMIN — HYDROXYCHLOROQUINE SULFATE 200 MG: 200 TABLET, FILM COATED ORAL at 11:50

## 2021-01-01 RX ADMIN — DULOXETINE HYDROCHLORIDE 60 MG: 60 CAPSULE, DELAYED RELEASE ORAL at 08:07

## 2021-01-01 RX ADMIN — DULOXETINE HYDROCHLORIDE 60 MG: 60 CAPSULE, DELAYED RELEASE ORAL at 08:01

## 2021-01-01 RX ADMIN — OXYCODONE HYDROCHLORIDE AND ACETAMINOPHEN 1000 MG: 500 TABLET ORAL at 08:15

## 2021-01-01 RX ADMIN — DOCUSATE SODIUM 50 MG AND SENNOSIDES 8.6 MG 1 TABLET: 8.6; 5 TABLET, FILM COATED ORAL at 21:46

## 2021-01-01 RX ADMIN — EPINEPHRINE 1 MG: 0.1 INJECTION, SOLUTION ENDOTRACHEAL; INTRACARDIAC; INTRAVENOUS at 17:54

## 2021-01-01 RX ADMIN — FAMOTIDINE 20 MG: 20 TABLET ORAL at 17:02

## 2021-01-01 RX ADMIN — DEXAMETHASONE SODIUM PHOSPHATE 8.6 MG: 10 INJECTION, SOLUTION INTRAMUSCULAR; INTRAVENOUS at 17:26

## 2021-01-01 RX ADMIN — OXYCODONE HYDROCHLORIDE AND ACETAMINOPHEN 1000 MG: 500 TABLET ORAL at 08:04

## 2021-01-01 RX ADMIN — FAMOTIDINE 20 MG: 20 TABLET ORAL at 17:09

## 2021-01-01 RX ADMIN — ALBUTEROL SULFATE 2 PUFF: 90 AEROSOL, METERED RESPIRATORY (INHALATION) at 17:01

## 2021-01-01 RX ADMIN — OXYCODONE HYDROCHLORIDE AND ACETAMINOPHEN 1000 MG: 500 TABLET ORAL at 21:36

## 2021-01-01 RX ADMIN — FAMOTIDINE 20 MG: 20 TABLET ORAL at 08:06

## 2021-01-01 RX ADMIN — ZINC SULFATE 220 MG (50 MG) CAPSULE 220 MG: CAPSULE at 08:07

## 2021-01-01 RX ADMIN — ARIPIPRAZOLE 10 MG: 10 TABLET ORAL at 21:00

## 2021-01-01 RX ADMIN — ATORVASTATIN CALCIUM 40 MG: 40 TABLET, FILM COATED ORAL at 21:53

## 2021-01-01 RX ADMIN — OXYCODONE HYDROCHLORIDE AND ACETAMINOPHEN 1000 MG: 500 TABLET ORAL at 22:00

## 2021-01-01 RX ADMIN — Medication 2000 UNITS: at 08:23

## 2021-01-01 RX ADMIN — ENOXAPARIN SODIUM 90 MG: 100 INJECTION SUBCUTANEOUS at 21:53

## 2021-01-01 RX ADMIN — CHOLECALCIFEROL (VITAMIN D3) 2000 UNITS: 25 TAB ORAL at 08:41

## 2021-01-01 RX ADMIN — ARIPIPRAZOLE 10 MG: 10 TABLET ORAL at 22:53

## 2021-01-01 RX ADMIN — ATORVASTATIN CALCIUM 40 MG: 40 TABLET, FILM COATED ORAL at 21:11

## 2021-01-01 RX ADMIN — DOCUSATE SODIUM 50 MG AND SENNOSIDES 8.6 MG 1 TABLET: 8.6; 5 TABLET, FILM COATED ORAL at 21:18

## 2021-01-01 RX ADMIN — Medication 1 TABLET: at 08:50

## 2021-01-01 RX ADMIN — ALBUTEROL SULFATE 2 PUFF: 90 AEROSOL, METERED RESPIRATORY (INHALATION) at 21:00

## 2021-01-01 RX ADMIN — Medication 2000 UNITS: at 08:51

## 2021-01-01 RX ADMIN — HYDROXYCHLOROQUINE SULFATE 200 MG: 200 TABLET, FILM COATED ORAL at 08:37

## 2021-01-01 RX ADMIN — ENOXAPARIN SODIUM 90 MG: 100 INJECTION SUBCUTANEOUS at 08:11

## 2021-01-01 RX ADMIN — METHYLPREDNISOLONE SODIUM SUCCINATE 40 MG: 40 INJECTION, POWDER, FOR SOLUTION INTRAMUSCULAR; INTRAVENOUS at 08:23

## 2021-01-01 RX ADMIN — HYDROXYCHLOROQUINE SULFATE 200 MG: 200 TABLET, FILM COATED ORAL at 08:06

## 2021-01-01 RX ADMIN — ENOXAPARIN SODIUM 90 MG: 100 INJECTION SUBCUTANEOUS at 21:14

## 2021-01-01 RX ADMIN — DOCUSATE SODIUM 50 MG AND SENNOSIDES 8.6 MG 1 TABLET: 8.6; 5 TABLET, FILM COATED ORAL at 21:11

## 2021-01-01 RX ADMIN — DULOXETINE HYDROCHLORIDE 60 MG: 60 CAPSULE, DELAYED RELEASE ORAL at 08:16

## 2021-01-01 RX ADMIN — POTASSIUM CHLORIDE 40 MEQ: 750 TABLET, EXTENDED RELEASE ORAL at 12:38

## 2021-01-01 RX ADMIN — DOXYCYCLINE 100 MG: 100 CAPSULE ORAL at 04:12

## 2021-01-01 RX ADMIN — SODIUM BICARBONATE 50 MEQ: 84 INJECTION, SOLUTION INTRAVENOUS at 18:00

## 2021-01-01 RX ADMIN — DULOXETINE HYDROCHLORIDE 60 MG: 60 CAPSULE, DELAYED RELEASE ORAL at 17:37

## 2021-01-01 RX ADMIN — ATORVASTATIN CALCIUM 40 MG: 40 TABLET, FILM COATED ORAL at 20:55

## 2021-01-01 RX ADMIN — METHYLPREDNISOLONE SODIUM SUCCINATE 125 MG: 125 INJECTION, POWDER, FOR SOLUTION INTRAMUSCULAR; INTRAVENOUS at 11:31

## 2021-01-01 RX ADMIN — FAMOTIDINE 20 MG: 20 TABLET ORAL at 17:26

## 2021-01-01 RX ADMIN — SODIUM CHLORIDE 0.5 MCG/KG/HR: 9 INJECTION, SOLUTION INTRAVENOUS at 15:40

## 2021-01-01 RX ADMIN — ATORVASTATIN CALCIUM 40 MG: 40 TABLET, FILM COATED ORAL at 21:14

## 2021-01-01 RX ADMIN — ENOXAPARIN SODIUM 90 MG: 100 INJECTION SUBCUTANEOUS at 08:03

## 2021-01-01 RX ADMIN — ATORVASTATIN CALCIUM 40 MG: 40 TABLET, FILM COATED ORAL at 21:13

## 2021-01-01 RX ADMIN — FAMOTIDINE 20 MG: 20 TABLET ORAL at 08:05

## 2021-01-01 RX ADMIN — HYDROXYCHLOROQUINE SULFATE 200 MG: 200 TABLET, FILM COATED ORAL at 08:44

## 2021-01-01 RX ADMIN — ENOXAPARIN SODIUM 90 MG: 100 INJECTION SUBCUTANEOUS at 08:04

## 2021-01-01 RX ADMIN — OXYCODONE HYDROCHLORIDE AND ACETAMINOPHEN 1000 MG: 500 TABLET ORAL at 08:23

## 2021-01-01 RX ADMIN — DOXYCYCLINE 100 MG: 100 CAPSULE ORAL at 19:44

## 2021-01-01 RX ADMIN — ALBUTEROL SULFATE 2 PUFF: 90 AEROSOL, METERED RESPIRATORY (INHALATION) at 11:32

## 2021-01-01 RX ADMIN — OXYMETAZOLINE HYDROCHLORIDE 2 SPRAY: 0.05 SPRAY NASAL at 08:58

## 2021-01-01 RX ADMIN — ALBUTEROL SULFATE 2 PUFF: 90 AEROSOL, METERED RESPIRATORY (INHALATION) at 21:27

## 2021-01-01 RX ADMIN — TOCILIZUMAB 600 MG: 20 INJECTION, SOLUTION, CONCENTRATE INTRAVENOUS at 15:51

## 2021-01-01 RX ADMIN — DULOXETINE HYDROCHLORIDE 60 MG: 60 CAPSULE, DELAYED RELEASE ORAL at 17:58

## 2021-01-01 RX ADMIN — ALBUTEROL SULFATE 2 PUFF: 90 AEROSOL, METERED RESPIRATORY (INHALATION) at 21:54

## 2021-01-01 RX ADMIN — FAMOTIDINE 20 MG: 20 TABLET ORAL at 17:32

## 2021-01-01 RX ADMIN — Medication 2000 UNITS: at 08:06

## 2021-01-01 RX ADMIN — DOXYCYCLINE 100 MG: 100 CAPSULE ORAL at 04:47

## 2021-01-01 RX ADMIN — Medication 2000 UNITS: at 08:18

## 2021-01-01 RX ADMIN — ZINC SULFATE 220 MG (50 MG) CAPSULE 220 MG: CAPSULE at 08:06

## 2021-01-01 RX ADMIN — HYDROXYCHLOROQUINE SULFATE 200 MG: 200 TABLET, FILM COATED ORAL at 08:16

## 2021-01-01 RX ADMIN — DEXAMETHASONE SODIUM PHOSPHATE 8.6 MG: 10 INJECTION, SOLUTION INTRAMUSCULAR; INTRAVENOUS at 04:34

## 2021-01-01 RX ADMIN — ALBUTEROL SULFATE 2 PUFF: 90 AEROSOL, METERED RESPIRATORY (INHALATION) at 17:58

## 2021-01-01 RX ADMIN — DEXAMETHASONE SODIUM PHOSPHATE 8.6 MG: 10 INJECTION, SOLUTION INTRAMUSCULAR; INTRAVENOUS at 05:24

## 2021-01-01 RX ADMIN — FAMOTIDINE 20 MG: 20 TABLET ORAL at 08:50

## 2021-01-01 RX ADMIN — DULOXETINE HYDROCHLORIDE 60 MG: 60 CAPSULE, DELAYED RELEASE ORAL at 08:23

## 2021-01-01 RX ADMIN — DULOXETINE HYDROCHLORIDE 60 MG: 60 CAPSULE, DELAYED RELEASE ORAL at 17:29

## 2021-01-01 RX ADMIN — ARIPIPRAZOLE 10 MG: 10 TABLET ORAL at 21:27

## 2021-01-01 RX ADMIN — ARIPIPRAZOLE 10 MG: 10 TABLET ORAL at 21:36

## 2021-01-01 RX ADMIN — DULOXETINE HYDROCHLORIDE 60 MG: 60 CAPSULE, DELAYED RELEASE ORAL at 17:26

## 2021-01-01 RX ADMIN — FAMOTIDINE 20 MG: 20 TABLET ORAL at 17:06

## 2021-01-01 RX ADMIN — EPINEPHRINE 1 MG: 0.1 INJECTION, SOLUTION ENDOTRACHEAL; INTRACARDIAC; INTRAVENOUS at 17:58

## 2021-01-01 RX ADMIN — CALCIUM CHLORIDE 1 G: 100 INJECTION PARENTERAL at 18:02

## 2021-01-01 RX ADMIN — DULOXETINE HYDROCHLORIDE 60 MG: 60 CAPSULE, DELAYED RELEASE ORAL at 17:06

## 2021-01-01 RX ADMIN — ARIPIPRAZOLE 10 MG: 10 TABLET ORAL at 21:13

## 2021-01-01 RX ADMIN — SODIUM CHLORIDE 1000 MG: 0.9 INJECTION, SOLUTION INTRAVENOUS at 08:07

## 2021-01-01 RX ADMIN — IOHEXOL 100 ML: 350 INJECTION, SOLUTION INTRAVENOUS at 15:58

## 2021-01-01 RX ADMIN — FAMOTIDINE 20 MG: 20 TABLET ORAL at 17:04

## 2021-01-01 RX ADMIN — IBUPROFEN 600 MG: 600 TABLET, FILM COATED ORAL at 12:43

## 2021-01-01 RX ADMIN — HYDROXYCHLOROQUINE SULFATE 200 MG: 200 TABLET, FILM COATED ORAL at 09:18

## 2021-01-01 RX ADMIN — DOXYCYCLINE 100 MG: 100 CAPSULE ORAL at 17:06

## 2021-01-01 RX ADMIN — ZINC SULFATE 220 MG (50 MG) CAPSULE 220 MG: CAPSULE at 08:04

## 2021-01-01 RX ADMIN — ENOXAPARIN SODIUM 90 MG: 100 INJECTION SUBCUTANEOUS at 21:13

## 2021-01-01 RX ADMIN — ENOXAPARIN SODIUM 90 MG: 100 INJECTION SUBCUTANEOUS at 08:06

## 2021-01-01 RX ADMIN — ALBUTEROL SULFATE 2 PUFF: 90 AEROSOL, METERED RESPIRATORY (INHALATION) at 12:20

## 2021-01-01 RX ADMIN — FAMOTIDINE 20 MG: 20 TABLET ORAL at 08:18

## 2021-01-01 RX ADMIN — FAMOTIDINE 20 MG: 20 TABLET ORAL at 17:01

## 2021-01-01 RX ADMIN — EPINEPHRINE 1 MG: 0.1 INJECTION, SOLUTION ENDOTRACHEAL; INTRACARDIAC; INTRAVENOUS at 18:02

## 2021-01-01 RX ADMIN — FUROSEMIDE 20 MG: 10 INJECTION, SOLUTION INTRAVENOUS at 14:46

## 2021-01-01 RX ADMIN — FENTANYL CITRATE 25 MCG: 50 INJECTION, SOLUTION INTRAMUSCULAR; INTRAVENOUS at 09:03

## 2021-01-01 RX ADMIN — ALBUTEROL SULFATE 2 PUFF: 90 AEROSOL, METERED RESPIRATORY (INHALATION) at 17:33

## 2021-01-01 RX ADMIN — ARIPIPRAZOLE 10 MG: 10 TABLET ORAL at 21:20

## 2021-01-01 RX ADMIN — SODIUM CHLORIDE 1000 MG: 0.9 INJECTION, SOLUTION INTRAVENOUS at 08:49

## 2021-01-01 RX ADMIN — OXYCODONE HYDROCHLORIDE AND ACETAMINOPHEN 1000 MG: 500 TABLET ORAL at 20:29

## 2021-01-01 RX ADMIN — ENOXAPARIN SODIUM 90 MG: 100 INJECTION SUBCUTANEOUS at 20:58

## 2021-01-01 RX ADMIN — ALBUTEROL SULFATE 2 PUFF: 90 AEROSOL, METERED RESPIRATORY (INHALATION) at 20:57

## 2021-01-01 RX ADMIN — ALBUTEROL SULFATE 2 PUFF: 90 AEROSOL, METERED RESPIRATORY (INHALATION) at 08:12

## 2021-01-01 RX ADMIN — DULOXETINE HYDROCHLORIDE 60 MG: 60 CAPSULE, DELAYED RELEASE ORAL at 17:01

## 2021-01-01 RX ADMIN — CEFTRIAXONE 1000 MG: 1 INJECTION, POWDER, FOR SOLUTION INTRAMUSCULAR; INTRAVENOUS at 18:09

## 2021-01-01 RX ADMIN — DULOXETINE HYDROCHLORIDE 60 MG: 60 CAPSULE, DELAYED RELEASE ORAL at 17:02

## 2021-01-01 RX ADMIN — TRAZODONE HYDROCHLORIDE 100 MG: 100 TABLET ORAL at 21:27

## 2021-01-01 RX ADMIN — Medication 1 TABLET: at 08:05

## 2021-01-01 RX ADMIN — DOXYCYCLINE 100 MG: 100 CAPSULE ORAL at 17:37

## 2021-01-01 RX ADMIN — DEXAMETHASONE SODIUM PHOSPHATE 8.6 MG: 10 INJECTION, SOLUTION INTRAMUSCULAR; INTRAVENOUS at 17:09

## 2021-01-01 RX ADMIN — DEXAMETHASONE SODIUM PHOSPHATE 8.6 MG: 10 INJECTION, SOLUTION INTRAMUSCULAR; INTRAVENOUS at 05:05

## 2021-01-01 RX ADMIN — ENOXAPARIN SODIUM 90 MG: 100 INJECTION SUBCUTANEOUS at 20:40

## 2021-01-01 RX ADMIN — PANTOPRAZOLE SODIUM 40 MG: 40 TABLET, DELAYED RELEASE ORAL at 05:55

## 2021-01-01 RX ADMIN — DEXAMETHASONE SODIUM PHOSPHATE 8.6 MG: 10 INJECTION, SOLUTION INTRAMUSCULAR; INTRAVENOUS at 17:29

## 2021-01-01 RX ADMIN — DOCUSATE SODIUM 50 MG AND SENNOSIDES 8.6 MG 1 TABLET: 8.6; 5 TABLET, FILM COATED ORAL at 21:00

## 2021-01-01 RX ADMIN — Medication 1 TABLET: at 08:12

## 2021-01-01 RX ADMIN — TRAZODONE HYDROCHLORIDE 100 MG: 100 TABLET ORAL at 22:53

## 2021-01-01 RX ADMIN — DEXAMETHASONE SODIUM PHOSPHATE 8.6 MG: 10 INJECTION, SOLUTION INTRAMUSCULAR; INTRAVENOUS at 17:48

## 2021-01-01 RX ADMIN — FAMOTIDINE 20 MG: 20 TABLET ORAL at 17:49

## 2021-01-01 RX ADMIN — TRAZODONE HYDROCHLORIDE 100 MG: 100 TABLET ORAL at 21:03

## 2021-01-01 RX ADMIN — ALBUTEROL SULFATE 2 PUFF: 90 AEROSOL, METERED RESPIRATORY (INHALATION) at 08:49

## 2021-01-01 RX ADMIN — ALBUTEROL SULFATE 2 PUFF: 90 AEROSOL, METERED RESPIRATORY (INHALATION) at 17:29

## 2021-01-01 RX ADMIN — Medication 2000 UNITS: at 08:04

## 2021-01-01 RX ADMIN — DULOXETINE HYDROCHLORIDE 60 MG: 60 CAPSULE, DELAYED RELEASE ORAL at 08:04

## 2021-01-01 RX ADMIN — FAMOTIDINE 20 MG: 20 TABLET ORAL at 08:01

## 2021-01-01 RX ADMIN — HYDROXYCHLOROQUINE SULFATE 200 MG: 200 TABLET, FILM COATED ORAL at 08:12

## 2021-01-01 RX ADMIN — DULOXETINE HYDROCHLORIDE 60 MG: 60 CAPSULE, DELAYED RELEASE ORAL at 17:57

## 2021-01-01 RX ADMIN — DULOXETINE HYDROCHLORIDE 60 MG: 60 CAPSULE, DELAYED RELEASE ORAL at 17:24

## 2021-01-01 RX ADMIN — ENOXAPARIN SODIUM 90 MG: 100 INJECTION SUBCUTANEOUS at 21:46

## 2021-01-01 RX ADMIN — DULOXETINE HYDROCHLORIDE 20 MG: 20 CAPSULE, DELAYED RELEASE ORAL at 08:42

## 2021-01-01 RX ADMIN — DULOXETINE HYDROCHLORIDE 60 MG: 60 CAPSULE, DELAYED RELEASE ORAL at 17:09

## 2021-01-01 RX ADMIN — FAMOTIDINE 20 MG: 20 TABLET ORAL at 08:12

## 2021-01-01 RX ADMIN — POTASSIUM CHLORIDE 40 MEQ: 1500 TABLET, EXTENDED RELEASE ORAL at 17:37

## 2021-01-01 RX ADMIN — GUAIFENESIN AND DEXTROMETHORPHAN 10 ML: 100; 10 SYRUP ORAL at 22:53

## 2021-01-01 RX ADMIN — ENOXAPARIN SODIUM 30 MG: 30 INJECTION, SOLUTION INTRAVENOUS; SUBCUTANEOUS at 21:27

## 2021-01-01 RX ADMIN — FAMOTIDINE 20 MG: 20 TABLET ORAL at 08:37

## 2021-01-01 RX ADMIN — ACETAMINOPHEN 650 MG: 325 TABLET ORAL at 20:43

## 2021-01-01 RX ADMIN — DOCUSATE SODIUM 50 MG AND SENNOSIDES 8.6 MG 1 TABLET: 8.6; 5 TABLET, FILM COATED ORAL at 22:12

## 2021-01-01 RX ADMIN — ENOXAPARIN SODIUM 30 MG: 30 INJECTION, SOLUTION INTRAVENOUS; SUBCUTANEOUS at 08:19

## 2021-01-01 RX ADMIN — ZINC SULFATE 220 MG (50 MG) CAPSULE 220 MG: CAPSULE at 08:18

## 2021-01-01 RX ADMIN — DEXAMETHASONE SODIUM PHOSPHATE 8.6 MG: 10 INJECTION, SOLUTION INTRAMUSCULAR; INTRAVENOUS at 05:12

## 2021-01-01 RX ADMIN — OXYCODONE HYDROCHLORIDE AND ACETAMINOPHEN 1000 MG: 500 TABLET ORAL at 08:07

## 2021-01-01 RX ADMIN — Medication 1 TABLET: at 08:23

## 2021-01-01 RX ADMIN — DULOXETINE HYDROCHLORIDE 60 MG: 60 CAPSULE, DELAYED RELEASE ORAL at 08:15

## 2021-01-01 RX ADMIN — ALBUTEROL SULFATE 2 PUFF: 90 AEROSOL, METERED RESPIRATORY (INHALATION) at 17:59

## 2021-01-01 RX ADMIN — METHYLPREDNISOLONE SODIUM SUCCINATE 40 MG: 40 INJECTION, POWDER, FOR SOLUTION INTRAMUSCULAR; INTRAVENOUS at 08:16

## 2021-01-01 RX ADMIN — Medication 1 TABLET: at 08:16

## 2021-01-01 RX ADMIN — ARIPIPRAZOLE 10 MG: 10 TABLET ORAL at 21:46

## 2021-01-01 RX ADMIN — DEXAMETHASONE SODIUM PHOSPHATE 8.6 MG: 10 INJECTION, SOLUTION INTRAMUSCULAR; INTRAVENOUS at 04:13

## 2021-01-01 RX ADMIN — SODIUM BICARBONATE 50 MEQ: 84 INJECTION, SOLUTION INTRAVENOUS at 18:29

## 2021-01-01 RX ADMIN — DULOXETINE HYDROCHLORIDE 60 MG: 60 CAPSULE, DELAYED RELEASE ORAL at 17:49

## 2021-01-01 RX ADMIN — ENOXAPARIN SODIUM 90 MG: 100 INJECTION SUBCUTANEOUS at 21:19

## 2021-01-01 RX ADMIN — Medication 2000 UNITS: at 08:37

## 2021-01-01 RX ADMIN — ATORVASTATIN CALCIUM 40 MG: 40 TABLET, FILM COATED ORAL at 21:20

## 2021-01-01 RX ADMIN — ATORVASTATIN CALCIUM 40 MG: 40 TABLET, FILM COATED ORAL at 21:27

## 2021-01-01 RX ADMIN — ZINC SULFATE 220 MG (50 MG) CAPSULE 220 MG: CAPSULE at 08:23

## 2021-01-01 RX ADMIN — DULOXETINE HYDROCHLORIDE 60 MG: 60 CAPSULE, DELAYED RELEASE ORAL at 17:22

## 2021-01-01 RX ADMIN — ALBUTEROL SULFATE 2 PUFF: 90 AEROSOL, METERED RESPIRATORY (INHALATION) at 21:12

## 2021-01-01 RX ADMIN — OXYCODONE HYDROCHLORIDE AND ACETAMINOPHEN 1000 MG: 500 TABLET ORAL at 21:27

## 2021-01-01 RX ADMIN — ATORVASTATIN CALCIUM 40 MG: 40 TABLET, FILM COATED ORAL at 21:18

## 2021-01-01 RX ADMIN — SODIUM BICARBONATE 50 MEQ: 84 INJECTION, SOLUTION INTRAVENOUS at 18:28

## 2021-01-01 RX ADMIN — ALBUTEROL SULFATE 2 PUFF: 90 AEROSOL, METERED RESPIRATORY (INHALATION) at 17:42

## 2021-01-01 RX ADMIN — CEFTRIAXONE 1000 MG: 1 INJECTION, POWDER, FOR SOLUTION INTRAMUSCULAR; INTRAVENOUS at 17:06

## 2021-01-01 RX ADMIN — HYDROXYCHLOROQUINE SULFATE 200 MG: 200 TABLET, FILM COATED ORAL at 08:34

## 2021-01-01 RX ADMIN — ENOXAPARIN SODIUM 90 MG: 100 INJECTION SUBCUTANEOUS at 08:01

## 2021-01-01 RX ADMIN — METHYLPREDNISOLONE SODIUM SUCCINATE 40 MG: 40 INJECTION, POWDER, FOR SOLUTION INTRAMUSCULAR; INTRAVENOUS at 14:43

## 2021-01-01 RX ADMIN — ENOXAPARIN SODIUM 30 MG: 30 INJECTION, SOLUTION INTRAVENOUS; SUBCUTANEOUS at 08:37

## 2021-01-01 RX ADMIN — MIDAZOLAM 1 MG: 1 INJECTION INTRAMUSCULAR; INTRAVENOUS at 14:00

## 2021-01-01 RX ADMIN — PREDNISONE 20 MG: 20 TABLET ORAL at 08:42

## 2021-01-01 RX ADMIN — DULOXETINE HYDROCHLORIDE 60 MG: 60 CAPSULE, DELAYED RELEASE ORAL at 08:12

## 2021-01-01 RX ADMIN — DEXAMETHASONE SODIUM PHOSPHATE 8.6 MG: 10 INJECTION, SOLUTION INTRAMUSCULAR; INTRAVENOUS at 17:06

## 2021-01-01 RX ADMIN — DEXAMETHASONE SODIUM PHOSPHATE 8.6 MG: 10 INJECTION, SOLUTION INTRAMUSCULAR; INTRAVENOUS at 17:04

## 2021-01-01 RX ADMIN — SODIUM CHLORIDE 1000 MG: 0.9 INJECTION, SOLUTION INTRAVENOUS at 08:34

## 2021-01-01 RX ADMIN — Medication 1 TABLET: at 08:06

## 2021-01-01 RX ADMIN — OXYCODONE HYDROCHLORIDE AND ACETAMINOPHEN 1000 MG: 500 TABLET ORAL at 08:41

## 2021-01-01 RX ADMIN — DEXAMETHASONE SODIUM PHOSPHATE 8.6 MG: 10 INJECTION, SOLUTION INTRAMUSCULAR; INTRAVENOUS at 04:46

## 2021-01-01 RX ADMIN — Medication 2000 UNITS: at 08:12

## 2021-01-01 RX ADMIN — FAMOTIDINE 20 MG: 20 TABLET ORAL at 17:42

## 2021-01-01 RX ADMIN — ENOXAPARIN SODIUM 30 MG: 30 INJECTION SUBCUTANEOUS at 08:41

## 2021-01-01 RX ADMIN — ALBUTEROL SULFATE 2 PUFF: 90 AEROSOL, METERED RESPIRATORY (INHALATION) at 08:28

## 2021-01-01 RX ADMIN — TRAZODONE HYDROCHLORIDE 100 MG: 100 TABLET ORAL at 21:36

## 2021-01-01 RX ADMIN — FAMOTIDINE 20 MG: 20 TABLET ORAL at 17:57

## 2021-01-01 RX ADMIN — ARIPIPRAZOLE 10 MG: 10 TABLET ORAL at 20:55

## 2021-01-01 RX ADMIN — OXYCODONE HYDROCHLORIDE AND ACETAMINOPHEN 1000 MG: 500 TABLET ORAL at 21:19

## 2021-01-01 RX ADMIN — ENOXAPARIN SODIUM 90 MG: 100 INJECTION SUBCUTANEOUS at 21:20

## 2021-01-01 RX ADMIN — ALBUTEROL SULFATE 2 PUFF: 90 AEROSOL, METERED RESPIRATORY (INHALATION) at 08:01

## 2021-01-01 RX ADMIN — ARIPIPRAZOLE 10 MG: 10 TABLET ORAL at 22:12

## 2021-01-01 RX ADMIN — HYDROXYCHLOROQUINE SULFATE 200 MG: 200 TABLET, FILM COATED ORAL at 08:19

## 2021-01-01 RX ADMIN — ALBUTEROL SULFATE 2 PUFF: 90 AEROSOL, METERED RESPIRATORY (INHALATION) at 08:05

## 2021-01-01 RX ADMIN — ENOXAPARIN SODIUM 30 MG: 30 INJECTION, SOLUTION INTRAVENOUS; SUBCUTANEOUS at 21:18

## 2021-01-01 RX ADMIN — OXYCODONE HYDROCHLORIDE AND ACETAMINOPHEN 1000 MG: 500 TABLET ORAL at 21:18

## 2021-01-01 RX ADMIN — DOCUSATE SODIUM 50 MG AND SENNOSIDES 8.6 MG 1 TABLET: 8.6; 5 TABLET, FILM COATED ORAL at 21:27

## 2021-01-01 RX ADMIN — ARIPIPRAZOLE 10 MG: 10 TABLET ORAL at 21:53

## 2021-01-01 RX ADMIN — OXYCODONE HYDROCHLORIDE AND ACETAMINOPHEN 1000 MG: 500 TABLET ORAL at 08:18

## 2021-01-01 RX ADMIN — DEXAMETHASONE SODIUM PHOSPHATE 8.6 MG: 10 INJECTION, SOLUTION INTRAMUSCULAR; INTRAVENOUS at 04:53

## 2021-01-01 RX ADMIN — DULOXETINE HYDROCHLORIDE 60 MG: 60 CAPSULE, DELAYED RELEASE ORAL at 08:37

## 2021-01-01 RX ADMIN — CEFTRIAXONE 1000 MG: 1 INJECTION, SOLUTION INTRAVENOUS at 19:45

## 2021-01-01 RX ADMIN — HYDROXYCHLOROQUINE SULFATE 200 MG: 200 TABLET, FILM COATED ORAL at 08:24

## 2021-01-01 RX ADMIN — FAMOTIDINE 20 MG: 20 TABLET ORAL at 17:37

## 2021-01-01 RX ADMIN — ALBUTEROL SULFATE 2 PUFF: 90 AEROSOL, METERED RESPIRATORY (INHALATION) at 21:50

## 2021-01-01 RX ADMIN — ALBUTEROL SULFATE 2 PUFF: 90 AEROSOL, METERED RESPIRATORY (INHALATION) at 08:15

## 2021-01-01 RX ADMIN — ENOXAPARIN SODIUM 30 MG: 30 INJECTION SUBCUTANEOUS at 20:24

## 2021-01-01 RX ADMIN — ENOXAPARIN SODIUM 30 MG: 30 INJECTION, SOLUTION INTRAVENOUS; SUBCUTANEOUS at 22:00

## 2021-01-01 RX ADMIN — DULOXETINE HYDROCHLORIDE 60 MG: 60 CAPSULE, DELAYED RELEASE ORAL at 17:42

## 2021-01-01 RX ADMIN — FAMOTIDINE 20 MG: 20 TABLET ORAL at 17:22

## 2021-01-19 NOTE — LETTER
January 19, 2021     Patient: Gladis Stanton   YOB: 1965   Date of Visit: 1/19/2021       To Whom It May Concern: It is my medical opinion that Gladis Stanton should remain out of work until cleared by physician  If you have any questions or concerns, please don't hesitate to call           Sincerely,        Isidro Locke PA-C    CC: No Recipients

## 2021-01-19 NOTE — PATIENT INSTRUCTIONS
Patient Instructions     CoVID-19 Bharati Landry de cuidado domiciliario  Rios proveedor de [place] y/o personal de sera pública lo/la ha evaluado y ha determinado que no necesita ser hospitalizado/a en salo momento  ? En salo momento usted puede ser aislado/a en casa donde será monitoreado/a por el personal de rios departamento de sera local o estatal  Debe seguir cuidadosamente los pasos de prevención y aislamiento que se indican a continuación, hasta que un proveedor de atención médica o el departamento de sera local o estatal indique que puede volver a taqueria actividades normales  Quédate en casa excepto si necesita recibir [employer] que están levemente enfermas con COVID-19 pueden aislarse en casa radha rios recuperacion  Usted debe restringir las actividades fuera de rios hogar, excepto para recibir [place]  No se presente al Murl Payor, a la escuela o en áreas públicas  Evite el uso del transporte público, el uso compartido de transporte o los taxis  Aislese de [employer] y [place] en rios domicilio  Personas: En la medida de lo posible, debe quedarte en heather habitación específica y lejos de otras personas en rios hogar  Además, debe usar un baño separado, si está disponible  Animales: Debe restringir el contacto con mascotas y otros animales [address] enfermo/a con COVID-19, al igual que lo haría con [place]  Aunque no rodriguez habido informes de mascotas u otros animales que se enferman con COVID-19, todavía se recomienda que las personas enfermas con COVID-19 limiten el contacto con los animales hasta que se sepa más información sobre el virus  En lo posible, pida a otro miembro de rios hogar que cuide de taqueria animales mientras esté enfermo/a  Si está enfermo/a con COVID-19, evite el contacto con rios mascota, incluyendo acariciar, abrazar, besar o ser lamido/a, al igual que compartir alimentos   Si debe cuidar de rios mascota o estar cerca de animales mientras está enfermo/a, Miguel roselia antes y después de [address] con las mascotas y use heather máscara facial  Consulte COVID-19 y Animales para obtener Francies Ringer  Llame antes de visitar a rios médico  Si tiene [employer], llame al proveedor de Nicole Hendricks y dígale que tiene o puede tener COVID-19  [place] ayudará al [employer] del proveedor de atención médica a sobia medidas para evitar que otras personas se infecten o expongan  Utilize máscara facial  Debe usar mascara facial cuando esté cerca de otras personas (por ejemplo, compartir heather habitación o vehículo) o mascotas y antes de entrar en la oficina de un proveedor de Nicole Hendricks  Si usted no puede usar máscara facial (por ejemplo, porque causa problemas para respirar), entonces las personas que viven con usted no deben permanecer en la misma habitación con usted, o deben usar máscara facial si entran a rios habitación  Lucía Hove rios tos y/o estornudos   Cúbrase la boca y la [place] con un pañuelo desechable cuando tosa o estornude  Tire los pañuelos usados en un contenedor de basura que tenga cobertura  Lávese las roselia inmediatamente con agua y jabón radha al menos 21 segundos o, si no hay agua y jabón disponibles, límpiese las roselia con un desinfectante de roselia a base de alcohol que contenga al menos un 60% de alcohol  Límpiese las roselia con frecuencia  [employer] las roselia a menudo con agua y jabón radha al menos 20 segundos, especialmente después de sonarse la nariz, toser o estornudar, ir al baño, y antes de comer o preparar alimentos  Si agua y jabón no están disponibles fácilmente, utilize un desinfectante de roselia a base de alcohol con al menos un 60% de alcohol, cubriendo todas las superficies de taqueria roselia y frotándolas hasta que se sientan secas  Marilyn Sprinkles y agua son la mejor opción si las roselia están visiblemente sucias  Evite tocarse los ojos, la nariz y la boca con las roselia sin [place]    Evite compartir artículos personales en el hogar  No debe compartir platos, vasos para beber, tazas, utensilios para comer, toallas o ropa de cama con otras personas o mascotas en rios hogar  Después de [employer], deben lavarse muy larisa con agua y Josef  Limpie todas las superficies "de toque frequente" todos los [address] superficies de toque fecuente incluyen encimeras o [place], mesas, escritorios, pomos o perillas de ceasar, accesorios de baño, inodoros, teléfonos, teclados, tabletas y [address] de noche  Además, limpie las superficies que puedan contener cande, heces fecales o líquidos corporales  Utilice un spray de limpieza para el hogar o heather toallita desechable, de acuerdo con las instrucciones de la etiqueta del product utilizado para limpiar  Las [employer] contienen instrucciones para un uso seguro y eficaz del producto de limpieza, incluidas las precauciones que deben tomarse al aplicar el producto, incluyendo el uso de guantes y asegurarse de que haya heather buena ventilación radha el uso del producto  Monitorea taqueria síntomas  Busca atención médica inmediata si tu enfermedad está empeorando (por ejemplo, dificultad para respirar)  Antes de buscar [employer], llame a rios proveedor de [employer] y slime que tiene, o está siendo evaluado para, COVID-19  Aicha heather mascara facial antes de entrar en las instalaciones  Estos pasos ayudarán al [place] del proveedor de atención médica a evitar que otras personas en la oficina o la nazario de espera se infecten o expongan  [place] a rios proveedor de [employer] llame al departamento de sera local o estatal  Las personas que se sometan a un seguimiento activo o que se les facilite la autosupervisión deben seguir las instrucciones proporcionadas por rios departamento de sera local o profesionales de sera ocupacional, según sea apropiado  Si tiene heather emergencia médica y necesita llamar al 911, notifique al personal de despacho que tiene o está siendo evaluado para COVID-19   Si es posible, pongase heather mascara facial antes de que lleguen los servicios médicos de emergencia  Discontinuar el aislamiento del hogar  Los pacientes con COVID-19 confirmado deben permanecer bajo precauciones de aislamiento en el hogar hasta que se cumplan las siguientes condiciones:   No etienne tenido fiebre radha al menos 24 horas (es decir, un día completos sin fiebre sin el medicamento que reduce la fiebre)  Y   otros síntomas etienne antonieta (por ejemplo, cuando rios tos o falta de aire etienne antonieta)  Y   Si tuvo heather enfermedad leve o moderada, etienne pasado al menos 10 robinson desde que aparecieron los síntomas por primera vez o si la enfermedad es grave (necesita oxígeno) o está inmunosuprimido, etienne pasado al menos 21 robinson desde que aparecieron los primeros síntomas  Los pacientes con COVID-19 confirmado también deben notificar a los contactos cercanos (incluido rios lugar de trabajo) y pedirles que se pongan en cuarentena  Actualmente, el contacto cercano se define maya estar dentro de los 6 pies radha 15 minutos o más desde el período 24 horas comenzando 48 horas antes del inicio de los síntomas hasta el momento en que el paciente se aisló  El paciente debe indicar a los contactos cercanos de pacientes diagnosticados con COVID-19 que se pongan en cuarentena radha 14 días desde la última vez que tuvieron rios último contacto con el [place]  Source: [employer] fi        COVID testing initiated  Results may take up to 5-10 days to return, but often come back sooner (2-4 days)     If the patient has a [facility] My Chart account, results may be accessed on line  If the patient does not have the [facility] Chart account, please establish one so results can be accessed  This will be the easiest and quickest way to get a copy of your test results if you require printed documentation       If patient is symptomatic and until results are obtained, home quarantine / self isolation strongly encouraged  If testing is done for screening purposes and patient is not symptomatic, we still recommend masking, social distancing, good hygiene practices be followed  If COVID test is positive, patient / care giver will be contacted by ordering provider or designated staff  If COVID test is positive, please call the primary care provider office to inform of positive test and request follow up evaluation appointment  (Generally, primary care providers are doing telemedicine visits with their positive COVID patients )  If COVID test is positive, please again review all information below  Further questions may be addressed by the primary care provider or the [facility] Jenn Anthony at [phone]  If the patient / caregiver has not heard about test results or has been unable to access results on the patient My Chart account in a timely fashion, please call the provider's office where test was ordered (or Hot Line if applicable)  to inquire about results  If results are negative and patient / care giver has been found to have already accessed results through the [facility]'s My Chart diego, no call will be made  Until results are obtained, home quarantine / self isolation strongly encouraged  If the patient would develop profound weakness, chest pain, shortness of breath please proceed to an emergency room for further evaluation otherwise we do recommend that patient follow-up with their primary care provider in the next 5-7 days if not improving  Symptomatic treatment as needed for symptoms relief based on age / medical status of patient  Things like warm salt water gargles, Tylenol or Ibuprofen (if not contraindicated), drinking plenty of fluids, nasal saline rinses / spray, warm tea with honey (not for patients less than 1 year of age),  etc may provide symptoms relief         COVID-19 Home Care Guidelines     Your healthcare provider and/or public health staff have evaluated you and have determined that you do not need to remain in the hospital at this time  At this time you can be isolated at home where you will be monitored by staff from your local or state health department  You should carefully follow the prevention and isolation steps below until a healthcare provider or local or state health department says that you can return to your normal activities  Stay home except to get medical care     People who are mildly ill with COVID-19 are able to isolate at home during their illness  You should restrict activities outside your home, except for getting medical care  Do not go to work, school, or public areas  Avoid using public transportation, ride-sharing, or taxis  Separate yourself from other people and animals in your home     People: As much as possible, you should stay in a specific room and away from other people in your home  Also, you should use a separate bathroom, if available  Animals: You should restrict contact with pets and other animals while you are sick with COVID-19, just like you would around other people  Although there have not been reports of pets or other animals becoming sick with COVID-19, it is still recommended that people sick with COVID-19 limit contact with animals until more information is known about the virus  When possible, have another member of your household care for your animals while you are sick  If you are sick with COVID-19, avoid contact with your pet, including petting, snuggling, being kissed or licked, and sharing food  If you must care for your pet or be around animals while you are sick, wash your hands before and after you interact with pets and wear a facemask  See COVID-19 and Animals for more information  Call ahead before visiting your doctor     If you have a medical appointment, call the healthcare provider and tell them that you have or may have COVID-19   This will help the healthcare providers office take steps to keep other people from getting infected or exposed  Wear a facemask     You should wear a facemask when you are around other people (e g , sharing a room or vehicle) or pets and before you enter a healthcare providers office  If you are not able to wear a facemask (for example, because it causes trouble breathing), then people who live with you should not stay in the same room with you, or they should wear a facemask if they enter your room  Cover your coughs and sneezes     Cover your mouth and nose with a tissue when you cough or sneeze  Throw used tissues in a lined trash can  Immediately wash your hands with soap and water for at least 20 seconds or, if soap and water are not available, clean your hands with an alcohol-based hand  that contains at least 60% alcohol  Clean your hands often     Wash your hands often with soap and water for at least 20 seconds, especially after blowing your nose, coughing, or sneezing; going to the bathroom; and before eating or preparing food  If soap and water are not readily available, use an alcohol-based hand  with at least 60% alcohol, covering all surfaces of your hands and rubbing them together until they feel dry  Soap and water are the best option if hands are visibly dirty  Avoid touching your eyes, nose, and mouth with unwashed hands  Avoid sharing personal household items     You should not share dishes, drinking glasses, cups, eating utensils, towels, or bedding with other people or pets in your home  After using these items, they should be washed thoroughly with soap and water  Clean all high-touch surfaces everyday     High touch surfaces include counters, tabletops, doorknobs, bathroom fixtures, toilets, phones, keyboards, tablets, and bedside tables  Also, clean any surfaces that may have blood, stool, or body fluids on them  Use a household cleaning spray or wipe, according to the label instructions  Labels contain instructions for safe and effective use of the cleaning product including precautions you should take when applying the product, such as wearing gloves and making sure you have good ventilation during use of the product  Monitor your symptoms     Seek prompt medical attention if your illness is worsening (e g , difficulty breathing)  Before seeking care, call your healthcare provider and tell them that you have, or are being evaluated for, COVID-19  Put on a facemask before you enter the facility  These steps will help the healthcare providers office to keep other people in the office or waiting room from getting infected or exposed  Ask your healthcare provider to call the local or [facility] health department  Persons who are placed under active monitoring or facilitated self-monitoring should follow instructions provided by their local health department or occupational health professionals, as appropriate  If you have a medical emergency and need to call 911, notify the dispatch personnel that you have, or are being evaluated for COVID-19  If possible, put on a facemask before emergency medical services arrive  Discontinuing home isolation     Patients with confirmed COVID-19 should remain under home isolation precautions until the following conditions are met:   § They have had no fever for at least 24 hours (that is one full day of no fever without the use medicine that reduces fevers)  AND  § other symptoms have improved (for example, when their cough or shortness of breath have improved)  AND  § at least 10 days have passed since their symptoms first appeared     Patients with confirmed COVID-19 should also notify close contacts (including their workplace) and ask that they self-quarantine        Currently, close contact is defined as being within 6 feet for for a cumulative total of 15 minutes or more over a 24 hour period starting from 2 days before illness onset  (or, for asymptomatic patients, 2 days prior to test specimen collection)  Close contacts of patients diagnosed with COVID-19 should be instructed by the patient to self-quarantine for 14 days from the last time of their last contact with the patient        Source: [employer] fi

## 2021-01-19 NOTE — PROGRESS NOTES
[address] [facility] [facility]        NAME: Zachary Marquez is a 60 y o  male  : 1965    MRN: [id number]  DATE: 2021  TIME: 2:01 PM    Assessment and Plan   Cough [R05]  1  Cough  Novel Coronavirus (Covid-19),PCR [facility]     Educated patient on symptomatic control  Educated patient signs and symptoms of worsening condition indications go to ER  Educated on use of over-the-counter medications as well as vitamins  Patient states he understands and agrees  Patient Instructions       Continue to monitor symptoms  If new or worsening symptoms develop, go immediately to Er  Drink plenty of fluids  Follow up with Family Doctor this week  Chief Complaint     Chief Complaint   Patient presents with    COVID-19     c/o dry cough,H/A, sore throat & runny nose for 4days  Grandchild + today, exposure over weekend  No meds         History of Present Illness       Cough  This is a new problem  Episode onset: 3-4 days ago  The problem has been unchanged  The problem occurs every few minutes  The cough is non-productive  Associated symptoms include nasal congestion, postnasal drip and a sore throat  Pertinent negatives include no chest pain, chills, ear pain, fever, headaches, hemoptysis, myalgias, rash, rhinorrhea, shortness of breath or wheezing  Nothing aggravates the symptoms  Risk factors: Non smoker  He has tried nothing for the symptoms  The treatment provided no relief  There is no history of asthma or COPD  Patient's granddaughter just tested positive for chronic virus  Patient has significant contact with granddaughter throughout the week    Review of Systems   Review of Systems   Constitutional: Negative for chills, diaphoresis, fatigue and fever  HENT: Positive for postnasal drip, sinus pressure, sinus pain and sore throat  Negative for congestion, ear pain, rhinorrhea, sneezing and trouble swallowing  Eyes: Negative  Respiratory: Positive for cough   Negative for hemoptysis, chest tightness, shortness of breath and wheezing  Cardiovascular: Negative  Negative for chest pain and palpitations  Gastrointestinal: Negative for abdominal pain, diarrhea, nausea and vomiting  Endocrine: Negative  Genitourinary: Negative for dysuria  Musculoskeletal: Negative  Negative for back pain, myalgias and neck pain  Skin: Negative for pallor and rash  Allergic/Immunologic: Negative  Neurological: Negative  Negative for light-headedness and headaches  Hematological: Negative  Psychiatric/Behavioral: Negative            Current Medications       Current Outpatient Medications:     ARIPiprazole (ABILIFY) 10 mg tablet, , Disp: , Rfl:     calcium citrate (CALCITRATE) 950 MG tablet, Take 1 tablet by mouth daily, Disp: , Rfl:     DULoxetine (CYMBALTA) 60 mg delayed release capsule, Take 20 mg by mouth daily, Disp: , Rfl:     ergocalciferol (VITAMIN D2) 50,000 units, Take 1 capsule (50,000 Units total) by mouth 2 (two) times a week with meals, Disp: 8 capsule, Rfl: 2    Multiple Vitamins-Minerals (MULTIVITAMIN ADULT PO), take 2 tablets by oral route daily, Disp: , Rfl:     topiramate (TOPAMAX) 100 mg tablet, 1 tab bid, Disp: 60 tablet, Rfl: 3    traMADol-acetaminophen (ULTRACET) 37 5-325 mg per tablet, TAKE 1 TABLET BY MOUTH EVERY 8HOURS AS NEEDED FOR MODERATE PAIN, Disp: 60 tablet, Rfl: 5    traZODone (DESYREL) 100 mg tablet, take 2 tablet by oral route  every night, Disp: , Rfl:     hydroxychloroquine (PLAQUENIL) 200 mg tablet, Take 1 tablet (200 mg total) by mouth 2 (two) times a day with meals (Patient taking differently: Take 200 mg by mouth daily ), Disp: 60 tablet, Rfl: 5    omeprazole (PriLOSEC) 20 mg delayed release capsule, Take 1 capsule (20 mg total) by mouth daily, Disp: 90 capsule, Rfl: 1    Current Allergies     Allergies as of 01/19/2021 - Reviewed 01/19/2021   Allergen Reaction Noted    No known allergies  08/04/2015            The following portions of the patient's history were reviewed and updated as appropriate: allergies, current medications, past family history, past medical history, past social history, past surgical history and problem list      Past Medical History:   Diagnosis Date    Anemia     b12    Arthritis     Back pain     Depression     GERD (gastroesophageal reflux disease)     Headache     Herniated cervical disc     Herniated lumbar intervertebral disc     Malabsorption syndrome     post gastric bypass    Obesity     Vitamin B12 deficiency 2014    Vitamin D deficiency 2014    Wears glasses        Past Surgical History:   Procedure Laterality Date    CARPAL TUNNEL RELEASE Bilateral     CHOLECYSTECTOMY      COLONOSCOPY  2014    normal exam    EGD  12/2019    pouchitis    ESOPHAGOGASTRODUODENOSCOPY  2015    biopsy taken,     GASTRIC BYPASS  2010    HEMORROIDECTOMY  06/2016    MN LAP,CHOLECYSTECTOMY/GRAPH N/A 4/26/2019    Procedure: LAPAROSCOPIC CHOLECYSTECTOMY;  Surgeon: Gerhardt Plate, MD;  Location: [address] [place] [address];  Service: General    VASECTOMY         Family History   Problem Relation Age of Onset    Asthma Mother     Hypertension Mother     Diabetes Mother         MELLITUS    Coronary artery disease Mother     Lung cancer Mother         COD    Diabetes Father         MELLITUS    Hypertension Father     Cancer Sister     Cancer Brother          Medications have been verified  Objective   Pulse (!) 50   Temp (!) 96 8 °F (36 °C)   Resp 17   Ht 5' 8" (1 727 m)   Wt 89 4 kg (197 lb)   SpO2 99%   BMI 29 95 kg/m²        Physical Exam     Physical Exam  Vitals signs and nursing note reviewed  Constitutional:       General: He is not in acute distress  Appearance: Normal appearance  He is well-developed  He is not ill-appearing or diaphoretic  HENT:      Head: Normocephalic and atraumatic        Right Ear: Tympanic membrane, ear canal and external ear normal       Left Ear: Tympanic membrane, ear canal and external ear normal       Nose: Congestion present  No rhinorrhea  Mouth/Throat:      Pharynx: Posterior oropharyngeal erythema present  No oropharyngeal exudate  Eyes:      General:         Right eye: No discharge  Left eye: No discharge  Conjunctiva/sclera: Conjunctivae normal    Neck:      Musculoskeletal: Normal range of motion and neck supple  Cardiovascular:      Rate and Rhythm: Normal rate and regular rhythm  Heart sounds: Normal heart sounds  Pulmonary:      Effort: Pulmonary effort is normal  No respiratory distress  Breath sounds: Normal breath sounds  No wheezing, rhonchi or rales  Lymphadenopathy:      Cervical: No cervical adenopathy  Skin:     General: Skin is warm  Capillary Refill: Capillary refill takes less than 2 seconds  Findings: No rash  Neurological:      Mental Status: He is alert

## 2021-03-04 NOTE — PROGRESS NOTES
Assessment/Plan:   his symptoms are related to rheumatoid arthritis, however during this time with COVID in the community patient was tested for COVID-19 infection  Statin prednisone 20 mg once a day for rheumatoid arthritis  Further treatment to follow-up after test results his back  No problem-specific Assessment & Plan notes found for this encounter  Diagnoses and all orders for this visit:    Rheumatoid arthritis involving both hands with positive rheumatoid factor (HCC)  -     predniSONE 20 mg tablet; Take 1 tablet (20 mg total) by mouth daily    Acute upper respiratory infection  -     Novel Coronavirus (COVID-19), PCR [facility] Collected in Office          Subjective:      Patient ID: Tanisha Aguero is a 60 y o  male  HPI  Patient  complains of cough congestion, sneezing, sore throat and swollen glands for 4 day(s)  Patient denies a history of chest pain and chills and denies a history of asthma  Patient denies smoke cigarettes  Patient tried OTC medications  patient suffers of rheumatoid arthritis and feels increased amount of pain in the joints of both hands  The following portions of the patient's history were reviewed and updated as appropriate: allergies, current medications, past family history, past medical history, past social history, past surgical history and problem list     Review of Systems   Constitutional: Positive for fatigue and fever  Negative for diaphoresis and unexpected weight change  HENT: Positive for rhinorrhea  Respiratory: Positive for cough  Negative for apnea, choking, chest tightness and shortness of breath  Cardiovascular: Negative for chest pain, palpitations and leg swelling  Gastrointestinal: Negative for abdominal distention, abdominal pain, anal bleeding, blood in stool and constipation  Musculoskeletal: Positive for arthralgias and myalgias  Negative for back pain, gait problem and joint swelling     Neurological: Negative for dizziness, facial asymmetry, light-headedness and headaches  Psychiatric/Behavioral: Negative for behavioral problems, dysphoric mood and self-injury  The patient is not nervous/anxious  Objective:      BP 90/60 (BP Location: Left arm, Patient Position: Sitting, Cuff Size: Standard)   Pulse 84   Temp 99 6 °F (37 6 °C) (Oral)   Resp 16   Ht 5' 9" (1 753 m)   Wt 93 4 kg (206 lb)   SpO2 98%   BMI 30 42 kg/m²          Physical Exam  Vitals signs and nursing note reviewed  Neck:      Musculoskeletal: No edema or neck rigidity  Thyroid: No thyroid mass or thyromegaly  Vascular: No carotid bruit or JVD  Trachea: No tracheal tenderness  Cardiovascular:      Rate and Rhythm: Normal rate and regular rhythm  No extrasystoles are present  Pulses: Normal pulses  Heart sounds: Normal heart sounds  Heart sounds not distant  No friction rub  Pulmonary:      Effort: Pulmonary effort is normal  No tachypnea or bradypnea  Breath sounds: Normal breath sounds  No stridor  Abdominal:      General: Bowel sounds are normal  There is no abdominal bruit  Palpations: Abdomen is soft  There is no hepatomegaly or splenomegaly  Hernia: No hernia is present  Musculoskeletal: Normal range of motion  Skin:     General: Skin is warm and dry  Neurological:      Mental Status: He is oriented to person, place, and time  Deep Tendon Reflexes: Reflexes are normal and symmetric  Psychiatric:         Behavior: Behavior normal          Thought Content:  Thought content normal          Judgment: Judgment normal

## 2021-03-10 PROBLEM — R06.00 DYSPNEA DUE TO COVID-19: Status: ACTIVE | Noted: 2021-01-01

## 2021-03-10 PROBLEM — U07.1 DYSPNEA DUE TO COVID-19: Status: ACTIVE | Noted: 2021-01-01

## 2021-03-10 NOTE — TELEPHONE ENCOUNTER
Patient called office stating that he is having SOB when he walks and has tightness in his chest  Per Dr Bianca Landeros patient should go to the ER to be evaluated ASAP  Patient told and will go to the ER

## 2021-03-10 NOTE — H&P
[facility]  H&P- Algis Moat 1965, 60 y o  male MRN: [id number]  Unit/Bed#: 7T [facility] 715-01 Encounter: [id number]  Primary Care Provider: Ronak Lara MD   Date and time admitted to hospital: 3/10/2021 11:09 AM    * Dyspnea due to COVID-19  Assessment & Plan  · Items started last week reported tested positive on 03/4/2021  Isolation should continue until 03/14/21  · His son tested positive as well  · CRP more than 160  · D-dimer less than 2 5  · CXR In the setting of clinically suspected/proven COVID-19, this plain film appearance does not contain findings that raise concern for viral pneumonia such as COVID-19, but does not rule out this diagnosis  · Group C anticoagulation for DVT prophylaxis Lovenox  · Admit under mild pathway for COVID pneumonia  · Start antibiotic given persistent cough and exertion dyspnea  · Lasix 40 mg IV 1 dose given crackles on exam  · Fortunately satting above 90 % on room air  · Already on prednisone for rheumatoid arthritis  · Vitamin D3, zinc and vitamin-C  Monitor procalcitonin and discontinue antibiotic if normal    Severe recurrent major depression without psychotic features [facility])  Assessment & Plan  Resume prehospital Abilify, Cymbalta and trazodone  Rheumatoid arthritis involving both hands with positive rheumatoid factor (HCC)  Assessment & Plan  Resume prehospital prednisone 20 mg daily and hydroxychloroquine 200 mg daily  VTE Prophylaxis: Enoxaparin (Lovenox)  / sequential compression device   Code Status:  Full code  POLST: POLST form is not discussed and not completed at this time  Discussion with family:  Discussed with patient  Anticipated Length of Stay:  Patient will be admitted on an Observation basis with an anticipated length of stay of  less than 2 midnights  Justification for Hospital Stay:  COVID pneumonia  Total Time for Visit, including Counseling / Coordination of Care: 45 minutes    Greater than 50% of this total time spent on direct patient counseling and coordination of care  Chief Complaint:   Shortness of breath    History of Present Illness:    Joselito Fowler is a 60 y o  male who presents with past medical history of rheumatoid arthritis, depression with psychosis and status post gastric bypass complaining of shortness of breath for the last week with exertion  He reported was tested positive for COVID pneumonia after having fever associated with exertional dyspnea last week  His son was also tested positive he denied having any travel recently  His symptoms associated with generalized fatigue and intermittent headache as well as productive cough of whitish sputum with blood tinge occasionally  Upon arrival to the ED he was satting above 92% on room air however desat to 89% with ambulation  Blood work noted for inflammatory markers CRP more than 160 and D-dimers less than 2  He will be admitted under mild pathway  Review of Systems:    Review of Systems   Constitutional: Positive for fatigue  Negative for fever  HENT: Negative for ear pain, rhinorrhea and sore throat  Eyes: Negative for pain and visual disturbance  Respiratory: Positive for cough and shortness of breath  Cardiovascular: Negative for chest pain and palpitations  Gastrointestinal: Negative for nausea and vomiting  Genitourinary: Negative for difficulty urinating and dysuria  Musculoskeletal: Negative for arthralgias and back pain  Skin: Negative for color change and rash  Neurological: Positive for headaches  Negative for numbness  Psychiatric/Behavioral: Negative for confusion and hallucinations  All other systems reviewed and are negative        Past Medical and Surgical History:     Past Medical History:   Diagnosis Date    Anemia     b12    Arthritis     Back pain     Depression     GERD (gastroesophageal reflux disease)     Headache     Herniated cervical disc     Herniated lumbar intervertebral disc     Malabsorption syndrome     post gastric bypass    Obesity     Vitamin B12 deficiency 2014    Vitamin D deficiency 2014    Wears glasses        Past Surgical History:   Procedure Laterality Date    CARPAL TUNNEL RELEASE Bilateral     CHOLECYSTECTOMY      COLONOSCOPY  2014    normal exam    EGD  12/2019    pouchitis    ESOPHAGOGASTRODUODENOSCOPY  2015    biopsy taken,     GASTRIC BYPASS  2010    HEMORROIDECTOMY  06/2016    CT LAP,CHOLECYSTECTOMY/GRAPH N/A 4/26/2019    Procedure: LAPAROSCOPIC CHOLECYSTECTOMY;  Surgeon: Epifanio Jose MD;  Location: [address] [place] [address] OR;  Service: General    VASECTOMY         Meds/Allergies:    Prior to Admission medications    Medication Sig Start Date End Date Taking?  Authorizing Provider   ARIPiprazole (ABILIFY) 10 mg tablet daily at bedtime  6/25/20  Yes Historical Provider, MD   DULoxetine (CYMBALTA) 60 mg delayed release capsule Take 20 mg by mouth daily   Yes Historical Provider, MD   hydroxychloroquine (PLAQUENIL) 200 mg tablet Take 1 tablet (200 mg total) by mouth daily 3/4/21 4/3/21 Yes Mateo Hernandez MD   predniSONE 20 mg tablet Take 1 tablet (20 mg total) by mouth daily 3/4/21  Yes Mateo Hernandez MD   traZODone (DESYREL) 100 mg tablet take 2 tablet by oral route  every night 6/12/17  Yes Historical Provider, MD   calcium citrate (CALCITRATE) 950 MG tablet Take 1 tablet by mouth daily    Historical Provider, MD   ergocalciferol (VITAMIN D2) 50,000 units Take 1 capsule (50,000 Units total) by mouth 2 (two) times a week with meals  Patient not taking: Reported on 3/10/2021 11/21/19   Anna Oliveros PA-C   Multiple Vitamins-Minerals (MULTIVITAMIN ADULT PO) take 2 tablets by oral route daily 9/23/16   Historical Provider, MD   omeprazole (PriLOSEC) 20 mg delayed release capsule Take 1 capsule (20 mg total) by mouth daily 12/13/19 4/20/20  Madison Murrieta PA-C   topiramate (TOPAMAX) 100 mg tablet 1 tab bid  Patient not taking: Reported on 3/10/2021 10/7/20   Gabbie Leger MD   traMADol-acetaminophen (ULTRACET) 37 5-325 mg per tablet TAKE 1 TABLET BY MOUTH EVERY 8HOURS AS NEEDED FOR MODERATE PAIN  Patient not taking: Reported on 3/10/2021 12/3/20   Kimberly Andrews MD     I have reviewed home medications with patient personally  Allergies: Allergies   Allergen Reactions    No Known Allergies        Social History:     Marital Status: /Civil Union   Occupation:  Unemployed on disability  Patient Pre-hospital Living Situation:  At home with family  Patient Pre-hospital Level of Mobility:  No assisted device  Patient Pre-hospital Diet Restrictions:  No restriction  Substance Use History:   Social History     Substance and Sexual Activity   Alcohol Use Yes    Alcohol/week: 3 0 standard drinks    Types: 3 Cans of beer per week    Frequency: Monthly or less    Drinks per session: 1 or 2    Binge frequency: Never    Comment: "socially"     Social History     Tobacco Use   Smoking Status Never Smoker   Smokeless Tobacco Never Used   Tobacco Comment    NO TOBACCO USE     Social History     Substance and Sexual Activity   Drug Use Not Currently       Family History:    non-contributory    Physical Exam:     Vitals:   Blood Pressure: 121/79 (03/10/21 1415)  Pulse: 77 (03/10/21 1415)  Temperature: (!) 97 1 °F (36 2 °C) (03/10/21 1415)  Temp Source: Temporal (03/10/21 1415)  Respirations: 20 (03/10/21 1415)  Height: 5' 9" (175 3 cm) (03/10/21 1415)  Weight - Scale: 91 2 kg (201 lb 1 oz) (03/10/21 1415)  SpO2: 96 % (03/10/21 1449)    Physical Exam  Vitals signs reviewed  Constitutional:       General: He is not in acute distress  Appearance: He is not ill-appearing, toxic-appearing or diaphoretic  HENT:      Head: Normocephalic and atraumatic        Right Ear: External ear normal       Left Ear: External ear normal       Mouth/Throat:      Comments: Avoided due to active coughing and COVID pneumonia  Eyes:      General:         Right eye: No discharge  Left eye: No discharge  Neck:      Musculoskeletal: Neck supple  No neck rigidity  Cardiovascular:      Rate and Rhythm: Normal rate and regular rhythm  Heart sounds: Normal heart sounds  No murmur  Pulmonary:      Effort: Pulmonary effort is normal  No respiratory distress  Breath sounds: Rales present  No wheezing  Abdominal:      General: Bowel sounds are normal  There is no distension  Palpations: Abdomen is soft  Tenderness: There is no abdominal tenderness  There is no guarding  Musculoskeletal:      Right lower leg: No edema  Left lower leg: No edema  Skin:     General: Skin is dry  Neurological:      General: No focal deficit present  Mental Status: He is alert and oriented to person, place, and time  Psychiatric:         Behavior: Behavior normal              Additional Data:     Lab Results: I have personally reviewed pertinent reports  Results from last 7 days   Lab Units 03/10/21  1136   WBC Thousand/uL 4 20*   HEMOGLOBIN g/dL 13 7   HEMATOCRIT % 41 4   PLATELETS Thousands/uL 108*   NEUTROS PCT % 72*   LYMPHS PCT % 21*   MONOS PCT % 7   EOS PCT % 0     Results from last 7 days   Lab Units 03/10/21  1136   SODIUM mmol/L 140   POTASSIUM mmol/L 3 5*   CHLORIDE mmol/L 103   CO2 mmol/L 30   BUN mg/dL 15   CREATININE mg/dL 0 97   ANION GAP mmol/L 7   CALCIUM mg/dL 9 0   GLUCOSE RANDOM mg/dL 110*                       Imaging: I have personally reviewed pertinent reports  XR chest 1 view portable   ED Interpretation by Arun Álvarez MD (03/10 1209)   No acute cardiopulmonary disease      Final Result by Marlin Amos DO (03/10 3355)      No acute cardiopulmonary disease  In the setting of clinically suspected/proven COVID-19, this plain film appearance does not contain findings that raise concern for viral pneumonia such as COVID-19, but does not rule out this diagnosis        Workstation performed: [id number] EKG, Pathology, and Other Studies Reviewed on Admission:       Allscripts / Epic Records Reviewed: Yes     ** Please Note: This note has been constructed using a voice recognition system   **

## 2021-03-10 NOTE — ASSESSMENT & PLAN NOTE
· Items started last week reported tested positive on 03/4/2021  Isolation should continue until 03/14/21  · His son tested positive as well  · CRP more than 160  · D-dimer less than 2 5  · CXR In the setting of clinically suspected/proven COVID-19, this plain film appearance does not contain findings that raise concern for viral pneumonia such as COVID-19, but does not rule out this diagnosis    · Group C anticoagulation for DVT prophylaxis Lovenox  · Admit under mild pathway for COVID pneumonia  · Start antibiotic given persistent cough and exertion dyspnea  · Lasix 40 mg IV 1 dose given crackles on exam  · Fortunately satting above 90 % on room air  · Already on prednisone for rheumatoid arthritis  · Vitamin D3, zinc and vitamin-C  Monitor procalcitonin and discontinue antibiotic if normal

## 2021-03-10 NOTE — ED PROVIDER NOTES
Date of Service: 11/23/2019    ENDOSCOPIST:  Tracy Salvador MD     ASSISTANT:  None     PROCEDURE: Colonoscopy     INSTRUMENT:  Olympus video colonoscope.     EXTENT OF EXAMINATION:  cecum, which was identified by IC valve and appendiceal orifice     LIMITATION OF EXAMINATION:  None.     MEDICATIONS:    Moderate sedation was administered with continuous monitoring of the patient by a trained caregiver under my direct supervision. Please refer to nursing documentation for doses of medications administered intravenously as well as the patient’s status during the procedure. Total sedation (intra-service) time was 20 minutes.    Fentanyl 75 micrograms and Versed 4 milligrams.    PREOPERATIVE DIAGNOSIS:    History of colon polyps. Last colonoscopy was in 2016 which showed multiple large polyps    POSTOPERATIVE DIAGNOSIS:   1. Large internal hemorrhoids  2. Small polyp in the sigmoid colon. This was removed using cold biopsy method  3. Left colon diverticulosis  4. Withdrawal time from the cecum to rectum was 8 minutes     DESCRIPTION OF PROCEDURE:   After explaining risks, benefits and alternatives to the patient including risk of perforation, bleeding, sedation and risk of missing polyps and mass lesions, a written consent was obtained.  The patient was placed in left lateral decubitus position and appropriate sedation was given.  Digital rectal examination was done which showed normal tone and no mass.  Scope was inserted in the rectum and all the way to the cecum with no difficulties.  Cecum was identified by the ileocecal valve and the appendiceal orifice.  Cecum, ascending, transverse, descending and sigmoid colon were examined very carefully upon inserting and withdrawal of the scope where no significant pathology was identified except for left colon diverticulosis and small polyp in the sigmoid colon which was removed using cold biopsy method.  Retroflexion in the rectum demonstrated no significant pathology except  History  Chief Complaint   Patient presents with    Breathing Problem     Covid positive and having increasing trouble with cough and breathing-went to PCP and sent to ER for evaluation  HPI  Patient is a 24-year-old male history of rheumatoid arthritis, obesity, gastric bypass, recent COVID diagnosis presenting for evaluation of dyspnea, chest tightness, hand pain  Patient states that for about the past week he has had worsening aching in his hands, fatigue, several days of worsening cough, shortness of breath, dyspnea on exertion  Patient states that he for started having symptoms of cough, headache, body aches about 10 days ago and had COVID status confirmed 7 days ago  Patient states that his cough is occasionally productive of white sputum, denies any thick purulent sputum, patient states continued fevers, chills, denies rigors, states that he has been able to continue with home activities despite shortness of breath  Prior to Admission Medications   Prescriptions Last Dose Informant Patient Reported? Taking?    ARIPiprazole (ABILIFY) 10 mg tablet 3/9/2021 at Unknown time Self Yes Yes   Sig: daily at bedtime    DULoxetine (CYMBALTA) 60 mg delayed release capsule 3/10/2021 at Unknown time Self Yes Yes   Sig: Take 20 mg by mouth daily   Multiple Vitamins-Minerals (MULTIVITAMIN ADULT PO) Not Taking at Unknown time Self Yes No   Sig: take 2 tablets by oral route daily   calcium citrate (CALCITRATE) 950 MG tablet Not Taking at Unknown time Self Yes No   Sig: Take 1 tablet by mouth daily   ergocalciferol (VITAMIN D2) 50,000 units Not Taking at Unknown time Self No No   Sig: Take 1 capsule (50,000 Units total) by mouth 2 (two) times a week with meals   Patient not taking: Reported on 3/10/2021   hydroxychloroquine (PLAQUENIL) 200 mg tablet 3/10/2021 at Unknown time  No Yes   Sig: Take 1 tablet (200 mg total) by mouth daily   omeprazole (PriLOSEC) 20 mg delayed release capsule  Self No No   Sig: Take 1 capsule (20 mg total) by mouth daily   predniSONE 20 mg tablet 3/10/2021 at Unknown time  No Yes   Sig: Take 1 tablet (20 mg total) by mouth daily   topiramate (TOPAMAX) 100 mg tablet Not Taking at Unknown time  No No   Si tab bid   Patient not taking: Reported on 3/10/2021   traMADol-acetaminophen (ULTRACET) 37 5-325 mg per tablet Not Taking at Unknown time  No No   Sig: TAKE 1 TABLET BY MOUTH EVERY 8HOURS AS NEEDED FOR MODERATE PAIN   Patient not taking: Reported on 3/10/2021   traZODone (DESYREL) 100 mg tablet 3/9/2021 at Unknown time Self Yes Yes   Sig: take 2 tablet by oral route  every night      Facility-Administered Medications: None       Past Medical History:   Diagnosis Date    Anemia     b12    Arthritis     Back pain     Depression     GERD (gastroesophageal reflux disease)     Headache     Herniated cervical disc     Herniated lumbar intervertebral disc     Malabsorption syndrome     post gastric bypass    Obesity     Vitamin B12 deficiency     Vitamin D deficiency 2014    Wears glasses        Past Surgical History:   Procedure Laterality Date    CARPAL TUNNEL RELEASE Bilateral     CHOLECYSTECTOMY      COLONOSCOPY      normal exam    EGD  2019    pouchitis    ESOPHAGOGASTRODUODENOSCOPY      biopsy taken,     GASTRIC BYPASS  2010    HEMORROIDECTOMY  2016    NC LAP,CHOLECYSTECTOMY/GRAPH N/A 2019    Procedure: LAPAROSCOPIC CHOLECYSTECTOMY;  Surgeon: Ame Edmond MD;  Location: [facility] MAIN OR;  Service: General    VASECTOMY         Family History   Problem Relation Age of Onset    Asthma Mother     Hypertension Mother     Diabetes Mother         MELLITUS    Coronary artery disease Mother     Lung cancer Mother         COD    Diabetes Father         MELLITUS    Hypertension Father     Cancer Sister     Cancer Brother      I have reviewed and agree with the history as documented      E-Cigarette/Vaping    E-Cigarette Use Never User      E-Cigarette/Vaping for internal hemorrhoids.  The procedure was tolerated well and terminated with no complications.    DIAGNOSIS AND IMPRESSION:  Colonoscopy for history of colon polyps with findings significant for single polyp. This was removed. A repeat colonoscopy is suggested in 5 years according to current guidelines.    SPECIMEN:  As above    ESTIMATED BLOOD LOSS:  Minimal     COMPLICATIONS:  None.     DISPOSITION:  The patient to be allowed to recover in the recovery area per protocols.         Substances     Social History     Tobacco Use    Smoking status: Never Smoker    Smokeless tobacco: Never Used    Tobacco comment: NO TOBACCO USE   Substance Use Topics    Alcohol use: Yes     Alcohol/week: 3 0 standard drinks     Types: 3 Cans of beer per week     Frequency: Monthly or less     Drinks per session: 1 or 2     Binge frequency: Never     Comment: "socially"    Drug use: Not Currently       Review of Systems   Constitutional: Negative for chills, fatigue and fever  HENT: Negative for congestion, rhinorrhea and sore throat  Eyes: Negative for photophobia and visual disturbance  Respiratory: Positive for cough and shortness of breath  Negative for apnea and chest tightness  Cardiovascular: Negative for chest pain, palpitations and leg swelling  Gastrointestinal: Negative for abdominal distention, abdominal pain, diarrhea, nausea and vomiting  Endocrine: Negative for polydipsia and polyuria  Genitourinary: Negative for dysuria and hematuria  Musculoskeletal: Negative for arthralgias and myalgias  Skin: Negative for color change, pallor, rash and wound  Neurological: Negative for weakness, numbness and headaches  Psychiatric/Behavioral: Negative for confusion  Physical Exam  Physical Exam  Vitals signs and nursing note reviewed  Constitutional:       General: He is not in acute distress  Appearance: He is well-developed  He is not diaphoretic  HENT:      Head: Normocephalic and atraumatic  Right Ear: External ear normal       Left Ear: External ear normal       Nose: Nose normal       Mouth/Throat:      Pharynx: No oropharyngeal exudate  Eyes:      Conjunctiva/sclera: Conjunctivae normal       Pupils: Pupils are equal, round, and reactive to light  Cardiovascular:      Rate and Rhythm: Normal rate and regular rhythm  Heart sounds: Normal heart sounds  No murmur  No friction rub  No gallop      Pulmonary:      Effort: Pulmonary effort is normal  No respiratory distress  Breath sounds: Normal breath sounds  No wheezing  Comments: Primarily right-sided fine rales on auscultation, tachypneic her respiratory rate the low 20s, no conversational dyspnea, satting about 94% on room air, desaturation to around 90%, on ambulation with worsening dyspnea, briefly dropping as low as 89, improvement following several minutes of rest  Chest:      Chest wall: No tenderness  Abdominal:      General: Bowel sounds are normal  There is no distension  Palpations: Abdomen is soft  There is no mass  Tenderness: There is no abdominal tenderness  There is no guarding or rebound  Musculoskeletal:         General: No deformity  Comments: No lower extremity erythema, swelling, calf tenderness   Skin:     General: Skin is warm and dry  Capillary Refill: Capillary refill takes less than 2 seconds  Neurological:      Mental Status: He is alert and oriented to person, place, and time     Psychiatric:         Behavior: Behavior normal          Vital Signs  ED Triage Vitals   Temperature Pulse Respirations Blood Pressure SpO2   03/10/21 1106 03/10/21 1106 03/10/21 1106 03/10/21 1106 03/10/21 1106   98 7 °F (37 1 °C) 85 20 150/89 94 %      Temp Source Heart Rate Source Patient Position - Orthostatic VS BP Location FiO2 (%)   03/10/21 1106 03/10/21 1106 03/10/21 1106 03/10/21 1106 --   Tympanic Monitor Sitting Left arm       Pain Score       03/10/21 1440       No Pain           Vitals:    03/10/21 1106 03/10/21 1240 03/10/21 1313 03/10/21 1415   BP: 150/89   121/79   Pulse: 85 73 80 77   Patient Position - Orthostatic VS: Sitting  Lying Lying         Visual Acuity      ED Medications  Medications   cholecalciferol (VITAMIN D3) tablet 2,000 Units (has no administration in time range)   ascorbic acid (VITAMIN C) tablet 1,000 mg (1,000 mg Oral Given 3/10/21 2024)   zinc sulfate (ZINCATE) capsule 220 mg (has no administration in time range)     Followed by multivitamin-minerals (CENTRUM) tablet 1 tablet (has no administration in time range)   enoxaparin (LOVENOX) subcutaneous injection 30 mg (30 mg Subcutaneous Given 3/10/21 2024)   cefTRIAXone (ROCEPHIN) IVPB (premix in dextrose) 1,000 mg 50 mL (1,000 mg Intravenous New Bag 3/10/21 1945)   doxycycline hyclate (VIBRAMYCIN) capsule 100 mg (100 mg Oral Given 3/10/21 1944)   aluminum-magnesium hydroxide-simethicone (MYLANTA) oral suspension 30 mL (has no administration in time range)   ondansetron (ZOFRAN) injection 4 mg (has no administration in time range)   acetaminophen (TYLENOL) tablet 650 mg (650 mg Oral Given 3/10/21 2043)   ARIPiprazole (ABILIFY) tablet 10 mg (has no administration in time range)   DULoxetine (CYMBALTA) delayed release capsule 20 mg (has no administration in time range)   hydroxychloroquine (PLAQUENIL) tablet 200 mg (has no administration in time range)   pantoprazole (PROTONIX) EC tablet 40 mg (has no administration in time range)   predniSONE tablet 20 mg (has no administration in time range)   traZODone (DESYREL) tablet 100 mg (has no administration in time range)   dextromethorphan-guaiFENesin (ROBITUSSIN DM) oral syrup 10 mL (has no administration in time range)   benzonatate (TESSALON PERLES) capsule 200 mg (has no administration in time range)   albuterol inhalation solution 2 5 mg (2 5 mg Nebulization Given 3/10/21 1237)   potassium chloride (K-DUR,KLOR-CON) CR tablet 40 mEq (40 mEq Oral Given 3/10/21 1238)   furosemide (LASIX) injection 40 mg (40 mg Intravenous Given 3/10/21 1948)       Diagnostic Studies  Results Reviewed     Procedure Component Value Units Date/Time    C-reactive protein [[id number]]  (Abnormal) Collected: 03/10/21 1136    Lab Status: Final result Specimen: Blood from Arm, Right Updated: 03/10/21 1410      9 mg/L     D-dimer, quantitative [[id number]]  (Abnormal) Collected: 03/10/21 1255    Lab Status: Final result Specimen: Blood from Arm, Left Updated: 03/10/21 1319     D-Dimer, Quant 0 53 ug/ml FEU     Troponin I [[id number]]  (Normal) Collected: 03/10/21 1136    Lab Status: Final result Specimen: Blood from Arm, Right Updated: 03/10/21 1212     Troponin I <0 01 ng/mL     Basic metabolic panel [[id number]]  (Abnormal) Collected: 03/10/21 1136    Lab Status: Final result Specimen: Blood from Arm, Right Updated: 03/10/21 1201     Sodium 140 mmol/L      Potassium 3 5 mmol/L      Chloride 103 mmol/L      CO2 30 mmol/L      ANION GAP 7 mmol/L      BUN 15 mg/dL      Creatinine 0 97 mg/dL      Glucose 110 mg/dL      Calcium 9 0 mg/dL      eGFR 86 ml/min/1 73sq m     Narrative:      [place] guidelines for Chronic Kidney Disease (CKD):     Stage 1 with normal or high GFR (GFR > 90 mL/min/1 73 square meters)    Stage 2 Mild CKD (GFR = 60-89 mL/min/1 73 square meters)    Stage 3A Moderate CKD (GFR = 45-59 mL/min/1 73 square meters)    Stage 3B Moderate CKD (GFR = 30-44 mL/min/1 73 square meters)    Stage 4 Severe CKD (GFR = 15-29 mL/min/1 73 square meters)    Stage 5 End Stage CKD (GFR <15 mL/min/1 73 square meters)  Note: GFR calculation is accurate only with a steady state creatinine    CBC and differential [[id number]]  (Abnormal) Collected: 03/10/21 1136    Lab Status: Final result Specimen: Blood from Arm, Right Updated: 03/10/21 1158     WBC 4 20 Thousand/uL      RBC 4 66 Million/uL      Hemoglobin 13 7 g/dL      Hematocrit 41 4 %      MCV 89 fL      MCH 29 3 pg      MCHC 33 0 g/dL      RDW 13 6 %      MPV 10 5 fL      Platelets 981 Thousands/uL      Neutrophils Relative 72 %      Lymphocytes Relative 21 %      Monocytes Relative 7 %      Eosinophils Relative 0 %      Basophils Relative 0 %      Neutrophils Absolute 3 00 Thousands/µL      Lymphocytes Absolute 0 90 Thousands/µL      Monocytes Absolute 0 30 Thousand/µL      Eosinophils Absolute 0 00 Thousand/µL      Basophils Absolute 0 00 Thousands/µL                  XR chest 1 view portable   ED Interpretation by Julia Mcintosh MD (03/10 1209)   No acute cardiopulmonary disease      Final Result by Berlin Kirby DO (03/10 1338)      No acute cardiopulmonary disease  In the setting of clinically suspected/proven COVID-19, this plain film appearance does not contain findings that raise concern for viral pneumonia such as COVID-19, but does not rule out this diagnosis  Workstation performed: [id number]                    Procedures  Procedures         ED Course  ED Course as of Mar 10 2230   Wed Mar 10, 2021   1208 No acute cardiopulmonary disease   XR chest 1 view portable                                           MDM  Number of Diagnoses or Management Options  COVID-19:   Dyspnea:   Hypoxia:   Diagnosis management comments: 60 M presenting for shortness of breath, AMARO in setting of COVID infection 10 days ago, desat'ing to 89 on ambulatory pulse ox with persistent dyspnea following this, CXR, EKG, basic labs and trop all normal, patient with persistent tachypnea in ED to high 20s, will admit for observation    Patient Progress  Patient progress: improved      Disposition  Final diagnoses:   COVID-19   Dyspnea   Hypoxia     Time reflects when diagnosis was documented in both MDM as applicable and the Disposition within this note     Time User Action Codes Description Comment    3/10/2021 12:46 PM Margette Saint Add [U07 1] COVID-19     3/10/2021 12:46 PM Margette Saint Add [R06 00] Dyspnea     3/10/2021 12:46 PM Margette Saint Add [R09 02] Hypoxia       ED Disposition     ED Disposition Condition Date/Time Comment    Admit Stable Wed Mar 10, 2021 12:46 PM Case was discussed with BUBBA and the patient's admission status was agreed to be Admission Status: observation status to the service of Dr Lalo Woodson           Follow-up Information    None         Current Discharge Medication List      CONTINUE these medications which have NOT CHANGED    Details ARIPiprazole (ABILIFY) 10 mg tablet daily at bedtime       DULoxetine (CYMBALTA) 60 mg delayed release capsule Take 20 mg by mouth daily      hydroxychloroquine (PLAQUENIL) 200 mg tablet Take 1 tablet (200 mg total) by mouth daily  Qty: 30 tablet, Refills: 5    Associated Diagnoses: Rheumatoid arthritis involving both hands with positive rheumatoid factor (HCC)      predniSONE 20 mg tablet Take 1 tablet (20 mg total) by mouth daily  Qty: 30 tablet, Refills: 0    Associated Diagnoses: Rheumatoid arthritis involving both hands with positive rheumatoid factor (HCC)      traZODone (DESYREL) 100 mg tablet take 2 tablet by oral route  every night      calcium citrate (CALCITRATE) 950 MG tablet Take 1 tablet by mouth daily      ergocalciferol (VITAMIN D2) 50,000 units Take 1 capsule (50,000 Units total) by mouth 2 (two) times a week with meals  Qty: 8 capsule, Refills: 2    Associated Diagnoses: Postsurgical malabsorption; Vitamin D deficiency      Multiple Vitamins-Minerals (MULTIVITAMIN ADULT PO) take 2 tablets by oral route daily      omeprazole (PriLOSEC) 20 mg delayed release capsule Take 1 capsule (20 mg total) by mouth daily  Qty: 90 capsule, Refills: 1    Associated Diagnoses: GERD (gastroesophageal reflux disease)      topiramate (TOPAMAX) 100 mg tablet 1 tab bid  Qty: 60 tablet, Refills: 3    Associated Diagnoses: Headaches due to old head injury; Weight gain following gastric bypass surgery      traMADol-acetaminophen (ULTRACET) 37 5-325 mg per tablet TAKE 1 TABLET BY MOUTH EVERY 8HOURS AS NEEDED FOR MODERATE PAIN  Qty: 60 tablet, Refills: 5    Associated Diagnoses: Chronic pain of left knee           No discharge procedures on file      PDMP Review       Value Time User    PDMP Reviewed  Yes 12/3/2020 11:50 AM Rick Cooper MD          ED Provider  Electronically Signed by           Frankie Sanchez MD  03/10/21 2157

## 2021-03-10 NOTE — PLAN OF CARE
Problem: PAIN - ADULT  Goal: Verbalizes/displays adequate comfort level or baseline comfort level  Description: Interventions:  - Encourage patient to monitor pain and request assistance  - Assess pain using appropriate pain scale  - Administer analgesics based on type and severity of pain and evaluate response  - Implement non-pharmacological measures as appropriate and evaluate response  - Consider cultural and social influences on pain and pain management  - Notify physician/advanced practitioner if interventions unsuccessful or patient reports new pain  Outcome: Progressing     Problem: INFECTION - ADULT  Goal: Absence or prevention of progression during hospitalization  Description: INTERVENTIONS:  - Assess and monitor for signs and symptoms of infection  - Monitor lab/diagnostic results  - Monitor all insertion sites, i e  indwelling lines, tubes, and drains  - Monitor endotracheal if appropriate and nasal secretions for changes in amount and color  - [place] appropriate cooling/warming therapies per order  - Administer medications as ordered  - Instruct and encourage patient and family to use good hand hygiene technique  - Identify and instruct in appropriate isolation precautions for identified infection/condition  Outcome: Progressing  Goal: Absence of fever/infection during neutropenic period  Description: INTERVENTIONS:  - Monitor WBC    Outcome: Progressing     Problem: SAFETY ADULT  Goal: Patient will remain free of falls  Description: INTERVENTIONS:  - Assess patient frequently for physical needs  -  Identify cognitive and physical deficits and behaviors that affect risk of falls    -  [place] fall precautions as indicated by assessment   - Educate patient/family on patient safety including physical limitations  - Instruct patient to call for assistance with activity based on assessment  - Modify environment to reduce risk of injury  - Consider OT/PT consult to assist with strengthening/mobility  Outcome: Progressing  Goal: Maintain or return to baseline ADL function  Description: INTERVENTIONS:  -  Assess patient's ability to carry out ADLs; assess patient's baseline for ADL function and identify physical deficits which impact ability to perform ADLs (bathing, care of mouth/teeth, toileting, grooming, dressing, etc )  - Assess/evaluate cause of self-care deficits   - Assess range of motion  - Assess patient's mobility; develop plan if impaired  - Assess patient's need for assistive devices and provide as appropriate  - Encourage maximum independence but intervene and supervise when necessary  - Involve family in performance of ADLs  - Assess for home care needs following discharge   - Consider OT consult to assist with ADL evaluation and planning for discharge  - Provide patient education as appropriate  Outcome: Progressing  Goal: Maintain or return mobility status to optimal level  Description: INTERVENTIONS:  - Assess patient's baseline mobility status (ambulation, transfers, stairs, etc )    - Identify cognitive and physical deficits and behaviors that affect mobility  - Identify mobility aids required to assist with transfers and/or ambulation (gait belt, sit-to-stand, lift, walker, cane, etc )  - [place] fall precautions as indicated by assessment  - Record patient progress and toleration of activity level on Mobility SBAR; progress patient to next Phase/Stage  - Instruct patient to call for assistance with activity based on assessment  - Consider rehabilitation consult to assist with strengthening/weightbearing, etc   Outcome: Progressing     Problem: DISCHARGE PLANNING  Goal: Discharge to home or other facility with appropriate resources  Description: INTERVENTIONS:  - Identify barriers to discharge w/patient and caregiver  - Arrange for needed discharge resources and transportation as appropriate  - Identify discharge learning needs (meds, wound care, etc )  - Arrange for interpretive services to assist at discharge as needed  - Refer to Case Management Department for coordinating discharge planning if the patient needs post-hospital services based on physician/advanced practitioner order or complex needs related to functional status, cognitive ability, or social support system  Outcome: Progressing     Problem: Knowledge Deficit  Goal: Patient/family/caregiver demonstrates understanding of disease process, treatment plan, medications, and discharge instructions  Description: Complete learning assessment and assess knowledge base    Interventions:  - Provide teaching at level of understanding  - Provide teaching via preferred learning methods  Outcome: Progressing

## 2021-03-11 NOTE — ASSESSMENT & PLAN NOTE
· Items started last week reported tested positive on 03/4/2021  Isolation should continue until 03/14/21  · His son tested positive as well  · CRP more than 160 trending down  · D-dimer less than 2 5  · CXR In the setting of clinically suspected/proven COVID-19, this plain film appearance does not contain findings that raise concern for viral pneumonia such as COVID-19, but does not rule out this diagnosis    · Group C anticoagulation for DVT prophylaxis Lovenox  · Admit under mild pathway for COVID pneumonia  · Discontinue antibiotic given normal procalcitonin  · Lasix 40 mg IV 1 dose given crackles on exam  · Fortunately satting above 90 % on room air  · Already on prednisone for rheumatoid arthritis  · Vitamin D3, zinc and vitamin-C for 1 week followed by daily multivitamin

## 2021-03-11 NOTE — NURSING NOTE
PT was D/C to home D/C instructions was given to PT and PT verbalized understanding of D/C instructions  All personal belonging was given to PT  IV was D/C   7 T staff accompany PT to lobby

## 2021-03-11 NOTE — UTILIZATION REVIEW
Initial Clinical Review    Admission: Date/Time/Statement:   Admission Orders (From admission, onward)     Ordered        03/10/21 1247  Place in Observation  Once                   Orders Placed This Encounter   Procedures    Place in Observation     Standing Status:   Standing     Number of Occurrences:   1     Order Specific Question:   Level of Care     Answer:   Med Surg [16]     ED Arrival Information     Expected Arrival Acuity Means of Arrival Escorted By Service Admission Type    - 3/10/2021 11:01 Urgent Walk-In Self General Medicine Urgent    Arrival Complaint    [address] SOB        Chief Complaint   Patient presents with    Breathing Problem     Covid positive and having increasing trouble with cough and breathing-went to PCP and sent to ER for evaluation  Assessment/Plan: 61 yo male with PMH  rheumatoid arthritis, depression with psychosis and s/p gastric bypass presents to ED with worsening Dyspnea, chest tightness, worsening aching in his hands, fatigue  Started with symptoms 10 days ago & tested positive for COVID 3/4  ED Findings: +rales, tachypnea, RA sat @ rest 94% with desats to 89 % with ambulation with worsening dyspnea  persistent tachypnea in ED to high 20s   9, D-Dimer 0 53,  K 3 5, WBC's 4 20, Platelets 367  CXR w/o acute findings  Admitted to Observation with Dyspnea due to COVID-19 and Plan is for mild covid pathway with Vitamins C,D,Zinc, Lasix 40 IV X 1, Abxs given persistent cough and exertion dyspnea & DC if procalcitonin is nl  Continue Prednisone for RA         ED Triage Vitals   Temperature Pulse Respirations Blood Pressure SpO2   03/10/21 1106 03/10/21 1106 03/10/21 1106 03/10/21 1106 03/10/21 1106   98 7 °F (37 1 °C) 85 20 150/89 94 %      Temp Source Heart Rate Source Patient Position - Orthostatic VS BP Location FiO2 (%)   03/10/21 1106 03/10/21 1106 03/10/21 1106 03/10/21 1106 --   Tympanic Monitor Sitting Left arm       Pain Score       03/10/21 1440 No Pain          Wt Readings from Last 1 Encounters:   03/10/21 91 2 kg (201 lb 1 oz)     Additional Vital Signs:   03/11/21 0700  99 °F (37 2 °C)  74  18  112/69  92 %  None (Room air) Lying   03/11/21 0300  --  --  --  --  93 %  None (Room air) --   03/10/21 2304  98 5 °F (36 9 °C)  93  17  123/77  92 %  None (Room air) --   03/10/21 1901  --  --  --  --  96 %  None (Room air) --   03/10/21 1830  --  --  --  --  94 %  None (Room air) --   03/10/21 1630  --  --  --  --  95 %  None (Room air) --   03/10/21 1449  --  --  --  --  96 %  None (Room air) --   03/10/21 1415  97 1 °F (36 2 °C)Abnormal   77  20  121/79  91 %  None (Room air) Lying   03/10/21 1313  --  80  20  --  97 %  -- Lying   03/10/21 1240  --  73  18  --  96 %  None (Room air) --       Pertinent Labs/Diagnostic Test Results:   Results from last 7 days   Lab Units 03/11/21  0611 03/10/21  1136   WBC Thousand/uL 4 50 4 20*   HEMOGLOBIN g/dL 13 5 13 7   HEMATOCRIT % 41 1 41 4   PLATELETS Thousands/uL 118* 108*   NEUTROS ABS Thousands/µL 3 40 3 00     Results from last 7 days   Lab Units 03/11/21  0611 03/10/21  1136   SODIUM mmol/L 139 140   POTASSIUM mmol/L 4 0 3 5*   CHLORIDE mmol/L 99 103   CO2 mmol/L 33* 30   ANION GAP mmol/L 7 7   BUN mg/dL 16 15   CREATININE mg/dL 1 04 0 97   EGFR ml/min/1 73sq m 79 86   CALCIUM mg/dL 9 0 9 0     Results from last 7 days   Lab Units 03/11/21  0611   AST U/L 68*   ALT U/L 34   ALK PHOS U/L 66   TOTAL PROTEIN g/dL 7 9   ALBUMIN g/dL 3 9   TOTAL BILIRUBIN mg/dL 0 60     Results from last 7 days   Lab Units 03/11/21  0611 03/10/21  1136   GLUCOSE RANDOM mg/dL 116* 110*     Results from last 7 days   Lab Units 03/10/21  1136   TROPONIN I ng/mL <0 01     Results from last 7 days   Lab Units 03/10/21  1255   D-DIMER QUANTITATIVE ug/ml FEU 0 53*     Results from last 7 days   Lab Units 03/10/21  2035   PROCALCITONIN ng/ml 0 13     Results from last 7 days   Lab Units 03/11/21  0611 03/10/21  1136   CRP mg/L 142 1* 162 9* 3/10 CXR:  No acute cardiopulmonary disease  In the setting of clinically suspected/proven COVID-19, this plain film appearance does not contain findings that raise concern for viral pneumonia such as COVID-19, but does not rule out this diagnosis     3/10 EKG:  Normal sinus rhythm  Possible Left atrial enlargement     ED Treatment:     Medication Administration from 03/10/2021 1101 to 03/10/2021 1350       Date/Time Order Dose Route Action     03/10/2021 1237 albuterol inhalation solution 2 5 mg 2 5 mg Nebulization Given     03/10/2021 1238 potassium chloride (K-DUR,KLOR-CON) CR tablet 40 mEq 40 mEq Oral Given        Past Medical History:   Diagnosis Date    Anemia     b12    Arthritis     Back pain     Depression     GERD (gastroesophageal reflux disease)     Headache     Herniated cervical disc     Herniated lumbar intervertebral disc     Malabsorption syndrome     post gastric bypass    Obesity     Vitamin B12 deficiency 2014    Vitamin D deficiency 2014    Wears glasses      Present on Admission:   Dyspnea due to COVID-19   Rheumatoid arthritis involving both hands with positive rheumatoid factor (HCC)   Severe recurrent major depression without psychotic features ([facility] [address] )      Admitting Diagnosis: Dyspnea [R06 00]  Breathing problem [R06 9]  Hypoxia [R09 02]  COVID-19 [U07 1]  Age/Sex: 60 y o  male     Admission Orders:  Scheduled Medications:  ARIPiprazole, 10 mg, Oral, HS  ascorbic acid, 1,000 mg, Oral, Q12H WILLAM  cefTRIAXone  1,000 mg 50 mL,  IV Daily  DC'd 3/11  cholecalciferol, 2,000 Units, Oral, Daily  doxycycline hyclate capsule 100 mg,  Oral,  q12H  DC 3/11  DULoxetine, 20 mg, Oral, Daily  enoxaparin, 30 mg, Subcutaneous, Q12H WILLAM  furosemide  injection 40 mg IV x 1  3/10  hydroxychloroquine, 200 mg, Oral, Daily  zinc sulfate, 220 mg, Oral, Daily    Followed by  Pankaj Nails ON 3/18/2021] multivitamin-minerals, 1 tablet, Oral, Daily  pantoprazole, 40 mg, Oral, Early Morning  predniSONE, 20 mg, Oral, Daily  traZODone, 100 mg, Oral, HS    Continuous IV Infusions:  PRN Meds:  acetaminophen, 650 mg, Oral, Q6H PRN x 1 3/10,  X 1 3/11  aluminum-magnesium hydroxide-simethicone, 30 mL, Oral, Q6H PRN  benzonatate, 200 mg, Oral, TID PRN  dextromethorphan-guaiFENesin, 10 mL, Oral, Q4H PRN  X 1 3/10  ondansetron, 4 mg, Intravenous, Q6H PRN    SCDs  IP CONSULT TO CASE MANAGEMENT    Network Utilization Review Department  ATTENTION: Please call with any questions or concerns to [phone] and carefully listen to the prompts so that you are directed to the right person  All voicemails are confidential   Shanika Henriquez all requests for admission clinical reviews, approved or denied determinations and any other requests to dedicated fax number below belonging to the campus where the patient is receiving treatment   List of dedicated fax numbers for the Facilities:  [address] DENIALS (Administrative/Medical Necessity) [phone]   [address] (Maternity/NICU/Pediatrics) [phone] [address] [place] [address] Dr Mike Luna [phone] (Kenney Saavedra "Katrina" 103) [address] [place] [address] Carrie Read 1481 [address] [place] [address]) [facility] [place] [address] [phone]

## 2021-03-11 NOTE — DISCHARGE SUMMARY
[facility]  Discharge- Cristina Adams 1965, 60 y o  male MRN: [id number]  Unit/Bed#: 7T [facility] 715-01 Encounter: [id number]  Primary Care Provider: Dipti Preciado MD   Date and time admitted to hospital: 3/10/2021 11:09 AM    * Dyspnea due to COVID-19  Assessment & Plan  · Items started last week reported tested positive on 03/4/2021  Isolation should continue until 03/14/21  · His son tested positive as well  · CRP more than 160 trending down  · D-dimer less than 2 5  · CXR In the setting of clinically suspected/proven COVID-19, this plain film appearance does not contain findings that raise concern for viral pneumonia such as COVID-19, but does not rule out this diagnosis  · Group C anticoagulation for DVT prophylaxis Lovenox  · Admit under mild pathway for COVID pneumonia  · Discontinue antibiotic given normal procalcitonin  · Lasix 40 mg IV 1 dose given crackles on exam  · Fortunately satting above 90 % on room air  · Already on prednisone for rheumatoid arthritis  · Vitamin D3, zinc and vitamin-C for 1 week followed by daily multivitamin      Severe recurrent major depression without psychotic features [facility])  Assessment & Plan  Resume prehospital Abilify, Cymbalta and trazodone  Rheumatoid arthritis involving both hands with positive rheumatoid factor (HCC)  Assessment & Plan  Resume prehospital prednisone 20 mg daily and hydroxychloroquine 200 mg daily  Discharging Physician / Practitioner: Sharla Baker MD  PCP: Ditpi Preciado MD  Admission Date:   Admission Orders (From admission, onward)     Ordered        03/10/21 1247  Place in Observation  Once                   Discharge Date: 03/11/21    Resolved Problems  Date Reviewed: 3/11/2021    None          Consultations During Hospital Stay:  · None    Procedures Performed:   · Chest x-ray    Significant Findings / Test Results:   No acute cardiopulmonary disease    In the setting of clinically suspected/proven COVID-19, this plain film appearance does not contain findings that raise concern for viral pneumonia such as COVID-19, but does not rule out this diagnosis  Incidental Findings:   · None     Test Results Pending at Discharge (will require follow up): · None     Outpatient Tests Requested:  · None    Complications:  None    Reason for Admission:  COVID-19 pneumonia    Hospital Course:     Elliot Ford is a 60 y o  male patient who originally presented to the hospital on 3/10/2021 due to shortness breath  Chest x-ray did not show any consolidation  He was satting above 92% through out hospitalization  Initially started on ceftriaxone and doxycycline discontinued after normal procalcitonin  He remains hemodynamically stable nontoxic  He felt improvement in his symptoms on the day of discharge including cough and shortness of breath  He was treated under mild pathway for COVID pneumonia per our protocol  Recommended to continue isolation through [id number] days given mild disease  Patient agreed with discharge plan and to follow-up with PCP in 3 days  Please see above list of diagnoses and related plan for additional information  Condition at Discharge: good     Discharge Day Visit / Exam:     Subjective:  Patient seen examined at bedside he reported improvement in his shortness of breath  Vitals: Blood Pressure: 117/70 (03/11/21 1143)  Pulse: 71 (03/11/21 1143)  Temperature: 99 4 °F (37 4 °C) (03/11/21 1143)  Temp Source: Oral (03/11/21 1143)  Respirations: 18 (03/11/21 1143)  Height: 5' 9" (175 3 cm) (03/10/21 1415)  Weight - Scale: 91 2 kg (201 lb 1 oz) (03/10/21 1415)  SpO2: 95 % (03/11/21 1143)  Exam:   Physical Exam  Vitals signs reviewed  Constitutional:       General: He is not in acute distress  Appearance: He is obese  He is not ill-appearing  Eyes:      General:         Right eye: No discharge  Left eye: No discharge     Cardiovascular:      Rate and Rhythm: Normal rate and regular rhythm  Heart sounds: Normal heart sounds  No murmur  Pulmonary:      Effort: Pulmonary effort is normal  No respiratory distress  Breath sounds: Normal breath sounds  No wheezing  Abdominal:      General: Bowel sounds are normal  There is no distension  Palpations: Abdomen is soft  Skin:     General: Skin is dry  Neurological:      Mental Status: He is alert and oriented to person, place, and time  Psychiatric:         Behavior: Behavior normal          Discussion with Family:  Discussed with patient using Hungarian interpretation service Vik Rojas # [phone])  Discharge instructions/Information to patient and family:   See after visit summary for information provided to patient and family  Provisions for Follow-Up Care:  See after visit summary for information related to follow-up care and any pertinent home health orders  Disposition:     Home    For Discharges to [facility] SNF:   · Not Applicable to this Patient - Not Applicable to this Patient    Planned Readmission:  None     Discharge Statement:  I spent 20 minutes discharging the patient  This time was spent on the day of discharge  I had direct contact with the patient on the day of discharge  Greater than 50% of the total time was spent examining patient, answering all patient questions, arranging and discussing plan of care with patient as well as directly providing post-discharge instructions  Additional time then spent on discharge activities  Discharge Medications:  See after visit summary for reconciled discharge medications provided to patient and family        ** Please Note: This note has been constructed using a voice recognition system **

## 2021-03-13 PROBLEM — U07.1 PNEUMONIA DUE TO COVID-19 VIRUS: Status: ACTIVE | Noted: 2021-01-01

## 2021-03-13 PROBLEM — J96.01 ACUTE RESPIRATORY FAILURE WITH HYPOXIA (HCC): Status: ACTIVE | Noted: 2021-01-01

## 2021-03-13 PROBLEM — J12.82 PNEUMONIA DUE TO COVID-19 VIRUS: Status: ACTIVE | Noted: 2021-01-01

## 2021-03-13 NOTE — ED NOTES
Non-rebreather on patient patient moved to room 12 (negative pressure room    Report to [address]  Mark Abad, RN  03/13/21 5405

## 2021-03-13 NOTE — RESPIRATORY THERAPY NOTE
Pt was put on vapotherm prior to transfer of 55%O2 25 LPM and 33C temp and proing and sating at 97%, pt is transfer to [facility]

## 2021-03-13 NOTE — ED NOTES
Pt switched to a non-rebreather at 15 L per Dr Karen Tanner   Pt taken to CT scan      Lambert Bosworth, RN  03/13/21 9356

## 2021-03-13 NOTE — H&P
[address] [facility]  H&P- Jared Alves 1965, 60 y o  male MRN: [id number]  Unit/Bed#: [address] 225-01 Encounter: [id number]  Primary Care Provider: Bryant Sarabia MD   Date and time admitted to hospital: 3/13/2021  4:32 PM    * Pneumonia due to COVID-19 virus  Assessment & Plan  Severe COVID PNA on high flow  CT shows sig b/l opacities  D-Dimer 1, CTA neg for PE  Severe COVID protocol  High flow oxygen  Rocephin, doxy  Check inflammatory markers  IV steroids, plasma ordered  Pulm consult, defer actemra, will check HIV and chronic hep panel and order bowel regimen  Self proning, ICS and OOB  DVTppx      Acute respiratory failure with hypoxia (HCC)  Assessment & Plan  Covid pna  High flow oxygen, wean as able  See above in COVID problem    Severe recurrent major depression without psychotic features (HCC)  Assessment & Plan  Continue abilify, cymbalta, trazodone for sleep    Rheumatoid arthritis involving both hands with positive rheumatoid factor (HCC)  Assessment & Plan  Continue home dose of plaquenil 200mg daily    VTE Prophylaxis: Enoxaparin (Lovenox)  / sequential compression device   Code Status: Full Code  POLST: There is no POLST form on file for this patient (pre-hospital)  Discussion with family: Patient    Anticipated Length of Stay:  Patient will be admitted on an Inpatient basis with an anticipated length of stay of  2 midnights  Justification for Hospital Stay: COVID PNA, resp failure    Total Time for Visit, including Counseling / Coordination of Care: 30 minutes  Greater than 50% of this total time spent on direct patient counseling and coordination of care  Chief Complaint:   SOB    History of Present Illness:    Jared Alves is a 60 y o  male who presents with SOB  Hx of depression and rheumatoid arthritis  Found to be COVID positive, transfer from [address] [place] [address] due to not having ICU back up  Currently SOB on 50L high flow at 50%   Denies any CP, N/V, abd pain or swelling  CTA chest shows extensive opacities bilaterally  Review of Systems:    Review of Systems   Constitutional: Negative for activity change, appetite change, chills and fever  HENT: Negative for congestion, sinus pressure and sore throat  Eyes: Negative for pain and discharge  Respiratory: Positive for cough and shortness of breath  Negative for wheezing  Cardiovascular: Negative for chest pain, palpitations and leg swelling  Gastrointestinal: Negative for abdominal distention, abdominal pain, blood in stool, diarrhea, nausea and vomiting  Endocrine: Negative for cold intolerance, heat intolerance, polydipsia, polyphagia and polyuria  Genitourinary: Negative for dysuria, frequency, hematuria and urgency  Musculoskeletal: Negative for arthralgias and back pain  Skin: Negative for color change and pallor  Neurological: Negative for seizures, syncope and weakness  Psychiatric/Behavioral: Negative for agitation and confusion         Past Medical and Surgical History:     Past Medical History:   Diagnosis Date    Anemia     b12    Arthritis     Back pain     Depression     GERD (gastroesophageal reflux disease)     Headache     Herniated cervical disc     Herniated lumbar intervertebral disc     Malabsorption syndrome     post gastric bypass    Obesity     Vitamin B12 deficiency 2014    Vitamin D deficiency 2014    Wears glasses        Past Surgical History:   Procedure Laterality Date    CARPAL TUNNEL RELEASE Bilateral     CHOLECYSTECTOMY      COLONOSCOPY  2014    normal exam    EGD  12/2019    pouchitis    ESOPHAGOGASTRODUODENOSCOPY  2015    biopsy taken,     GASTRIC BYPASS  2010    HEMORROIDECTOMY  06/2016    ND LAP,CHOLECYSTECTOMY/GRAPH N/A 4/26/2019    Procedure: LAPAROSCOPIC CHOLECYSTECTOMY;  Surgeon: Candice Barksdale MD;  Location: [facility] MAIN OR;  Service: General    VASECTOMY         Meds/Allergies:    Prior to Admission medications    Medication Sig Start Date End Date Taking?  Authorizing Provider   acetaminophen (TYLENOL) 325 mg tablet Take 2 tablets (650 mg total) by mouth every 6 (six) hours as needed for mild pain 3/11/21   Gabriella Ragland MD   aluminum-magnesium hydroxide-simethicone (MYLANTA) 200-200-20 mg/5 mL suspension Take 30 mL by mouth every 6 (six) hours as needed for indigestion or heartburn  Patient not taking: Reported on 3/13/2021 3/11/21   Gabriella Ragland MD   ARIPiprazole (ABILIFY) 10 mg tablet daily at bedtime  6/25/20   Historical Provider, MD   ascorbic acid (VITAMIN C) 1000 MG tablet Take 1 tablet (1,000 mg total) by mouth every 12 (twelve) hours for 12 doses  Patient not taking: Reported on 3/13/2021 3/11/21 3/17/21  Gabriella Ragland MD   benzonatate (TESSALON) 200 MG capsule Take 1 capsule (200 mg total) by mouth 3 (three) times a day as needed for cough 3/11/21   Gabriella Ragland MD   calcium citrate (CALCITRATE) 950 MG tablet Take 1 tablet by mouth daily    Historical Provider, MD   cholecalciferol (VITAMIN D3) 1,000 units tablet Take 2 tablets (2,000 Units total) by mouth daily  Patient not taking: Reported on 3/13/2021 3/12/21   Gabriella Ragland MD   dextromethorphan-guaiFENesin (ROBITUSSIN DM)  mg/5 mL syrup Take 10 mL by mouth every 4 (four) hours as needed for cough  Patient not taking: Reported on 3/13/2021 3/11/21   Gabriella Ragland MD   DULoxetine (CYMBALTA) 60 mg delayed release capsule Take 60 mg by mouth 2 (two) times a day     Historical Provider, MD   hydroxychloroquine (PLAQUENIL) 200 mg tablet Take 1 tablet (200 mg total) by mouth daily 3/4/21 4/3/21  Radha Givens MD   Multiple Vitamins-Minerals (MULTIVITAMIN ADULT PO) take 2 tablets by oral route daily 9/23/16   Historical Provider, MD   predniSONE 20 mg tablet Take 1 tablet (20 mg total) by mouth daily 3/4/21   Radha Givens MD   traZODone (DESYREL) 100 mg tablet take 2 tablet by oral route  every night 6/12/17   Historical Provider, MD   zinc sulfate (ZINCATE) 220 mg capsule Take 1 capsule (220 mg total) by mouth daily for 6 doses  Patient not taking: Reported on 3/13/2021 3/12/21 3/18/21  Jessica Garcia MD   omeprazole (PriLOSEC) 20 mg delayed release capsule Take 1 capsule (20 mg total) by mouth daily 12/13/19 3/13/21  Madison Murrieta PA-C     I have reviewed home medications with patient personally  Allergies: Allergies   Allergen Reactions    No Known Allergies        Social History:     Marital Status: /Civil Union   Occupation: Retired  Patient Pre-hospital Living Situation: Home  Patient Pre-hospital Level of Mobility: Ambulatory  Patient Pre-hospital Diet Restrictions: None  Substance Use History:   Social History     Substance and Sexual Activity   Alcohol Use Not Currently    Alcohol/week: 3 0 standard drinks    Types: 3 Cans of beer per week    Frequency: Monthly or less    Drinks per session: 1 or 2    Binge frequency: Never    Comment: "socially"     Social History     Tobacco Use   Smoking Status Never Smoker   Smokeless Tobacco Never Used   Tobacco Comment    NO TOBACCO USE     Social History     Substance and Sexual Activity   Drug Use Not Currently       Family History:    Family History   Problem Relation Age of Onset    Asthma Mother     Hypertension Mother     Diabetes Mother         MELLITUS    Coronary artery disease Mother     Lung cancer Mother         COD    Diabetes Father         MELLITUS    Hypertension Father     Cancer Sister     Cancer Brother        Physical Exam:     Vitals:   Blood Pressure: 137/73 (03/13/21 1700)  Pulse: 72 (03/13/21 1700)  Temperature: 98 2 °F (36 8 °C) (03/13/21 1649)  Respirations: (!) 24 (03/13/21 1700)  SpO2: 93 % (03/13/21 1700)    Physical Exam  Vitals signs and nursing note reviewed  Constitutional:       Appearance: Normal appearance  He is normal weight  HENT:      Head: Normocephalic and atraumatic     Eyes:      Conjunctiva/sclera: Conjunctivae normal  Pupils: Pupils are equal, round, and reactive to light  Cardiovascular:      Rate and Rhythm: Normal rate and regular rhythm  Heart sounds: Normal heart sounds  Pulmonary:      Breath sounds: Rhonchi present  No wheezing  Comments: Decrease breath sounds  Abdominal:      General: Bowel sounds are normal  There is no distension  Palpations: Abdomen is soft  Tenderness: There is no abdominal tenderness  Musculoskeletal:         General: No swelling  Skin:     General: Skin is warm and dry  Neurological:      General: No focal deficit present  Mental Status: He is alert  Additional Data:     Lab Results: I have personally reviewed pertinent reports  Results from last 7 days   Lab Units 03/13/21  1221 03/11/21  0611   WBC Thousand/uL 11 30* 4 50   HEMOGLOBIN g/dL 13 5 13 5   HEMATOCRIT % 41 4 41 1   PLATELETS Thousands/uL 150 118*   BANDS PCT % 3  --    NEUTROS PCT %  --  75*   LYMPHS PCT %  --  18*   LYMPHO PCT % 6*  --    MONOS PCT %  --  7   MONO PCT % 2  --    EOS PCT %  --  0     Results from last 7 days   Lab Units 03/13/21  1221   SODIUM mmol/L 138   POTASSIUM mmol/L 3 5*   CHLORIDE mmol/L 102   CO2 mmol/L 27   BUN mg/dL 12   CREATININE mg/dL 1 06   ANION GAP mmol/L 9   CALCIUM mg/dL 8 8   ALBUMIN g/dL 3 8   TOTAL BILIRUBIN mg/dL 0 70   ALK PHOS U/L 76   ALT U/L 37   AST U/L 53   GLUCOSE RANDOM mg/dL 149*     Results from last 7 days   Lab Units 03/13/21  1221   INR  1 08             Results from last 7 days   Lab Units 03/13/21  1221 03/11/21  0611 03/10/21  2035   LACTIC ACID mmol/L 1 5  --   --    PROCALCITONIN ng/ml  --  0 11 0 13       Imaging: I have personally reviewed pertinent reports  No orders to display       EKG, Pathology, and Other Studies Reviewed on Admission:   · EKG: ordered, previous NSR on 03/10/21    [employer] Records Reviewed: Yes     ** Please Note: This note has been constructed using a voice recognition system   **

## 2021-03-13 NOTE — ASSESSMENT & PLAN NOTE
Severe COVID PNA on high flow  CT shows sig b/l opacities  D-Dimer 1, CTA neg for PE  Severe COVID protocol  High flow oxygen  Rocephin, doxy  Check inflammatory markers  IV steroids, plasma ordered  Pulm consult, defer actemra, will check HIV and chronic hep panel and order bowel regimen  Self proning, ICS and OOB  DVTppx

## 2021-03-13 NOTE — ED NOTES
Pt aware of transfer to [address] [place] [address] Rd here and report given     Salome Hernandez, TIM  03/13/21 7484

## 2021-03-13 NOTE — ED NOTES
Patient has crackles 1/2 to 3/4 up by laterally )2 sat 89-93% on room air  reviewed with Dr Joe Joseph, TIM  03/13/21 1495

## 2021-03-13 NOTE — EMTALA/ACUTE CARE TRANSFER
[facility] EMERGENCY DEPARTMENT  [address] [facility] [address]  [phone]  Dept: [facility] TRANSFER CONSENT    NAME Jared Alves                                         1965                              MRN [id number]    I have been informed of my rights regarding examination, treatment, and transfer   by Dr Luis Kurtz DO    Benefits: Specialized equipment and/or services available at the receiving facility (Include comment)________________________    Risks: Potential for delay in receiving treatment, Loss of IV, Potential deterioration of medical condition, Increased discomfort during transfer, Possible worsening of condition or death during transfer      Consent for Transfer:  I acknowledge that my medical condition has been evaluated and explained to me by the emergency department physician or other qualified medical person and/or my attending physician, who has recommended that I be transferred to the service of  Accepting Physician: Dr Eliane Kelly at [address] [place] [address] Name, [address] : [place] [address]  The above potential benefits of such transfer, the potential risks associated with such transfer, and the probable risks of not being transferred have been explained to me, and I fully understand them  The doctor has explained that, in my case, the benefits of transfer outweigh the risks  I agree to be transferred  I authorize the performance of emergency medical procedures and treatments upon me in both transit and upon arrival at the receiving facility  Additionally, I authorize the release of any and all medical records to the receiving facility and request they be transported with me, if possible  I understand that the safest mode of transportation during a medical emergency is an ambulance and that the Hospital advocates the use of this mode of transport   Risks of traveling to the receiving facility by car, including absence of medical control, life sustaining equipment, such as oxygen, and medical personnel has been explained to me and I fully understand them  (JOSEPH CORRECT BOX BELOW)  [  ]  I consent to the stated transfer and to be transported by ambulance/helicopter  [  ]  I consent to the stated transfer, but refuse transportation by ambulance and accept full responsibility for my transportation by car  I understand the risks of non-ambulance transfers and I exonerate the Hospital and its staff from any deterioration in my condition that results from this refusal     X___________________________________________    DATE  21  TIME________  Signature of patient or legally responsible individual signing on patient behalf           RELATIONSHIP TO PATIENT_________________________          Provider Certification    NAME Em Ugarte                                         1965                              MRN [id number]    A medical screening exam was performed on the above named patient  Based on the examination:    Condition Necessitating Transfer The primary encounter diagnosis was COVID-19  A diagnosis of Hypoxia was also pertinent to this visit      Patient Condition: The patient has been stabilized such that within reasonable medical probability, no material deterioration of the patient condition or the condition of the unborn child(tay) is likely to result from the transfer    Reason for Transfer: Level of Care needed not available at this facility    Transfer Requirements: [address]   · Space available and qualified personnel available for treatment as acknowledged by    · Agreed to accept transfer and to provide appropriate medical treatment as acknowledged by       Dr Trupti Pereira  · Appropriate medical records of the examination and treatment of the patient are provided at the time of transfer   [address] _______  · Transfer will be performed by qualified personnel from and appropriate transfer equipment as required, including the use of necessary and appropriate life support measures  Provider Certification: I have examined the patient and explained the following risks and benefits of being transferred/refusing transfer to the patient/family:  General risk, such as traffic hazards, adverse weather conditions, rough terrain or turbulence, possible failure of equipment (including vehicle or aircraft), or consequences of actions of persons outside the control of the transport personnel      Based on these reasonable risks and benefits to the patient and/or the unborn child(tay), and based upon the information available at the time of the patients examination, I certify that the medical benefits reasonably to be expected from the provision of appropriate medical treatments at another medical facility outweigh the increasing risks, if any, to the individuals medical condition, and in the case of labor to the unborn child, from effecting the transfer      X____________________________________________ DATE 03/13/21        TIME_______      ORIGINAL - SEND TO MEDICAL RECORDS   COPY - SEND WITH PATIENT DURING TRANSFER

## 2021-03-14 NOTE — CONSULTS
Pulmonary Consultation   Bayron Wells 60 y o  male MRN: [id number]   [id number] Encounter: [id number]      Reason for consultation:     Acute hypoxemic respiratory failure and COVID-19 pneumonia      Requesting physician:    Quincy Varela MD      Impressions:     acute hypoxemic respiratory failure     COVID 19 pneumonia  Recommendations:     Titrate oxygen to maintain O2 saturation above 85%     Treatment per the  severe pathway     No Actemra at this point in time  History of Present Illness   HPI:  Bayron Wells is a 60 y o  male who  Was admitted yesterday with acute hypoxemic respiratory failure due to COVID-19 pneumonia  The patient was recently diagnosed with COVID-19  He was at home and required no oxygen  However his breathing started to get worse and when he presented to the emergency room he was found to be hypoxic  He was placed on oxygen and was admitted  He presented initially to [facility] Emergency Department and was transferred to [facility] for further management  Today the patient is feeling better  He has occasional cough  He is producing whitish sputum  Denies hemoptysis  Denies pain  The patient was admitted yesterday  He was started on the severe pathway for COVID-19 treatment  He received convalescent plasma yesterday  Review of systems:  12 point review of systems was completed and was otherwise negative except as listed in HPI        Historical Information   Past Medical History:   Diagnosis Date    Anemia     b12    Arthritis     Back pain     Depression     GERD (gastroesophageal reflux disease)     Headache     Herniated cervical disc     Herniated lumbar intervertebral disc     Malabsorption syndrome     post gastric bypass    Obesity     Vitamin B12 deficiency 2014    Vitamin D deficiency 2014    Wears glasses      Past Surgical History:   Procedure Laterality Date    CARPAL TUNNEL RELEASE Bilateral  CHOLECYSTECTOMY      COLONOSCOPY  2014    normal exam    EGD  12/2019    pouchitis    ESOPHAGOGASTRODUODENOSCOPY  2015    biopsy taken,     GASTRIC BYPASS  2010    HEMORROIDECTOMY  06/2016    DC LAP,CHOLECYSTECTOMY/GRAPH N/A 4/26/2019    Procedure: LAPAROSCOPIC CHOLECYSTECTOMY;  Surgeon: Edmond Washburn MD;  Location: [address] [place] [address];  Service: General    VASECTOMY       Family History   Problem Relation Age of Onset    Asthma Mother     Hypertension Mother     Diabetes Mother         MELLITUS    Coronary artery disease Mother     Lung cancer Mother         COD    Diabetes Father         MELLITUS    Hypertension Father     Cancer Sister     Cancer Brother        Family History: The family history significant for bronchial asthma and lung cancer in his mother  Social History:    The patient denied tobacco use  He was living at home        Meds/Allergies   Current Facility-Administered Medications   Medication Dose Route Frequency    ARIPiprazole (ABILIFY) tablet 10 mg  10 mg Oral HS    ascorbic acid (VITAMIN C) tablet 1,000 mg  1,000 mg Oral Q12H [facility]    atorvastatin (LIPITOR) tablet 40 mg  40 mg Oral HS    bisacodyl (DULCOLAX) rectal suppository 10 mg  10 mg Rectal Daily PRN    cefTRIAXone (ROCEPHIN) 1,000 mg in dextrose 5 % 50 mL IVPB  1,000 mg Intravenous Q24H    cholecalciferol (VITAMIN D3) tablet 2,000 Units  2,000 Units Oral Daily    dexamethasone (PF) (DECADRON) injection 8 6 mg  0 1 mg/kg Intravenous Q12H    doxycycline hyclate (VIBRAMYCIN) capsule 100 mg  100 mg Oral Q12H    DULoxetine (CYMBALTA) delayed release capsule 60 mg  60 mg Oral BID    enoxaparin (LOVENOX) subcutaneous injection 30 mg  30 mg Subcutaneous Q12H [facility]    famotidine (PEPCID) tablet 20 mg  20 mg Oral BID    hydroxychloroquine (PLAQUENIL) tablet 200 mg  200 mg Oral Daily    zinc sulfate (ZINCATE) capsule 220 mg  220 mg Oral Daily    Followed by   Jerral Kocher ON 3/21/2021] multivitamin-minerals (CENTRUM ADULTS) tablet 1 tablet  1 tablet Oral Daily    polyethylene glycol (MIRALAX) packet 17 g  17 g Oral Daily PRN    senna-docusate sodium (SENOKOT S) 8 6-50 mg per tablet 1 tablet  1 tablet Oral HS    traZODone (DESYREL) tablet 100 mg  100 mg Oral HS PRN     Medications Prior to Admission   Medication    acetaminophen (TYLENOL) 325 mg tablet    aluminum-magnesium hydroxide-simethicone (MYLANTA) 200-200-20 mg/5 mL suspension    ARIPiprazole (ABILIFY) 10 mg tablet    ascorbic acid (VITAMIN C) 1000 MG tablet    benzonatate (TESSALON) 200 MG capsule    calcium citrate (CALCITRATE) 950 MG tablet    cholecalciferol (VITAMIN D3) 1,000 units tablet    dextromethorphan-guaiFENesin (ROBITUSSIN DM)  mg/5 mL syrup    DULoxetine (CYMBALTA) 60 mg delayed release capsule    hydroxychloroquine (PLAQUENIL) 200 mg tablet    Multiple Vitamins-Minerals (MULTIVITAMIN ADULT PO)    predniSONE 20 mg tablet    traZODone (DESYREL) 100 mg tablet    zinc sulfate (ZINCATE) 220 mg capsule     Allergies   Allergen Reactions    No Known Allergies        Vitals: Blood pressure 116/80, pulse 76, temperature 97 8 °F (36 6 °C), resp  rate (!) 24, SpO2 (!) 87 % ,      Intake/Output Summary (Last 24 hours) at 3/14/2021 1544  Last data filed at 3/14/2021 0801  Gross per 24 hour   Intake 437 ml   Output 300 ml   Net 137 ml       Physical exam:        Head/eyes:    Normocephalic, without obvious abnormality, atraumatic,         PERRL, extraocular muscles intact, no scleral icterus    Nose:   Nares normal, septum midline, mucosa normal, no drainage    or sinus tenderness   Throat:   Moist mucous membranes, no thrush   Neck:   Supple, trachea midline, no adenopathy; no carotid    bruit or JVD   Lungs:       Decreased breath sounds  No wheezing          Heart:    Regular rate and rhythm, S1 and S2 normal, no murmur, rub   or gallop   Abdomen:     Soft, non-tender, bowel sounds active all four quadrants,     no masses, no organomegaly Extremities:   Extremities normal, atraumatic, no cyanosis or edema   Skin:   Warm, dry, turgor normal, no rashes or lesions   Neurologic:   CNII-XII intact, normal strength, non-focal             Labs:   CBC:   Lab Results   Component Value Date    WBC 9 32 03/14/2021    HGB 13 5 03/14/2021    HCT 42 0 03/14/2021    MCV 90 03/14/2021     03/14/2021    MCH 29 0 03/14/2021    MCHC 32 1 03/14/2021    RDW 12 9 03/14/2021    MPV 12 1 03/14/2021    NRBC 0 03/14/2021   , CMP:   Lab Results   Component Value Date    SODIUM 141 03/14/2021    K 4 7 03/14/2021     03/14/2021    CO2 27 03/14/2021    BUN 14 03/14/2021    CREATININE 1 01 03/14/2021    CALCIUM 9 7 03/14/2021    AST 36 03/14/2021    ALT 44 03/14/2021    ALKPHOS 67 03/14/2021    EGFR 82 03/14/2021     Lab Results   Component Value Date    DDIMER 0 90 (H) 03/14/2021     Lab Results   Component Value Date     4 (H) 03/14/2021         Imaging and other studies: I have personally reviewed pertinent films in PACS  CT scan of the chest is reviewed on the [facility] system  He has bilateral multifocal ground-glass opacities          Renny Brady MD

## 2021-03-14 NOTE — ASSESSMENT & PLAN NOTE
Severe COVID PNA on high flow  CT shows sig b/l opacities  D-Dimer 1, CTA neg for PE  Severe COVID protocol  High flow oxygen  Rocephin, doxy  Trend inflammatory markers  IV steroids, plasma given 03/13  Pulm consult, defer actemra, will check HIV and chronic hep panel and order bowel regimen  Self proning, ICS and OOB  DVTppx    O2 does decrease when patient laying on his back or side, improves when prone   Encourage to continue self-proning

## 2021-03-14 NOTE — PROGRESS NOTES
[address] [place] [address]  Progress Note - Esperanza Escamilla 1965, 60 y o  male MRN: [id number]  Unit/Bed#: [address] 2 -01 Encounter: [id number]  Primary Care Provider: Kimberly Andrews MD   Date and time admitted to hospital: 3/13/2021  4:32 PM    * Pneumonia due to COVID-19 virus  Assessment & Plan  Severe COVID PNA on high flow  CT shows sig b/l opacities  D-Dimer 1, CTA neg for PE  Severe COVID protocol  High flow oxygen  Rocephin, doxy  Trend inflammatory markers  IV steroids, plasma given 03/13  Pulm consult, defer actemra, will check HIV and chronic hep panel and order bowel regimen  Self proning, ICS and OOB  DVTppx    O2 does decrease when patient laying on his back or side, improves when prone  Encourage to continue self-proning      Acute respiratory failure with hypoxia (HCC)  Assessment & Plan  Covid pna  High flow oxygen, wean as able  See above in COVID problem    Severe recurrent major depression without psychotic features ([facility] [address] )  Assessment & Plan  Continue abilify, cymbalta, trazodone for sleep    Rheumatoid arthritis involving both hands with positive rheumatoid factor (HCC)  Assessment & Plan  Continue home dose of plaquenil 200mg daily        VTE Pharmacologic Prophylaxis:   Pharmacologic: Enoxaparin (Lovenox)  Mechanical VTE Prophylaxis in Place: Yes    Patient Centered Rounds: I have performed bedside rounds with nursing staff today  Discussions with Specialists or Other Care Team Provider: None    Education and Discussions with Family / Patient: Patient    Time Spent for Care: 30 minutes  More than 50% of total time spent on counseling and coordination of care as described above      Current Length of Stay: 1 day(s)    Current Patient Status: Inpatient   Certification Statement: The patient will continue to require additional inpatient hospital stay due to Resp Failure    Discharge Plan: Pending    Code Status: Prior      Subjective:   Patient today, reports doing well  No complaints  On high flow oxygen  No bowel movements overnight  Objective:     Vitals:   Temp (24hrs), Av 8 °F (36 6 °C), Min:97 2 °F (36 2 °C), Max:100 1 °F (37 8 °C)    Temp:  [97 2 °F (36 2 °C)-100 1 °F (37 8 °C)] 97 8 °F (36 6 °C)  HR:  [] 74  Resp:  [19-37] 24  BP: ()/(54-94) 115/82  SpO2:  [76 %-95 %] 84 %  There is no height or weight on file to calculate BMI  Input and Output Summary (last 24 hours): Intake/Output Summary (Last 24 hours) at 3/14/2021 1153  Last data filed at 3/14/2021 0801  Gross per 24 hour   Intake 437 ml   Output 300 ml   Net 137 ml       Physical Exam:     Physical Exam  Vitals signs and nursing note reviewed  Constitutional:       Appearance: Normal appearance  He is normal weight  HENT:      Head: Normocephalic and atraumatic  Eyes:      Conjunctiva/sclera: Conjunctivae normal       Pupils: Pupils are equal, round, and reactive to light  Cardiovascular:      Rate and Rhythm: Normal rate and regular rhythm  Heart sounds: Normal heart sounds  Pulmonary:      Breath sounds: Rhonchi present  No wheezing  Comments: Decrease breath sounds  Abdominal:      General: Bowel sounds are normal  There is no distension  Palpations: Abdomen is soft  Tenderness: There is no abdominal tenderness  Musculoskeletal:         General: No swelling  Skin:     General: Skin is warm and dry  Neurological:      General: No focal deficit present  Mental Status: He is alert           Additional Data:     Labs:    Results from last 7 days   Lab Units 21  0457 21  1221   WBC Thousand/uL 9 32 11 30*   HEMOGLOBIN g/dL 13 5 13 5   HEMATOCRIT % 42 0 41 4   PLATELETS Thousands/uL 163 150   BANDS PCT %  --  3   NEUTROS PCT % 90*  --    LYMPHS PCT % 6*  --    LYMPHO PCT %  --  6*   MONOS PCT % 2*  --    MONO PCT %  --  2   EOS PCT % 0  --      Results from last 7 days   Lab Units 21  0457   SODIUM mmol/L 141   POTASSIUM mmol/L 4  7   CHLORIDE mmol/L 104   CO2 mmol/L 27   BUN mg/dL 14   CREATININE mg/dL 1 01   ANION GAP mmol/L 10   CALCIUM mg/dL 9 7   ALBUMIN g/dL 2 7*   TOTAL BILIRUBIN mg/dL 0 47   ALK PHOS U/L 67   ALT U/L 44   AST U/L 36   GLUCOSE RANDOM mg/dL 132     Results from last 7 days   Lab Units 03/13/21  1221   INR  1 08             Results from last 7 days   Lab Units 03/13/21  1221 03/11/21  0611 03/10/21  2035   LACTIC ACID mmol/L 1 5  --   --    PROCALCITONIN ng/ml 0 16 0 11 0 13           * I Have Reviewed All Lab Data Listed Above  * Additional Pertinent Lab Tests Reviewed: All Labs For Current Hospital Admission Reviewed    Imaging:    Xr Chest 1 View Portable    Result Date: 3/14/2021  Impression: Moderate bilateral groundglass opacity due to Covid-19 pneumonia, increased from 3/10/2021  Workstation performed: [id number]    Cta Ed Chest Pe Study    Result Date: 3/13/2021  Impression: 1  Extensive groundglass and patchy pulmonary opacities in a peripheral and basilar distribution consistent with provided clinical history of Covid 19  2  No PE  Workstation performed: [id number]    Recent Cultures (last 7 days):     Results from last 7 days   Lab Units 03/13/21  1232 03/13/21  1221   BLOOD CULTURE  Received in Microbiology Lab  Culture in Progress  Received in Microbiology Lab  Culture in Progress         Last 24 Hours Medication List:   Current Facility-Administered Medications   Medication Dose Route Frequency Provider Last Rate    ARIPiprazole  10 mg Oral HS Dk Burgess MD      ascorbic acid  1,000 mg Oral Q12H Mireya Rosenberg MD      atorvastatin  40 mg Oral HS Dk Burgess MD      bisacodyl  10 mg Rectal Daily PRN Dk Burgess MD      cefTRIAXone  1,000 mg Intravenous Q24H Dk Burgess MD 1,000 mg (03/13/21 1809)    cholecalciferol  2,000 Units Oral Daily Dk Burgess MD      dexamethasone  0 1 mg/kg Intravenous Q12H Dk Burgess MD      doxycycline hyclate  100 mg Oral Q12H MD Neal Ashford DULoxetine  60 mg Oral BID Jona Cai MD      enoxaparin  30 mg Subcutaneous Q12H Akosua Oh MD      famotidine  20 mg Oral BID Jona Cai MD      hydroxychloroquine  200 mg Oral Daily Jona Cai MD      zinc sulfate  220 mg Oral Daily Jona Cai MD      Followed by   Bran Shea ON 3/21/2021] multivitamin-minerals  1 tablet Oral Daily Jona Cai MD      polyethylene glycol  17 g Oral Daily PRN Jona Cai MD      senna-docusate sodium  1 tablet Oral HS Jona Cai MD      traZODone  100 mg Oral HS PRN Jona Cai MD          Today, Patient Was Seen By: Jona Cai MD    ** Please Note: Dictation voice to text software may have been used in the creation of this document   **

## 2021-03-14 NOTE — UTILIZATION REVIEW
Initial Clinical Review    Admission: Date/Time/Statement:   Admission Orders (From admission, onward)     Ordered        03/13/21 1647  Inpatient Admission  Once                   Orders Placed This Encounter   Procedures    Inpatient Admission     Standing Status:   Standing     Number of Occurrences:   1     Order Specific Question:   Level of Care     Answer:   Level 2 Stepdown / HOT [14]     Order Specific Question:   Estimated length of stay     Answer:   More than 2 Midnights     Order Specific Question:   Certification     Answer:   I certify that inpatient services are medically necessary for this patient for a duration of greater than two midnights  See H&P and MD Progress Notes for additional information about the patient's course of treatment  Assessment/Plan:  59 yo male Transferred from [facility] ED for higher LOC  Admitted to Inpatient Level 2 Stepdown  with Pneumonia due to COVID -19 , Acute Respiratory Failure  Plan is for severe covid protocol with  High flow O2, IV Rocephin, po doxy, IV steroids, Plasma, Pulm consult, self proning, trend inflammatory markers    Initially presented to [facility] ED with c/o SOB; tachycardi, tachypneic and speaking in several word sentences on arrival  RA sat 76%   Chest x-ray shows worsening COVID infection with worsening bilateral peripheral opacities  CTA neg for PE (see below)  Required Vapotherm in ED  Recent admission to Observation 3/10 to 3/11 with Dyspnea due to COVID-19 & tx'd with Vitamins C,D,Zinc, Lasix 40 IV X 1, Abxs  Diagnosed with COVID 3/4     3/14: Continues on Hi Flow O2  Titrate oxygen to maintain O2 saturation above 85%  Sat does decrease when patient laying on his back or side, improves when prone  Encourage to continue self-proning  Pulm following    Received convalescent plasma 3/13       Admission Vitals   Temperature Pulse Respirations Blood Pressure SpO2   03/13/21 1649 03/13/21 1649 03/13/21 1649 03/13/21 1649 03/13/21 1649   98 2 °F (36 8 °C) 78 (!) 24 137/73 94 %      Temp src Heart Rate Source Patient Position - Orthostatic VS BP Location FiO2 (%)   -- -- -- -- --             Pain Score       03/13/21 1700       No Pain          Wt Readings from Last 1 Encounters:   03/13/21 86 kg (189 lb 9 5 oz)     Additional Vital Signs:   03/14/21 08:08:08  97 8 °F (36 6 °C)  74  24Abnormal   115/82  93  84 %Abnormal   --  --  --   03/14/21 0700  --  --  --  --  --  87 %Abnormal   --  --  HFNC prongs   03/14/21 04:16:53  97 3 °F (36 3 °C)   69  22  117/80  92  94 %  --  --  --   03/13/21 2240  --  --  --  --  --  --  50 L/min  High flow nasal cannula  --   03/13/21 22:30:08  97 3 °F (36 3 °C)  67  22  115/79  91  93 %  --  --  --   03/13/21 2230  97 3 °F (36 3 °C)   67  22  115/79  --  --  --  --  --   03/13/21 22:19:20  97 2 °F (36 2 °C)   64  22  117/79  92  89 %Abnormal   --  --  --   03/13/21 22:00:50  97 5 °F (36 4 °C)  67  24Abnormal   116/79  91  91 %  --  --  --   03/13/21 21:57:10  97 7 °F (36 5 °C)  69  20  116/80  92  92 %  --  --  --   03/13/21 21:41:20  97 8 °F (36 6 °C)  68  20  115/81  92  92 %  --  --  --   03/13/21 19:52:53  97 9 °F (36 6 °C)  77  20  133/74  94  89 %Abnormal   --  --  --   03/13/21 1935  --  --  --  --  --  89 %Abnormal   --  --  HFNC prongs   03/13/21 1920  --  --  --  --  --  --  50 L/min  High flow nasal cannula  --   03/13/21 1700  --  72  24Abnormal   137/73  94  93 %  --  --         Pertinent Labs/Diagnostic Test Results:     Results from last 7 days   Lab Units 03/14/21 0457 03/13/21  1221 03/11/21  0611 03/10/21  1136   WBC Thousand/uL 9 32 11 30* 4 50 4 20*   HEMOGLOBIN g/dL 13 5 13 5 13 5 13 7   HEMATOCRIT % 42 0 41 4 41 1 41 4   PLATELETS Thousands/uL 163 150 118* 108*   NEUTROS ABS Thousands/µL 8 44*  --  3 40 3 00   TOTAL NEUT ABS Thousand/uL  --  10 28*  --   --    BANDS PCT %  --  3  --   --      Results from last 7 days   Lab Units 03/14/21 0457 03/13/21  1221 03/11/21  0611 03/10/21  1136   SODIUM mmol/L 141 138 139 140   POTASSIUM mmol/L 4 7 3 5* 4 0 3 5*   CHLORIDE mmol/L 104 102 99 103   CO2 mmol/L 27 27 33* 30   ANION GAP mmol/L 10 9 7 7   BUN mg/dL 14 12 16 15   CREATININE mg/dL 1 01 1 06 1 04 0 97   EGFR ml/min/1 73sq m 82 78 79 86   CALCIUM mg/dL 9 7 8 8 9 0 9 0     Results from last 7 days   Lab Units 03/14/21  0457 03/13/21  1221 03/11/21  0611   AST U/L 36 53 68*   ALT U/L 44 37 34   ALK PHOS U/L 67 76 66   TOTAL PROTEIN g/dL 7 7 7 7 7 9   ALBUMIN g/dL 2 7* 3 8 3 9   TOTAL BILIRUBIN mg/dL 0 47 0 70 0 60     Results from last 7 days   Lab Units 03/14/21  0457 03/13/21  1221 03/11/21  0611 03/10/21  1136   GLUCOSE RANDOM mg/dL 132 149* 116* 110*     Results from last 7 days   Lab Units 03/14/21  0457   CK TOTAL U/L 67     Results from last 7 days   Lab Units 03/14/21  0457 03/13/21  1221 03/10/21  1136   TROPONIN I ng/mL <0 02 <0 01 <0 01     Results from last 7 days   Lab Units 03/14/21  0457 03/13/21  1221 03/10/21  1255   D-DIMER QUANTITATIVE ug/ml FEU 0 90* 1 04* 0 53*     Results from last 7 days   Lab Units 03/13/21  1221   PROTIME seconds 14 1   INR  1 08   PTT seconds 29     Results from last 7 days   Lab Units 03/13/21  1221 03/11/21  0611 03/10/21  2035   PROCALCITONIN ng/ml 0 16 0 11 0 13     Results from last 7 days   Lab Units 03/13/21  1221   LACTIC ACID mmol/L 1 5     Results from last 7 days   Lab Units 03/14/21  0457   NT-PRO BNP pg/mL 117     Results from last 7 days   Lab Units 03/14/21  0457   FERRITIN ng/mL 274     Results from last 7 days   Lab Units 03/13/21  1756   HEP B S AG  Non-reactive   HEP C AB  Non-reactive   HEP B C IGM  Non-reactive   HEP B C TOTAL AB  Non-reactive     Results from last 7 days   Lab Units 03/14/21  0457 03/11/21  0611 03/10/21  1136   CRP mg/L 259 4* 142 1* 162 9*       Results from last 7 days   Lab Units 03/13/21  1232 03/13/21  1221   BLOOD CULTURE  Received in Microbiology Lab  Culture in Progress  Received in Microbiology Lab  Culture in Progress  3/13 ED CXR: Moderate bilateral groundglass opacity due to Covid-19 pneumonia, increased from 3/10/2021   3/13 ED CTA Chest:  Extensive groundglass and patchy pulmonary opacities in a peripheral and basilar distribution consistent with provided clinical history of Covid 19   2    No PE    Past Medical History:   Diagnosis Date    Anemia     b12    Arthritis     Back pain     Depression     GERD (gastroesophageal reflux disease)     Headache     Herniated cervical disc     Herniated lumbar intervertebral disc     Malabsorption syndrome     post gastric bypass    Obesity     Vitamin B12 deficiency 2014    Vitamin D deficiency 2014    Wears glasses      Present on Admission:   Rheumatoid arthritis involving both hands with positive rheumatoid factor (HCC)   Severe recurrent major depression without psychotic features ([facility] [address] )      Admitting Diagnosis: COVID-19 [U07 1]  Age/Sex: 60 y o  male  Admission Orders:  Scheduled Medications:  ARIPiprazole, 10 mg, Oral, HS  ascorbic acid, 1,000 mg, Oral, Q12H WILLAM  atorvastatin, 40 mg, Oral, HS  cefTRIAXone, 1,000 mg, Intravenous, Q24H  cholecalciferol, 2,000 Units, Oral, Daily  dexamethasone, 0 1 mg/kg, Intravenous, Q12H  doxycycline hyclate, 100 mg, Oral, Q12H  DULoxetine, 60 mg, Oral, BID  enoxaparin, 30 mg, Subcutaneous, Q12H WILLAM  famotidine, 20 mg, Oral, BID  hydroxychloroquine, 200 mg, Oral, Daily  zinc sulfate, 220 mg, Oral, Daily    Followed by  Pankaj Nails ON 3/21/2021] multivitamin-minerals, 1 tablet, Oral, Daily  senna-docusate sodium, 1 tablet, Oral, HS  KCL 40 mEq  Po x 1  3/13    Continuous IV Infusions:     PRN Meds:  bisacodyl, 10 mg, Rectal, Daily PRN  polyethylene glycol, 17 g, Oral, Daily PRN  traZODone, 100 mg, Oral, HS PRN  X 1 3/13    IP CONSULT TO PULMONOLOGY    Network Utilization Review Department  ATTENTION: Please call with any questions or concerns to [phone] and carefully listen to the prompts so that you are directed to the right person  All voicemails are confidential   Nitesh Montez all requests for admission clinical reviews, approved or denied determinations and any other requests to dedicated fax number below belonging to the campus where the patient is receiving treatment   List of dedicated fax numbers for the Facilities:  [address] DENIALS (Administrative/Medical Necessity) [phone]   [address] (Maternity/NICU/Pediatrics) [phone]   [address] [place] [address] [address] [facility] [address] Mike Luna [address] (  [facility]) [address] [address] [place] [address] Carrie Read [address] [place], [place] [address] [phone]

## 2021-03-14 NOTE — PLAN OF CARE
Problem: PAIN - ADULT  Goal: Verbalizes/displays adequate comfort level or baseline comfort level  Description: Interventions:  - Encourage patient to monitor pain and request assistance  - Assess pain using appropriate pain scale  - Administer analgesics based on type and severity of pain and evaluate response  - Implement non-pharmacological measures as appropriate and evaluate response  - Consider cultural and social influences on pain and pain management  - Notify physician/advanced practitioner if interventions unsuccessful or patient reports new pain  Outcome: Progressing     Problem: INFECTION - ADULT  Goal: Absence or prevention of progression during hospitalization  Description: INTERVENTIONS:  - Assess and monitor for signs and symptoms of infection  - Monitor lab/diagnostic results  - Monitor all insertion sites, i e  indwelling lines, tubes, and drains  - Monitor endotracheal if appropriate and nasal secretions for changes in amount and color  - [place] appropriate cooling/warming therapies per order  - Administer medications as ordered  - Instruct and encourage patient and family to use good hand hygiene technique  - Identify and instruct in appropriate isolation precautions for identified infection/condition  Outcome: Progressing     Problem: SAFETY ADULT  Goal: Patient will remain free of falls  Description: INTERVENTIONS:  - Assess patient frequently for physical needs  -  Identify cognitive and physical deficits and behaviors that affect risk of falls    -  [place] fall precautions as indicated by assessment   - Educate patient/family on patient safety including physical limitations  - Instruct patient to call for assistance with activity based on assessment  - Modify environment to reduce risk of injury  - Consider OT/PT consult to assist with strengthening/mobility  Outcome: Progressing  Goal: Maintain or return to baseline ADL function  Description: INTERVENTIONS:  -  Assess patient's ability to carry out ADLs; assess patient's baseline for ADL function and identify physical deficits which impact ability to perform ADLs (bathing, care of mouth/teeth, toileting, grooming, dressing, etc )  - Assess/evaluate cause of self-care deficits   - Assess range of motion  - Assess patient's mobility; develop plan if impaired  - Assess patient's need for assistive devices and provide as appropriate  - Encourage maximum independence but intervene and supervise when necessary  - Involve family in performance of ADLs  - Assess for home care needs following discharge   - Consider OT consult to assist with ADL evaluation and planning for discharge  - Provide patient education as appropriate  Outcome: Progressing  Goal: Maintain or return mobility status to optimal level  Description: INTERVENTIONS:  - Assess patient's baseline mobility status (ambulation, transfers, stairs, etc )    - Identify cognitive and physical deficits and behaviors that affect mobility  - Identify mobility aids required to assist with transfers and/or ambulation (gait belt, sit-to-stand, lift, walker, cane, etc )  - [place] fall precautions as indicated by assessment  - Record patient progress and toleration of activity level on Mobility SBAR; progress patient to next Phase/Stage  - Instruct patient to call for assistance with activity based on assessment  - Consider rehabilitation consult to assist with strengthening/weightbearing, etc   Outcome: Progressing     Problem: DISCHARGE PLANNING  Goal: Discharge to home or other facility with appropriate resources  Description: INTERVENTIONS:  - Identify barriers to discharge w/patient and caregiver  - Arrange for needed discharge resources and transportation as appropriate  - Identify discharge learning needs (meds, wound care, etc )  - Arrange for interpretive services to assist at discharge as needed  - Refer to Case Management Department for coordinating discharge planning if the patient needs post-hospital services based on physician/advanced practitioner order or complex needs related to functional status, cognitive ability, or social support system  Outcome: Progressing     Problem: Knowledge Deficit  Goal: Patient/family/caregiver demonstrates understanding of disease process, treatment plan, medications, and discharge instructions  Description: Complete learning assessment and assess knowledge base    Interventions:  - Provide teaching at level of understanding  - Provide teaching via preferred learning methods  Outcome: Progressing     Problem: RESPIRATORY - ADULT  Goal: Achieves optimal ventilation and oxygenation  Description: INTERVENTIONS:  - Assess for changes in respiratory status  - Assess for changes in mentation and behavior  - Position to facilitate oxygenation and minimize respiratory effort  - Oxygen administered by appropriate delivery if ordered  - Initiate smoking cessation education as indicated  - Encourage broncho-pulmonary hygiene including cough, deep breathe, Incentive Spirometry  - Assess the need for suctioning and aspirate as needed  - Assess and instruct to report SOB or any respiratory difficulty  - Respiratory Therapy support as indicated  Outcome: Progressing     Problem: MUSCULOSKELETAL - ADULT  Goal: Maintain or return mobility to safest level of function  Description: INTERVENTIONS:  - Assess patient's ability to carry out ADLs; assess patient's baseline for ADL function and identify physical deficits which impact ability to perform ADLs (bathing, care of mouth/teeth, toileting, grooming, dressing, etc )  - Assess/evaluate cause of self-care deficits   - Assess range of motion  - Assess patient's mobility  - Assess patient's need for assistive devices and provide as appropriate  - Encourage maximum independence but intervene and supervise when necessary  - Involve family in performance of ADLs  - Assess for home care needs following discharge   - Consider OT consult to assist with ADL evaluation and planning for discharge  - Provide patient education as appropriate  Outcome: Progressing  Goal: Maintain proper alignment of affected body part  Description: INTERVENTIONS:  - Support, maintain and protect limb and body alignment  - Provide patient/ family with appropriate education  Outcome: Progressing     Problem: Nutrition/Hydration-ADULT  Goal: Nutrient/Hydration intake appropriate for improving, restoring or maintaining nutritional needs  Description: Monitor and assess patient's nutrition/hydration status for malnutrition  Collaborate with interdisciplinary team and initiate plan and interventions as ordered  Monitor patient's weight and dietary intake as ordered or per policy  Utilize nutrition screening tool and intervene as necessary  Determine patient's food preferences and provide high-protein, high-caloric foods as appropriate  INTERVENTIONS:  - Monitor oral intake, urinary output, labs, and treatment plans  - Assess nutrition and hydration status and recommend course of action  - Evaluate amount of meals eaten  - Assist patient with eating if necessary   - Allow adequate time for meals  - Recommend/ encourage appropriate diets, oral nutritional supplements, and vitamin/mineral supplements  - Order, calculate, and assess calorie counts as needed  - Recommend, monitor, and adjust tube feedings and TPN/PPN based on assessed needs  - Assess need for intravenous fluids  - Provide specific nutrition/hydration education as appropriate  - Include patient/family/caregiver in decisions related to nutrition  Outcome: Progressing     Problem: Potential for Falls  Goal: Patient will remain free of falls  Description: INTERVENTIONS:  - Assess patient frequently for physical needs  -  Identify cognitive and physical deficits and behaviors that affect risk of falls    -  [place] fall precautions as indicated by assessment   - Educate patient/family on patient safety including physical limitations  - Instruct patient to call for assistance with activity based on assessment  - Modify environment to reduce risk of injury  - Consider OT/PT consult to assist with strengthening/mobility  Outcome: Progressing

## 2021-03-14 NOTE — PLAN OF CARE
Problem: PAIN - ADULT  Goal: Verbalizes/displays adequate comfort level or baseline comfort level  Description: Interventions:  - Encourage patient to monitor pain and request assistance  - Assess pain using appropriate pain scale  - Administer analgesics based on type and severity of pain and evaluate response  - Implement non-pharmacological measures as appropriate and evaluate response  - Consider cultural and social influences on pain and pain management  - Notify physician/advanced practitioner if interventions unsuccessful or patient reports new pain  Outcome: Progressing     Problem: INFECTION - ADULT  Goal: Absence or prevention of progression during hospitalization  Description: INTERVENTIONS:  - Assess and monitor for signs and symptoms of infection  - Monitor lab/diagnostic results  - Monitor all insertion sites, i e  indwelling lines, tubes, and drains  - Monitor endotracheal if appropriate and nasal secretions for changes in amount and color  - [place] appropriate cooling/warming therapies per order  - Administer medications as ordered  - Instruct and encourage patient and family to use good hand hygiene technique  - Identify and instruct in appropriate isolation precautions for identified infection/condition  Outcome: Progressing     Problem: SAFETY ADULT  Goal: Patient will remain free of falls  Description: INTERVENTIONS:  - Assess patient frequently for physical needs  -  Identify cognitive and physical deficits and behaviors that affect risk of falls    -  [place] fall precautions as indicated by assessment   - Educate patient/family on patient safety including physical limitations  - Instruct patient to call for assistance with activity based on assessment  - Modify environment to reduce risk of injury  - Consider OT/PT consult to assist with strengthening/mobility  Outcome: Progressing  Goal: Maintain or return to baseline ADL function  Description: INTERVENTIONS:  -  Assess patient's ability to carry out ADLs; assess patient's baseline for ADL function and identify physical deficits which impact ability to perform ADLs (bathing, care of mouth/teeth, toileting, grooming, dressing, etc )  - Assess/evaluate cause of self-care deficits   - Assess range of motion  - Assess patient's mobility; develop plan if impaired  - Assess patient's need for assistive devices and provide as appropriate  - Encourage maximum independence but intervene and supervise when necessary  - Involve family in performance of ADLs  - Assess for home care needs following discharge   - Consider OT consult to assist with ADL evaluation and planning for discharge  - Provide patient education as appropriate  Outcome: Progressing  Goal: Maintain or return mobility status to optimal level  Description: INTERVENTIONS:  - Assess patient's baseline mobility status (ambulation, transfers, stairs, etc )    - Identify cognitive and physical deficits and behaviors that affect mobility  - Identify mobility aids required to assist with transfers and/or ambulation (gait belt, sit-to-stand, lift, walker, cane, etc )  - [place] fall precautions as indicated by assessment  - Record patient progress and toleration of activity level on Mobility SBAR; progress patient to next Phase/Stage  - Instruct patient to call for assistance with activity based on assessment  - Consider rehabilitation consult to assist with strengthening/weightbearing, etc   Outcome: Progressing     Problem: DISCHARGE PLANNING  Goal: Discharge to home or other facility with appropriate resources  Description: INTERVENTIONS:  - Identify barriers to discharge w/patient and caregiver  - Arrange for needed discharge resources and transportation as appropriate  - Identify discharge learning needs (meds, wound care, etc )  - Arrange for interpretive services to assist at discharge as needed  - Refer to Case Management Department for coordinating discharge planning if the patient needs post-hospital services based on physician/advanced practitioner order or complex needs related to functional status, cognitive ability, or social support system  Outcome: Progressing     Problem: Knowledge Deficit  Goal: Patient/family/caregiver demonstrates understanding of disease process, treatment plan, medications, and discharge instructions  Description: Complete learning assessment and assess knowledge base    Interventions:  - Provide teaching at level of understanding  - Provide teaching via preferred learning methods  Outcome: Progressing     Problem: RESPIRATORY - ADULT  Goal: Achieves optimal ventilation and oxygenation  Description: INTERVENTIONS:  - Assess for changes in respiratory status  - Assess for changes in mentation and behavior  - Position to facilitate oxygenation and minimize respiratory effort  - Oxygen administered by appropriate delivery if ordered  - Initiate smoking cessation education as indicated  - Encourage broncho-pulmonary hygiene including cough, deep breathe, Incentive Spirometry  - Assess the need for suctioning and aspirate as needed  - Assess and instruct to report SOB or any respiratory difficulty  - Respiratory Therapy support as indicated  Outcome: Progressing     Problem: MUSCULOSKELETAL - ADULT  Goal: Maintain or return mobility to safest level of function  Description: INTERVENTIONS:  - Assess patient's ability to carry out ADLs; assess patient's baseline for ADL function and identify physical deficits which impact ability to perform ADLs (bathing, care of mouth/teeth, toileting, grooming, dressing, etc )  - Assess/evaluate cause of self-care deficits   - Assess range of motion  - Assess patient's mobility  - Assess patient's need for assistive devices and provide as appropriate  - Encourage maximum independence but intervene and supervise when necessary  - Involve family in performance of ADLs  - Assess for home care needs following discharge   - Consider OT consult to assist with ADL evaluation and planning for discharge  - Provide patient education as appropriate  Outcome: Progressing  Goal: Maintain proper alignment of affected body part  Description: INTERVENTIONS:  - Support, maintain and protect limb and body alignment  - Provide patient/ family with appropriate education  Outcome: Progressing     Problem: Nutrition/Hydration-ADULT  Goal: Nutrient/Hydration intake appropriate for improving, restoring or maintaining nutritional needs  Description: Monitor and assess patient's nutrition/hydration status for malnutrition  Collaborate with interdisciplinary team and initiate plan and interventions as ordered  Monitor patient's weight and dietary intake as ordered or per policy  Utilize nutrition screening tool and intervene as necessary  Determine patient's food preferences and provide high-protein, high-caloric foods as appropriate  INTERVENTIONS:  - Monitor oral intake, urinary output, labs, and treatment plans  - Assess nutrition and hydration status and recommend course of action  - Evaluate amount of meals eaten  - Assist patient with eating if necessary   - Allow adequate time for meals  - Recommend/ encourage appropriate diets, oral nutritional supplements, and vitamin/mineral supplements  - Order, calculate, and assess calorie counts as needed  - Recommend, monitor, and adjust tube feedings and TPN/PPN based on assessed needs  - Assess need for intravenous fluids  - Provide specific nutrition/hydration education as appropriate  - Include patient/family/caregiver in decisions related to nutrition  Outcome: Progressing     Problem: Potential for Falls  Goal: Patient will remain free of falls  Description: INTERVENTIONS:  - Assess patient frequently for physical needs  -  Identify cognitive and physical deficits and behaviors that affect risk of falls    -  [place] fall precautions as indicated by assessment   - Educate patient/family on patient safety including physical limitations  - Instruct patient to call for assistance with activity based on assessment  - Modify environment to reduce risk of injury  - Consider OT/PT consult to assist with strengthening/mobility  Outcome: Progressing

## 2021-03-14 NOTE — PLAN OF CARE
Problem: PAIN - ADULT  Goal: Verbalizes/displays adequate comfort level or baseline comfort level  Description: Interventions:  - Encourage patient to monitor pain and request assistance  - Assess pain using appropriate pain scale  - Administer analgesics based on type and severity of pain and evaluate response  - Implement non-pharmacological measures as appropriate and evaluate response  - Consider cultural and social influences on pain and pain management  - Notify physician/advanced practitioner if interventions unsuccessful or patient reports new pain  Outcome: Progressing     Problem: INFECTION - ADULT  Goal: Absence or prevention of progression during hospitalization  Description: INTERVENTIONS:  - Assess and monitor for signs and symptoms of infection  - Monitor lab/diagnostic results  - Monitor all insertion sites, i e  indwelling lines, tubes, and drains  - Monitor endotracheal if appropriate and nasal secretions for changes in amount and color  - [place] appropriate cooling/warming therapies per order  - Administer medications as ordered  - Instruct and encourage patient and family to use good hand hygiene technique  - Identify and instruct in appropriate isolation precautions for identified infection/condition  Outcome: Progressing     Problem: SAFETY ADULT  Goal: Patient will remain free of falls  Description: INTERVENTIONS:  - Assess patient frequently for physical needs  -  Identify cognitive and physical deficits and behaviors that affect risk of falls    -  [place] fall precautions as indicated by assessment   - Educate patient/family on patient safety including physical limitations  - Instruct patient to call for assistance with activity based on assessment  - Modify environment to reduce risk of injury  - Consider OT/PT consult to assist with strengthening/mobility  Outcome: Progressing  Goal: Maintain or return to baseline ADL function  Description: INTERVENTIONS:  -  Assess patient's ability to carry out ADLs; assess patient's baseline for ADL function and identify physical deficits which impact ability to perform ADLs (bathing, care of mouth/teeth, toileting, grooming, dressing, etc )  - Assess/evaluate cause of self-care deficits   - Assess range of motion  - Assess patient's mobility; develop plan if impaired  - Assess patient's need for assistive devices and provide as appropriate  - Encourage maximum independence but intervene and supervise when necessary  - Involve family in performance of ADLs  - Assess for home care needs following discharge   - Consider OT consult to assist with ADL evaluation and planning for discharge  - Provide patient education as appropriate  Outcome: Progressing  Goal: Maintain or return mobility status to optimal level  Description: INTERVENTIONS:  - Assess patient's baseline mobility status (ambulation, transfers, stairs, etc )    - Identify cognitive and physical deficits and behaviors that affect mobility  - Identify mobility aids required to assist with transfers and/or ambulation (gait belt, sit-to-stand, lift, walker, cane, etc )  - [place] fall precautions as indicated by assessment  - Record patient progress and toleration of activity level on Mobility SBAR; progress patient to next Phase/Stage  - Instruct patient to call for assistance with activity based on assessment  - Consider rehabilitation consult to assist with strengthening/weightbearing, etc   Outcome: Progressing     Problem: DISCHARGE PLANNING  Goal: Discharge to home or other facility with appropriate resources  Description: INTERVENTIONS:  - Identify barriers to discharge w/patient and caregiver  - Arrange for needed discharge resources and transportation as appropriate  - Identify discharge learning needs (meds, wound care, etc )  - Arrange for interpretive services to assist at discharge as needed  - Refer to Case Management Department for coordinating discharge planning if the patient needs post-hospital services based on physician/advanced practitioner order or complex needs related to functional status, cognitive ability, or social support system  Outcome: Progressing     Problem: Knowledge Deficit  Goal: Patient/family/caregiver demonstrates understanding of disease process, treatment plan, medications, and discharge instructions  Description: Complete learning assessment and assess knowledge base    Interventions:  - Provide teaching at level of understanding  - Provide teaching via preferred learning methods  Outcome: Progressing     Problem: RESPIRATORY - ADULT  Goal: Achieves optimal ventilation and oxygenation  Description: INTERVENTIONS:  - Assess for changes in respiratory status  - Assess for changes in mentation and behavior  - Position to facilitate oxygenation and minimize respiratory effort  - Oxygen administered by appropriate delivery if ordered  - Initiate smoking cessation education as indicated  - Encourage broncho-pulmonary hygiene including cough, deep breathe, Incentive Spirometry  - Assess the need for suctioning and aspirate as needed  - Assess and instruct to report SOB or any respiratory difficulty  - Respiratory Therapy support as indicated  Outcome: Progressing     Problem: MUSCULOSKELETAL - ADULT  Goal: Maintain or return mobility to safest level of function  Description: INTERVENTIONS:  - Assess patient's ability to carry out ADLs; assess patient's baseline for ADL function and identify physical deficits which impact ability to perform ADLs (bathing, care of mouth/teeth, toileting, grooming, dressing, etc )  - Assess/evaluate cause of self-care deficits   - Assess range of motion  - Assess patient's mobility  - Assess patient's need for assistive devices and provide as appropriate  - Encourage maximum independence but intervene and supervise when necessary  - Involve family in performance of ADLs  - Assess for home care needs following discharge   - Consider OT consult to assist with ADL evaluation and planning for discharge  - Provide patient education as appropriate  Outcome: Progressing  Goal: Maintain proper alignment of affected body part  Description: INTERVENTIONS:  - Support, maintain and protect limb and body alignment  - Provide patient/ family with appropriate education  Outcome: Progressing     Problem: Nutrition/Hydration-ADULT  Goal: Nutrient/Hydration intake appropriate for improving, restoring or maintaining nutritional needs  Description: Monitor and assess patient's nutrition/hydration status for malnutrition  Collaborate with interdisciplinary team and initiate plan and interventions as ordered  Monitor patient's weight and dietary intake as ordered or per policy  Utilize nutrition screening tool and intervene as necessary  Determine patient's food preferences and provide high-protein, high-caloric foods as appropriate       INTERVENTIONS:  - Monitor oral intake, urinary output, labs, and treatment plans  - Assess nutrition and hydration status and recommend course of action  - Evaluate amount of meals eaten  - Assist patient with eating if necessary   - Allow adequate time for meals  - Recommend/ encourage appropriate diets, oral nutritional supplements, and vitamin/mineral supplements  - Order, calculate, and assess calorie counts as needed  - Recommend, monitor, and adjust tube feedings and TPN/PPN based on assessed needs  - Assess need for intravenous fluids  - Provide specific nutrition/hydration education as appropriate  - Include patient/family/caregiver in decisions related to nutrition  Outcome: Progressing

## 2021-03-15 PROBLEM — R78.81 GRAM-POSITIVE BACTEREMIA: Status: ACTIVE | Noted: 2021-01-01

## 2021-03-15 NOTE — PLAN OF CARE
Problem: PHYSICAL THERAPY ADULT  Goal: Performs mobility at highest level of function for planned discharge setting  See evaluation for individualized goals  Description: Treatment/Interventions: Functional transfer training, LE strengthening/ROM, Elevations, Therapeutic exercise, Endurance training, Patient/family training, Equipment eval/education, Bed mobility, Gait training, Continued evaluation, Spoke to nursing, OT          See flowsheet documentation for full assessment, interventions and recommendations  Note: Prognosis: Good  Problem List: Decreased strength, Decreased mobility, Decreased endurance  Assessment: Brenda Rasmussen is a 60 y o  male admitted to [facility] on [facility]/13/2021 for Pneumonia due to COVID-19 virus  PT was consulted and pt was seen on 3/15/2021 for mobility assessment and d/c planning  Pt presents contact and airborne precautions, multiple lines (jv, HFNC), medium fall risk  Pt is currently functioning at a supervision assistance x1 level for bed mobility, supervision assistance x1 level for transfers, supervision assistance x1 level for ambulation with no assistive device  Pt demonstrated generalized BLE weakness however no significant impact on mobility  No obvious physical distress observed during ambulation  No gross LOB observed ambulating without an AD however decreased gait speeds noted  Discussed importance of mobility during hospital stay and reviewed various positioning options including OOB/upright and prone as tolerated; pt verbalize understanding  Pt will benefit from continued skilled IP PT to address the above mentioned impairments  in order to maximize recovery and increase functional independence when completing mobility and ADLs  At this time PT recommendations for d/c are home pending continued medical progress  End of session pt seated OOB, all needs in reach     Barriers to Discharge: None     PT Discharge Recommendation: Return to previous environment with social support     PT - OK to Discharge: Yes    See flowsheet documentation for full assessment

## 2021-03-15 NOTE — PLAN OF CARE
Problem: OCCUPATIONAL THERAPY ADULT  Goal: Performs self-care activities at highest level of function for planned discharge setting  See evaluation for individualized goals  Description: Treatment Interventions: ADL retraining, Functional transfer training, UE strengthening/ROM, Endurance training, Patient/family training, Equipment evaluation/education, Compensatory technique education, Energy conservation, Activityengagement          See flowsheet documentation for full assessment, interventions and recommendations     Note: Limitation: Decreased ADL status, Decreased UE strength, Decreased Safe judgement during ADL, Decreased endurance, Decreased high-level ADLs  Prognosis: Good  Assessment: see note     OT Discharge Recommendation: Return to previous environment with social support(+ skilled OT/PT pending progress)  OT - OK to Discharge: No

## 2021-03-15 NOTE — UTILIZATION REVIEW
Notification of Inpatient Admission/Inpatient Authorization Request   This is a Notification of Inpatient Admission for [address] 46  Be advised that this patient was admitted to our facility under Inpatient Status  Contact Nano Grossman at [phone] for additional admission information  [facility]  DEDICATED -[phone]  Patient Name:   Emre Fong   YOB: 1965       [address]   [address]:   [address]   Tax ID: [id number]  NPI: [id number] Attending Provider/NPI:  Phone:  Address: Bipin Sewell   [phone]  Same as the facility   Place of Service Code: 24 Place of Service Name:  [address] [place] [address]   Start Date: 3/13/21 3325 Discharge Date & Time: No discharge date for patient encounter  Type of Admission: Inpatient Status Discharge Disposition   (if discharged): [address] [place] [address]   Patient Diagnoses: [id number] [[id number] [id number]]     Orders: Admission Orders (From admission, onward)     Ordered        03/13/21 1647  Inpatient Admission  Once                    Assigned Utilization Review Contact: Ahmet Ross  Utilization   Network Utilization Review Department  Phone: [phone]; Fax [phone]  Email: [email or web address]   ATTENTION PAYERS: Please call the assigned Utilization  directly with any questions or concerns ALL voicemails in the department are confidential  Send all requests for admission clinical reviews, approved or denied determinations and any other requests to dedicated fax number belonging to the campus where the patient is receiving treatment

## 2021-03-15 NOTE — PROGRESS NOTES
Progress Note - Pulmonary   Colby Skates 60 y o  male MRN: [id number]  Unit/Bed#: [address] [facility] 2 -01 Encounter: [id number]      Assessment/Plan:         1  Acute hypoxic respiratory failure secondary to COVID 19  1  Titrate oxygen as needed to maintain SpO2 >/=88%  2  Pulmonary toilet: IS, cough deep breathe  3  Side lying and prone position  4  Current O2 needs 50% HFNC  2  COVID 19 moderate protocol started  1  Recommendations  1  Severe protocol- check inflammatory markers tomorrow-if rising with worsening clinical picture will administer actemra and CXR  2  Labs  1   8  2  Procalcitonin   33   3  D-dimer   90  4  Ferritin 289  5   on 3/14/21  6  Troponin  <0 02  7  HIV na  8  hepatitis panel na   3  Medications  1  Vitamin C/D/ZinC  2  pepcid  3  lipitor  4  Anticoagulation Lovenox  5  Remdesivir day -not given as he is on Plaquenil  6  decadron dosing  1mg/kg BID  7  Diuretics na  8  Actemra  Continue to hold-oxygen requirements decreasing  9  Convalsecent plasma : 3/13  10  Antibiotics: D/C  3  RA  1  Home regimen          Subjective:     Issa Burrell was seen in bed upon entering the room, denies acute overnight events  Continues with dry cough  Denies: chest pain, fevers, chills, bronchospasm or hemoptysis    Objective:         Vitals: Blood pressure 127/83, pulse 77, temperature (!) 97 3 °F (36 3 °C), temperature source Oral, resp  rate (!) 24, SpO2 (!) 89 %  , 50% HFNC, There is no height or weight on file to calculate BMI        Intake/Output Summary (Last 24 hours) at 3/15/2021 1202  Last data filed at 3/15/2021 0450  Gross per 24 hour   Intake --   Output 700 ml   Net -700 ml         Physical Exam  Gen: Awake, alert, oriented x 3, no acute distress  HEENT: Mucous membranes moist, no oral lesions, no thrush, oxygen via HFNC  NECK: no accessory muscle use, JVP not elevated  Cardiac: Regular, single S1, single S2, no murmurs, no rubs, no gallops  Lungs: clear breath sounds bilaterally  Abdomen: normoactive bowel sounds, soft nontender, nondistended, no rebound or rigidity, no guarding  Extremities: no cyanosis, no clubbing, no edema    Labs: I have personally reviewed pertinent lab results  , CBC:   Lab Results   Component Value Date    WBC 16 43 (H) 03/15/2021    HGB 13 3 03/15/2021    HCT 41 0 03/15/2021    MCV 91 03/15/2021     03/15/2021    MCH 29 6 03/15/2021    MCHC 32 4 03/15/2021    RDW 12 9 03/15/2021    MPV 12 6 03/15/2021    NRBC 0 03/15/2021   , CMP:   Lab Results   Component Value Date    SODIUM 141 03/15/2021    K 4 4 03/15/2021     03/15/2021    CO2 26 03/15/2021    BUN 20 03/15/2021    CREATININE 1 08 03/15/2021    CALCIUM 9 6 03/15/2021    AST 53 (H) 03/15/2021    ALT 62 03/15/2021    ALKPHOS 67 03/15/2021    EGFR 76 03/15/2021     Imaging and other studies: none      Codey Anderson

## 2021-03-15 NOTE — PROGRESS NOTES
[address]  Progress Note - Syed Oconnor 1965, 60 y o  male MRN: [id number]  Unit/Bed#: [place] [address] -01 Encounter: [id number]  Primary Care Provider: Mateo Hernandez MD   Date and time admitted to hospital: 3/13/2021  4:32 PM    * Pneumonia due to COVID-19 virus  Assessment & Plan  Severe COVID PNA on high flow  CT shows sig b/l opacities  CTA neg for PE  Severe COVID protocol  High flow oxygen  D/C abx, neg procal  Trend inflammatory markers  IV steroids, plasma given 03/13  Pulm following, defer actemra  Self proning, ICS and OOB  DVTppx    Continue to require high flow oxygen, currently at 50%, 50L      Gram-positive bacteremia  Assessment & Plan  1/2 Gram Positive Cocci bacteremia  Possible contaminant  No fevers, WBC elevated likely due to steroids  Monitor off abx    Acute respiratory failure with hypoxia (HCC)  Assessment & Plan  Covid pna  High flow oxygen, wean as able  See above in COVID problem    Severe recurrent major depression without psychotic features (HCC)  Assessment & Plan  Continue abilify, cymbalta, trazodone for sleep    Rheumatoid arthritis involving both hands with positive rheumatoid factor (HCC)  Assessment & Plan  Continue home dose of plaquenil 200mg daily      VTE Pharmacologic Prophylaxis:   Pharmacologic: Enoxaparin (Lovenox)  Mechanical VTE Prophylaxis in Place: Yes    Patient Centered Rounds: I have performed bedside rounds with nursing staff today  Discussions with Specialists or Other Care Team Provider: Cardiology    Education and Discussions with Family / Patient: Patient    Time Spent for Care: 30 minutes  More than 50% of total time spent on counseling and coordination of care as described above      Current Length of Stay: 2 day(s)    Current Patient Status: Inpatient   Certification Statement: The patient will continue to require additional inpatient hospital stay due to Respiratory failure    Discharge Plan: Pending    Code Status: Prior      Subjective:   No events  Oxygen requirements was up to 70 but mainly at 50 depending on patient positioning  No complaints  Objective:     Vitals:   Temp (24hrs), Av °F (36 7 °C), Min:97 3 °F (36 3 °C), Max:99 3 °F (37 4 °C)    Temp:  [97 3 °F (36 3 °C)-99 3 °F (37 4 °C)] 97 9 °F (36 6 °C)  HR:  [57-77] 63  Resp:  [19-24] 22  BP: (103-127)/(71-83) 122/76  SpO2:  [87 %-95 %] 89 %  There is no height or weight on file to calculate BMI  Input and Output Summary (last 24 hours): Intake/Output Summary (Last 24 hours) at 3/15/2021 1429  Last data filed at 3/15/2021 1420  Gross per 24 hour   Intake --   Output 1725 ml   Net -1725 ml       Physical Exam:     Physical Exam  Vitals signs and nursing note reviewed  Constitutional:       Appearance: Normal appearance  He is normal weight  HENT:      Head: Normocephalic and atraumatic  Eyes:      Conjunctiva/sclera: Conjunctivae normal       Pupils: Pupils are equal, round, and reactive to light  Cardiovascular:      Rate and Rhythm: Normal rate and regular rhythm  Heart sounds: Normal heart sounds  Pulmonary:      Breath sounds: No wheezing or rhonchi  Comments: Decrease breath sounds  Abdominal:      General: Bowel sounds are normal       Palpations: Abdomen is soft  Musculoskeletal:         General: No swelling  Skin:     General: Skin is warm and dry  Neurological:      General: No focal deficit present  Mental Status: He is alert             Additional Data:     Labs:    Results from last 7 days   Lab Units 03/15/21  0504 21  0457   WBC Thousand/uL 16 43* 9 32   HEMOGLOBIN g/dL 13 3 13 5   HEMATOCRIT % 41 0 42 0   PLATELETS Thousands/uL 197 163   BANDS PCT % 1  --    NEUTROS PCT %  --  90*   LYMPHS PCT %  --  6*   LYMPHO PCT % 10*  --    MONOS PCT %  --  2*   MONO PCT % 2*  --    EOS PCT % 0 0     Results from last 7 days   Lab Units 03/15/21  0504   SODIUM mmol/L 141   POTASSIUM mmol/L 4 4   CHLORIDE mmol/L 104   CO2 mmol/L 26   BUN mg/dL 20   CREATININE mg/dL 1 08   ANION GAP mmol/L 11   CALCIUM mg/dL 9 6   ALBUMIN g/dL 2 7*   TOTAL BILIRUBIN mg/dL 0 39   ALK PHOS U/L 67   ALT U/L 62   AST U/L 53*   GLUCOSE RANDOM mg/dL 129     Results from last 7 days   Lab Units 03/13/21  1221   INR  1 08             Results from last 7 days   Lab Units 03/15/21  0504 03/14/21  0457 03/13/21  1221 03/11/21  0611 03/10/21  2035   LACTIC ACID mmol/L  --   --  1 5  --   --    PROCALCITONIN ng/ml 0 14 0 17 0 16 0 11 0 13           * I Have Reviewed All Lab Data Listed Above  * Additional Pertinent Lab Tests Reviewed: All Labs For Current Hospital Admission Reviewed    Imaging:    Xr Chest 1 View Portable    Result Date: 3/14/2021  Impression: Moderate bilateral groundglass opacity due to Covid-19 pneumonia, increased from 3/10/2021  Workstation performed: [id number]    Cta Ed Chest Pe Study    Result Date: 3/13/2021  Impression: 1  Extensive groundglass and patchy pulmonary opacities in a peripheral and basilar distribution consistent with provided clinical history of Covid 19  2  No PE   Workstation performed: [id number]    Recent Cultures (last 7 days):     Results from last 7 days   Lab Units 03/13/21  1232 03/13/21  1221   BLOOD CULTURE  No Growth at 24 hrs   --    GRAM STAIN RESULT   --  Gram positive cocci in pairs and chains*       Last 24 Hours Medication List:   Current Facility-Administered Medications   Medication Dose Route Frequency Provider Last Rate    ARIPiprazole  10 mg Oral HS Christina Alba MD      ascorbic acid  1,000 mg Oral Q12H Peter Che MD      atorvastatin  40 mg Oral HS Mildercorey Alba MD      bisacodyl  10 mg Rectal Daily PRN Christina Alba MD      cholecalciferol  2,000 Units Oral Daily Christina Alba MD      dexamethasone  0 1 mg/kg Intravenous Q12H Christina Alba MD      DULoxetine  60 mg Oral BID Christina Alba MD      enoxaparin  30 mg Subcutaneous Q12H Peter Che MD      famotidine 20 mg Oral BID Peter Hook MD      hydroxychloroquine  200 mg Oral Daily Peter Hook MD      zinc sulfate  220 mg Oral Daily Peter Hook MD      Followed by   David Gardiner ON 3/21/2021] multivitamin-minerals  1 tablet Oral Daily Peter Hook MD      polyethylene glycol  17 g Oral Daily PRN Peter Hook MD      senna-docusate sodium  1 tablet Oral HS Peter Hook MD      traZODone  100 mg Oral HS PRN Peter Hook MD          Today, Patient Was Seen By: Peter Hook MD    ** Please Note: Dictation voice to text software may have been used in the creation of this document   **

## 2021-03-15 NOTE — ASSESSMENT & PLAN NOTE
Severe COVID PNA on high flow  CT shows sig b/l opacities  CTA neg for PE  Severe COVID protocol  High flow oxygen  D/C abx, neg procal  Trend inflammatory markers  IV steroids, plasma given 03/13  Pulm following, defer actemra  Self proning, ICS and OOB  DVTppx    Continue to require high flow oxygen, currently at 50%, 50L

## 2021-03-15 NOTE — RESPIRATORY THERAPY NOTE
Pt transitioned from HFNC to 15L Midflow with RN present  Pt advised to prone to help with oxygenation  Sat 89%

## 2021-03-15 NOTE — OCCUPATIONAL THERAPY NOTE
Occupational Therapy Evaluation     Patient Name: Sherlyn Valdez  YDXSW'S Date: 3/15/2021  Problem List  Principal Problem:    Pneumonia due to COVID-19 virus  Active Problems:    Rheumatoid arthritis involving both hands with positive rheumatoid factor ([facility] [address] )    Severe recurrent major depression without psychotic features ([facility] [address] )    Acute respiratory failure with hypoxia ([facility] [address] )    Past Medical History  Past Medical History:   Diagnosis Date    Anemia     b12    Arthritis     Back pain     Depression     GERD (gastroesophageal reflux disease)     Headache     Herniated cervical disc     Herniated lumbar intervertebral disc     Malabsorption syndrome     post gastric bypass    Obesity     Vitamin B12 deficiency 2014    Vitamin D deficiency 2014    Wears glasses      Past Surgical History  Past Surgical History:   Procedure Laterality Date    CARPAL TUNNEL RELEASE Bilateral     CHOLECYSTECTOMY      COLONOSCOPY  2014    normal exam    EGD  12/2019    pouchitis    ESOPHAGOGASTRODUODENOSCOPY  2015    biopsy taken,     GASTRIC BYPASS  2010    HEMORROIDECTOMY  06/2016    UT LAP,CHOLECYSTECTOMY/GRAPH N/A 4/26/2019    Procedure: LAPAROSCOPIC CHOLECYSTECTOMY;  Surgeon: Maria L Drake MD;  Location: [facility] MAIN OR;  Service: General    VASECTOMY            03/15/21 0930   OT Last Visit   OT Visit Date 03/15/21   Note Type   Note type Evaluation   Restrictions/Precautions   Weight Bearing Precautions Per Order No   Other Precautions Contact/isolation; Airborne/isolation; Fall Risk;O2;Multiple lines  (50L HFNC, COVID 19 (+))   Pain Assessment   Pain Assessment Tool 0-10   Pain Score No Pain   Home Living   Type of Home House   Home Layout Two level;Bed/bath upstairs;1/2 bath on main level;Stairs to enter without rails  (4STE, 12 steps to 2nd floor)   Bathroom Shower/Tub Tub/shower unit   Bathroom Toilet Standard   Bathroom Equipment Shower chair   [address]   (denies) Additional Comments patient lives in a [facility] with bed/bath located on 2nd floor  has half bath on main floor  patient reports 12 steps to 2nd floor where the tub/shower unit is located, owns a shower chair however intermittently used it  Prior Function   Level of [place] Independent with ADLs and functional mobility  (+ IADLs; no AD)   Lives With Spouse;Son  (spouse can provide limited physical A; son works)   Receives Help From [facility] [place] Independent   IADLs Independent  (+ drives)   Falls in the last 6 months 0   Vocational Retired   Comments Patient lives in a [facility], independent w/ self care, IADLs and functional transfers/mobility without AD  + drives  lives with his son who works during the day M-F and his spouse who is in "so so" health and can provide limited physical assistance  Patient did not need to assist his spouse w/ anything PTA  Lifestyle   Autonomy I self care, IADLs, transfers/mobility no AD; + drives   Psychosocial   Psychosocial (WDL) WDL   ADL   Eating Assistance 5  Supervision/Setup  (setup)   Grooming Assistance 5  Supervision/Setup   UB Bathing Assistance 4  Minimal Assistance   LB Bathing Assistance 4  Minimal Assistance   UB Dressing Assistance 4  Minimal Assistance   LB Dressing Assistance 4  Minimal Assistance   Toileting Assistance  4  Minimal Assistance   Bed Mobility   Supine to Sit 5  Supervision   Additional Comments supine in bed on arrival  patient completed all bed mobility w/ supervision w/ bed rails  increased time  O2 sats 86% supine in bed, O2 improved w/ activity on 50L HFNC  Patient educated on importance of prone positioning and frequent positional changes   Transfers   Sit to Stand 5  Supervision   Stand to Sit 5  Supervision   Additional Comments transfers without AD  supervision and assistance with lines for safety     Functional Mobility   Functional Mobility 5  Supervision   Additional Comments short household distances in room without AD   Balance Dynamic Sitting Fair +   Static Standing Fair   Dynamic Standing Fair -   Ambulatory Fair -   Activity Tolerance   Activity Tolerance Patient tolerated treatment well   Medical Staff Made Aware Max Ball PT   Nurse Made Aware cleared to see per RN and aware of findings at end of session   RUE Assessment   RUE Assessment WFL   LUE Assessment   LUE Assessment WFL   Hand Function   Gross Motor Coordination Functional   Fine Motor Coordination Functional   Sensation   Light Touch No apparent deficits   Sharp/Dull No apparent deficits   Vision-Basic Assessment   Current Vision Wears glasses only for reading   Cognition   Overall Cognitive Status [facility]   Arousal/Participation Alert; Responsive; Cooperative   Attention Within functional limits   Orientation Level Oriented X4   Memory Within functional limits   Following Commands Follows one step commands with increased time or repetition   Comments Patient speaks both Sami and [address] , primary Sami  PT and OT completing Sami speaking phrases and patient responding appropriately to [address]  as well  Patient pleasant and cooperative throughout session  Improvements w/ O2 sats noted w/ activity  Seated OOB to chair sats at 90% on 50L HFNC no SOB  Educated on prone positioning, energy conservation and activity pacing  Verbalized and demonstrated good understanding of call bell and educated received  Assessment   Limitation Decreased ADL status; Decreased UE strength;Decreased Safe judgement during ADL;Decreased endurance;Decreased high-level ADLs   Prognosis Good   Assessment Pt is a 60 y o  male seen for OT evaluation s/p admit to [facility] on 3/13/2021 w/ Pneumonia due to COVID-19 virus  Comorbidities affecting pt's functional performance at time of assessment include: severe depression, RA in both hands, acute resp failure, s/p gastric bypass, hyperlipidemia (please see extensive list of comorbidities)   Personal factors affecting pt at time of IE include:steps to enter environment, limited home support, behavioral pattern, difficulty performing ADLS, difficulty performing IADLS , limited insight into deficits, flat affect, financial barriers, health management  and environment  Prior to admission, pt was indep w/ ADLs, IADLs, + drives, I w/ functional transfers/mobility without AD  Patient [place] in a [place] with 2nd fl bed/bath and 1/2 bath on first floor  Lives with spouse and son (son works during the day, spouse can provide limited physical assistance)  Upon evaluation: Patient completing all self care tasks with min A, and functional transfers and bed mobility being completed with supervision increased time  Patient on 50L HFNC during evaluation -- sats at 86% at beginning of session supine in bed, O2 sats improved to 90% s/p activity and ambulation without AD seated OOB to chair  RN aware  Pt presents with the following deficits impacting occupational performance: weakness, decreased strength, decreased balance, decreased tolerance, decreased safety awareness and decreased coping skills  Pt to benefit from continued skilled OT tx while in the hospital to address deficits as defined above and maximize level of functional independence w ADL's and functional mobility  Occupational Performance areas to address include: eating, grooming, bathing/shower, toilet hygiene, dressing, medication management, socialization, health maintenance, functional mobility, community mobility, clothing management, cleaning, meal prep, money management, household maintenance and social participation  From OT standpoint, recommendation at time of d/c would be home with family support and home therapy services (pending progress)  The patient's raw score on the AM-PAC Daily Activity inpatient short form is 20, standardized score is 42 03, greater than 39 4  Patients at this level are likely to benefit from discharge to home   Please refer to the recommendation of the Occupational Therapist for safe discharge planning  Goals   Patient Goals to go home   Long Term Goal see below   Plan   Treatment Interventions ADL retraining;Functional transfer training;UE strengthening/ROM; Endurance training;Patient/family training;Equipment evaluation/education; Compensatory technique education; Energy conservation; Activityengagement   Goal Expiration Date 03/30/21   OT Treatment Day 0   OT Frequency 2-3x/wk   Recommendation   OT Discharge Recommendation Return to previous environment with social support  (+ skilled OT/PT pending progress)   OT - OK to Discharge No   AM-PAC Daily Activity Inpatient   Lower Body Dressing 3   Bathing 3   Toileting 3   Upper Body Dressing 3   Grooming 4   Eating 4   Daily Activity Raw Score 20   Daily Activity Standardized Score (Calc for Raw Score >=11) 42 03   AM-PAC Applied Cognition Inpatient   Following a Speech/Presentation 4   Understanding Ordinary Conversation 4   Taking Medications 3   Remembering Where Things Are Placed or Put Away 3   Remembering List of 4-5 Errands 3   Taking Care of Complicated Tasks 3   Applied Cognition Raw Score 20   Applied Cognition Standardized Score 41 76       Occupational Therapy Goals to be met by time of d/c:    1) Pt will improve activity tolerance to G for min 30 min txment sessions  2) Pt will complete UB ADLs/self care w/ Mod I   3) Pt will complete toileting w/ Mod I w/ G hygiene/thoroughness using DME PRN  4) Pt will improve functional transfers on/off all surfaces using DME PRN w/ G balance/safety including toileting w/ Mod I  5) Pt will improve fx'l mobility during I/ADl/leisure tasks using DME PRN w/ g balance/safety w/ Mod I  6) Pt will demonstrate G carryover of pt/caregiver education and training as appropriate w/ mod I  w/ G tolerance  7) Pt will engage in depression screen/leisure interest checklist w/ G participation to monitor s/s depression and ID 3 positive coping strategies to A w/ emotional regulation and management  8) Pt will demonstrate 100% carryover of E C  techniques w/ mod I t/o fx'l I/ADL/leisure tasks w/o cues s/p skilled education  9) Pt will demonstrate improved b/l UE strength by 1 MMT grade to enhance ADLS and functional transfers      Tomas Cabrera MS, OTR/L

## 2021-03-15 NOTE — ASSESSMENT & PLAN NOTE
1/2 Gram Positive Cocci bacteremia  Possible contaminant  No fevers, WBC elevated likely due to steroids  Monitor off abx

## 2021-03-15 NOTE — PHYSICAL THERAPY NOTE
PHYSICAL THERAPY EVALUATION          Patient Name: Diane Matson  HVVXW'W Date: 3/15/2021   PT EVALUATION    60 y o     [id number]    COVID-19 [U07 1]    Past Medical History:   Diagnosis Date    Anemia     b12    Arthritis     Back pain     Depression     GERD (gastroesophageal reflux disease)     Headache     Herniated cervical disc     Herniated lumbar intervertebral disc     Malabsorption syndrome     post gastric bypass    Obesity     Vitamin B12 deficiency 2014    Vitamin D deficiency 2014    Wears glasses      Past Surgical History:   Procedure Laterality Date    CARPAL TUNNEL RELEASE Bilateral     CHOLECYSTECTOMY      COLONOSCOPY  2014    normal exam    EGD  12/2019    pouchitis    ESOPHAGOGASTRODUODENOSCOPY  2015    biopsy taken,     GASTRIC BYPASS  2010    HEMORROIDECTOMY  06/2016    AL LAP,CHOLECYSTECTOMY/GRAPH N/A 4/26/2019    Procedure: LAPAROSCOPIC CHOLECYSTECTOMY;  Surgeon: Candice Barksdale MD;  Location: [address] [place] [address];  Service: Morna Retort VASECTOMY          03/15/21 0927   PT Last Visit   PT Visit Date 03/15/21   Note Type   Note type Evaluation   Pain Assessment   Pain Assessment Tool Pain Assessment not indicated - pt denies pain   Pain Score No Pain   Home Living   Type of [address] [place] [address] Two level;Bed/bath upstairs;Stairs to enter without rails   Bathroom Shower/Tub Tub/shower unit   Additional Comments 4 CATHY  12 steps to second story   Prior Function   Level of [place] Independent with ADLs and functional mobility   Lives With Spouse; Son   Michael Help From Family   ADL Assistance Independent   IADLs Independent  (splits w family)   Falls in the last 6 months 0   Vocational Retired   Comments pta pt reports being indep, amb w/o an AD  +   lives w son who works and wife who is whom but in "so so" health   Restrictions/Precautions   Other Precautions Contact/isolation; Airborne/isolation;Multiple lines;O2  (50L HFNC)   General   Additional Pertinent History pt admitted 3/13/21 for pna d/t covid  pmhx significant for depression   RLE Assessment   RLE Assessment WFL  (4- to /5)   LLE Assessment   LLE Assessment WFL  (4- to 4/5)   Bed Mobility   Supine to Sit 5  Supervision   Additional items Bedrails; Increased time required   Transfers   Sit to Stand 5  Supervision   Additional items Increased time required   Stand to Sit 5  Supervision   Additional items Armrests; Increased time required   Additional Comments A for lines   Ambulation/Elevation   Gait pattern WNL; Excessively slow   Gait Assistance 5  Supervision   Additional items Assist x 1  (for lines)   Assistive Device None   Distance 10'   Balance   Dynamic Sitting Fair +   Static Standing Fair   Dynamic Standing Fair -   Ambulatory Fair -   Endurance Deficit   Endurance Deficit Yes   Endurance Deficit Description current oxygen requirements 50L HFNC  O2 86% at rest, 90% post ambulation   Activity Tolerance   Activity Tolerance Patient tolerated treatment well   Medical Staff Made Aware Florencia Ponce OTR   Nurse Made Aware Roman RN   Assessment   Prognosis Good   Problem List Decreased strength;Decreased mobility; Decreased endurance   Assessment Cal Alkol is a 60 y o  male admitted [facility] on 3/13/2021 for Pneumonia due to COVID-19 virus  PT was consulted and pt was seen on 3/15/2021 for mobility assessment and d/c planning  Pt presents contact and airborne precautions, multiple lines (jv, HFNC), medium fall risk  Pt is currently functioning at a supervision assistance x1 level for bed mobility, supervision assistance x1 level for transfers, supervision assistance x1 level for ambulation with no assistive device  Pt demonstrated generalized BLE weakness however no significant impact on mobility  No obvious physical distress observed during ambulation   No gross LOB observed ambulating without an AD however decreased gait speeds noted  Discussed importance of mobility during hospital stay and reviewed various positioning options including OOB/upright and prone as tolerated; pt verbalize understanding  Pt will benefit from continued skilled IP PT to address the above mentioned impairments  in order to maximize recovery and increase functional independence when completing mobility and ADLs  At this time PT recommendations for d/c are home pending continued medical progress  End of session pt seated OOB, all needs in reach  Barriers to Discharge None   Goals   Patient Goals to feel better   [facility] Expiration Date 03/29/21   Short Term Goal #1 1)  Pt will perform bed mobility indep demonstrating appropriate technique 100% of the time in order to improve function  2)  Perform all transfers with Steve demonstrating safe and appropriate technique 100% of the time in order to improve ability to negotiate safely in home environment  3) Amb with least restrictive AD > 150'x1 with mod I in order to demonstrate ability to negotiate in home environment  4)  Improve overall strength and balance 1/2 grade in order to optimize ability to perform functional tasks and reduce fall risk  5) Increase activity tolerance to 45 minutes in order to improve endurance to functional tasks  6)  Negotiate stairs using most appropriate technique and S in order to be able to negotiate safely in home environment  7) PT for ongoing patient and family/caregiver education, DME needs and d/c planning in order to promote highest level of function in least restrictive environment  PT Treatment Day 0   Plan   Treatment/Interventions Functional transfer training;LE strengthening/ROM; Elevations; Therapeutic exercise; Endurance training;Patient/family training;Equipment eval/education; Bed mobility;Gait training;Continued evaluation;Spoke to nursing;OT   PT Frequency 2-3x/wk   Recommendation   PT Discharge Recommendation Return to previous environment with social support   PT - OK to Discharge Yes   Additional Comments when medically stable   AM-PAC Basic Mobility Inpatient   Turning in Bed Without Bedrails 4   Lying on Back to Sitting on Edge of Flat Bed 4   Moving Bed to Chair 3   Standing Up From Chair 3   Walk in Room 3   Climb 3-5 Stairs 3   Basic Mobility Inpatient Raw Score 20   Basic Mobility Standardized Score 43 99   History: co - morbidities, social background, fall risk, multiple lines  Exam: impairments in systems including musculoskeletal (strength), neuromuscular (balance, gait, transfers, motor function and sensation), AM-PAC, cardiopulmonary, cognition  Clinical: unstable/unpredictable; ongoing management for admitting diagnosis  Complexity:high      Ru Frausto, PT

## 2021-03-16 NOTE — PROGRESS NOTES
Progress Note - Pulmonary   Brenda Finely 60 y o  male MRN: [id number]  Unit/Bed#: [place] 2 -01 Encounter: [id number]      Assessment/Plan:         1  Acute hypoxic respiratory failure secondary to COVID 19  1  Titrate oxygen as needed to maintain SpO2 >/=88%  2  Pulmonary toilet: IS, cough deep breathe  3  Side lying and prone position  4  Current O2 needs: 15L midflow  2  COVID 19 moderate protocol started  1  Recommendations  1  Continue severe pathway-as oxygen demand is decreasing continue to hole on actemra  2  Labs  1  CRP 66 8  2  Procalcitonin negative x 5  3  D-dimer increased to 1 81 today from   90  4  Ferritin 270  5  BNP 3/15/  6  Troponin  <0 02 x 4 last on 3/14/21  7  HIV non reactive  8  hepatitis panel non reactive  3  Medications  1  Vitamin C/D/ZinC  2  pepcid  3  lipitor  4  Anticoagulation remains on dvt prophylaxis  5  Remdesivir -not administered  6  decadron dosing  1mg/kg BID  7  Diuretics-will trial low lose IV lasix today 1 x 20 mg   8  Actemra  NA  9  Convalsecent plasma 3/13  10  Antibiotics d/c'd  3  RA  1  On home regimen        Subjective:     Zoila Burkett was seen in bed upon entering the room  Denies changes in breathing today  + nonproductive cough  Denies: chest pain, dyspnea at rest or hemoptysis    Objective:         Vitals: Blood pressure 119/74, pulse 62, temperature 97 7 °F (36 5 °C), temperature source Oral, resp  rate (!) 26, SpO2 (!) 89 %  , 15L Midflow, There is no height or weight on file to calculate BMI        Intake/Output Summary (Last 24 hours) at 3/16/2021 1250  Last data filed at 3/15/2021 2301  Gross per 24 hour   Intake --   Output 1425 ml   Net -1425 ml         Physical Exam  Gen: Awake, alert, oriented x 3, no acute distress  HEENT: Mucous membranes moist, no oral lesions, no thrush, oxygen via [place]  NECK: noaccessory muscle use, JVP  no elevated  Cardiac: Regular, single S1, single S2, no murmurs, no rubs, no gallops  Lungs: bibasilar rales  Abdomen: normoactive bowel sounds, soft nontender, nondistended, no rebound or rigidity, no guarding  Extremities: no cyanosis, no clubbing, no edema    Labs: I have personally reviewed pertinent lab results  , CBC: No results found for: WBC, HGB, HCT, MCV, PLT, ADJUSTEDWBC, MCH, MCHC, RDW, MPV, NRBC, CMP: No results found for: SODIUM, K, CL, CO2, ANIONGAP, BUN, CREATININE, GLUCOSE, CALCIUM, AST, ALT, ALKPHOS, PROT, BILITOT, EGFR  Imaging and other studies: none      Codey Link

## 2021-03-16 NOTE — OCCUPATIONAL THERAPY NOTE
Occupational Therapy  OT tx session cancelled  TIM Portillo reports findings from [employer] indicating Pt with decreased respiratory status with just returning to supine positioning for rest  Will continue to follow as tolerated   HELENA Monaco

## 2021-03-16 NOTE — PLAN OF CARE
Problem: PAIN - ADULT  Goal: Verbalizes/displays adequate comfort level or baseline comfort level  Description: Interventions:  - Encourage patient to monitor pain and request assistance  - Assess pain using appropriate pain scale  - Administer analgesics based on type and severity of pain and evaluate response  - Implement non-pharmacological measures as appropriate and evaluate response  - Consider cultural and social influences on pain and pain management  - Notify physician/advanced practitioner if interventions unsuccessful or patient reports new pain  Outcome: Progressing     Problem: SAFETY ADULT  Goal: Patient will remain free of falls  Description: INTERVENTIONS:  - Assess patient frequently for physical needs  -  Identify cognitive and physical deficits and behaviors that affect risk of falls    -  [place] fall precautions as indicated by assessment   - Educate patient/family on patient safety including physical limitations  - Instruct patient to call for assistance with activity based on assessment  - Modify environment to reduce risk of injury  - Consider OT/PT consult to assist with strengthening/mobility  Outcome: Progressing

## 2021-03-16 NOTE — ASSESSMENT & PLAN NOTE
Severe COVID PNA on high flow  CT shows sig b/l opacities  CTA neg for PE  Severe COVID protocol  High flow oxygen  D/C abx, neg procal  Trend inflammatory markers  IV steroids, plasma given 03/13  Pulm following, defer actemra  Self proning, ICS and OOB  DVTppx    Wean to midflow 15L, trending markers show slight improvement

## 2021-03-16 NOTE — PROGRESS NOTES
[address] 46  Progress Note - [place] 1965, 60 y o  male MRN: [id number]  Unit/Bed#: [place] [address] -01 Encounter: [id number]  Primary Care Provider: Philippe Kumar MD   Date and time admitted to hospital: 3/13/2021  4:32 PM    * Pneumonia due to COVID-19 virus  Assessment & Plan  Severe COVID PNA on high flow  CT shows sig b/l opacities  CTA neg for PE  Severe COVID protocol  High flow oxygen  D/C abx, neg procal  Trend inflammatory markers  IV steroids, plasma given 03/13  Pulm following, defer actemra  Self proning, ICS and OOB  DVTppx    Wean to midflow 15L, trending markers show slight improvement      Gram-positive bacteremia  Assessment & Plan  1/2 Gram Positive Cocci bacteremia  Possible contaminant  No fevers, WBC elevated likely due to steroids  Monitor off abx    Acute respiratory failure with hypoxia (HCC)  Assessment & Plan  Covid pna  On midflow, wean from high flow  See above in COVID problem    Severe recurrent major depression without psychotic features (HCC)  Assessment & Plan  Continue abilify, cymbalta, trazodone for sleep    Rheumatoid arthritis involving both hands with positive rheumatoid factor ([facility] [address] )  Assessment & Plan  Continue home dose of plaquenil 200mg daily        VTE Pharmacologic Prophylaxis:   Pharmacologic: Enoxaparin (Lovenox)  Mechanical VTE Prophylaxis in Place: Yes    Patient Centered Rounds: I have performed bedside rounds with nursing staff today  Discussions with Specialists or Other Care Team Provider: Pulmonary    Education and Discussions with Family / Patient: Patient    Time Spent for Care: 30 minutes  More than 50% of total time spent on counseling and coordination of care as described above      Current Length of Stay: 3 day(s)    Current Patient Status: Inpatient   Certification Statement: The patient will continue to require additional inpatient hospital stay due to Resp failure    Discharge Plan: Pending    Code Status: Level 1 - Full Code      Subjective:   Patient doing well  No events overnight  No complaints  Discussed continuing current ICS, self proning and getting out to chair  Objective:     Vitals:   Temp (24hrs), Av 9 °F (36 6 °C), Min:97 6 °F (36 4 °C), Max:98 5 °F (36 9 °C)    Temp:  [97 6 °F (36 4 °C)-98 5 °F (36 9 °C)] 97 7 °F (36 5 °C)  HR:  [48-72] 62  Resp:  [20-28] 26  BP: (112-123)/(67-76) 119/74  SpO2:  [89 %-99 %] 89 %  There is no height or weight on file to calculate BMI  Input and Output Summary (last 24 hours): Intake/Output Summary (Last 24 hours) at 3/16/2021 1111  Last data filed at 3/15/2021 2301  Gross per 24 hour   Intake --   Output 1425 ml   Net -1425 ml       Physical Exam:     Physical Exam  Vitals signs and nursing note reviewed  Constitutional:       Appearance: Normal appearance  He is normal weight  HENT:      Head: Normocephalic and atraumatic  Eyes:      Conjunctiva/sclera: Conjunctivae normal       Pupils: Pupils are equal, round, and reactive to light  Cardiovascular:      Rate and Rhythm: Normal rate and regular rhythm  Heart sounds: Normal heart sounds  Pulmonary:      Breath sounds: No wheezing or rhonchi  Comments: Decrease breath sounds  Abdominal:      General: Bowel sounds are normal       Palpations: Abdomen is soft  Musculoskeletal:         General: No swelling  Skin:     General: Skin is warm and dry  Neurological:      General: No focal deficit present  Mental Status: He is alert             Additional Data:     Labs:    Results from last 7 days   Lab Units 03/15/21  0504 21  0457   WBC Thousand/uL 16 43* 9 32   HEMOGLOBIN g/dL 13 3 13 5   HEMATOCRIT % 41 0 42 0   PLATELETS Thousands/uL 197 163   BANDS PCT % 1  --    NEUTROS PCT %  --  90*   LYMPHS PCT %  --  6*   LYMPHO PCT % 10*  --    MONOS PCT %  --  2*   MONO PCT % 2*  --    EOS PCT % 0 0     Results from last 7 days   Lab Units 03/15/21  0504   SODIUM mmol/L 141 POTASSIUM mmol/L 4 4   CHLORIDE mmol/L 104   CO2 mmol/L 26   BUN mg/dL 20   CREATININE mg/dL 1 08   ANION GAP mmol/L 11   CALCIUM mg/dL 9 6   ALBUMIN g/dL 2 7*   TOTAL BILIRUBIN mg/dL 0 39   ALK PHOS U/L 67   ALT U/L 62   AST U/L 53*   GLUCOSE RANDOM mg/dL 129     Results from last 7 days   Lab Units 03/13/21  1221   INR  1 08             Results from last 7 days   Lab Units 03/15/21  0504 03/14/21  0457 03/13/21  1221 03/11/21  0611 03/10/21  2035   LACTIC ACID mmol/L  --   --  1 5  --   --    PROCALCITONIN ng/ml 0 14 0 17 0 16 0 11 0 13           * I Have Reviewed All Lab Data Listed Above  * Additional Pertinent Lab Tests Reviewed: All Labs For Current Hospital Admission Reviewed    Imaging:    Xr Chest Portable    Result Date: 3/16/2021  Impression: Worsening diffuse bilateral Covid 19 pneumonia  Workstation performed: [id number]     Xr Chest 1 View Portable    Result Date: 3/14/2021  Impression: Moderate bilateral groundglass opacity due to Covid-19 pneumonia, increased from 3/10/2021  Workstation performed: [id number]    Cta Ed Chest Pe Study    Result Date: 3/13/2021  Impression: 1  Extensive groundglass and patchy pulmonary opacities in a peripheral and basilar distribution consistent with provided clinical history of Covid 19  2  No PE  Workstation performed: [id number]    Recent Cultures (last 7 days):     Results from last 7 days   Lab Units 03/13/21  1232 03/13/21  1221   BLOOD CULTURE  No Growth at 48 hrs   Alpha Hemolytic Streptococcus NOT Enterococcus, NOT S  pneumoniae*   GRAM STAIN RESULT   --  Gram positive cocci in pairs and chains*       Last 24 Hours Medication List:   Current Facility-Administered Medications   Medication Dose Route Frequency Provider Last Rate    ARIPiprazole  10 mg Oral HS Ysabel Olson MD      ascorbic acid  1,000 mg Oral Q12H Tahir Hugo MD      atorvastatin  40 mg Oral HS Ysabel Olson MD      bisacodyl  10 mg Rectal Daily PRN MD Karan Mcneill Ok cholecalciferol  2,000 Units Oral Daily Mildercorey Alba MD      dexamethasone  0 1 mg/kg Intravenous Q12H Christina Alba MD      DULoxetine  60 mg Oral BID Mildercorey Alba MD      enoxaparin  30 mg Subcutaneous Q12H [facility] Mildercorey Alba MD      famotidine  20 mg Oral BID Milderd MD Parth      hydroxychloroquine  200 mg Oral Daily Mildercorey Alba MD      zinc sulfate  220 mg Oral Daily Milderd MD Parth      Followed by   Cristina Chatterjee ON 3/21/2021] multivitamin-minerals  1 tablet Oral Daily Mildercorey Alba MD      polyethylene glycol  17 g Oral Daily PRN Mildercorey Alba MD      senna-docusate sodium  1 tablet Oral HS Mildercroey Alba MD      traZODone  100 mg Oral HS PRN Mildercorey Alba MD          Today, Patient Was Seen By: Christina Alba MD    ** Please Note: Dictation voice to text software may have been used in the creation of this document   **

## 2021-03-17 NOTE — PLAN OF CARE
Problem: PAIN - ADULT  Goal: Verbalizes/displays adequate comfort level or baseline comfort level  Description: Interventions:  - Encourage patient to monitor pain and request assistance  - Assess pain using appropriate pain scale  - Administer analgesics based on type and severity of pain and evaluate response  - Implement non-pharmacological measures as appropriate and evaluate response  - Consider cultural and social influences on pain and pain management  - Notify physician/advanced practitioner if interventions unsuccessful or patient reports new pain  Outcome: Progressing     Problem: INFECTION - ADULT  Goal: Absence or prevention of progression during hospitalization  Description: INTERVENTIONS:  - Assess and monitor for signs and symptoms of infection  - Monitor lab/diagnostic results  - Monitor all insertion sites, i e  indwelling lines, tubes, and drains  - Monitor endotracheal if appropriate and nasal secretions for changes in amount and color  - [place] appropriate cooling/warming therapies per order  - Administer medications as ordered  - Instruct and encourage patient and family to use good hand hygiene technique  - Identify and instruct in appropriate isolation precautions for identified infection/condition  Outcome: Progressing     Problem: SAFETY ADULT  Goal: Patient will remain free of falls  Description: INTERVENTIONS:  - Assess patient frequently for physical needs  -  Identify cognitive and physical deficits and behaviors that affect risk of falls    -  [place] fall precautions as indicated by assessment   - Educate patient/family on patient safety including physical limitations  - Instruct patient to call for assistance with activity based on assessment  - Modify environment to reduce risk of injury  - Consider OT/PT consult to assist with strengthening/mobility  Outcome: Progressing  Goal: Maintain or return to baseline ADL function  Description: INTERVENTIONS:  -  Assess patient's ability to carry out ADLs; assess patient's baseline for ADL function and identify physical deficits which impact ability to perform ADLs (bathing, care of mouth/teeth, toileting, grooming, dressing, etc )  - Assess/evaluate cause of self-care deficits   - Assess range of motion  - Assess patient's mobility; develop plan if impaired  - Assess patient's need for assistive devices and provide as appropriate  - Encourage maximum independence but intervene and supervise when necessary  - Involve family in performance of ADLs  - Assess for home care needs following discharge   - Consider OT consult to assist with ADL evaluation and planning for discharge  - Provide patient education as appropriate  Outcome: Progressing  Goal: Maintain or return mobility status to optimal level  Description: INTERVENTIONS:  - Assess patient's baseline mobility status (ambulation, transfers, stairs, etc )    - Identify cognitive and physical deficits and behaviors that affect mobility  - Identify mobility aids required to assist with transfers and/or ambulation (gait belt, sit-to-stand, lift, walker, cane, etc )  - [place] fall precautions as indicated by assessment  - Record patient progress and toleration of activity level on Mobility SBAR; progress patient to next Phase/Stage  - Instruct patient to call for assistance with activity based on assessment  - Consider rehabilitation consult to assist with strengthening/weightbearing, etc   Outcome: Progressing     Problem: DISCHARGE PLANNING  Goal: Discharge to home or other facility with appropriate resources  Description: INTERVENTIONS:  - Identify barriers to discharge w/patient and caregiver  - Arrange for needed discharge resources and transportation as appropriate  - Identify discharge learning needs (meds, wound care, etc )  - Arrange for interpretive services to assist at discharge as needed  - Refer to Case Management Department for coordinating discharge planning if the patient needs post-hospital services based on physician/advanced practitioner order or complex needs related to functional status, cognitive ability, or social support system  Outcome: Progressing     Problem: Knowledge Deficit  Goal: Patient/family/caregiver demonstrates understanding of disease process, treatment plan, medications, and discharge instructions  Description: Complete learning assessment and assess knowledge base    Interventions:  - Provide teaching at level of understanding  - Provide teaching via preferred learning methods  Outcome: Progressing     Problem: RESPIRATORY - ADULT  Goal: Achieves optimal ventilation and oxygenation  Description: INTERVENTIONS:  - Assess for changes in respiratory status  - Assess for changes in mentation and behavior  - Position to facilitate oxygenation and minimize respiratory effort  - Oxygen administered by appropriate delivery if ordered  - Initiate smoking cessation education as indicated  - Encourage broncho-pulmonary hygiene including cough, deep breathe, Incentive Spirometry  - Assess the need for suctioning and aspirate as needed  - Assess and instruct to report SOB or any respiratory difficulty  - Respiratory Therapy support as indicated  Outcome: Progressing     Problem: MUSCULOSKELETAL - ADULT  Goal: Maintain or return mobility to safest level of function  Description: INTERVENTIONS:  - Assess patient's ability to carry out ADLs; assess patient's baseline for ADL function and identify physical deficits which impact ability to perform ADLs (bathing, care of mouth/teeth, toileting, grooming, dressing, etc )  - Assess/evaluate cause of self-care deficits   - Assess range of motion  - Assess patient's mobility  - Assess patient's need for assistive devices and provide as appropriate  - Encourage maximum independence but intervene and supervise when necessary  - Involve family in performance of ADLs  - Assess for home care needs following discharge   - Consider OT consult to assist with ADL evaluation and planning for discharge  - Provide patient education as appropriate  Outcome: Progressing  Goal: Maintain proper alignment of affected body part  Description: INTERVENTIONS:  - Support, maintain and protect limb and body alignment  - Provide patient/ family with appropriate education  Outcome: Progressing     Problem: Nutrition/Hydration-ADULT  Goal: Nutrient/Hydration intake appropriate for improving, restoring or maintaining nutritional needs  Description: Monitor and assess patient's nutrition/hydration status for malnutrition  Collaborate with interdisciplinary team and initiate plan and interventions as ordered  Monitor patient's weight and dietary intake as ordered or per policy  Utilize nutrition screening tool and intervene as necessary  Determine patient's food preferences and provide high-protein, high-caloric foods as appropriate  INTERVENTIONS:  - Monitor oral intake, urinary output, labs, and treatment plans  - Assess nutrition and hydration status and recommend course of action  - Evaluate amount of meals eaten  - Assist patient with eating if necessary   - Allow adequate time for meals  - Recommend/ encourage appropriate diets, oral nutritional supplements, and vitamin/mineral supplements  - Order, calculate, and assess calorie counts as needed  - Recommend, monitor, and adjust tube feedings and TPN/PPN based on assessed needs  - Assess need for intravenous fluids  - Provide specific nutrition/hydration education as appropriate  - Include patient/family/caregiver in decisions related to nutrition  Outcome: Progressing     Problem: Potential for Falls  Goal: Patient will remain free of falls  Description: INTERVENTIONS:  - Assess patient frequently for physical needs  -  Identify cognitive and physical deficits and behaviors that affect risk of falls    -  [place] fall precautions as indicated by assessment   - Educate patient/family on patient safety including physical limitations  - Instruct patient to call for assistance with activity based on assessment  - Modify environment to reduce risk of injury  - Consider OT/PT consult to assist with strengthening/mobility  Outcome: Progressing

## 2021-03-17 NOTE — ASSESSMENT & PLAN NOTE
Severe COVID PNA initially on high flow on presentation  CT shows sig b/l opacities  CTA neg for PE  D/C abx, neg procal  Trend inflammatory markers  IV steroids, plasma given 03/13  Pulm following, defer actemra  Self proning, ICS and OOB  DVTppx    On midflow 15L with NRB, monitor inflammatory markers  Lasix 40mg given once, per pulm

## 2021-03-17 NOTE — PLAN OF CARE
Problem: PAIN - ADULT  Goal: Verbalizes/displays adequate comfort level or baseline comfort level  Description: Interventions:  - Encourage patient to monitor pain and request assistance  - Assess pain using appropriate pain scale  - Administer analgesics based on type and severity of pain and evaluate response  - Implement non-pharmacological measures as appropriate and evaluate response  - Consider cultural and social influences on pain and pain management  - Notify physician/advanced practitioner if interventions unsuccessful or patient reports new pain  Outcome: Progressing     Problem: INFECTION - ADULT  Goal: Absence or prevention of progression during hospitalization  Description: INTERVENTIONS:  - Assess and monitor for signs and symptoms of infection  - Monitor lab/diagnostic results  - Monitor all insertion sites, i e  indwelling lines, tubes, and drains  - Monitor endotracheal if appropriate and nasal secretions for changes in amount and color  - [place] appropriate cooling/warming therapies per order  - Administer medications as ordered  - Instruct and encourage patient and family to use good hand hygiene technique  - Identify and instruct in appropriate isolation precautions for identified infection/condition  Outcome: Progressing     Problem: SAFETY ADULT  Goal: Patient will remain free of falls  Description: INTERVENTIONS:  - Assess patient frequently for physical needs  -  Identify cognitive and physical deficits and behaviors that affect risk of falls    -  [place] fall precautions as indicated by assessment   - Educate patient/family on patient safety including physical limitations  - Instruct patient to call for assistance with activity based on assessment  - Modify environment to reduce risk of injury  - Consider OT/PT consult to assist with strengthening/mobility  Outcome: Progressing  Goal: Maintain or return to baseline ADL function  Description: INTERVENTIONS:  -  Assess patient's ability to carry out ADLs; assess patient's baseline for ADL function and identify physical deficits which impact ability to perform ADLs (bathing, care of mouth/teeth, toileting, grooming, dressing, etc )  - Assess/evaluate cause of self-care deficits   - Assess range of motion  - Assess patient's mobility; develop plan if impaired  - Assess patient's need for assistive devices and provide as appropriate  - Encourage maximum independence but intervene and supervise when necessary  - Involve family in performance of ADLs  - Assess for home care needs following discharge   - Consider OT consult to assist with ADL evaluation and planning for discharge  - Provide patient education as appropriate  Outcome: Progressing  Goal: Maintain or return mobility status to optimal level  Description: INTERVENTIONS:  - Assess patient's baseline mobility status (ambulation, transfers, stairs, etc )    - Identify cognitive and physical deficits and behaviors that affect mobility  - Identify mobility aids required to assist with transfers and/or ambulation (gait belt, sit-to-stand, lift, walker, cane, etc )  - [place] fall precautions as indicated by assessment  - Record patient progress and toleration of activity level on Mobility SBAR; progress patient to next Phase/Stage  - Instruct patient to call for assistance with activity based on assessment  - Consider rehabilitation consult to assist with strengthening/weightbearing, etc   Outcome: Progressing     Problem: DISCHARGE PLANNING  Goal: Discharge to home or other facility with appropriate resources  Description: INTERVENTIONS:  - Identify barriers to discharge w/patient and caregiver  - Arrange for needed discharge resources and transportation as appropriate  - Identify discharge learning needs (meds, wound care, etc )  - Arrange for interpretive services to assist at discharge as needed  - Refer to Case Management Department for coordinating discharge planning if the patient needs post-hospital services based on physician/advanced practitioner order or complex needs related to functional status, cognitive ability, or social support system  Outcome: Progressing     Problem: Knowledge Deficit  Goal: Patient/family/caregiver demonstrates understanding of disease process, treatment plan, medications, and discharge instructions  Description: Complete learning assessment and assess knowledge base    Interventions:  - Provide teaching at level of understanding  - Provide teaching via preferred learning methods  Outcome: Progressing     Problem: RESPIRATORY - ADULT  Goal: Achieves optimal ventilation and oxygenation  Description: INTERVENTIONS:  - Assess for changes in respiratory status  - Assess for changes in mentation and behavior  - Position to facilitate oxygenation and minimize respiratory effort  - Oxygen administered by appropriate delivery if ordered  - Initiate smoking cessation education as indicated  - Encourage broncho-pulmonary hygiene including cough, deep breathe, Incentive Spirometry  - Assess the need for suctioning and aspirate as needed  - Assess and instruct to report SOB or any respiratory difficulty  - Respiratory Therapy support as indicated  Outcome: Progressing     Problem: MUSCULOSKELETAL - ADULT  Goal: Maintain or return mobility to safest level of function  Description: INTERVENTIONS:  - Assess patient's ability to carry out ADLs; assess patient's baseline for ADL function and identify physical deficits which impact ability to perform ADLs (bathing, care of mouth/teeth, toileting, grooming, dressing, etc )  - Assess/evaluate cause of self-care deficits   - Assess range of motion  - Assess patient's mobility  - Assess patient's need for assistive devices and provide as appropriate  - Encourage maximum independence but intervene and supervise when necessary  - Involve family in performance of ADLs  - Assess for home care needs following discharge   - Consider OT consult to assist with ADL evaluation and planning for discharge  - Provide patient education as appropriate  Outcome: Progressing  Goal: Maintain proper alignment of affected body part  Description: INTERVENTIONS:  - Support, maintain and protect limb and body alignment  - Provide patient/ family with appropriate education  Outcome: Progressing     Problem: Nutrition/Hydration-ADULT  Goal: Nutrient/Hydration intake appropriate for improving, restoring or maintaining nutritional needs  Description: Monitor and assess patient's nutrition/hydration status for malnutrition  Collaborate with interdisciplinary team and initiate plan and interventions as ordered  Monitor patient's weight and dietary intake as ordered or per policy  Utilize nutrition screening tool and intervene as necessary  Determine patient's food preferences and provide high-protein, high-caloric foods as appropriate  INTERVENTIONS:  - Monitor oral intake, urinary output, labs, and treatment plans  - Assess nutrition and hydration status and recommend course of action  - Evaluate amount of meals eaten  - Assist patient with eating if necessary   - Allow adequate time for meals  - Recommend/ encourage appropriate diets, oral nutritional supplements, and vitamin/mineral supplements  - Order, calculate, and assess calorie counts as needed  - Recommend, monitor, and adjust tube feedings and TPN/PPN based on assessed needs  - Assess need for intravenous fluids  - Provide specific nutrition/hydration education as appropriate  - Include patient/family/caregiver in decisions related to nutrition  Outcome: Progressing     Problem: Potential for Falls  Goal: Patient will remain free of falls  Description: INTERVENTIONS:  - Assess patient frequently for physical needs  -  Identify cognitive and physical deficits and behaviors that affect risk of falls    -  [place] fall precautions as indicated by assessment   - Educate patient/family on patient safety including physical limitations  - Instruct patient to call for assistance with activity based on assessment  - Modify environment to reduce risk of injury  - Consider OT/PT consult to assist with strengthening/mobility  Outcome: Progressing

## 2021-03-17 NOTE — PROGRESS NOTES
Progress Note - Pulmonary   Moraimaceline Andrade 60 y o  male MRN: [id number]  Unit/Bed#: [place] [facility] 2 -01 Encounter: [id number]      Assessment/Plan:         1  Acute hypoxic respiratory failure secondary to COVID 19  1  Titrate oxygen as needed to maintain SpO2 >/=88%  2  Pulmonary toilet: IS, cough deep breathe  3  Side lying and prone position  4  Current O2 needs: 15 L midflow with NRB  2  COVID 19 moderate protocol started  1  Recommendations  1  Severe protocol-add actemra and treatment dose lovenox  2  CXR with worsening diffuse bilateral infiltrates  2  Labs  1   4  2  Procalcitonin negative x 5  3  D-dimer 2 26  4  Ferritin 268  5  BNP NA  6  Troponin  NA   7  HIV non reactive  8  hepatitis panel non reactive   3  Medications  1  Vitamin C/D/ZinC  2  pepcid  3  lipitor  4  Anticoagulation increased to 1mg/kg BID  5  Remdesivir NA  6  decadron dosing   1 mg/kg BID  7  Diuretics NA  8  Actemra  1 dose ordered today  9  Convalsecent plasma 3/13  10  Antibiotics d/'c'd  11  Bowel regimen in place  3  RA  1  Continue Plaquenil            Subjective:     Olga Retana was seen in bed on his side upon entering  Again denies acute overnight events  Breathing is unchanged  Continues with dry cough  Denies: chest pain, fevers, chills, bronchospasm or hemoptysis    Objective:         Vitals: Blood pressure 130/77, pulse 65, temperature 97 7 °F (36 5 °C), resp  rate (!) 28, SpO2 (!) 86 %  , 15L midflow and NRB, There is no height or weight on file to calculate BMI        Intake/Output Summary (Last 24 hours) at 3/17/2021 1600  Last data filed at 3/16/2021 2301  Gross per 24 hour   Intake --   Output 900 ml   Net -900 ml         Physical Exam  Gen: Awake, alert, oriented x 3, no acute distress  HEENT: Mucous membranes moist, no oral lesions, no thrush, oxygen via midflow and NRB  NECK: no accessory muscle use, JVP not elevated  Cardiac: Regular, single S1, single S2, no murmurs, no rubs, no gallops  Lungs: clear breath sounds  Abdomen: normoactive bowel sounds, soft nontender, nondistended, no rebound or rigidity, no guarding  Extremities: no cyanosis, no clubbing, no edema    Labs: I have personally reviewed pertinent lab results  , CBC:   Lab Results   Component Value Date    WBC 15 57 (H) 03/17/2021    HGB 14 4 03/17/2021    HCT 43 5 03/17/2021    MCV 89 03/17/2021     03/17/2021    MCH 29 4 03/17/2021    MCHC 33 1 03/17/2021    RDW 12 8 03/17/2021    MPV 12 7 03/17/2021    NRBC 0 03/17/2021   , CMP:   Lab Results   Component Value Date    SODIUM 137 03/17/2021    K 4 4 03/17/2021     03/17/2021    CO2 26 03/17/2021    BUN 20 03/17/2021    CREATININE 0 99 03/17/2021    CALCIUM 9 2 03/17/2021    AST 39 03/17/2021    ALT 78 03/17/2021    ALKPHOS 86 03/17/2021    EGFR 84 03/17/2021     Imaging and other studies: none      Codey Harris

## 2021-03-17 NOTE — PROGRESS NOTES
[facility]  Progress Note - Elliot Ford 1965, 60 y o  male MRN: [id number]  Unit/Bed#: [place] [address] -01 Encounter: [id number]  Primary Care Provider: Je Carmona MD   Date and time admitted to hospital: 3/13/2021  4:32 PM    * Pneumonia due to COVID-19 virus  Assessment & Plan  Severe COVID PNA initially on high flow on presentation  CT shows sig b/l opacities  CTA neg for PE  D/C abx, neg procal  Trend inflammatory markers  IV steroids, plasma given 03/13  Pulm following, defer actemra  Self proning, ICS and OOB  DVTppx    On midflow 15L with NRB, monitor inflammatory markers  Lasix 40mg given once, per pulm    Acute respiratory failure with hypoxia (HCC)  Assessment & Plan  Covid pna  On midflow with NRB  See above in COVID problem    Severe recurrent major depression without psychotic features ([facility] )  Assessment & Plan  Continue abilify, cymbalta, trazodone for sleep    Rheumatoid arthritis involving both hands with positive [facility] factor [facility] )  Assessment & Plan  Continue home dose of plaquenil 200mg daily      VTE Pharmacologic Prophylaxis:   Pharmacologic: Enoxaparin (Lovenox)  Mechanical VTE Prophylaxis in Place: Yes    Patient Centered Rounds: I have performed bedside rounds with nursing staff today  Discussions with Specialists or Other Care Team Provider: Pulmonary    Education and Discussions with Family / Patient: Patient    Time Spent for Care: 30 minutes  More than 50% of total time spent on counseling and coordination of care as described above  Current Length of Stay: 4 day(s)    Current Patient Status: Inpatient   Certification Statement: The patient will continue to require additional inpatient hospital stay due to Resp failure, COVID PNA    Discharge Plan: Pending    Code Status: Level 1 - Full Code      Subjective:   Patient today has no complaints  Continue to be on midflow and NRB   Has been compliant with pulmonary exercises such as proning, ICS and OOB  Objective:     Vitals:   Temp (24hrs), Av 9 °F (36 6 °C), Min:97 3 °F (36 3 °C), Max:98 5 °F (36 9 °C)    Temp:  [97 3 °F (36 3 °C)-98 5 °F (36 9 °C)] 97 6 °F (36 4 °C)  HR:  [56-73] 73  Resp:  [18-28] 28  BP: (115-134)/(74-76) 117/76  SpO2:  [84 %-92 %] 90 %  There is no height or weight on file to calculate BMI  Input and Output Summary (last 24 hours): Intake/Output Summary (Last 24 hours) at 3/17/2021 1135  Last data filed at 3/16/2021 2301  Gross per 24 hour   Intake --   Output 2100 ml   Net -2100 ml       Physical Exam:     Physical Exam  Vitals signs and nursing note reviewed  Constitutional:       Appearance: Normal appearance  He is normal weight  HENT:      Head: Normocephalic and atraumatic  Eyes:      Conjunctiva/sclera: Conjunctivae normal       Pupils: Pupils are equal, round, and reactive to light  Cardiovascular:      Rate and Rhythm: Normal rate and regular rhythm  Pulmonary:      Breath sounds: Normal breath sounds  No wheezing or rhonchi  Abdominal:      General: Bowel sounds are normal       Palpations: Abdomen is soft  Musculoskeletal:         General: No swelling  Skin:     General: Skin is warm and dry  Neurological:      General: No focal deficit present  Mental Status: He is alert             Additional Data:     Labs:    Results from last 7 days   Lab Units 21  0534 03/15/21  0504   WBC Thousand/uL 15 57* 16 43*   HEMOGLOBIN g/dL 14 4 13 3   HEMATOCRIT % 43 5 41 0   PLATELETS Thousands/uL 263 197   BANDS PCT %  --  1   NEUTROS PCT % 88*  --    LYMPHS PCT % 5*  --    LYMPHO PCT %  --  10*   MONOS PCT % 2*  --    MONO PCT %  --  2*   EOS PCT % 0 0     Results from last 7 days   Lab Units 21  0535   SODIUM mmol/L 137   POTASSIUM mmol/L 4 4   CHLORIDE mmol/L 101   CO2 mmol/L 26   BUN mg/dL 20   CREATININE mg/dL 0 99   ANION GAP mmol/L 10   CALCIUM mg/dL 9 2   ALBUMIN g/dL 2 7*   TOTAL BILIRUBIN mg/dL 0 78   ALK PHOS U/L 86   ALT U/L 78   AST U/L 39   GLUCOSE RANDOM mg/dL 99     Results from last 7 days   Lab Units 03/13/21  1221   INR  1 08             Results from last 7 days   Lab Units 03/15/21  0504 03/14/21  0457 03/13/21  1221 03/11/21  0611 03/10/21  2035   LACTIC ACID mmol/L  --   --  1 5  --   --    PROCALCITONIN ng/ml 0 14 0 17 0 16 0 11 0 13           * I Have Reviewed All Lab Data Listed Above  * Additional Pertinent Lab Tests Reviewed: All Labs For Current Hospital Admission Reviewed    Imaging:    Xr Chest Portable    Result Date: 3/16/2021  Impression: Worsening diffuse bilateral Covid 19 pneumonia  Workstation performed: [id number]     Xr Chest 1 View Portable    Result Date: 3/14/2021  Impression: Moderate bilateral groundglass opacity due to Covid-19 pneumonia, increased from 3/10/2021  Workstation performed: [id number]    Cta Ed Chest Pe Study    Result Date: 3/13/2021  Impression: 1  Extensive groundglass and patchy pulmonary opacities in a peripheral and basilar distribution consistent with provided clinical history of Covid 19  2  No PE  Workstation performed: [id number]      Recent Cultures (last 7 days):     Results from last 7 days   Lab Units 03/13/21  1232 03/13/21  1221   BLOOD CULTURE  No Growth at 72 hrs   Alpha Hemolytic Streptococcus NOT Enterococcus, NOT S  pneumoniae*   GRAM STAIN RESULT   --  Gram positive cocci in pairs and chains*       Last 24 Hours Medication List:   Current Facility-Administered Medications   Medication Dose Route Frequency Provider Last Rate    ARIPiprazole  10 mg Oral HS Dk Burgess MD      ascorbic acid  1,000 mg Oral Q12H Mireya Rosenberg MD      atorvastatin  40 mg Oral HS Dk Burgess MD      bisacodyl  10 mg Rectal Daily PRN Dk Burgess MD      cholecalciferol  2,000 Units Oral Daily Dk Burgess MD      dexamethasone  0 1 mg/kg Intravenous Q12H Dk Burgess MD      DULoxetine  60 mg Oral BID Dk Burgess MD      enoxaparin  30 mg Subcutaneous Q12H [address] Marimar Jean MD      famotidine  20 mg Oral BID Marimar Jean MD      hydroxychloroquine  200 mg Oral Daily Marimar Jean MD      zinc sulfate  220 mg Oral Daily Marimar Jean MD      Followed by   Logan Nolan ON 3/21/2021] multivitamin-minerals  1 tablet Oral Daily Marimar Jean MD      polyethylene glycol  17 g Oral Daily PRN Marimar Jean MD      senna-docusate sodium  1 tablet Oral HS Marimar Jean MD      traZODone  100 mg Oral HS PRN Marimar Jean MD          Today, Patient Was Seen By: aMrimar Jean MD    ** Please Note: Dictation voice to text software may have been used in the creation of this document   **

## 2021-03-18 NOTE — PLAN OF CARE
Problem: OCCUPATIONAL THERAPY ADULT  Goal: Performs self-care activities at highest level of function for planned discharge setting  See evaluation for individualized goals  Description: Treatment Interventions: ADL retraining, Functional transfer training, UE strengthening/ROM, Endurance training, Patient/family training, Equipment evaluation/education, Compensatory technique education, Energy conservation, Activityengagement          See flowsheet documentation for full assessment, interventions and recommendations  Outcome: Progressing  Note: Limitation: Decreased ADL status, Decreased UE strength, Decreased Safe judgement during ADL, Decreased endurance, Decreased high-level ADLs  Prognosis: Good  Assessment: Pt was seen for skilled OT with focus on completion of self care tasks, functional transfers, review of compensatory breathing techs, fall prevention and review of current plan of care  Pt with initial greeting while seated EOB  SAT at 89% on 15 iters NRW/Mid flow O2  Pt receptive to completion of self care tasks, dynamic stand balance activity without need for AD  Dip in SpO2 to 79% with functional reach for LE dressing activities  Cues to take rest break with compensatory breathing techs, pursed lip breathing required to encourage return to 91% SpO2  Pt with improved SpO2 to 100% with dynamic stand balance activity to complete toilet transfer, hand hygiene at sink and to walk to window in room with O2 extension provided  Seated rest break required with SpO2 again dipping to 77% with quick return noted to 95%  Pt with improved activity tolerance overall this tx session        OT Discharge Recommendation: Return to previous environment with social support  OT - OK to Discharge: No

## 2021-03-18 NOTE — PLAN OF CARE
Problem: PHYSICAL THERAPY ADULT  Goal: Performs mobility at highest level of function for planned discharge setting  See evaluation for individualized goals  Description: Treatment/Interventions: Functional transfer training, LE strengthening/ROM, Elevations, Therapeutic exercise, Endurance training, Patient/family training, Equipment eval/education, Bed mobility, Gait training, Continued evaluation, Spoke to nursing, OT          See flowsheet documentation for full assessment, interventions and recommendations  Outcome: Adequate for Discharge  Note: Prognosis: Good  Problem List: Decreased endurance, Impaired balance, Decreased strength, Decreased mobility  Assessment: Pt showing overall improved tolerance to activity, improved endurance and mobility  Pt  Progressed with ambulation distances to 25' x1 and 61' x2 with supervision without use of an AD  Pt demonstrates fluent, reciprocal gait pattern without lob however does display an occasional lateral sway at times  Pt's limited by mild ellis, pt 's o2 sats remain 100% on 15L mild flow and 15 L non-re breather during gait training, however when pt sits to rest pt desats to 72% with 30 seconds rebounds to 94-95%  Pt  Performs sitting and standing balance activities without LOB and standing there x without support without lob  Cues for pacing techniques/ seated standing rest breaks required  Pt performs bed mobility supine to sit with supervision and sit to supine I'ly  The patient's AM-PAC Basic Mobility Inpatient Short Form Raw Score is 23, Standardized Score is 50  88  A standardized score greater than 42 9 suggests the patient may benefit from discharge to home  Please also refer to the recommendation of the Physical Therapist for safe discharge planning  PT recommendations for d/c are home pending continued medical progress     Barriers to Discharge: None     PT Discharge Recommendation: Return to previous environment with social support     PT - OK to Discharge: Yes    See flowsheet documentation for full assessment

## 2021-03-18 NOTE — PLAN OF CARE
Problem: PAIN - ADULT  Goal: Verbalizes/displays adequate comfort level or baseline comfort level  Description: Interventions:  - Encourage patient to monitor pain and request assistance  - Assess pain using appropriate pain scale  - Administer analgesics based on type and severity of pain and evaluate response  - Implement non-pharmacological measures as appropriate and evaluate response  - Consider cultural and social influences on pain and pain management  - Notify physician/advanced practitioner if interventions unsuccessful or patient reports new pain  Outcome: Progressing     Problem: INFECTION - ADULT  Goal: Absence or prevention of progression during hospitalization  Description: INTERVENTIONS:  - Assess and monitor for signs and symptoms of infection  - Monitor lab/diagnostic results  - Monitor all insertion sites, i e  indwelling lines, tubes, and drains  - Monitor endotracheal if appropriate and nasal secretions for changes in amount and color  - [employer] appropriate cooling/warming therapies per order  - Administer medications as ordered  - Instruct and encourage patient and family to use good hand hygiene technique  - Identify and instruct in appropriate isolation precautions for identified infection/condition  Outcome: Progressing     Problem: SAFETY ADULT  Goal: Patient will remain free of falls  Description: INTERVENTIONS:  - Assess patient frequently for physical needs  -  Identify cognitive and physical deficits and behaviors that affect risk of falls    -  [employer] fall precautions as indicated by assessment   - Educate patient/family on patient safety including physical limitations  - Instruct patient to call for assistance with activity based on assessment  - Modify environment to reduce risk of injury  - Consider OT/PT consult to assist with strengthening/mobility  Outcome: Progressing  Goal: Maintain or return to baseline ADL function  Description: INTERVENTIONS:  -  Assess patient's ability to carry out ADLs; assess patient's baseline for ADL function and identify physical deficits which impact ability to perform ADLs (bathing, care of mouth/teeth, toileting, grooming, dressing, etc )  - Assess/evaluate cause of self-care deficits   - Assess range of motion  - Assess patient's mobility; develop plan if impaired  - Assess patient's need for assistive devices and provide as appropriate  - Encourage maximum independence but intervene and supervise when necessary  - Involve family in performance of ADLs  - Assess for home care needs following discharge   - Consider OT consult to assist with ADL evaluation and planning for discharge  - Provide patient education as appropriate  Outcome: Progressing  Goal: Maintain or return mobility status to optimal level  Description: INTERVENTIONS:  - Assess patient's baseline mobility status (ambulation, transfers, stairs, etc )    - Identify cognitive and physical deficits and behaviors that affect mobility  - Identify mobility aids required to assist with transfers and/or ambulation (gait belt, sit-to-stand, lift, walker, cane, etc )  - [place] fall precautions as indicated by assessment  - Record patient progress and toleration of activity level on Mobility SBAR; progress patient to next Phase/Stage  - Instruct patient to call for assistance with activity based on assessment  - Consider rehabilitation consult to assist with strengthening/weightbearing, etc   Outcome: Progressing     Problem: DISCHARGE PLANNING  Goal: Discharge to home or other facility with appropriate resources  Description: INTERVENTIONS:  - Identify barriers to discharge w/patient and caregiver  - Arrange for needed discharge resources and transportation as appropriate  - Identify discharge learning needs (meds, wound care, etc )  - Arrange for interpretive services to assist at discharge as needed  - Refer to Case Management Department for coordinating discharge planning if the patient needs post-hospital services based on physician/advanced practitioner order or complex needs related to functional status, cognitive ability, or social support system  Outcome: Progressing     Problem: Knowledge Deficit  Goal: Patient/family/caregiver demonstrates understanding of disease process, treatment plan, medications, and discharge instructions  Description: Complete learning assessment and assess knowledge base    Interventions:  - Provide teaching at level of understanding  - Provide teaching via preferred learning methods  Outcome: Progressing     Problem: RESPIRATORY - ADULT  Goal: Achieves optimal ventilation and oxygenation  Description: INTERVENTIONS:  - Assess for changes in respiratory status  - Assess for changes in mentation and behavior  - Position to facilitate oxygenation and minimize respiratory effort  - Oxygen administered by appropriate delivery if ordered  - Initiate smoking cessation education as indicated  - Encourage broncho-pulmonary hygiene including cough, deep breathe, Incentive Spirometry  - Assess the need for suctioning and aspirate as needed  - Assess and instruct to report SOB or any respiratory difficulty  - Respiratory Therapy support as indicated  Outcome: Progressing     Problem: MUSCULOSKELETAL - ADULT  Goal: Maintain or return mobility to safest level of function  Description: INTERVENTIONS:  - Assess patient's ability to carry out ADLs; assess patient's baseline for ADL function and identify physical deficits which impact ability to perform ADLs (bathing, care of mouth/teeth, toileting, grooming, dressing, etc )  - Assess/evaluate cause of self-care deficits   - Assess range of motion  - Assess patient's mobility  - Assess patient's need for assistive devices and provide as appropriate  - Encourage maximum independence but intervene and supervise when necessary  - Involve family in performance of ADLs  - Assess for home care needs following discharge   - Consider OT consult to assist with ADL evaluation and planning for discharge  - Provide patient education as appropriate  Outcome: Progressing  Goal: Maintain proper alignment of affected body part  Description: INTERVENTIONS:  - Support, maintain and protect limb and body alignment  - Provide patient/ family with appropriate education  Outcome: Progressing     Problem: Nutrition/Hydration-ADULT  Goal: Nutrient/Hydration intake appropriate for improving, restoring or maintaining nutritional needs  Description: Monitor and assess patient's nutrition/hydration status for malnutrition  Collaborate with interdisciplinary team and initiate plan and interventions as ordered  Monitor patient's weight and dietary intake as ordered or per policy  Utilize nutrition screening tool and intervene as necessary  Determine patient's food preferences and provide high-protein, high-caloric foods as appropriate  INTERVENTIONS:  - Monitor oral intake, urinary output, labs, and treatment plans  - Assess nutrition and hydration status and recommend course of action  - Evaluate amount of meals eaten  - Assist patient with eating if necessary   - Allow adequate time for meals  - Recommend/ encourage appropriate diets, oral nutritional supplements, and vitamin/mineral supplements  - Order, calculate, and assess calorie counts as needed  - Recommend, monitor, and adjust tube feedings and TPN/PPN based on assessed needs  - Assess need for intravenous fluids  - Provide specific nutrition/hydration education as appropriate  - Include patient/family/caregiver in decisions related to nutrition  Outcome: Progressing     Problem: Potential for Falls  Goal: Patient will remain free of falls  Description: INTERVENTIONS:  - Assess patient frequently for physical needs  -  Identify cognitive and physical deficits and behaviors that affect risk of falls    -  [place] fall precautions as indicated by assessment   - Educate patient/family on patient safety including physical limitations  - Instruct patient to call for assistance with activity based on assessment  - Modify environment to reduce risk of injury  - Consider OT/PT consult to assist with strengthening/mobility  Outcome: Progressing

## 2021-03-18 NOTE — PROGRESS NOTES
Progress Note - Pulmonary   Jonathon Cat 60 y o  male MRN: [id number]  Unit/Bed#: [facility] 2 -01 Encounter: [id number]    Assessment/Plan:    1  Acute hypoxic respiratory failure secondary to COVID-19        -   Patient currently 15 L/ NRB-  88%,  Patient does not wear home O2        -   Continue maintain saturations greater than 89%        -    Pulmonary toileting: IS Q 1 hr, OOB  As tolerated, IS Q 1 hr        -   Patient may need Oxymizer upon discharge    2  COVID-19 3/4/2021        -   3/13/2021- CP        -   3/17/2021-  Actemra x 1        -   Day #6/10  Decadron        -   D-dimer 1 86-   Lovenox 1mg/kg        -   ferritin   314    CRP  128 5        -   procalcitonin negative x 5       will repeat Actemra,  Give 1 dose IV Lasix 40 mg,  Repeat procalcitonin in a m  3   Rheumatoid arthritis        -   Will continue a Plaquenil    Chief Complaint:    "Im ok"    Subjective:    Radha Uriostegui  Was comfortably sitting in his bed  He still reports that he is having significant shortness of breath upon ambulating  No significant overnight events reported  Patient currently denying any fever, chills, hemoptysis, headaches, night sweats, pleuritic chest pain, or palpitations  Objective:    Vitals: Blood pressure 109/71, pulse 64, temperature 97 9 °F (36 6 °C), resp  rate (!) 28, SpO2 (!) 88 %  15L,There is no height or weight on file to calculate BMI  Intake/Output Summary (Last 24 hours) at 3/18/2021 1556  Last data filed at 3/17/2021 2231  Gross per 24 hour   Intake --   Output 200 ml   Net -200 ml       Invasive Devices     Peripheral Intravenous Line            Peripheral IV 03/17/21 Right Antecubital 1 day                Physical Exam:   Physical Exam  Constitutional:       General: He is not in acute distress  Appearance: Normal appearance  He is normal weight  He is not ill-appearing  HENT:      Head: Normocephalic and atraumatic  Nose: Nose normal  No congestion or rhinorrhea  Mouth/Throat:      Mouth: Mucous membranes are dry  Pharynx: No oropharyngeal exudate or posterior oropharyngeal erythema  Neck:      Musculoskeletal: Normal range of motion and neck supple  No neck rigidity or muscular tenderness  Cardiovascular:      Rate and Rhythm: Normal rate and regular rhythm  Pulses: Normal pulses  Heart sounds: No murmur  No friction rub  No gallop  Pulmonary:      Effort: Pulmonary effort is normal  No tachypnea, bradypnea, accessory muscle usage or respiratory distress  Breath sounds: Decreased air movement present  No stridor or transmitted upper airway sounds  Decreased breath sounds present  No wheezing, rhonchi or rales  Comments: Diminished breath sounds throughout lung fields  Chest:      Chest wall: No tenderness  Abdominal:      General: Abdomen is flat  Bowel sounds are normal  There is no distension  Palpations: Abdomen is soft  There is no mass  Musculoskeletal: Normal range of motion  General: No swelling or tenderness  Skin:     General: Skin is warm and dry  Coloration: Skin is not jaundiced or pale  Neurological:      General: No focal deficit present  Mental Status: He is alert and oriented to person, place, and time  Mental status is at baseline  Psychiatric:         Mood and Affect: Mood normal          Behavior: Behavior normal          Labs:  I have personally reviewed pertinent lab results 3/17/2021    Imaging and other studies: I have personally reviewed pertinent films in PACS      chest x-ray 3/16/2021-  Worsening bilateral diffuse COVID-19 pneumonia

## 2021-03-18 NOTE — PROGRESS NOTES
[address] [place] [address]  Progress Note - Sherlyn Valdez 1965, 60 y o  male MRN: [id number]  Unit/Bed#: [facility] 2 -01 Encounter: [id number]  Primary Care Provider: Radha Givens MD   Date and time admitted to hospital: 3/13/2021  4:32 PM    * Pneumonia due to COVID-19 virus  Assessment & Plan  Severe COVID   Still on 15 liter NC and non rebreather  On IV steroids, received plasma on 3/13  Getting actimra  Appreciate pulmonary help  Still has a ways to go     Gram-positive bacteremia  Assessment & Plan   Gram Positive Cocci bacteremia  Possible contaminant  No fevers, WBC elevated likely due to steroids  Monitor off abx    Acute respiratory failure with hypoxia (HCC)  Assessment & Plan  Due to COVID pneumonia    Still on high flow oxygen     Rheumatoid arthritis involving both hands with positive rheumatoid factor (HCC)  Assessment & Plan  On placquenil          Subjective:   Feels well ask long as he doesn't walk and keeps his non rebreather on     He has a moderate non productive cough    No fever nor chills  Objective:     Vitals:   Temp (24hrs), Av 7 °F (36 5 °C), Min:97 4 °F (36 3 °C), Max:98 °F (36 7 °C)    Temp:  [97 4 °F (36 3 °C)-98 °F (36 7 °C)] 97 9 °F (36 6 °C)  HR:  [53-71] 64  Resp:  [22-28] 28  BP: (109-137)/(69-87) 109/71  SpO2:  [86 %-95 %] 88 %  There is no height or weight on file to calculate BMI  Input and Output Summary (last 24 hours): Intake/Output Summary (Last 24 hours) at 3/18/2021 1842  Last data filed at 3/17/2021 2231  Gross per 24 hour   Intake --   Output 200 ml   Net -200 ml       Physical Exam:     Physical Exam  Vitals signs and nursing note reviewed  HENT:      Head: Normocephalic and atraumatic  Eyes:      Pupils: Pupils are equal, round, and reactive to light  Cardiovascular:      Rate and Rhythm: Normal rate and regular rhythm  Heart sounds: No murmur  No friction rub  No gallop  Pulmonary:      Breath sounds:  No wheezing or rales  Comments: Coarse BS bilat no rhonchi  No wheeze  Abdominal:      General: Bowel sounds are normal       Palpations: Abdomen is soft  Tenderness: There is no abdominal tenderness  Musculoskeletal:      Right lower leg: No edema  Left lower leg: No edema          Additional Data:     Labs:    Results from last 7 days   Lab Units 03/17/21  0534   WBC Thousand/uL 15 57*   HEMOGLOBIN g/dL 14 4   HEMATOCRIT % 43 5   PLATELETS Thousands/uL 263   NEUTROS PCT % 88*   LYMPHS PCT % 5*   MONOS PCT % 2*   EOS PCT % 0     Results from last 7 days   Lab Units 03/17/21  0535   POTASSIUM mmol/L 4 4   CHLORIDE mmol/L 101   CO2 mmol/L 26   BUN mg/dL 20   CREATININE mg/dL 0 99   CALCIUM mg/dL 9 2   ALK PHOS U/L 86   ALT U/L 78   AST U/L 39     Results from last 7 days   Lab Units 03/13/21  1221   INR  1 08                   * I Have Reviewed All Lab Data     Recent Cultures (last 7 days):     Results from last 7 days   Lab Units 03/13/21  1232 03/13/21  1221   BLOOD CULTURE  No Growth After 4 Days   Alpha Hemolytic Streptococcus NOT Enterococcus, NOT S  pneumoniae*   GRAM STAIN RESULT   --  Gram positive cocci in pairs and chains*         Last 24 Hours Medication List:   Current Facility-Administered Medications   Medication Dose Route Frequency Provider Last Rate    ARIPiprazole  10 mg Oral HS Dk Burgess MD      ascorbic acid  1,000 mg Oral Q12H Mireya Rosenberg MD      atorvastatin  40 mg Oral HS Dk Burgess MD      bisacodyl  10 mg Rectal Daily PRN Dk Burgess MD      cholecalciferol  2,000 Units Oral Daily Dk Burgess MD      dexamethasone  0 1 mg/kg Intravenous Q12H Dk Burgess MD      DULoxetine  60 mg Oral BID Dk Burgess MD      enoxaparin  1 mg/kg Subcutaneous Q12H [facility] KIRK Butler      famotidine  20 mg Oral BID Dk Burgess MD      hydroxychloroquine  200 mg Oral Daily Dk Burgess MD      zinc sulfate  220 mg Oral Daily Dk Burgess MD      Followed by  [START ON 3/21/2021] multivitamin-minerals  1 tablet Oral Daily Peter Hook MD      polyethylene glycol  17 g Oral Daily PRN Peter Hook MD      senna-docusate sodium  1 tablet Oral HS MD Syed Huitron Drop traZODone  100 mg Oral HS PRN Peter Hook MD           VTE Pharmacologic Prophylaxis:   Pharmacologic: Enoxaparin (Lovenox)      Current Length of Stay: 5 day(s)    Current Patient Status: Inpatient       Discharge Plan:   Code Status: Level 1 - Full Code           Today, Patient Was Seen By: Evelio Kumari DO    ** Please Note: Dictation voice to text software may have been used in the creation of this document   **

## 2021-03-18 NOTE — PHYSICAL THERAPY NOTE
Physical Therapy Treatment Note     03/18/21 1233   Note Type   Note Type Treatment   Pain Assessment   Pain Assessment Tool Pain Assessment not indicated - pt denies pain   Pain Score No Pain   Restrictions/Precautions   Other Precautions Multiple lines;Telemetry;Pain; Fall Risk;O2;Contact/isolation; Airborne/isolation  (15 l mid flow and 15l non- rebreather)   General   Chart Reviewed Yes   Family/Caregiver Present No   Cognition   Overall Cognitive Status WFL   Arousal/Participation Alert; Responsive; Cooperative   Attention Within functional limits   Orientation Level Oriented X4   Memory Within functional limits   Following Commands Follows multistep commands with increased time or repetition   Subjective   Subjective pt reports feeling better  pt agreeable to PT  Bed Mobility   Supine to Sit 5  Supervision   Additional items Assist x 1   Sit to Supine 7  Independent   Transfers   Sit to Stand 5  Supervision   Additional items Assist x 1;Bedrails; Increased time required   Stand to Sit 5  Supervision   Additional items Assist x 1;Bedrails; Increased time required   Toilet transfer 5  Supervision   Additional items Assist x 1;Standard toilet;Verbal cues  (use of grab bar)   Ambulation/Elevation   Gait pattern WNL  (slow, mild lateral sway at times  )   Gait Assistance 5  Supervision   Additional items Assist x 1   Assistive Device None   Distance 60' x2, 25' x1 assist to mange o2 tank and lines  Balance   Static Sitting Normal   Dynamic Sitting Good   Static Standing Good   Dynamic Standing Fair +   Ambulatory Fair   Endurance Deficit   Endurance Deficit Description ellis, spo2 100% with ambulation and upon seated rest o2 drops to 72% on 15L non-rebreather and 15 L midflow regbounds to 94-95% within 30 seconds      Activity Tolerance   Activity Tolerance Patient tolerated treatment well   Medical Staff Made Aware HELENA vela   Exercises   Marching Standing;Bilateral  (115 reps, without support)   Balance training  pt performs stamnding toe rasises x 10 reps without support and turning 360 degrees cw and ccw x 3 reps without support  balance activites seated eob and standing with supervision  Assessment   Prognosis Good   Problem List Decreased endurance; Impaired balance;Decreased strength;Decreased mobility   Assessment Pt showing overall improved tolerance to activity, improved endurance and mobility  Pt  Progressed with ambulation distances to 25' x1 and 61' x2 with supervision without use of an AD  Pt demonstrates fluent, reciprocal gait pattern without lob however does display an occasional lateral sway at times  Pt's limited by mild ellis, pt 's o2 sats remain 100% on 15L mild flow and 15 L non-re breather during gait training, however when pt sits to rest pt desats to 72% with 30 seconds rebounds to 94-95%  Pt  Performs sitting and standing balance activities without LOB and standing there x without support without lob  Cues for pacing techniques/ seated standing rest breaks required  Pt performs bed mobility supine to sit with supervision and sit to supine I'ly  The patient's AM-PAC Basic Mobility Inpatient Short Form Raw Score is 23, Standardized Score is 50  88  A standardized score greater than 42 9 suggests the patient may benefit from discharge to home  Please also refer to the recommendation of the Physical Therapist for safe discharge planning  PT recommendations for d/c are home pending continued medical progress  Goals   Patient Goals ' to go home "     STG Expiration Date 03/29/21   PT Treatment Day 1   Plan   Treatment/Interventions Functional transfer training;LE strengthening/ROM; Therapeutic exercise; Endurance training;Patient/family training;Equipment eval/education; Bed mobility;Gait training;Spoke to nursing;OT   Progress Progressing toward goals   PT Frequency 2-3x/wk   Recommendation   PT Discharge Recommendation Return to previous environment with social support   PT - OK to Discharge Yes   AM-PAC Basic Mobility Inpatient   Turning in Bed Without Bedrails 4   Lying on Back to Sitting on Edge of Flat Bed 4   Moving Bed to Chair 4   Standing Up From Chair 4   Walk in Room 4   Climb 3-5 Stairs 3   Basic Mobility Inpatient Raw Score 23   Basic Mobility Standardized Score 50 88        03/18/21 1233   Note Type   Note Type Treatment   Pain Assessment   Pain Assessment Tool Pain Assessment not indicated - pt denies pain   Pain Score No Pain   Restrictions/Precautions   Other Precautions Multiple lines;Telemetry;Pain; Fall Risk;O2;Contact/isolation; Airborne/isolation  (15 l mid flow and 15l non- rebreather)   General   Chart Reviewed Yes   Family/Caregiver Present No   Cognition   Overall Cognitive Status WFL   Arousal/Participation Alert; Responsive; Cooperative   Attention Within functional limits   Orientation Level Oriented X4   Memory Within functional limits   Following Commands Follows multistep commands with increased time or repetition   Subjective   Subjective pt reports feeling better  pt agreeable to PT  Bed Mobility   Supine to Sit 5  Supervision   Additional items Assist x 1   Sit to Supine 7  Independent   Transfers   Sit to Stand 5  Supervision   Additional items Assist x 1;Bedrails; Increased time required   Stand to Sit 5  Supervision   Additional items Assist x 1;Bedrails; Increased time required   Toilet transfer 5  Supervision   Additional items Assist x 1;Standard toilet;Verbal cues  (use of grab bar)   Ambulation/Elevation   Gait pattern WNL  (slow, mild lateral sway at times  )   Gait Assistance 5  Supervision   Additional items Assist x 1   Assistive Device None   Distance 60' x2, 25' x1 assist to mange o2 tank and lines      Balance   Static Sitting Normal   Dynamic Sitting Good   Static Standing Good   Dynamic Standing Fair +   Ambulatory Fair   Endurance Deficit   Endurance Deficit Description ellis, spo2 100% with ambulation and upon seated rest o2 drops to 72% on 15L non-rebreather and 15 L midflow regbounds to 94-95% within 30 seconds  Activity Tolerance   Activity Tolerance Patient tolerated treatment well   Medical Staff Made Aware HELENA vela   Exercises   Marching Standing;Bilateral  (115 reps, without support)   Balance training  pt performs stamnding toe rasises x 10 reps without support and turning 360 degrees cw and ccw x 3 reps without support  balance activites seated eob and standing with supervision  Assessment   Prognosis Good   Problem List Decreased endurance; Impaired balance;Decreased strength;Decreased mobility   Assessment Pt showing overall improved tolerance to activity, improved endurance and mobility  Pt  Progressed with ambulation distances to 25' x1 and 61' x2 with supervision without use of an AD  Pt demonstrates fluent, reciprocal gait pattern without lob however does display an occasional lateral sway at times  Pt's limited by mild ellis, pt 's o2 sats remain 100% on 15L mild flow and 15 L non-re breather during gait training, however when pt sits to rest pt desats to 72% with 30 seconds rebounds to 94-95%  Pt  Performs sitting and standing balance activities without LOB and standing there x without support without lob  Cues for pacing techniques/ seated standing rest breaks required  Pt performs bed mobility supine to sit with supervision and sit to supine I'ly  The patient's AM-PAC Basic Mobility Inpatient Short Form Raw Score is 23, Standardized Score is 50  88  A standardized score greater than 42 9 suggests the patient may benefit from discharge to home  Please also refer to the recommendation of the Physical Therapist for safe discharge planning  PT recommendations for d/c are home pending continued medical progress  Goals   Patient Goals ' to go home "     STG Expiration Date 03/29/21   PT Treatment Day 1   Plan   Treatment/Interventions Functional transfer training;LE strengthening/ROM; Therapeutic exercise; Endurance training;Patient/family training;Equipment eval/education; Bed mobility;Gait training;Spoke to nursing;OT   Progress Progressing toward goals   PT Frequency 2-3x/wk   Recommendation   PT Discharge Recommendation Return to previous environment with social support   PT - OK to Discharge Yes   [address] Mobility Inpatient   Turning in Bed Without Bedrails 4   Lying on Back to Sitting on Edge of Flat Bed 4   Moving Bed to Chair 4   Standing Up From Chair 4   Walk in Room 4   Climb 3-5 Stairs 3   Basic Mobility Inpatient Raw Score 23   Basic Mobility Standardized Score 50 88         Derrell Reyna, PTA

## 2021-03-18 NOTE — PLAN OF CARE
Problem: PAIN - ADULT  Goal: Verbalizes/displays adequate comfort level or baseline comfort level  Description: Interventions:  - Encourage patient to monitor pain and request assistance  - Assess pain using appropriate pain scale  - Administer analgesics based on type and severity of pain and evaluate response  - Implement non-pharmacological measures as appropriate and evaluate response  - Consider cultural and social influences on pain and pain management  - Notify physician/advanced practitioner if interventions unsuccessful or patient reports new pain  Outcome: Progressing     Problem: INFECTION - ADULT  Goal: Absence or prevention of progression during hospitalization  Description: INTERVENTIONS:  - Assess and monitor for signs and symptoms of infection  - Monitor lab/diagnostic results  - Monitor all insertion sites, i e  indwelling lines, tubes, and drains  - Monitor endotracheal if appropriate and nasal secretions for changes in amount and color  - [place] appropriate cooling/warming therapies per order  - Administer medications as ordered  - Instruct and encourage patient and family to use good hand hygiene technique  - Identify and instruct in appropriate isolation precautions for identified infection/condition  Outcome: Progressing     Problem: SAFETY ADULT  Goal: Patient will remain free of falls  Description: INTERVENTIONS:  - Assess patient frequently for physical needs  -  Identify cognitive and physical deficits and behaviors that affect risk of falls    -  [place] fall precautions as indicated by assessment   - Educate patient/family on patient safety including physical limitations  - Instruct patient to call for assistance with activity based on assessment  - Modify environment to reduce risk of injury  - Consider OT/PT consult to assist with strengthening/mobility  Outcome: Progressing  Goal: Maintain or return to baseline ADL function  Description: INTERVENTIONS:  -  Assess patient's ability to carry out ADLs; assess patient's baseline for ADL function and identify physical deficits which impact ability to perform ADLs (bathing, care of mouth/teeth, toileting, grooming, dressing, etc )  - Assess/evaluate cause of self-care deficits   - Assess range of motion  - Assess patient's mobility; develop plan if impaired  - Assess patient's need for assistive devices and provide as appropriate  - Encourage maximum independence but intervene and supervise when necessary  - Involve family in performance of ADLs  - Assess for home care needs following discharge   - Consider OT consult to assist with ADL evaluation and planning for discharge  - Provide patient education as appropriate  Outcome: Progressing  Goal: Maintain or return mobility status to optimal level  Description: INTERVENTIONS:  - Assess patient's baseline mobility status (ambulation, transfers, stairs, etc )    - Identify cognitive and physical deficits and behaviors that affect mobility  - Identify mobility aids required to assist with transfers and/or ambulation (gait belt, sit-to-stand, lift, walker, cane, etc )  - [place] fall precautions as indicated by assessment  - Record patient progress and toleration of activity level on Mobility SBAR; progress patient to next Phase/Stage  - Instruct patient to call for assistance with activity based on assessment  - Consider rehabilitation consult to assist with strengthening/weightbearing, etc   Outcome: Progressing     Problem: DISCHARGE PLANNING  Goal: Discharge to home or other facility with appropriate resources  Description: INTERVENTIONS:  - Identify barriers to discharge w/patient and caregiver  - Arrange for needed discharge resources and transportation as appropriate  - Identify discharge learning needs (meds, wound care, etc )  - Arrange for interpretive services to assist at discharge as needed  - Refer to Case Management Department for coordinating discharge planning if the patient needs post-hospital services based on physician/advanced practitioner order or complex needs related to functional status, cognitive ability, or social support system  Outcome: Progressing     Problem: Knowledge Deficit  Goal: Patient/family/caregiver demonstrates understanding of disease process, treatment plan, medications, and discharge instructions  Description: Complete learning assessment and assess knowledge base    Interventions:  - Provide teaching at level of understanding  - Provide teaching via preferred learning methods  Outcome: Progressing     Problem: RESPIRATORY - ADULT  Goal: Achieves optimal ventilation and oxygenation  Description: INTERVENTIONS:  - Assess for changes in respiratory status  - Assess for changes in mentation and behavior  - Position to facilitate oxygenation and minimize respiratory effort  - Oxygen administered by appropriate delivery if ordered  - Initiate smoking cessation education as indicated  - Encourage broncho-pulmonary hygiene including cough, deep breathe, Incentive Spirometry  - Assess the need for suctioning and aspirate as needed  - Assess and instruct to report SOB or any respiratory difficulty  - Respiratory Therapy support as indicated  Outcome: Progressing     Problem: MUSCULOSKELETAL - ADULT  Goal: Maintain or return mobility to safest level of function  Description: INTERVENTIONS:  - Assess patient's ability to carry out ADLs; assess patient's baseline for ADL function and identify physical deficits which impact ability to perform ADLs (bathing, care of mouth/teeth, toileting, grooming, dressing, etc )  - Assess/evaluate cause of self-care deficits   - Assess range of motion  - Assess patient's mobility  - Assess patient's need for assistive devices and provide as appropriate  - Encourage maximum independence but intervene and supervise when necessary  - Involve family in performance of ADLs  - Assess for home care needs following discharge   - Consider OT consult to assist with ADL evaluation and planning for discharge  - Provide patient education as appropriate  Outcome: Progressing  Goal: Maintain proper alignment of affected body part  Description: INTERVENTIONS:  - Support, maintain and protect limb and body alignment  - Provide patient/ family with appropriate education  Outcome: Progressing     Problem: Nutrition/Hydration-ADULT  Goal: Nutrient/Hydration intake appropriate for improving, restoring or maintaining nutritional needs  Description: Monitor and assess patient's nutrition/hydration status for malnutrition  Collaborate with interdisciplinary team and initiate plan and interventions as ordered  Monitor patient's weight and dietary intake as ordered or per policy  Utilize nutrition screening tool and intervene as necessary  Determine patient's food preferences and provide high-protein, high-caloric foods as appropriate  INTERVENTIONS:  - Monitor oral intake, urinary output, labs, and treatment plans  - Assess nutrition and hydration status and recommend course of action  - Evaluate amount of meals eaten  - Assist patient with eating if necessary   - Allow adequate time for meals  - Recommend/ encourage appropriate diets, oral nutritional supplements, and vitamin/mineral supplements  - Order, calculate, and assess calorie counts as needed  - Recommend, monitor, and adjust tube feedings and TPN/PPN based on assessed needs  - Assess need for intravenous fluids  - Provide specific nutrition/hydration education as appropriate  - Include patient/family/caregiver in decisions related to nutrition  Outcome: Progressing     Problem: Potential for Falls  Goal: Patient will remain free of falls  Description: INTERVENTIONS:  - Assess patient frequently for physical needs  -  Identify cognitive and physical deficits and behaviors that affect risk of falls    -  [place] fall precautions as indicated by assessment   - Educate patient/family on patient safety including physical limitations  - Instruct patient to call for assistance with activity based on assessment  - Modify environment to reduce risk of injury  - Consider OT/PT consult to assist with strengthening/mobility  Outcome: Progressing

## 2021-03-18 NOTE — ASSESSMENT & PLAN NOTE
Severe COVID   Still on 15 liter NC and non rebreather  On IV steroids, received plasma on 3/13  Getting actimra    Appreciate pulmonary help  Still has a ways to go

## 2021-03-18 NOTE — OCCUPATIONAL THERAPY NOTE
Occupational Therapy Treatment Note:         03/18/21 1216   OT Last Visit   OT Visit Date 03/18/21   Note Type   Note Type Treatment   Restrictions/Precautions   Weight Bearing Precautions Per Order No   Other Precautions Pain; Fall Risk;Contact/isolation; Airborne/isolation;Droplet precautions   Pain Assessment   Pain Assessment Tool 0-10   Pain Score No Pain   Pain Location/Orientation Location: Generalized   ADL   Where Assessed Standing at sink   Grooming Assistance 5  Supervision/Setup   Grooming Deficit Setup; Increased time to complete;Wash/dry hands;Brushing hair   Grooming Comments no LOB with hand hygiene at sink  LB Dressing Assistance 5  Supervision/Setup   LB Dressing Deficit Setup;Don/doff R sock; Don/doff L sock;Pull up over hips; Thread LLE into underwear; Thread RLE into underwear; Thread LLE into pants; Thread RLE into pants   LB Dressing Comments dip in SpO2 to 79% with activities involving functional reach    [address]  5  Supervision/Setup   Toileting Deficit Setup;Supervison/safety;Grab bar use   Toileting Comments Pt able to tolerate use of standard toilet with activities  Functional Standing Tolerance   Time 4-5 mins   Activity dynamic stand balance activity    Comments Pt with increase in SpO2 to 100% on 15 liters NRB/Mid flow with SpO2 dipping to 72% following activity with return to seated positioning  Quick return to 89% with 1-2 mins rest period  Bed Mobility   Supine to Sit 5  Supervision   Additional items Assist x 1   Transfers   Sit to Stand 5  Supervision   Additional items Bedrails; Increased time required;Verbal cues   Stand to Sit 5  Supervision   Additional items Bedrails; Increased time required;Verbal cues   Stand pivot 5  Supervision   Additional items Bedrails; Increased time required;Verbal cues   Toilet transfer 5  Supervision   Additional items Bedrails; Increased time required;Standard toilet;Verbal cues   Additional Comments Pt with improved stand balance/tolerance this tx session  Functional Mobility   Functional Mobility 5  Supervision   Toilet Transfers   Toilet Transfer From [employer] [facility] Transfer Type To and from   Toilet Transfer to Standard toilet   Toilet Transfer Technique Stand pivot; Ambulating   Toilet Transfers Verbal cues; Supervision   Cognition   Overall Cognitive Status WFL   Arousal/Participation Alert; Responsive; Cooperative   Attention Within functional limits   Orientation Level Oriented X4   Memory Within functional limits   Following Commands Follows multistep commands with increased time or repetition   Comments Pt able to follow simple command in [place]  Good carry over of newly learned information  Additional Activities   Additional Activities Other (Comment)  (reviewed energy conservation/compensatory breathing techs  )   Additional Activities Comments further review will be required   Activity Tolerance   Activity Tolerance Patient limited by fatigue   Medical Staff Made Aware Reported all findings to nursing staff  Improvement noted with stand tolerance this tx session  Assessment   Assessment Pt was seen for skilled OT with focus on completion of self care tasks, functional transfers, review of compensatory breathing techs, fall prevention and review of current plan of care  Pt with initial greeting while seated EOB  SAT at 89% on 15 iters NRW/Mid flow O2  Pt receptive to completion of self care tasks, dynamic stand balance activity without need for AD  Dip in SpO2 to 79% with functional reach for LE dressing activities  Cues to take rest break with compensatory breathing techs, pursed lip breathing required to encourage return to 91% SpO2  Pt with improved SpO2 to 100% with dynamic stand balance activity to complete toilet transfer, hand hygiene at sink and to walk to window in room with O2 extension provided  Seated rest break required with SpO2 again dipping to 77% with quick return noted to 95%   Pt with improved activity tolerance overall this tx session  Plan   Treatment Interventions ADL retraining;Functional transfer training; Endurance training;UE strengthening/ROM; Patient/family training   Goal Expiration Date 03/30/21   OT Treatment Day 1   OT Frequency 2-3x/wk   Recommendation   OT Discharge Recommendation Return to previous environment with social support   AM-PAC Daily Activity Inpatient   Lower Body Dressing 3   Bathing 3   Toileting 3   Upper Body Dressing 3   Grooming 4   Eating 4   Daily Activity Raw Score 20   Daily Activity Standardized Score (Calc for Raw Score >=11) 42 03   AM-PAC Applied Cognition Inpatient   Following a Speech/Presentation 4   Understanding Ordinary Conversation 4   Taking Medications 3   Remembering Where Things Are Placed or Put Away 3   Remembering List of 4-5 Errands 3   Taking Care of Complicated Tasks 3   Applied Cognition Raw Score 20   Applied Cognition Standardized Score 41 76   Marta Scanlon, [address]

## 2021-03-18 NOTE — UTILIZATION REVIEW
Continued Stay Review    Date:  3/18                       Current Patient Class:  IP         Current Level of Care:   Level 2 stepdown     HPI:55 y o  male initially admitted on  3/13  For acute hypoxic resp failure 2/2  Severe Covid 19 PNA     3/13  COVID POS  Given convalescent plasma   3/16  1/2 blood cx POS Cocci bacteremia; no fever, procal neg:  POSSIBLE CONTAMINANT   3/17  Actemra x 1    3/18      Day #6/10  Decadron  D-dimer 1 86-   Lovenox 1mg/kg  ferritin   314    CRP  128  5    procalcitonin negative x 5  Currently requiring 15L/NRB - 88%       Today will repeat Actemra, give 1x dose IV lasix 40 mg   Repeat Procalcitonin in AM    Pertinent Labs/Diagnostic Results:       Results from last 7 days   Lab Units 03/17/21  0534 03/15/21  0504 03/14/21  0457 03/13/21  1221   WBC Thousand/uL 15 57* 16 43* 9 32 11 30*   HEMOGLOBIN g/dL 14 4 13 3 13 5 13 5   HEMATOCRIT % 43 5 41 0 42 0 41 4   PLATELETS Thousands/uL 263 197 163 150   NEUTROS ABS Thousands/µL 13 63*  --  8 44*  --    TOTAL NEUT ABS Thousand/uL  --   --   --  10 28*   BANDS PCT %  --  1  --  3         Results from last 7 days   Lab Units 03/17/21  0535 03/15/21  0504 03/14/21  0457 03/13/21  1221   SODIUM mmol/L 137 141 141 138   POTASSIUM mmol/L 4 4 4 4 4 7 3 5*   CHLORIDE mmol/L 101 104 104 102   CO2 mmol/L 26 26 27 27   ANION GAP mmol/L 10 11 10 9   BUN mg/dL 20 20 14 12   CREATININE mg/dL 0 99 1 08 1 01 1 06   EGFR ml/min/1 73sq m 84 76 82 78   CALCIUM mg/dL 9 2 9 6 9 7 8 8     Results from last 7 days   Lab Units 03/17/21  0535 03/15/21  0504 03/14/21  0457 03/13/21  1221   AST U/L 39 53* 36 53   ALT U/L 78 62 44 37   ALK PHOS U/L 86 67 67 76   TOTAL PROTEIN g/dL 7 7 7 5 7 7 7 7   ALBUMIN g/dL 2 7* 2 7* 2 7* 3 8   TOTAL BILIRUBIN mg/dL 0 78 0 39 0 47 0 70         Results from last 7 days   Lab Units 03/17/21  0535 03/15/21  0504 03/14/21  0457 03/13/21  1221   GLUCOSE RANDOM mg/dL 99 129 132 149*     Results from last 7 days   Lab Units 03/14/21  0457   CK TOTAL U/L 67     Results from last 7 days   Lab Units 03/14/21  0457 03/13/21  1221   TROPONIN I ng/mL <0 02 <0 01     Results from last 7 days   Lab Units 03/18/21  0447 03/17/21  0535 03/16/21  0751 03/15/21  0504 03/14/21  0457   D-DIMER QUANTITATIVE ug/ml FEU 1 86* 2 26* 1 81* 0 90* 0 90*     Results from last 7 days   Lab Units 03/13/21  1221   PROTIME seconds 14 1   INR  1 08   PTT seconds 29         Results from last 7 days   Lab Units 03/15/21  0504 03/14/21  0457 03/13/21  1221   PROCALCITONIN ng/ml 0 14 0 17 0 16     Results from last 7 days   Lab Units 03/13/21  1221   LACTIC ACID mmol/L 1 5     Results from last 7 days   Lab Units 03/14/21  0457   NT-PRO BNP pg/mL 117     Results from last 7 days   Lab Units 03/18/21  0447 03/17/21  0535 03/16/21  0751   FERRITIN ng/mL 314 268 270     Results from last 7 days   Lab Units 03/13/21  1756   HEP B S AG  Non-reactive   HEP C AB  Non-reactive   HEP B C IGM  Non-reactive   HEP B C TOTAL AB  Non-reactive         Results from last 7 days   Lab Units 03/18/21  0447 03/17/21  0535 03/16/21  0751 03/15/21  0504 03/14/21  0457   CRP mg/L 128 5* 144 4* 66 8* 135 8* 259 4*     Results from last 7 days   Lab Units 03/13/21  1232 03/13/21  1221   BLOOD CULTURE  No Growth After 4 Days   Alpha Hemolytic Streptococcus NOT Enterococcus, NOT S  pneumoniae*   GRAM STAIN RESULT   --  Gram positive cocci in pairs and chains*     Vital Signs:   03/18/21 15:14:40  97 9 °F (36 6 °C)  64  28Abnormal   109/71  84  88 %Abnormal   80  --  15 L/min  Mid flow nasal cannula   --   O2 Device: with NRB mask at 03/18/21 1514   03/18/21 12:10:32  98 °F (36 7 °C)  65  --  137/87  104  95 %  80  --  15 L/min  Mid flow nasal cannula            Medications:   Scheduled Medications:  ARIPiprazole, 10 mg, Oral, HS  ascorbic acid, 1,000 mg, Oral, Q12H WILLAM  atorvastatin, 40 mg, Oral, HS  cholecalciferol, 2,000 Units, Oral, Daily  dexamethasone, 0 1 mg/kg, Intravenous, Q12H  DULoxetine, 60 mg, Oral, BID  enoxaparin, 1 mg/kg, Subcutaneous, Q12H WILLAM  famotidine, 20 mg, Oral, BID  furosemide, 40 mg, Intravenous, Once  hydroxychloroquine, 200 mg, Oral, Daily  zinc sulfate, 220 mg, Oral, Daily    Followed by  Sammi Lazo ON 3/21/2021] multivitamin-minerals, 1 tablet, Oral, Daily  senna-docusate sodium, 1 tablet, Oral, HS  tocilizumab (ACTEMRA) 100 mL IVPB, 600 mg, Intravenous, Once      Continuous IV Infusions:     PRN Meds:  bisacodyl, 10 mg, Rectal, Daily PRN  polyethylene glycol, 17 g, Oral, Daily PRN  traZODone, 100 mg, Oral, HS PRN        Discharge Plan: d    Network Utilization Review Department  ATTENTION: Please call with any questions or concerns to [phone] and carefully listen to the prompts so that you are directed to the right person  All voicemails are confidential   Angelica Thomas all requests for admission clinical reviews, approved or denied determinations and any other requests to dedicated fax number below belonging to the campus where the patient is receiving treatment   List of dedicated fax numbers for the Facilities:  [address] DENIALS (Administrative/Medical Necessity) [phone]   [address] (Maternity/NICU/Pediatrics) [phone] [address] Mike Luna 4837 (  Cole Saavedra "Katrina" 103) [address] [place] [address] [address] Maia Ortiz) [facility] [phone]

## 2021-03-19 NOTE — PROGRESS NOTES
Progress Note - Pulmonary   Esperanza Escamilla 60 y o  male MRN: [id number]  Unit/Bed#: [facility] 68 2 -01 Encounter: [id number]    Assessment/Plan:    1  Acute hypoxic respiratory failure secondary to COVID-19        -    Hypoxia mildly improving, 15L  Mid flow 96%, NRB prn,  Patient does not wear home O2        -    Continue maintain saturations greater than 89%        -   Pulmonary toileting:  Deep breathing cough, OOB  As tolerated, IS Q 1 hr        -   Will need ambulatory pulse ox prior to discharge patient will likely require Oxymizer    2  COVID-19 3/4/2021        -   3/13/2021- CP        -   3/17/2021, 3/18/2021-  Actemra x 2        -  Day #7/10  Decadron        -    Not a candidate from  remdesivir        -   D-dimer- 1 89-  Lovenox 1 mg/kg        -   Ferritin  314             5         Patient seemed to respond well to 1 dose IV Lasix 40 mg,  Will repeat again today continue with high-dose IV  Steroid,  Chest x-ray worsening ground-glass opacity concerning for fibrosis will need to consider pulse dosing    3  Rheumatoid arthritis        -   Will continue Plaquenil    Chief Complaint:    "I am ok"    Subjective:    Wyatt Bryant Was comfortably sitting in his bed eating breakfast   Patient had limited the saturations in the mid 70s when taking office non-rebreather to eat  Overall however he was able to recover quickly  No significant overnight events reported  Patient currently denies any fevers, chills, hemoptysis, headaches, night sweats, pleuritic chest pain, or palpitations  Objective:    Vitals: Blood pressure 123/79, pulse (!) 54, temperature 97 9 °F (36 6 °C), temperature source Oral, resp  rate 22, SpO2 96 %  15L,There is no height or weight on file to calculate BMI        Intake/Output Summary (Last 24 hours) at 3/19/2021 1242  Last data filed at 3/19/2021 0744  Gross per 24 hour   Intake --   Output 600 ml   Net -600 ml       Invasive Devices     Peripheral Intravenous Line Peripheral IV 03/17/21 Right Antecubital 2 days                Physical Exam:   Physical Exam  Constitutional:       General: He is not in acute distress  Appearance: Normal appearance  He is normal weight  He is not ill-appearing  HENT:      Head: Normocephalic and atraumatic  Nose: Nose normal  No congestion or rhinorrhea  Mouth/Throat:      Mouth: Mucous membranes are dry  Pharynx: No oropharyngeal exudate or posterior oropharyngeal erythema  Neck:      Musculoskeletal: Normal range of motion and neck supple  No neck rigidity or muscular tenderness  Cardiovascular:      Rate and Rhythm: Normal rate and regular rhythm  Pulses: Normal pulses  Heart sounds: Normal heart sounds  No murmur  No friction rub  No gallop  Pulmonary:      Effort: No tachypnea, bradypnea, accessory muscle usage or respiratory distress  Breath sounds: Decreased air movement present  No stridor or transmitted upper airway sounds  Decreased breath sounds present  No wheezing, rhonchi or rales  Comments: Noted aeration throughout lung  Chest:      Chest wall: No tenderness  Abdominal:      General: Abdomen is flat  Bowel sounds are normal  There is no distension  Palpations: Abdomen is soft  There is no mass  Musculoskeletal: Normal range of motion  General: No swelling or tenderness  Skin:     General: Skin is warm and dry  Coloration: Skin is not jaundiced or pale  Neurological:      General: No focal deficit present  Mental Status: He is alert and oriented to person, place, and time  Mental status is at baseline  Psychiatric:         Mood and Affect: Mood normal          Behavior: Behavior normal          Labs:    I have personally reviewed pertinent lab results CBC:   Lab Results   Component Value Date    WBC 13 55 (H) 03/19/2021    HGB 14 5 03/19/2021    HCT 43 7 03/19/2021    MCV 89 03/19/2021     03/19/2021    MCH 29 5 03/19/2021    MCHC 33 2 03/19/2021    RDW 12 6 03/19/2021    MPV 12 0 03/19/2021    NRBC 0 03/19/2021   , CMP:   Lab Results   Component Value Date    SODIUM 135 (L) 03/19/2021    K 4 4 03/19/2021     03/19/2021    CO2 25 03/19/2021    BUN 26 (H) 03/19/2021    CREATININE 1 04 03/19/2021    CALCIUM 9 1 03/19/2021    EGFR 79 03/19/2021       Imaging and other studies: I have personally reviewed pertinent films in PACS     Chest x-ray 3/16/2021-  Worsening bilateral diffuse COVID-19 pneumonia

## 2021-03-19 NOTE — PHYSICAL THERAPY NOTE
Physical Therapy Progress Note     03/19/21 1537   PT Last Visit   PT Visit Date 03/19/21   Note Type   Note Type Treatment   Pain Assessment   Pain Assessment Tool Pain Assessment not indicated - pt denies pain   Pain Score No Pain   Restrictions/Precautions   Weight Bearing Precautions Per Order No   Other Precautions Multiple lines;O2;Fall Risk; Airborne/isolation;Contact/isolation;Telemetry   General   Chart Reviewed Yes   Response to Previous Treatment Patient reporting fatigue but able to participate  Family/Caregiver Present No   Subjective   Subjective Willing to participate in therapy this PM    Bed Mobility   Supine to Sit 5  Supervision   Additional items Assist x 1;HOB elevated; Bedrails;Leg ; Increased time required;Verbal cues;LE management   Sit to Supine 5  Supervision   Additional items Assist x 1;Bedrails;Leg ; Increased time required;Verbal cues;LE management   Transfers   Sit to Stand 5  Supervision   Additional items Assist x 1;Bedrails; Increased time required;Verbal cues   Stand to Sit 5  Supervision   Additional items Assist x 1;Bedrails; Increased time required;Verbal cues   Ambulation/Elevation   Gait pattern WNL; Excessively slow   Gait Assistance 5  Supervision   Additional items Assist x 1;Verbal cues; Tactile cues   Assistive Device None   Distance 10'   Balance   Static Sitting Normal   Dynamic Sitting Good   Static Standing Good   Dynamic Standing Fair +   Ambulatory Fair   Endurance Deficit   Endurance Deficit Yes   Endurance Deficit Description AMARO   Activity Tolerance   Activity Tolerance Treatment limited secondary to medical complications (Comment); Other (Comment)  (AMARO )   Nurse Made Aware Yes   Exercises   THR Supine;Sitting;10 reps;AAROM; Bilateral   Assessment   Prognosis Fair   Problem List Decreased strength;Decreased range of motion;Decreased endurance; Impaired balance;Decreased mobility   Assessment Pt  supine in bed upon my arrival  Pt  reporting fatigue, however agreeable to therapeutic intevention  Per Pulmonary orders, attempt to maintain O2 saturation above 89% at this time  Performance of HEP with cues provided for proper completion  Noted O2 saturation at rest between 87-89%  With performance of HEP noted drop to 85%, requiring resting period for return to 89%  Progressed with transfers being able to complete with A of therapist with cues for hand placement/technique  Progressed with a limited amb  trial with no AD and standyA of therapist  Noted O2 saturation drop to 82-83%, requiring quick return to supine in bed for return to 88% at rest  RN made aware of O2 measurements at end of treatment session  PT will continue to recommend d/c home with family support when medically stable  Barriers to Discharge Other (Comment)   Barriers to Discharge Comments medical status   Goals   Patient Goals To rest    STG Expiration Date 03/29/21   PT Treatment Day 2   Plan   Treatment/Interventions LE strengthening/ROM; Functional transfer training; Therapeutic exercise; Endurance training;Gait training;Bed mobility;Spoke to nursing;Spoke to case management   Progress Slow progress, medical status limitations   PT Frequency 2-3x/wk   Recommendation   PT Discharge Recommendation Return to previous environment with social support   Equipment Recommended Other (Comment)  (none at present continue to assess)   PT - OK to Discharge Yes  (if d/c when medically stable  )   AM-PAC Basic Mobility Inpatient   Turning in Bed Without Bedrails 4   Lying on Back to Sitting on Edge of Flat Bed 4   Moving Bed to Chair 4   Standing Up From Chair 4   Walk in Room 4   Climb 3-5 Stairs 3   Basic Mobility Inpatient Raw Score 23   Basic Mobility Standardized Score 50 88     Amanda Paul, PTA

## 2021-03-19 NOTE — ASSESSMENT & PLAN NOTE
60year old male admitted due to acute respiratory failure with hypoxia secondary to COVID-19 pneumonia    - Continue 15L NC/ NRB  - Pulmonary assisting in management   - On IV steroids

## 2021-03-19 NOTE — PROGRESS NOTES
[facility]  Progress Note - Windy Morton 1965, 60 y o  male MRN: [id number]  Unit/Bed#: [facility] 68 2 -01 Encounter: [id number]  Primary Care Provider: Edvin Chavis MD   Date and time admitted to hospital: 3/13/2021  4:32 PM    * Pneumonia due to COVID-19 virus  Assessment & Plan  60year old male admitted due to acute respiratory failure with hypoxia secondary to COVID-19 pneumonia  - Continue 15L NC/ NRB  - Pulmonary assisting in management   - On IV steroids    Acute respiratory failure with hypoxia (HCC)  Assessment & Plan  Secondary to above    Rheumatoid arthritis involving both hands with positive rheumatoid factor (HCC)  Assessment & Plan  Continue Plaquenil    Severe recurrent major depression without psychotic features ([facility]  Assessment & Plan  Continue Home medications    Gram-positive bacteremia  Assessment & Plan  Possibly contaminant  - Monitor off antibiotics  Serial troponins negative  Results from last 7 days   Lab Units 03/19/21  0444 03/15/21  0504 03/14/21  0457 03/13/21  1232 03/13/21  1221   PROCALCITONIN ng/ml 0 06 0 14 0 17  --  0 16   BLOOD CULTURE   --   --   --  No Growth After 5 Days  Alpha Hemolytic Streptococcus NOT Enterococcus, NOT S  pneumoniae*   GRAM STAIN RESULT   --   --   --   --  Gram positive cocci in pairs and chains*                 VTE Pharmacologic Prophylaxis:   Pharmacologic: Enoxaparin (Lovenox)  Mechanical VTE Prophylaxis in Place: Yes    Patient Centered Rounds: I have performed bedside rounds with nursing staff today  Discussions with Specialists or Other Care Team Provider: Nursing, pulmonary    Education and Discussions with Family / Patient: patient, attempted to call son can't leave voicemail due to inbox not setup    Time Spent for Care: 30 minutes  More than 50% of total time spent on counseling and coordination of care as described above      Current Length of Stay: 6 day(s)    Current Patient Status: Inpatient Certification Statement: The patient will continue to require additional inpatient hospital stay due to acute respiratory failure scondary to covid-19 management, iv steroids    Discharge Plan: active    Code Status: Level 1 - Full Code      Subjective:   Patient seen and examined at bedside  Still with high oxygen reqquirements but comfortable in bed otherwise  Objective:     Vitals:   Temp (24hrs), Av 6 °F (36 4 °C), Min:97 4 °F (36 3 °C), Max:97 9 °F (36 6 °C)    Temp:  [97 4 °F (36 3 °C)-97 9 °F (36 6 °C)] 97 9 °F (36 6 °C)  HR:  [54-88] 88  Resp:  [20-22] 22  BP: (106-133)/(73-84) 133/84  SpO2:  [87 %-96 %] 87 %  There is no height or weight on file to calculate BMI  Input and Output Summary (last 24 hours): Intake/Output Summary (Last 24 hours) at 3/19/2021 1701  Last data filed at 3/19/2021 1501  Gross per 24 hour   Intake --   Output 1300 ml   Net -1300 ml       Physical Exam:     Physical Exam  Vitals signs reviewed  Constitutional:       Appearance: He is normal weight  HENT:      Head: Normocephalic  Nose: Nose normal       Mouth/Throat:      Mouth: Mucous membranes are dry  Eyes:      General: No scleral icterus  Cardiovascular:      Rate and Rhythm: Normal rate  Pulmonary:      Effort: Pulmonary effort is normal  No respiratory distress  Abdominal:      Tenderness: There is no abdominal tenderness  Skin:     General: Skin is warm  Neurological:      Mental Status: Mental status is at baseline     Psychiatric:         Mood and Affect: Mood normal          Behavior: Behavior normal        Additional Data:     Labs:    Results from last 7 days   Lab Units 21  0516 21  0534   WBC Thousand/uL 13 55* 15 57*   HEMOGLOBIN g/dL 14 5 14 4   HEMATOCRIT % 43 7 43 5   PLATELETS Thousands/uL 358 263   BANDS PCT % 3  --    NEUTROS PCT %  --  88*   LYMPHS PCT %  --  5*   LYMPHO PCT % 5*  --    MONOS PCT %  --  2*   MONO PCT % 4  --    EOS PCT % 0 0     Results from last 7 days   Lab Units 03/19/21  0559 03/17/21  0535   SODIUM mmol/L 135* 137   POTASSIUM mmol/L 4 4 4 4   CHLORIDE mmol/L 101 101   CO2 mmol/L 25 26   BUN mg/dL 26* 20   CREATININE mg/dL 1 04 0 99   ANION GAP mmol/L 9 10   CALCIUM mg/dL 9 1 9 2   ALBUMIN g/dL  --  2 7*   TOTAL BILIRUBIN mg/dL  --  0 78   ALK PHOS U/L  --  86   ALT U/L  --  78   AST U/L  --  39   GLUCOSE RANDOM mg/dL 114 99     Results from last 7 days   Lab Units 03/13/21  1221   INR  1 08             Results from last 7 days   Lab Units 03/19/21  0444 03/15/21  0504 03/14/21  0457 03/13/21  1221   LACTIC ACID mmol/L  --   --   --  1 5   PROCALCITONIN ng/ml 0 06 0 14 0 17 0 16           * I Have Reviewed All Lab Data Listed Above  * Additional Pertinent Lab Tests Reviewed: Eric 64 Admission Reviewed    Imaging:    Imaging Reports Reviewed Today Include: xr chest cta pe study    Recent Cultures (last 7 days):     Results from last 7 days   Lab Units 03/13/21  1232 03/13/21  1221   BLOOD CULTURE  No Growth After 5 Days   Alpha Hemolytic Streptococcus NOT Enterococcus, NOT S  pneumoniae*   GRAM STAIN RESULT   --  Gram positive cocci in pairs and chains*       Last 24 Hours Medication List:   Current Facility-Administered Medications   Medication Dose Route Frequency Provider Last Rate    ARIPiprazole  10 mg Oral HS Nesha Birmingham MD      ascorbic acid  1,000 mg Oral Q12H Patti Lambert MD      atorvastatin  40 mg Oral HS Nesha Birmingham MD      bisacodyl  10 mg Rectal Daily PRN Nesha Birmingham MD      cholecalciferol  2,000 Units Oral Daily Nesha Birmingham MD      dexamethasone  0 1 mg/kg Intravenous Q12H Nesha Birmingham MD      DULoxetine  60 mg Oral BID Nesha Birmingham MD      enoxaparin  1 mg/kg Subcutaneous Q12H [address] [place], CRNP      famotidine  20 mg Oral BID Nesha Birmingham MD      hydroxychloroquine  200 mg Oral Daily Nesha Birmingham MD      zinc sulfate  220 mg Oral Daily Nesha Birmingham MD      Followed by   Toro Gallo ON 3/21/2021] multivitamin-minerals  1 tablet Oral Daily Dk Burgess MD      polyethylene glycol  17 g Oral Daily PRN Dk Burgess MD      senna-docusate sodium  1 tablet Oral HS Dk Burgess MD      traZODone  100 mg Oral HS PRN Dk Burgess MD          Today, Patient Was Seen By: Tahira Araujo MD    ** Please Note: Dictation voice to text software may have been used in the creation of this document   **

## 2021-03-19 NOTE — CASE MANAGEMENT
Search Cathy performed should pt require O2 on d/c and insurance not willing to cover in full or pt has copays with pt being 100% eligible for financial assistance  Form completed and placed in [employer]

## 2021-03-19 NOTE — PLAN OF CARE
Problem: PAIN - ADULT  Goal: Verbalizes/displays adequate comfort level or baseline comfort level  Description: Interventions:  - Encourage patient to monitor pain and request assistance  - Assess pain using appropriate pain scale  - Administer analgesics based on type and severity of pain and evaluate response  - Implement non-pharmacological measures as appropriate and evaluate response  - Consider cultural and social influences on pain and pain management  - Notify physician/advanced practitioner if interventions unsuccessful or patient reports new pain  Outcome: Progressing     Problem: INFECTION - ADULT  Goal: Absence or prevention of progression during hospitalization  Description: INTERVENTIONS:  - Assess and monitor for signs and symptoms of infection  - Monitor lab/diagnostic results  - Monitor all insertion sites, i e  indwelling lines, tubes, and drains  - Monitor endotracheal if appropriate and nasal secretions for changes in amount and color  - [place] appropriate cooling/warming therapies per order  - Administer medications as ordered  - Instruct and encourage patient and family to use good hand hygiene technique  - Identify and instruct in appropriate isolation precautions for identified infection/condition  Outcome: Progressing     Problem: SAFETY ADULT  Goal: Patient will remain free of falls  Description: INTERVENTIONS:  - Assess patient frequently for physical needs  -  Identify cognitive and physical deficits and behaviors that affect risk of falls    -  [place] fall precautions as indicated by assessment   - Educate patient/family on patient safety including physical limitations  - Instruct patient to call for assistance with activity based on assessment  - Modify environment to reduce risk of injury  - Consider OT/PT consult to assist with strengthening/mobility  Outcome: Progressing  Goal: Maintain or return to baseline ADL function  Description: INTERVENTIONS:  -  Assess patient's ability to carry out ADLs; assess patient's baseline for ADL function and identify physical deficits which impact ability to perform ADLs (bathing, care of mouth/teeth, toileting, grooming, dressing, etc )  - Assess/evaluate cause of self-care deficits   - Assess range of motion  - Assess patient's mobility; develop plan if impaired  - Assess patient's need for assistive devices and provide as appropriate  - Encourage maximum independence but intervene and supervise when necessary  - Involve family in performance of ADLs  - Assess for home care needs following discharge   - Consider OT consult to assist with ADL evaluation and planning for discharge  - Provide patient education as appropriate  Outcome: Progressing  Goal: Maintain or return mobility status to optimal level  Description: INTERVENTIONS:  - Assess patient's baseline mobility status (ambulation, transfers, stairs, etc )    - Identify cognitive and physical deficits and behaviors that affect mobility  - Identify mobility aids required to assist with transfers and/or ambulation (gait belt, sit-to-stand, lift, walker, cane, etc )  - [place] fall precautions as indicated by assessment  - Record patient progress and toleration of activity level on Mobility SBAR; progress patient to next Phase/Stage  - Instruct patient to call for assistance with activity based on assessment  - Consider rehabilitation consult to assist with strengthening/weightbearing, etc   Outcome: Progressing     Problem: DISCHARGE PLANNING  Goal: Discharge to home or other facility with appropriate resources  Description: INTERVENTIONS:  - Identify barriers to discharge w/patient and caregiver  - Arrange for needed discharge resources and transportation as appropriate  - Identify discharge learning needs (meds, wound care, etc )  - Arrange for interpretive services to assist at discharge as needed  - Refer to Case Management Department for coordinating discharge planning if the patient needs post-hospital services based on physician/advanced practitioner order or complex needs related to functional status, cognitive ability, or social support system  Outcome: Progressing     Problem: Knowledge Deficit  Goal: Patient/family/caregiver demonstrates understanding of disease process, treatment plan, medications, and discharge instructions  Description: Complete learning assessment and assess knowledge base    Interventions:  - Provide teaching at level of understanding  - Provide teaching via preferred learning methods  Outcome: Progressing     Problem: RESPIRATORY - ADULT  Goal: Achieves optimal ventilation and oxygenation  Description: INTERVENTIONS:  - Assess for changes in respiratory status  - Assess for changes in mentation and behavior  - Position to facilitate oxygenation and minimize respiratory effort  - Oxygen administered by appropriate delivery if ordered  - Initiate smoking cessation education as indicated  - Encourage broncho-pulmonary hygiene including cough, deep breathe, Incentive Spirometry  - Assess the need for suctioning and aspirate as needed  - Assess and instruct to report SOB or any respiratory difficulty  - Respiratory Therapy support as indicated  Outcome: Progressing     Problem: MUSCULOSKELETAL - ADULT  Goal: Maintain or return mobility to safest level of function  Description: INTERVENTIONS:  - Assess patient's ability to carry out ADLs; assess patient's baseline for ADL function and identify physical deficits which impact ability to perform ADLs (bathing, care of mouth/teeth, toileting, grooming, dressing, etc )  - Assess/evaluate cause of self-care deficits   - Assess range of motion  - Assess patient's mobility  - Assess patient's need for assistive devices and provide as appropriate  - Encourage maximum independence but intervene and supervise when necessary  - Involve family in performance of ADLs  - Assess for home care needs following discharge   - Consider OT consult to assist with ADL evaluation and planning for discharge  - Provide patient education as appropriate  Outcome: Progressing  Goal: Maintain proper alignment of affected body part  Description: INTERVENTIONS:  - Support, maintain and protect limb and body alignment  - Provide patient/ family with appropriate education  Outcome: Progressing     Problem: Nutrition/Hydration-ADULT  Goal: Nutrient/Hydration intake appropriate for improving, restoring or maintaining nutritional needs  Description: Monitor and assess patient's nutrition/hydration status for malnutrition  Collaborate with interdisciplinary team and initiate plan and interventions as ordered  Monitor patient's weight and dietary intake as ordered or per policy  Utilize nutrition screening tool and intervene as necessary  Determine patient's food preferences and provide high-protein, high-caloric foods as appropriate  INTERVENTIONS:  - Monitor oral intake, urinary output, labs, and treatment plans  - Assess nutrition and hydration status and recommend course of action  - Evaluate amount of meals eaten  - Assist patient with eating if necessary   - Allow adequate time for meals  - Recommend/ encourage appropriate diets, oral nutritional supplements, and vitamin/mineral supplements  - Order, calculate, and assess calorie counts as needed  - Recommend, monitor, and adjust tube feedings and TPN/PPN based on assessed needs  - Assess need for intravenous fluids  - Provide specific nutrition/hydration education as appropriate  - Include patient/family/caregiver in decisions related to nutrition  Outcome: Progressing     Problem: Potential for Falls  Goal: Patient will remain free of falls  Description: INTERVENTIONS:  - Assess patient frequently for physical needs  -  Identify cognitive and physical deficits and behaviors that affect risk of falls    -  [place] fall precautions as indicated by assessment   - Educate patient/family on patient safety including physical limitations  - Instruct patient to call for assistance with activity based on assessment  - Modify environment to reduce risk of injury  - Consider OT/PT consult to assist with strengthening/mobility  Outcome: Progressing

## 2021-03-19 NOTE — ASSESSMENT & PLAN NOTE
Possibly contaminant  - Monitor off antibiotics  Serial troponins negative  Results from last 7 days   Lab Units 03/19/21  0444 03/15/21  0504 03/14/21  0457 03/13/21  1232 03/13/21  1221   PROCALCITONIN ng/ml 0 06 0 14 0 17  --  0 16   BLOOD CULTURE   --   --   --  No Growth After 5 Days   Alpha Hemolytic Streptococcus NOT Enterococcus, NOT S  pneumoniae*   GRAM STAIN RESULT   --   --   --   --  Gram positive cocci in pairs and chains*

## 2021-03-19 NOTE — PLAN OF CARE
Problem: PAIN - ADULT  Goal: Verbalizes/displays adequate comfort level or baseline comfort level  Description: Interventions:  - Encourage patient to monitor pain and request assistance  - Assess pain using appropriate pain scale  - Administer analgesics based on type and severity of pain and evaluate response  - Implement non-pharmacological measures as appropriate and evaluate response  - Consider cultural and social influences on pain and pain management  - Notify physician/advanced practitioner if interventions unsuccessful or patient reports new pain  Outcome: Progressing     Problem: INFECTION - ADULT  Goal: Absence or prevention of progression during hospitalization  Description: INTERVENTIONS:  - Assess and monitor for signs and symptoms of infection  - Monitor lab/diagnostic results  - Monitor all insertion sites, i e  indwelling lines, tubes, and drains  - Monitor endotracheal if appropriate and nasal secretions for changes in amount and color  - [place] appropriate cooling/warming therapies per order  - Administer medications as ordered  - Instruct and encourage patient and family to use good hand hygiene technique  - Identify and instruct in appropriate isolation precautions for identified infection/condition  Outcome: Progressing     Problem: SAFETY ADULT  Goal: Patient will remain free of falls  Description: INTERVENTIONS:  - Assess patient frequently for physical needs  -  Identify cognitive and physical deficits and behaviors that affect risk of falls    -  [place] fall precautions as indicated by assessment   - Educate patient/family on patient safety including physical limitations  - Instruct patient to call for assistance with activity based on assessment  - Modify environment to reduce risk of injury  - Consider OT/PT consult to assist with strengthening/mobility  Outcome: Progressing  Goal: Maintain or return to baseline ADL function  Description: INTERVENTIONS:  -  Assess patient's ability to carry out ADLs; assess patient's baseline for ADL function and identify physical deficits which impact ability to perform ADLs (bathing, care of mouth/teeth, toileting, grooming, dressing, etc )  - Assess/evaluate cause of self-care deficits   - Assess range of motion  - Assess patient's mobility; develop plan if impaired  - Assess patient's need for assistive devices and provide as appropriate  - Encourage maximum independence but intervene and supervise when necessary  - Involve family in performance of ADLs  - Assess for home care needs following discharge   - Consider OT consult to assist with ADL evaluation and planning for discharge  - Provide patient education as appropriate  Outcome: Progressing  Goal: Maintain or return mobility status to optimal level  Description: INTERVENTIONS:  - Assess patient's baseline mobility status (ambulation, transfers, stairs, etc )    - Identify cognitive and physical deficits and behaviors that affect mobility  - Identify mobility aids required to assist with transfers and/or ambulation (gait belt, sit-to-stand, lift, walker, cane, etc )  - [place] fall precautions as indicated by assessment  - Record patient progress and toleration of activity level on Mobility SBAR; progress patient to next Phase/Stage  - Instruct patient to call for assistance with activity based on assessment  - Consider rehabilitation consult to assist with strengthening/weightbearing, etc   Outcome: Progressing     Problem: DISCHARGE PLANNING  Goal: Discharge to home or other facility with appropriate resources  Description: INTERVENTIONS:  - Identify barriers to discharge w/patient and caregiver  - Arrange for needed discharge resources and transportation as appropriate  - Identify discharge learning needs (meds, wound care, etc )  - Arrange for interpretive services to assist at discharge as needed  - Refer to Case Management Department for coordinating discharge planning if the patient needs post-hospital services based on physician/advanced practitioner order or complex needs related to functional status, cognitive ability, or social support system  Outcome: Progressing     Problem: Knowledge Deficit  Goal: Patient/family/caregiver demonstrates understanding of disease process, treatment plan, medications, and discharge instructions  Description: Complete learning assessment and assess knowledge base    Interventions:  - Provide teaching at level of understanding  - Provide teaching via preferred learning methods  Outcome: Progressing     Problem: RESPIRATORY - ADULT  Goal: Achieves optimal ventilation and oxygenation  Description: INTERVENTIONS:  - Assess for changes in respiratory status  - Assess for changes in mentation and behavior  - Position to facilitate oxygenation and minimize respiratory effort  - Oxygen administered by appropriate delivery if ordered  - Initiate smoking cessation education as indicated  - Encourage broncho-pulmonary hygiene including cough, deep breathe, Incentive Spirometry  - Assess the need for suctioning and aspirate as needed  - Assess and instruct to report SOB or any respiratory difficulty  - Respiratory Therapy support as indicated  Outcome: Progressing     Problem: MUSCULOSKELETAL - ADULT  Goal: Maintain or return mobility to safest level of function  Description: INTERVENTIONS:  - Assess patient's ability to carry out ADLs; assess patient's baseline for ADL function and identify physical deficits which impact ability to perform ADLs (bathing, care of mouth/teeth, toileting, grooming, dressing, etc )  - Assess/evaluate cause of self-care deficits   - Assess range of motion  - Assess patient's mobility  - Assess patient's need for assistive devices and provide as appropriate  - Encourage maximum independence but intervene and supervise when necessary  - Involve family in performance of ADLs  - Assess for home care needs following discharge   - Consider OT consult to assist with ADL evaluation and planning for discharge  - Provide patient education as appropriate  Outcome: Progressing  Goal: Maintain proper alignment of affected body part  Description: INTERVENTIONS:  - Support, maintain and protect limb and body alignment  - Provide patient/ family with appropriate education  Outcome: Progressing     Problem: Nutrition/Hydration-ADULT  Goal: Nutrient/Hydration intake appropriate for improving, restoring or maintaining nutritional needs  Description: Monitor and assess patient's nutrition/hydration status for malnutrition  Collaborate with interdisciplinary team and initiate plan and interventions as ordered  Monitor patient's weight and dietary intake as ordered or per policy  Utilize nutrition screening tool and intervene as necessary  Determine patient's food preferences and provide high-protein, high-caloric foods as appropriate  INTERVENTIONS:  - Monitor oral intake, urinary output, labs, and treatment plans  - Assess nutrition and hydration status and recommend course of action  - Evaluate amount of meals eaten  - Assist patient with eating if necessary   - Allow adequate time for meals  - Recommend/ encourage appropriate diets, oral nutritional supplements, and vitamin/mineral supplements  - Order, calculate, and assess calorie counts as needed  - Recommend, monitor, and adjust tube feedings and TPN/PPN based on assessed needs  - Assess need for intravenous fluids  - Provide specific nutrition/hydration education as appropriate  - Include patient/family/caregiver in decisions related to nutrition  Outcome: Progressing     Problem: Potential for Falls  Goal: Patient will remain free of falls  Description: INTERVENTIONS:  - Assess patient frequently for physical needs  -  Identify cognitive and physical deficits and behaviors that affect risk of falls    -  [place] fall precautions as indicated by assessment   - Educate patient/family on patient safety including physical limitations  - Instruct patient to call for assistance with activity based on assessment  - Modify environment to reduce risk of injury  - Consider OT/PT consult to assist with strengthening/mobility  Outcome: Progressing

## 2021-03-19 NOTE — PLAN OF CARE
Problem: PHYSICAL THERAPY ADULT  Goal: Performs mobility at highest level of function for planned discharge setting  See evaluation for individualized goals  Description: Treatment/Interventions: Functional transfer training, LE strengthening/ROM, Elevations, Therapeutic exercise, Endurance training, Patient/family training, Equipment eval/education, Bed mobility, Gait training, Continued evaluation, Spoke to nursing, OT          See flowsheet documentation for full assessment, interventions and recommendations  Outcome: Progressing  Note: Prognosis: Fair  Problem List: Decreased strength, Decreased range of motion, Decreased endurance, Impaired balance, Decreased mobility  Assessment: Pt  supine in bed upon my arrival  Pt  reporting fatigue, however agreeable to therapeutic intevention  Per Pulmonary orders, attempt to maintain O2 saturation above 89% at this time  Performance of HEP with cues provided for proper completion  Noted O2 saturation at rest between 87-89%  With performance of HEP noted drop to 85%, requiring resting period for return to 89%  Progressed with transfers being able to complete with A of therapist with cues for hand placement/technique  Progressed with a limited amb  trial with no AD and standyA of therapist  Noted O2 saturation drop to 82-83%, requiring quick return to supine in bed for return to 88% at rest  RN made aware of O2 measurements at end of treatment session  PT will continue to recommend d/c home with family support when medically stable  Barriers to Discharge: Other (Comment)  Barriers to Discharge Comments: medical status  PT Discharge Recommendation: Return to previous environment with social support     PT - OK to Discharge: Yes(if d/c when medically stable )    See flowsheet documentation for full assessment

## 2021-03-20 NOTE — PLAN OF CARE
Problem: PAIN - ADULT  Goal: Verbalizes/displays adequate comfort level or baseline comfort level  Description: Interventions:  - Encourage patient to monitor pain and request assistance  - Assess pain using appropriate pain scale  - Administer analgesics based on type and severity of pain and evaluate response  - Implement non-pharmacological measures as appropriate and evaluate response  - Consider cultural and social influences on pain and pain management  - Notify physician/advanced practitioner if interventions unsuccessful or patient reports new pain  Outcome: Progressing     Problem: INFECTION - ADULT  Goal: Absence or prevention of progression during hospitalization  Description: INTERVENTIONS:  - Assess and monitor for signs and symptoms of infection  - Monitor lab/diagnostic results  - Monitor all insertion sites, i e  indwelling lines, tubes, and drains  - Monitor endotracheal if appropriate and nasal secretions for changes in amount and color  - [place] appropriate cooling/warming therapies per order  - Administer medications as ordered  - Instruct and encourage patient and family to use good hand hygiene technique  - Identify and instruct in appropriate isolation precautions for identified infection/condition  Outcome: Progressing     Problem: SAFETY ADULT  Goal: Patient will remain free of falls  Description: INTERVENTIONS:  - Assess patient frequently for physical needs  -  Identify cognitive and physical deficits and behaviors that affect risk of falls    -  [place] fall precautions as indicated by assessment   - Educate patient/family on patient safety including physical limitations  - Instruct patient to call for assistance with activity based on assessment  - Modify environment to reduce risk of injury  - Consider OT/PT consult to assist with strengthening/mobility  Outcome: Progressing  Goal: Maintain or return to baseline ADL function  Description: INTERVENTIONS:  -  Assess patient's ability to carry out ADLs; assess patient's baseline for ADL function and identify physical deficits which impact ability to perform ADLs (bathing, care of mouth/teeth, toileting, grooming, dressing, etc )  - Assess/evaluate cause of self-care deficits   - Assess range of motion  - Assess patient's mobility; develop plan if impaired  - Assess patient's need for assistive devices and provide as appropriate  - Encourage maximum independence but intervene and supervise when necessary  - Involve family in performance of ADLs  - Assess for home care needs following discharge   - Consider OT consult to assist with ADL evaluation and planning for discharge  - Provide patient education as appropriate  Outcome: Progressing  Goal: Maintain or return mobility status to optimal level  Description: INTERVENTIONS:  - Assess patient's baseline mobility status (ambulation, transfers, stairs, etc )    - Identify cognitive and physical deficits and behaviors that affect mobility  - Identify mobility aids required to assist with transfers and/or ambulation (gait belt, sit-to-stand, lift, walker, cane, etc )  - [place] fall precautions as indicated by assessment  - Record patient progress and toleration of activity level on Mobility SBAR; progress patient to next Phase/Stage  - Instruct patient to call for assistance with activity based on assessment  - Consider rehabilitation consult to assist with strengthening/weightbearing, etc   Outcome: Progressing     Problem: DISCHARGE PLANNING  Goal: Discharge to home or other facility with appropriate resources  Description: INTERVENTIONS:  - Identify barriers to discharge w/patient and caregiver  - Arrange for needed discharge resources and transportation as appropriate  - Identify discharge learning needs (meds, wound care, etc )  - Arrange for interpretive services to assist at discharge as needed  - Refer to Case Management Department for coordinating discharge planning if the patient needs post-hospital services based on physician/advanced practitioner order or complex needs related to functional status, cognitive ability, or social support system  Outcome: Progressing     Problem: Knowledge Deficit  Goal: Patient/family/caregiver demonstrates understanding of disease process, treatment plan, medications, and discharge instructions  Description: Complete learning assessment and assess knowledge base    Interventions:  - Provide teaching at level of understanding  - Provide teaching via preferred learning methods  Outcome: Progressing     Problem: RESPIRATORY - ADULT  Goal: Achieves optimal ventilation and oxygenation  Description: INTERVENTIONS:  - Assess for changes in respiratory status  - Assess for changes in mentation and behavior  - Position to facilitate oxygenation and minimize respiratory effort  - Oxygen administered by appropriate delivery if ordered  - Initiate smoking cessation education as indicated  - Encourage broncho-pulmonary hygiene including cough, deep breathe, Incentive Spirometry  - Assess the need for suctioning and aspirate as needed  - Assess and instruct to report SOB or any respiratory difficulty  - Respiratory Therapy support as indicated  Outcome: Progressing     Problem: MUSCULOSKELETAL - ADULT  Goal: Maintain or return mobility to safest level of function  Description: INTERVENTIONS:  - Assess patient's ability to carry out ADLs; assess patient's baseline for ADL function and identify physical deficits which impact ability to perform ADLs (bathing, care of mouth/teeth, toileting, grooming, dressing, etc )  - Assess/evaluate cause of self-care deficits   - Assess range of motion  - Assess patient's mobility  - Assess patient's need for assistive devices and provide as appropriate  - Encourage maximum independence but intervene and supervise when necessary  - Involve family in performance of ADLs  - Assess for home care needs following discharge   - Consider OT consult to assist with ADL evaluation and planning for discharge  - Provide patient education as appropriate  Outcome: Progressing  Goal: Maintain proper alignment of affected body part  Description: INTERVENTIONS:  - Support, maintain and protect limb and body alignment  - Provide patient/ family with appropriate education  Outcome: Progressing     Problem: Nutrition/Hydration-ADULT  Goal: Nutrient/Hydration intake appropriate for improving, restoring or maintaining nutritional needs  Description: Monitor and assess patient's nutrition/hydration status for malnutrition  Collaborate with interdisciplinary team and initiate plan and interventions as ordered  Monitor patient's weight and dietary intake as ordered or per policy  Utilize nutrition screening tool and intervene as necessary  Determine patient's food preferences and provide high-protein, high-caloric foods as appropriate  INTERVENTIONS:  - Monitor oral intake, urinary output, labs, and treatment plans  - Assess nutrition and hydration status and recommend course of action  - Evaluate amount of meals eaten  - Assist patient with eating if necessary   - Allow adequate time for meals  - Recommend/ encourage appropriate diets, oral nutritional supplements, and vitamin/mineral supplements  - Order, calculate, and assess calorie counts as needed  - Recommend, monitor, and adjust tube feedings and TPN/PPN based on assessed needs  - Assess need for intravenous fluids  - Provide specific nutrition/hydration education as appropriate  - Include patient/family/caregiver in decisions related to nutrition  Outcome: Progressing     Problem: Potential for Falls  Goal: Patient will remain free of falls  Description: INTERVENTIONS:  - Assess patient frequently for physical needs  -  Identify cognitive and physical deficits and behaviors that affect risk of falls    -  [place] fall precautions as indicated by assessment   - Educate patient/family on patient safety including physical limitations  - Instruct patient to call for assistance with activity based on assessment  - Modify environment to reduce risk of injury  - Consider OT/PT consult to assist with strengthening/mobility  Outcome: Progressing

## 2021-03-20 NOTE — PLAN OF CARE
Problem: PAIN - ADULT  Goal: Verbalizes/displays adequate comfort level or baseline comfort level  Description: Interventions:  - Encourage patient to monitor pain and request assistance  - Assess pain using appropriate pain scale  - Administer analgesics based on type and severity of pain and evaluate response  - Implement non-pharmacological measures as appropriate and evaluate response  - Consider cultural and social influences on pain and pain management  - Notify physician/advanced practitioner if interventions unsuccessful or patient reports new pain  Outcome: Progressing     Problem: INFECTION - ADULT  Goal: Absence or prevention of progression during hospitalization  Description: INTERVENTIONS:  - Assess and monitor for signs and symptoms of infection  - Monitor lab/diagnostic results  - Monitor all insertion sites, i e  indwelling lines, tubes, and drains  - Monitor endotracheal if appropriate and nasal secretions for changes in amount and color  - [place] appropriate cooling/warming therapies per order  - Administer medications as ordered  - Instruct and encourage patient and family to use good hand hygiene technique  - Identify and instruct in appropriate isolation precautions for identified infection/condition  Outcome: Progressing     Problem: SAFETY ADULT  Goal: Patient will remain free of falls  Description: INTERVENTIONS:  - Assess patient frequently for physical needs  -  Identify cognitive and physical deficits and behaviors that affect risk of falls    -  [place] fall precautions as indicated by assessment   - Educate patient/family on patient safety including physical limitations  - Instruct patient to call for assistance with activity based on assessment  - Modify environment to reduce risk of injury  - Consider OT/PT consult to assist with strengthening/mobility  Outcome: Progressing  Goal: Maintain or return to baseline ADL function  Description: INTERVENTIONS:  -  Assess patient's ability to carry out ADLs; assess patient's baseline for ADL function and identify physical deficits which impact ability to perform ADLs (bathing, care of mouth/teeth, toileting, grooming, dressing, etc )  - Assess/evaluate cause of self-care deficits   - Assess range of motion  - Assess patient's mobility; develop plan if impaired  - Assess patient's need for assistive devices and provide as appropriate  - Encourage maximum independence but intervene and supervise when necessary  - Involve family in performance of ADLs  - Assess for home care needs following discharge   - Consider OT consult to assist with ADL evaluation and planning for discharge  - Provide patient education as appropriate  Outcome: Progressing  Goal: Maintain or return mobility status to optimal level  Description: INTERVENTIONS:  - Assess patient's baseline mobility status (ambulation, transfers, stairs, etc )    - Identify cognitive and physical deficits and behaviors that affect mobility  - Identify mobility aids required to assist with transfers and/or ambulation (gait belt, sit-to-stand, lift, walker, cane, etc )  - [place] fall precautions as indicated by assessment  - Record patient progress and toleration of activity level on Mobility SBAR; progress patient to next Phase/Stage  - Instruct patient to call for assistance with activity based on assessment  - Consider rehabilitation consult to assist with strengthening/weightbearing, etc   Outcome: Progressing     Problem: DISCHARGE PLANNING  Goal: Discharge to home or other facility with appropriate resources  Description: INTERVENTIONS:  - Identify barriers to discharge w/patient and caregiver  - Arrange for needed discharge resources and transportation as appropriate  - Identify discharge learning needs (meds, wound care, etc )  - Arrange for interpretive services to assist at discharge as needed  - Refer to Case Management Department for coordinating discharge planning if the patient needs post-hospital services based on physician/advanced practitioner order or complex needs related to functional status, cognitive ability, or social support system  Outcome: Progressing     Problem: Knowledge Deficit  Goal: Patient/family/caregiver demonstrates understanding of disease process, treatment plan, medications, and discharge instructions  Description: Complete learning assessment and assess knowledge base    Interventions:  - Provide teaching at level of understanding  - Provide teaching via preferred learning methods  Outcome: Progressing     Problem: RESPIRATORY - ADULT  Goal: Achieves optimal ventilation and oxygenation  Description: INTERVENTIONS:  - Assess for changes in respiratory status  - Assess for changes in mentation and behavior  - Position to facilitate oxygenation and minimize respiratory effort  - Oxygen administered by appropriate delivery if ordered  - Initiate smoking cessation education as indicated  - Encourage broncho-pulmonary hygiene including cough, deep breathe, Incentive Spirometry  - Assess the need for suctioning and aspirate as needed  - Assess and instruct to report SOB or any respiratory difficulty  - Respiratory Therapy support as indicated  Outcome: Progressing     Problem: MUSCULOSKELETAL - ADULT  Goal: Maintain or return mobility to safest level of function  Description: INTERVENTIONS:  - Assess patient's ability to carry out ADLs; assess patient's baseline for ADL function and identify physical deficits which impact ability to perform ADLs (bathing, care of mouth/teeth, toileting, grooming, dressing, etc )  - Assess/evaluate cause of self-care deficits   - Assess range of motion  - Assess patient's mobility  - Assess patient's need for assistive devices and provide as appropriate  - Encourage maximum independence but intervene and supervise when necessary  - Involve family in performance of ADLs  - Assess for home care needs following discharge   - Consider OT consult to assist with ADL evaluation and planning for discharge  - Provide patient education as appropriate  Outcome: Progressing  Goal: Maintain proper alignment of affected body part  Description: INTERVENTIONS:  - Support, maintain and protect limb and body alignment  - Provide patient/ family with appropriate education  Outcome: Progressing     Problem: Nutrition/Hydration-ADULT  Goal: Nutrient/Hydration intake appropriate for improving, restoring or maintaining nutritional needs  Description: Monitor and assess patient's nutrition/hydration status for malnutrition  Collaborate with interdisciplinary team and initiate plan and interventions as ordered  Monitor patient's weight and dietary intake as ordered or per policy  Utilize nutrition screening tool and intervene as necessary  Determine patient's food preferences and provide high-protein, high-caloric foods as appropriate  INTERVENTIONS:  - Monitor oral intake, urinary output, labs, and treatment plans  - Assess nutrition and hydration status and recommend course of action  - Evaluate amount of meals eaten  - Assist patient with eating if necessary   - Allow adequate time for meals  - Recommend/ encourage appropriate diets, oral nutritional supplements, and vitamin/mineral supplements  - Order, calculate, and assess calorie counts as needed  - Recommend, monitor, and adjust tube feedings and TPN/PPN based on assessed needs  - Assess need for intravenous fluids  - Provide specific nutrition/hydration education as appropriate  - Include patient/family/caregiver in decisions related to nutrition  Outcome: Progressing     Problem: Potential for Falls  Goal: Patient will remain free of falls  Description: INTERVENTIONS:  - Assess patient frequently for physical needs  -  Identify cognitive and physical deficits and behaviors that affect risk of falls    -  [place] fall precautions as indicated by assessment   - Educate patient/family on patient safety including physical limitations  - Instruct patient to call for assistance with activity based on assessment  - Modify environment to reduce risk of injury  - Consider OT/PT consult to assist with strengthening/mobility  Outcome: Progressing

## 2021-03-20 NOTE — ASSESSMENT & PLAN NOTE
60year old male admitted due to acute respiratory failure with hypoxia secondary to COVID-19 pneumonia  Still with high oxygen requirements without any improvement    - Continue 15L NC/ NRB  - Pulmonary assisting in management   - On IV steroids

## 2021-03-20 NOTE — PLAN OF CARE
Problem: PAIN - ADULT  Goal: Verbalizes/displays adequate comfort level or baseline comfort level  Description: Interventions:  - Encourage patient to monitor pain and request assistance  - Assess pain using appropriate pain scale  - Administer analgesics based on type and severity of pain and evaluate response  - Implement non-pharmacological measures as appropriate and evaluate response  - Consider cultural and social influences on pain and pain management  - Notify physician/advanced practitioner if interventions unsuccessful or patient reports new pain  Outcome: Progressing     Problem: INFECTION - ADULT  Goal: Absence or prevention of progression during hospitalization  Description: INTERVENTIONS:  - Assess and monitor for signs and symptoms of infection  - Monitor lab/diagnostic results  - Monitor all insertion sites, i e  indwelling lines, tubes, and drains  - Monitor endotracheal if appropriate and nasal secretions for changes in amount and color  - [place] appropriate cooling/warming therapies per order  - Administer medications as ordered  - Instruct and encourage patient and family to use good hand hygiene technique  - Identify and instruct in appropriate isolation precautions for identified infection/condition  Outcome: Progressing     Problem: SAFETY ADULT  Goal: Patient will remain free of falls  Description: INTERVENTIONS:  - Assess patient frequently for physical needs  -  Identify cognitive and physical deficits and behaviors that affect risk of falls    -  [place] fall precautions as indicated by assessment   - Educate patient/family on patient safety including physical limitations  - Instruct patient to call for assistance with activity based on assessment  - Modify environment to reduce risk of injury  - Consider OT/PT consult to assist with strengthening/mobility  Outcome: Progressing  Goal: Maintain or return to baseline ADL function  Description: INTERVENTIONS:  -  Assess patient's ability to carry out ADLs; assess patient's baseline for ADL function and identify physical deficits which impact ability to perform ADLs (bathing, care of mouth/teeth, toileting, grooming, dressing, etc )  - Assess/evaluate cause of self-care deficits   - Assess range of motion  - Assess patient's mobility; develop plan if impaired  - Assess patient's need for assistive devices and provide as appropriate  - Encourage maximum independence but intervene and supervise when necessary  - Involve family in performance of ADLs  - Assess for home care needs following discharge   - Consider OT consult to assist with ADL evaluation and planning for discharge  - Provide patient education as appropriate  Outcome: Progressing  Goal: Maintain or return mobility status to optimal level  Description: INTERVENTIONS:  - Assess patient's baseline mobility status (ambulation, transfers, stairs, etc )    - Identify cognitive and physical deficits and behaviors that affect mobility  - Identify mobility aids required to assist with transfers and/or ambulation (gait belt, sit-to-stand, lift, walker, cane, etc )  - [place] fall precautions as indicated by assessment  - Record patient progress and toleration of activity level on Mobility SBAR; progress patient to next Phase/Stage  - Instruct patient to call for assistance with activity based on assessment  - Consider rehabilitation consult to assist with strengthening/weightbearing, etc   Outcome: Progressing     Problem: DISCHARGE PLANNING  Goal: Discharge to home or other facility with appropriate resources  Description: INTERVENTIONS:  - Identify barriers to discharge w/patient and caregiver  - Arrange for needed discharge resources and transportation as appropriate  - Identify discharge learning needs (meds, wound care, etc )  - Arrange for interpretive services to assist at discharge as needed  - Refer to Case Management Department for coordinating discharge planning if the patient needs post-hospital services based on physician/advanced practitioner order or complex needs related to functional status, cognitive ability, or social support system  Outcome: Progressing     Problem: Knowledge Deficit  Goal: Patient/family/caregiver demonstrates understanding of disease process, treatment plan, medications, and discharge instructions  Description: Complete learning assessment and assess knowledge base    Interventions:  - Provide teaching at level of understanding  - Provide teaching via preferred learning methods  Outcome: Progressing     Problem: RESPIRATORY - ADULT  Goal: Achieves optimal ventilation and oxygenation  Description: INTERVENTIONS:  - Assess for changes in respiratory status  - Assess for changes in mentation and behavior  - Position to facilitate oxygenation and minimize respiratory effort  - Oxygen administered by appropriate delivery if ordered  - Initiate smoking cessation education as indicated  - Encourage broncho-pulmonary hygiene including cough, deep breathe, Incentive Spirometry  - Assess the need for suctioning and aspirate as needed  - Assess and instruct to report SOB or any respiratory difficulty  - Respiratory Therapy support as indicated  Outcome: Progressing     Problem: MUSCULOSKELETAL - ADULT  Goal: Maintain or return mobility to safest level of function  Description: INTERVENTIONS:  - Assess patient's ability to carry out ADLs; assess patient's baseline for ADL function and identify physical deficits which impact ability to perform ADLs (bathing, care of mouth/teeth, toileting, grooming, dressing, etc )  - Assess/evaluate cause of self-care deficits   - Assess range of motion  - Assess patient's mobility  - Assess patient's need for assistive devices and provide as appropriate  - Encourage maximum independence but intervene and supervise when necessary  - Involve family in performance of ADLs  - Assess for home care needs following discharge   - Consider OT consult to assist with ADL evaluation and planning for discharge  - Provide patient education as appropriate  Outcome: Progressing  Goal: Maintain proper alignment of affected body part  Description: INTERVENTIONS:  - Support, maintain and protect limb and body alignment  - Provide patient/ family with appropriate education  Outcome: Progressing     Problem: Nutrition/Hydration-ADULT  Goal: Nutrient/Hydration intake appropriate for improving, restoring or maintaining nutritional needs  Description: Monitor and assess patient's nutrition/hydration status for malnutrition  Collaborate with interdisciplinary team and initiate plan and interventions as ordered  Monitor patient's weight and dietary intake as ordered or per policy  Utilize nutrition screening tool and intervene as necessary  Determine patient's food preferences and provide high-protein, high-caloric foods as appropriate  INTERVENTIONS:  - Monitor oral intake, urinary output, labs, and treatment plans  - Assess nutrition and hydration status and recommend course of action  - Evaluate amount of meals eaten  - Assist patient with eating if necessary   - Allow adequate time for meals  - Recommend/ encourage appropriate diets, oral nutritional supplements, and vitamin/mineral supplements  - Order, calculate, and assess calorie counts as needed  - Recommend, monitor, and adjust tube feedings and TPN/PPN based on assessed needs  - Assess need for intravenous fluids  - Provide specific nutrition/hydration education as appropriate  - Include patient/family/caregiver in decisions related to nutrition  Outcome: Progressing     Problem: Potential for Falls  Goal: Patient will remain free of falls  Description: INTERVENTIONS:  - Assess patient frequently for physical needs  -  Identify cognitive and physical deficits and behaviors that affect risk of falls    -  [place] fall precautions as indicated by assessment   - Educate patient/family on patient safety including physical limitations  - Instruct patient to call for assistance with activity based on assessment  - Modify environment to reduce risk of injury  - Consider OT/PT consult to assist with strengthening/mobility  Outcome: Progressing

## 2021-03-20 NOTE — PROGRESS NOTES
[facility]  Progress Note - Patricia Tapia 1965, 60 y o  male MRN: [id number]  Unit/Bed#: [facility] 2 -01 Encounter: [id number]  Primary Care Provider: Mer Chamberlain MD   Date and time admitted to hospital: 3/13/2021  4:32 PM    * Pneumonia due to COVID-19 virus  Assessment & Plan  60year old male admitted due to acute respiratory failure with hypoxia secondary to COVID-19 pneumonia  Still with high oxygen requirements without any improvement  - Continue 15L NC/ NRB  - Pulmonary assisting in management   - On IV steroids    Acute respiratory failure with hypoxia (HCC)  Assessment & Plan  Secondary to above    Rheumatoid arthritis involving both hands with positive rheumatoid factor (HCC)  Assessment & Plan  Continue Plaquenil    Severe recurrent major depression without psychotic features ([facility] [place] [address] )  Assessment & Plan  Continue Home medications    Gram-positive bacteremia  Assessment & Plan  Possibly contaminant  - Monitor off antibiotics  Serial troponins negative  Results from last 7 days   Lab Units 03/19/21  0444 03/15/21  0504 03/14/21  0457   PROCALCITONIN ng/ml 0 06 0 14 0 17                 VTE Pharmacologic Prophylaxis:   Pharmacologic: Enoxaparin (Lovenox)  Mechanical VTE Prophylaxis in Place: Yes    Patient Centered Rounds: I have performed bedside rounds with nursing staff today  Discussions with Specialists or Other Care Team Provider: Nursing    Education and Discussions with Family / Patient: patient    Time Spent for Care: 30 minutes  More than 50% of total time spent on counseling and coordination of care as described above      Current Length of Stay: 7 day(s)    Current Patient Status: Inpatient   Certification Statement: The patient will continue to require additional inpatient hospital stay due to covid-19 management, iv steroids, acute respiratory failure with hypoxia    Discharge Plan: active    Code Status: Level 1 - Full Code      Subjective: Patient seen and examined at bedside  Still desaturating with exertion  Lack of improvement today  But comfortable while in bed  Objective:     Vitals:   Temp (24hrs), Av 3 °F (36 3 °C), Min:97 °F (36 1 °C), Max:97 6 °F (36 4 °C)    Temp:  [97 °F (36 1 °C)-97 6 °F (36 4 °C)] 97 2 °F (36 2 °C)  HR:  [57-66] 59  Resp:  [19-] 19  BP: ()/(67-86) 106/67  SpO2:  [84 %-95 %] 95 %  There is no height or weight on file to calculate BMI  Input and Output Summary (last 24 hours): Intake/Output Summary (Last 24 hours) at 3/20/2021 1522  Last data filed at 3/19/2021 2343  Gross per 24 hour   Intake --   Output 300 ml   Net -300 ml       Physical Exam:     Physical Exam  Vitals signs reviewed  HENT:      Head: Normocephalic  Nose: Nose normal       Mouth/Throat:      Mouth: Mucous membranes are moist    Eyes:      General: No scleral icterus  Extraocular Movements: Extraocular movements intact  Cardiovascular:      Rate and Rhythm: Normal rate  Pulmonary:      Effort: Pulmonary effort is normal  No respiratory distress  Abdominal:      Palpations: Abdomen is soft  Tenderness: There is no abdominal tenderness  Musculoskeletal: Normal range of motion  Skin:     General: Skin is warm  Neurological:      Mental Status: He is alert  Mental status is at baseline     Psychiatric:         Mood and Affect: Mood normal          Behavior: Behavior normal        Additional Data:     Labs:    Results from last 7 days   Lab Units 21  0516 21  0534   WBC Thousand/uL 13 55* 15 57*   HEMOGLOBIN g/dL 14 5 14 4   HEMATOCRIT % 43 7 43 5   PLATELETS Thousands/uL 358 263   BANDS PCT % 3  --    NEUTROS PCT %  --  88*   LYMPHS PCT %  --  5*   LYMPHO PCT % 5*  --    MONOS PCT %  --  2*   MONO PCT % 4  --    EOS PCT % 0 0     Results from last 7 days   Lab Units 21  0559 21  0535   SODIUM mmol/L 135* 137   POTASSIUM mmol/L 4 4 4 4   CHLORIDE mmol/L 101 101   CO2 mmol/L 25 26 BUN mg/dL 26* 20   CREATININE mg/dL 1 04 0 99   ANION GAP mmol/L 9 10   CALCIUM mg/dL 9 1 9 2   ALBUMIN g/dL  --  2 7*   TOTAL BILIRUBIN mg/dL  --  0 78   ALK PHOS U/L  --  86   ALT U/L  --  78   AST U/L  --  39   GLUCOSE RANDOM mg/dL 114 99                 Results from last 7 days   Lab Units 03/19/21  0444 03/15/21  0504 03/14/21  0457   PROCALCITONIN ng/ml 0 06 0 14 0 17           * I Have Reviewed All Lab Data Listed Above  * Additional Pertinent Lab Tests Reviewed: Eric 64 Admission Reviewed    Imaging:    Imaging Reports Reviewed Today Include: xr chest    Recent Cultures (last 7 days):           Last 24 Hours Medication List:   Current Facility-Administered Medications   Medication Dose Route Frequency Provider Last Rate    ARIPiprazole  10 mg Oral HS Ysabel Olson, MD      atorvastatin  40 mg Oral HS Ysabel Olson MD      bisacodyl  10 mg Rectal Daily PRN Ysabel Olson MD      cholecalciferol  2,000 Units Oral Daily Ysabel Olson MD      dexamethasone  0 1 mg/kg Intravenous Q12H Ysabel Olson MD      DULoxetine  60 mg Oral BID Ysabel Olson MD      enoxaparin  1 mg/kg Subcutaneous Q12H [address] [place]      famotidine  20 mg Oral BID Ysabel Olson MD      hydroxychloroquine  200 mg Oral Daily Ysabel Olson, MD Karan Lindsey [facility] [place] 3/21/2021] multivitamin-minerals  1 tablet Oral Daily Ysabel Olson MD      polyethylene glycol  17 g Oral Daily PRN Ysabel Olson MD      senna-docusate sodium  1 tablet Oral HS Ysabel Olson MD      traZODone  100 mg Oral HS PRN Ysabel Olson MD          Today, Patient Was Seen By: Jameel Vazquez MD    ** Please Note: Dictation voice to text software may have been used in the creation of this document   **

## 2021-03-20 NOTE — ASSESSMENT & PLAN NOTE
Possibly contaminant  - Monitor off antibiotics  Serial troponins negative      Results from last 7 days   Lab Units 03/19/21  0444 03/15/21  0504 03/14/21  0457   PROCALCITONIN ng/ml 0 06 0 14 0 17

## 2021-03-21 NOTE — PLAN OF CARE
Problem: PAIN - ADULT  Goal: Verbalizes/displays adequate comfort level or baseline comfort level  Description: Interventions:  - Encourage patient to monitor pain and request assistance  - Assess pain using appropriate pain scale  - Administer analgesics based on type and severity of pain and evaluate response  - Implement non-pharmacological measures as appropriate and evaluate response  - Consider cultural and social influences on pain and pain management  - Notify physician/advanced practitioner if interventions unsuccessful or patient reports new pain  Outcome: Progressing     Problem: INFECTION - ADULT  Goal: Absence or prevention of progression during hospitalization  Description: INTERVENTIONS:  - Assess and monitor for signs and symptoms of infection  - Monitor lab/diagnostic results  - Monitor all insertion sites, i e  indwelling lines, tubes, and drains  - Monitor endotracheal if appropriate and nasal secretions for changes in amount and color  - [place] appropriate cooling/warming therapies per order  - Administer medications as ordered  - Instruct and encourage patient and family to use good hand hygiene technique  - Identify and instruct in appropriate isolation precautions for identified infection/condition  Outcome: Progressing     Problem: SAFETY ADULT  Goal: Patient will remain free of falls  Description: INTERVENTIONS:  - Assess patient frequently for physical needs  -  Identify cognitive and physical deficits and behaviors that affect risk of falls    -  [place] fall precautions as indicated by assessment   - Educate patient/family on patient safety including physical limitations  - Instruct patient to call for assistance with activity based on assessment  - Modify environment to reduce risk of injury  - Consider OT/PT consult to assist with strengthening/mobility  Outcome: Progressing  Goal: Maintain or return to baseline ADL function  Description: INTERVENTIONS:  -  Assess patient's ability to carry out ADLs; assess patient's baseline for ADL function and identify physical deficits which impact ability to perform ADLs (bathing, care of mouth/teeth, toileting, grooming, dressing, etc )  - Assess/evaluate cause of self-care deficits   - Assess range of motion  - Assess patient's mobility; develop plan if impaired  - Assess patient's need for assistive devices and provide as appropriate  - Encourage maximum independence but intervene and supervise when necessary  - Involve family in performance of ADLs  - Assess for home care needs following discharge   - Consider OT consult to assist with ADL evaluation and planning for discharge  - Provide patient education as appropriate  Outcome: Progressing  Goal: Maintain or return mobility status to optimal level  Description: INTERVENTIONS:  - Assess patient's baseline mobility status (ambulation, transfers, stairs, etc )    - Identify cognitive and physical deficits and behaviors that affect mobility  - Identify mobility aids required to assist with transfers and/or ambulation (gait belt, sit-to-stand, lift, walker, cane, etc )  - [place] fall precautions as indicated by assessment  - Record patient progress and toleration of activity level on Mobility SBAR; progress patient to next Phase/Stage  - Instruct patient to call for assistance with activity based on assessment  - Consider rehabilitation consult to assist with strengthening/weightbearing, etc   Outcome: Progressing     Problem: DISCHARGE PLANNING  Goal: Discharge to home or other facility with appropriate resources  Description: INTERVENTIONS:  - Identify barriers to discharge w/patient and caregiver  - Arrange for needed discharge resources and transportation as appropriate  - Identify discharge learning needs (meds, wound care, etc )  - Arrange for interpretive services to assist at discharge as needed  - Refer to Case Management Department for coordinating discharge planning if the patient needs post-hospital services based on physician/advanced practitioner order or complex needs related to functional status, cognitive ability, or social support system  Outcome: Progressing     Problem: Knowledge Deficit  Goal: Patient/family/caregiver demonstrates understanding of disease process, treatment plan, medications, and discharge instructions  Description: Complete learning assessment and assess knowledge base    Interventions:  - Provide teaching at level of understanding  - Provide teaching via preferred learning methods  Outcome: Progressing     Problem: RESPIRATORY - ADULT  Goal: Achieves optimal ventilation and oxygenation  Description: INTERVENTIONS:  - Assess for changes in respiratory status  - Assess for changes in mentation and behavior  - Position to facilitate oxygenation and minimize respiratory effort  - Oxygen administered by appropriate delivery if ordered  - Initiate smoking cessation education as indicated  - Encourage broncho-pulmonary hygiene including cough, deep breathe, Incentive Spirometry  - Assess the need for suctioning and aspirate as needed  - Assess and instruct to report SOB or any respiratory difficulty  - Respiratory Therapy support as indicated  Outcome: Progressing     Problem: MUSCULOSKELETAL - ADULT  Goal: Maintain or return mobility to safest level of function  Description: INTERVENTIONS:  - Assess patient's ability to carry out ADLs; assess patient's baseline for ADL function and identify physical deficits which impact ability to perform ADLs (bathing, care of mouth/teeth, toileting, grooming, dressing, etc )  - Assess/evaluate cause of self-care deficits   - Assess range of motion  - Assess patient's mobility  - Assess patient's need for assistive devices and provide as appropriate  - Encourage maximum independence but intervene and supervise when necessary  - Involve family in performance of ADLs  - Assess for home care needs following discharge   - Consider OT consult to assist with ADL evaluation and planning for discharge  - Provide patient education as appropriate  Outcome: Progressing  Goal: Maintain proper alignment of affected body part  Description: INTERVENTIONS:  - Support, maintain and protect limb and body alignment  - Provide patient/ family with appropriate education  Outcome: Progressing     Problem: Nutrition/Hydration-ADULT  Goal: Nutrient/Hydration intake appropriate for improving, restoring or maintaining nutritional needs  Description: Monitor and assess patient's nutrition/hydration status for malnutrition  Collaborate with interdisciplinary team and initiate plan and interventions as ordered  Monitor patient's weight and dietary intake as ordered or per policy  Utilize nutrition screening tool and intervene as necessary  Determine patient's food preferences and provide high-protein, high-caloric foods as appropriate  INTERVENTIONS:  - Monitor oral intake, urinary output, labs, and treatment plans  - Assess nutrition and hydration status and recommend course of action  - Evaluate amount of meals eaten  - Assist patient with eating if necessary   - Allow adequate time for meals  - Recommend/ encourage appropriate diets, oral nutritional supplements, and vitamin/mineral supplements  - Order, calculate, and assess calorie counts as needed  - Recommend, monitor, and adjust tube feedings and TPN/PPN based on assessed needs  - Assess need for intravenous fluids  - Provide specific nutrition/hydration education as appropriate  - Include patient/family/caregiver in decisions related to nutrition  Outcome: Progressing     Problem: Potential for Falls  Goal: Patient will remain free of falls  Description: INTERVENTIONS:  - Assess patient frequently for physical needs  -  Identify cognitive and physical deficits and behaviors that affect risk of falls    -  [place] fall precautions as indicated by assessment   - Educate patient/family on patient safety including physical limitations  - Instruct patient to call for assistance with activity based on assessment  - Modify environment to reduce risk of injury  - Consider OT/PT consult to assist with strengthening/mobility  Outcome: Progressing

## 2021-03-21 NOTE — PROGRESS NOTES
Carlos Fernandez  Progress Note - Vida Signs 1965, 60 y o  male MRN: [id number]  Unit/Bed#: [facility] -01 Encounter: [id number]  Primary Care Provider: Haris Hamilton MD   Date and time admitted to hospital: 3/13/2021  4:32 PM    * Pneumonia due to COVID-19 virus  Assessment & Plan  60year old male admitted due to acute respiratory failure with hypoxia secondary to COVID-19 pneumonia  Oxygen requirements went down to 14L this afternoon   - Continue midflow and titrate daily  - Continue IV steroids    Acute respiratory failure with hypoxia (HCC)  Assessment & Plan  Secondary to above    Rheumatoid arthritis involving both hands with positive rheumatoid factor (HCC)  Assessment & Plan  Continue Plaquenil    Severe recurrent major depression without psychotic features [facility])  Assessment & Plan  Continue Home medications    Gram-positive bacteremia  Assessment & Plan  Possibly contaminant  - Monitor off antibiotics  Serial troponins negative  Results from last 7 days   Lab Units 03/20/21  0503 03/19/21  0444 03/15/21  0504   PROCALCITONIN ng/ml 0 07 0 06 0 14                 VTE Pharmacologic Prophylaxis:   Pharmacologic: Enoxaparin (Lovenox)  Mechanical VTE Prophylaxis in Place: Yes    Patient Centered Rounds: I have performed bedside rounds with nursing staff today  Discussions with Specialists or Other Care Team Provider: Nursing    Education and Discussions with Family / Patient: patient    Time Spent for Care: 30 minutes  More than 50% of total time spent on counseling and coordination of care as described above      Current Length of Stay: 8 day(s)    Current Patient Status: Inpatient   Certification Statement: The patient will continue to require additional inpatient hospital stay due to covid-19 management, mid flow for respiratory failure, iv steroids    Discharge Plan: active    Code Status: Level 1 - Full Code      Subjective:   Patient seen and examined at bedside  He is frustrated that he continues to remain inpatient as a result of his COVID-19  I spoke to his son over the telephone and explained to him the need for his continued admission in the hospital  He was requesting if he could shower, but unfortunately given his midflow this would not reach the bathroom  He will be given a basin for washing instead  Objective:     Vitals:   Temp (24hrs), Av 8 °F (36 6 °C), Min:97 7 °F (36 5 °C), Max:97 9 °F (36 6 °C)    Temp:  [97 7 °F (36 5 °C)-97 9 °F (36 6 °C)] 97 8 °F (36 6 °C)  HR:  [56-76] 62  Resp:  [19-24] 19  BP: (112-125)/(62-86) 124/78  SpO2:  [87 %-93 %] 93 %  There is no height or weight on file to calculate BMI  Input and Output Summary (last 24 hours): Intake/Output Summary (Last 24 hours) at 3/21/2021 1616  Last data filed at 3/21/2021 0543  Gross per 24 hour   Intake --   Output 500 ml   Net -500 ml       Physical Exam:     Physical Exam  Vitals signs reviewed  Constitutional:       Appearance: He is not ill-appearing  HENT:      Head: Normocephalic  Nose: Nose normal    Eyes:      General: No scleral icterus  Extraocular Movements: Extraocular movements intact  Cardiovascular:      Rate and Rhythm: Normal rate  Pulmonary:      Effort: Pulmonary effort is normal  No respiratory distress  Abdominal:      Palpations: Abdomen is soft  Tenderness: There is no abdominal tenderness  Musculoskeletal: Normal range of motion  Skin:     General: Skin is warm  Neurological:      Mental Status: He is alert  Mental status is at baseline     Psychiatric:         Mood and Affect: Mood normal          Behavior: Behavior normal        Additional Data:     Labs:    Results from last 7 days   Lab Units 21  0516 21  0534   WBC Thousand/uL 13 55* 15 57*   HEMOGLOBIN g/dL 14 5 14 4   HEMATOCRIT % 43 7 43 5   PLATELETS Thousands/uL 358 263   BANDS PCT % 3  --    NEUTROS PCT %  --  88*   LYMPHS PCT %  --  5*   LYMPHO PCT % 5*  --    MONOS PCT %  --  2*   MONO PCT % 4  --    EOS PCT % 0 0     Results from last 7 days   Lab Units 03/19/21  0559 03/17/21  0535   SODIUM mmol/L 135* 137   POTASSIUM mmol/L 4 4 4 4   CHLORIDE mmol/L 101 101   CO2 mmol/L 25 26   BUN mg/dL 26* 20   CREATININE mg/dL 1 04 0 99   ANION GAP mmol/L 9 10   CALCIUM mg/dL 9 1 9 2   ALBUMIN g/dL  --  2 7*   TOTAL BILIRUBIN mg/dL  --  0 78   ALK PHOS U/L  --  86   ALT U/L  --  78   AST U/L  --  39   GLUCOSE RANDOM mg/dL 114 99                 Results from last 7 days   Lab Units 03/20/21  0503 03/19/21  0444 03/15/21  0504   PROCALCITONIN ng/ml 0 07 0 06 0 14           * I Have Reviewed All Lab Data Listed Above  * Additional Pertinent Lab Tests Reviewed[facility]    Imaging:    Imaging Reports Reviewed Today Include: No new imaging    Recent Cultures (last 7 days):           Last 24 Hours Medication List:   Current Facility-Administered Medications   Medication Dose Route Frequency Provider Last Rate    ARIPiprazole  10 mg Oral HS Jona Cai MD      atorvastatin  40 mg Oral HS Jona Cai MD      bisacodyl  10 mg Rectal Daily PRN Jona Cai MD      cholecalciferol  2,000 Units Oral Daily Jona Cai MD      dexamethasone  0 1 mg/kg Intravenous Q12H Jona Cai MD      DULoxetine  60 mg Oral BID Jona Cai MD      enoxaparin  1 mg/kg Subcutaneous Q12H [facility] KIRK Flores      famotidine  20 mg Oral BID Jona Cai MD      hydroxychloroquine  200 mg Oral Daily Jona Cai MD      multivitamin-minerals  1 tablet Oral Daily Jona Cai MD      polyethylene glycol  17 g Oral Daily PRN Jona Cai MD      senna-docusate sodium  1 tablet Oral HS Jona Cai MD      traZODone  100 mg Oral HS PRN Jona Cai MD          Today, Patient Was Seen By: Anne Abbott MD    ** Please Note: Dictation voice to text software may have been used in the creation of this document   **

## 2021-03-21 NOTE — ASSESSMENT & PLAN NOTE
Possibly contaminant  - Monitor off antibiotics  Serial troponins negative      Results from last 7 days   Lab Units 03/20/21  0503 03/19/21  0444 03/15/21  0504   PROCALCITONIN ng/ml 0 07 0 06 0 14

## 2021-03-21 NOTE — ASSESSMENT & PLAN NOTE
60year old male admitted due to acute respiratory failure with hypoxia secondary to COVID-19 pneumonia   Oxygen requirements went down to 14L this afternoon   - Continue midflow and titrate daily  - Continue IV steroids

## 2021-03-21 NOTE — PLAN OF CARE
Problem: PAIN - ADULT  Goal: Verbalizes/displays adequate comfort level or baseline comfort level  Description: Interventions:  - Encourage patient to monitor pain and request assistance  - Assess pain using appropriate pain scale  - Administer analgesics based on type and severity of pain and evaluate response  - Implement non-pharmacological measures as appropriate and evaluate response  - Consider cultural and social influences on pain and pain management  - Notify physician/advanced practitioner if interventions unsuccessful or patient reports new pain  Outcome: Progressing     Problem: INFECTION - ADULT  Goal: Absence or prevention of progression during hospitalization  Description: INTERVENTIONS:  - Assess and monitor for signs and symptoms of infection  - Monitor lab/diagnostic results  - Monitor all insertion sites, i e  indwelling lines, tubes, and drains  - Monitor endotracheal if appropriate and nasal secretions for changes in amount and color  - [place] appropriate cooling/warming therapies per order  - Administer medications as ordered  - Instruct and encourage patient and family to use good hand hygiene technique  - Identify and instruct in appropriate isolation precautions for identified infection/condition  Outcome: Progressing     Problem: SAFETY ADULT  Goal: Patient will remain free of falls  Description: INTERVENTIONS:  - Assess patient frequently for physical needs  -  Identify cognitive and physical deficits and behaviors that affect risk of falls    -  [place] fall precautions as indicated by assessment   - Educate patient/family on patient safety including physical limitations  - Instruct patient to call for assistance with activity based on assessment  - Modify environment to reduce risk of injury  - Consider OT/PT consult to assist with strengthening/mobility  Outcome: Progressing  Goal: Maintain or return to baseline ADL function  Description: INTERVENTIONS:  -  Assess patient's ability to carry out ADLs; assess patient's baseline for ADL function and identify physical deficits which impact ability to perform ADLs (bathing, care of mouth/teeth, toileting, grooming, dressing, etc )  - Assess/evaluate cause of self-care deficits   - Assess range of motion  - Assess patient's mobility; develop plan if impaired  - Assess patient's need for assistive devices and provide as appropriate  - Encourage maximum independence but intervene and supervise when necessary  - Involve family in performance of ADLs  - Assess for home care needs following discharge   - Consider OT consult to assist with ADL evaluation and planning for discharge  - Provide patient education as appropriate  Outcome: Progressing  Goal: Maintain or return mobility status to optimal level  Description: INTERVENTIONS:  - Assess patient's baseline mobility status (ambulation, transfers, stairs, etc )    - Identify cognitive and physical deficits and behaviors that affect mobility  - Identify mobility aids required to assist with transfers and/or ambulation (gait belt, sit-to-stand, lift, walker, cane, etc )  - [place] fall precautions as indicated by assessment  - Record patient progress and toleration of activity level on Mobility SBAR; progress patient to next Phase/Stage  - Instruct patient to call for assistance with activity based on assessment  - Consider rehabilitation consult to assist with strengthening/weightbearing, etc   Outcome: Progressing     Problem: DISCHARGE PLANNING  Goal: Discharge to home or other facility with appropriate resources  Description: INTERVENTIONS:  - Identify barriers to discharge w/patient and caregiver  - Arrange for needed discharge resources and transportation as appropriate  - Identify discharge learning needs (meds, wound care, etc )  - Arrange for interpretive services to assist at discharge as needed  - Refer to Case Management Department for coordinating discharge planning if the patient needs post-hospital services based on physician/advanced practitioner order or complex needs related to functional status, cognitive ability, or social support system  Outcome: Progressing     Problem: Knowledge Deficit  Goal: Patient/family/caregiver demonstrates understanding of disease process, treatment plan, medications, and discharge instructions  Description: Complete learning assessment and assess knowledge base    Interventions:  - Provide teaching at level of understanding  - Provide teaching via preferred learning methods  Outcome: Progressing     Problem: RESPIRATORY - ADULT  Goal: Achieves optimal ventilation and oxygenation  Description: INTERVENTIONS:  - Assess for changes in respiratory status  - Assess for changes in mentation and behavior  - Position to facilitate oxygenation and minimize respiratory effort  - Oxygen administered by appropriate delivery if ordered  - Initiate smoking cessation education as indicated  - Encourage broncho-pulmonary hygiene including cough, deep breathe, Incentive Spirometry  - Assess the need for suctioning and aspirate as needed  - Assess and instruct to report SOB or any respiratory difficulty  - Respiratory Therapy support as indicated  Outcome: Progressing     Problem: MUSCULOSKELETAL - ADULT  Goal: Maintain or return mobility to safest level of function  Description: INTERVENTIONS:  - Assess patient's ability to carry out ADLs; assess patient's baseline for ADL function and identify physical deficits which impact ability to perform ADLs (bathing, care of mouth/teeth, toileting, grooming, dressing, etc )  - Assess/evaluate cause of self-care deficits   - Assess range of motion  - Assess patient's mobility  - Assess patient's need for assistive devices and provide as appropriate  - Encourage maximum independence but intervene and supervise when necessary  - Involve family in performance of ADLs  - Assess for home care needs following discharge   - Consider OT consult to assist with ADL evaluation and planning for discharge  - Provide patient education as appropriate  Outcome: Progressing  Goal: Maintain proper alignment of affected body part  Description: INTERVENTIONS:  - Support, maintain and protect limb and body alignment  - Provide patient/ family with appropriate education  Outcome: Progressing     Problem: Nutrition/Hydration-ADULT  Goal: Nutrient/Hydration intake appropriate for improving, restoring or maintaining nutritional needs  Description: Monitor and assess patient's nutrition/hydration status for malnutrition  Collaborate with interdisciplinary team and initiate plan and interventions as ordered  Monitor patient's weight and dietary intake as ordered or per policy  Utilize nutrition screening tool and intervene as necessary  Determine patient's food preferences and provide high-protein, high-caloric foods as appropriate  INTERVENTIONS:  - Monitor oral intake, urinary output, labs, and treatment plans  - Assess nutrition and hydration status and recommend course of action  - Evaluate amount of meals eaten  - Assist patient with eating if necessary   - Allow adequate time for meals  - Recommend/ encourage appropriate diets, oral nutritional supplements, and vitamin/mineral supplements  - Order, calculate, and assess calorie counts as needed  - Recommend, monitor, and adjust tube feedings and TPN/PPN based on assessed needs  - Assess need for intravenous fluids  - Provide specific nutrition/hydration education as appropriate  - Include patient/family/caregiver in decisions related to nutrition  Outcome: Progressing     Problem: Potential for Falls  Goal: Patient will remain free of falls  Description: INTERVENTIONS:  - Assess patient frequently for physical needs  -  Identify cognitive and physical deficits and behaviors that affect risk of falls    -  [place] fall precautions as indicated by assessment   - Educate patient/family on patient safety including physical limitations  - Instruct patient to call for assistance with activity based on assessment  - Modify environment to reduce risk of injury  - Consider OT/PT consult to assist with strengthening/mobility  Outcome: Progressing

## 2021-03-22 NOTE — ASSESSMENT & PLAN NOTE
60year old male admitted due to acute respiratory failure with hypoxia secondary to COVID-19 pneumonia   Still at 14L   - Continue midflow and titrate daily  - Continue IV steroids

## 2021-03-22 NOTE — PLAN OF CARE
Problem: PAIN - ADULT  Goal: Verbalizes/displays adequate comfort level or baseline comfort level  Description: Interventions:  - Encourage patient to monitor pain and request assistance  - Assess pain using appropriate pain scale  - Administer analgesics based on type and severity of pain and evaluate response  - Implement non-pharmacological measures as appropriate and evaluate response  - Consider cultural and social influences on pain and pain management  - Notify physician/advanced practitioner if interventions unsuccessful or patient reports new pain  Outcome: Progressing     Problem: INFECTION - ADULT  Goal: Absence or prevention of progression during hospitalization  Description: INTERVENTIONS:  - Assess and monitor for signs and symptoms of infection  - Monitor lab/diagnostic results  - Monitor all insertion sites, i e  indwelling lines, tubes, and drains  - Monitor endotracheal if appropriate and nasal secretions for changes in amount and color  - [place] appropriate cooling/warming therapies per order  - Administer medications as ordered  - Instruct and encourage patient and family to use good hand hygiene technique  - Identify and instruct in appropriate isolation precautions for identified infection/condition  Outcome: Progressing     Problem: SAFETY ADULT  Goal: Patient will remain free of falls  Description: INTERVENTIONS:  - Assess patient frequently for physical needs  -  Identify cognitive and physical deficits and behaviors that affect risk of falls    -  [place] fall precautions as indicated by assessment   - Educate patient/family on patient safety including physical limitations  - Instruct patient to call for assistance with activity based on assessment  - Modify environment to reduce risk of injury  - Consider OT/PT consult to assist with strengthening/mobility  Outcome: Progressing  Goal: Maintain or return to baseline ADL function  Description: INTERVENTIONS:  -  Assess patient's ability to carry out ADLs; assess patient's baseline for ADL function and identify physical deficits which impact ability to perform ADLs (bathing, care of mouth/teeth, toileting, grooming, dressing, etc )  - Assess/evaluate cause of self-care deficits   - Assess range of motion  - Assess patient's mobility; develop plan if impaired  - Assess patient's need for assistive devices and provide as appropriate  - Encourage maximum independence but intervene and supervise when necessary  - Involve family in performance of ADLs  - Assess for home care needs following discharge   - Consider OT consult to assist with ADL evaluation and planning for discharge  - Provide patient education as appropriate  Outcome: Progressing  Goal: Maintain or return mobility status to optimal level  Description: INTERVENTIONS:  - Assess patient's baseline mobility status (ambulation, transfers, stairs, etc )    - Identify cognitive and physical deficits and behaviors that affect mobility  - Identify mobility aids required to assist with transfers and/or ambulation (gait belt, sit-to-stand, lift, walker, cane, etc )  - [place] fall precautions as indicated by assessment  - Record patient progress and toleration of activity level on Mobility SBAR; progress patient to next Phase/Stage  - Instruct patient to call for assistance with activity based on assessment  - Consider rehabilitation consult to assist with strengthening/weightbearing, etc   Outcome: Progressing     Problem: DISCHARGE PLANNING  Goal: Discharge to home or other facility with appropriate resources  Description: INTERVENTIONS:  - Identify barriers to discharge w/patient and caregiver  - Arrange for needed discharge resources and transportation as appropriate  - Identify discharge learning needs (meds, wound care, etc )  - Arrange for interpretive services to assist at discharge as needed  - Refer to Case Management Department for coordinating discharge planning if the patient needs post-hospital services based on physician/advanced practitioner order or complex needs related to functional status, cognitive ability, or social support system  Outcome: Progressing     Problem: Knowledge Deficit  Goal: Patient/family/caregiver demonstrates understanding of disease process, treatment plan, medications, and discharge instructions  Description: Complete learning assessment and assess knowledge base    Interventions:  - Provide teaching at level of understanding  - Provide teaching via preferred learning methods  Outcome: Progressing     Problem: RESPIRATORY - ADULT  Goal: Achieves optimal ventilation and oxygenation  Description: INTERVENTIONS:  - Assess for changes in respiratory status  - Assess for changes in mentation and behavior  - Position to facilitate oxygenation and minimize respiratory effort  - Oxygen administered by appropriate delivery if ordered  - Initiate smoking cessation education as indicated  - Encourage broncho-pulmonary hygiene including cough, deep breathe, Incentive Spirometry  - Assess the need for suctioning and aspirate as needed  - Assess and instruct to report SOB or any respiratory difficulty  - Respiratory Therapy support as indicated  Outcome: Progressing     Problem: MUSCULOSKELETAL - ADULT  Goal: Maintain or return mobility to safest level of function  Description: INTERVENTIONS:  - Assess patient's ability to carry out ADLs; assess patient's baseline for ADL function and identify physical deficits which impact ability to perform ADLs (bathing, care of mouth/teeth, toileting, grooming, dressing, etc )  - Assess/evaluate cause of self-care deficits   - Assess range of motion  - Assess patient's mobility  - Assess patient's need for assistive devices and provide as appropriate  - Encourage maximum independence but intervene and supervise when necessary  - Involve family in performance of ADLs  - Assess for home care needs following discharge   - Consider OT consult to assist with ADL evaluation and planning for discharge  - Provide patient education as appropriate  Outcome: Progressing  Goal: Maintain proper alignment of affected body part  Description: INTERVENTIONS:  - Support, maintain and protect limb and body alignment  - Provide patient/ family with appropriate education  Outcome: Progressing     Problem: Nutrition/Hydration-ADULT  Goal: Nutrient/Hydration intake appropriate for improving, restoring or maintaining nutritional needs  Description: Monitor and assess patient's nutrition/hydration status for malnutrition  Collaborate with interdisciplinary team and initiate plan and interventions as ordered  Monitor patient's weight and dietary intake as ordered or per policy  Utilize nutrition screening tool and intervene as necessary  Determine patient's food preferences and provide high-protein, high-caloric foods as appropriate  INTERVENTIONS:  - Monitor oral intake, urinary output, labs, and treatment plans  - Assess nutrition and hydration status and recommend course of action  - Evaluate amount of meals eaten  - Assist patient with eating if necessary   - Allow adequate time for meals  - Recommend/ encourage appropriate diets, oral nutritional supplements, and vitamin/mineral supplements  - Order, calculate, and assess calorie counts as needed  - Recommend, monitor, and adjust tube feedings and TPN/PPN based on assessed needs  - Assess need for intravenous fluids  - Provide specific nutrition/hydration education as appropriate  - Include patient/family/caregiver in decisions related to nutrition  Outcome: Progressing     Problem: Potential for Falls  Goal: Patient will remain free of falls  Description: INTERVENTIONS:  - Assess patient frequently for physical needs  -  Identify cognitive and physical deficits and behaviors that affect risk of falls    -  [place] fall precautions as indicated by assessment   - Educate patient/family on patient safety including physical limitations  - Instruct patient to call for assistance with activity based on assessment  - Modify environment to reduce risk of injury  - Consider OT/PT consult to assist with strengthening/mobility  Outcome: Progressing

## 2021-03-22 NOTE — PLAN OF CARE
Problem: PHYSICAL THERAPY ADULT  Goal: Performs mobility at highest level of function for planned discharge setting  See evaluation for individualized goals  Description: Treatment/Interventions: Functional transfer training, LE strengthening/ROM, Elevations, Therapeutic exercise, Endurance training, Patient/family training, Equipment eval/education, Bed mobility, Gait training, Continued evaluation, Spoke to nursing, OT          See flowsheet documentation for full assessment, interventions and recommendations  Outcome: Progressing  Note: Prognosis: Fair  Problem List: Decreased strength, Decreased range of motion, Decreased endurance, Impaired balance, Decreased mobility  Assessment: Pt  supine in bed upon my arrival  Pt  reporting fatigue, however agreeable to therapeutic intervention  Performance of HEP supine in bed with cues provided for proper completion  Noted O2 saturation at rest between 94-97%  With performance of HEP drop to 90-92% requiring additional resting period for return  Progressed with transfers being able to complete with standbyA of therapist with cues for hand placement/technique  Progressed with a limited amb  trial with standbyA of therapist  Pt  returned to seated at EOB  Noted with increased time, O2 saturation drop to 86% after completion of amb  trial  Pt  returned to supine in bed  O2 saturation return to 91% after additional resting period supine  Pt  remained supine in bed at end of treatment session  PT will continue to recommend d/c home with increased family support when medically stable  Barriers to Discharge: Other (Comment)  Barriers to Discharge Comments: medical status  PT Discharge Recommendation: Return to previous environment with social support     PT - OK to Discharge: Yes(if d/c when medically stable )    See flowsheet documentation for full assessment

## 2021-03-22 NOTE — PHYSICAL THERAPY NOTE
Physical Therapy Progress Note     03/22/21 1133   PT Last Visit   PT Visit Date 03/22/21   Note Type   Note Type Treatment   Pain Assessment   Pain Assessment Tool Pain Assessment not indicated - pt denies pain   Pain Score No Pain   Restrictions/Precautions   Weight Bearing Precautions Per Order No   Other Precautions Multiple lines;O2;Fall Risk;Telemetry;Contact/isolation; Airborne/isolation   General   Chart Reviewed Yes   Response to Previous Treatment Patient reporting fatigue but able to participate  Family/Caregiver Present No   Subjective   Subjective Willing to participate in therapy this AM    Bed Mobility   Supine to Sit 5  Supervision   Additional items Assist x 1;HOB elevated; Bedrails;Leg ; Increased time required;Verbal cues;LE management   Sit to Supine 5  Supervision   Additional items Assist x 1;Bedrails;Leg ; Increased time required;Verbal cues;LE management   Transfers   Sit to Stand 5  Supervision   Additional items Assist x 1;Bedrails; Increased time required;Verbal cues   Stand to Sit 5  Supervision   Additional items Assist x 1;Bedrails; Increased time required;Verbal cues   Ambulation/Elevation   Gait pattern Decreased foot clearance;WNL; Excessively slow   Gait Assistance 5  Supervision   Additional items Assist x 1;Verbal cues; Tactile cues   Assistive Device None   Distance 30'   Balance   Static Sitting Normal   Dynamic Sitting Good   Static Standing Good   Dynamic Standing Fair   Ambulatory Fair   Endurance Deficit   Endurance Deficit Yes   Endurance Deficit Description AMARO   Activity Tolerance   Activity Tolerance Treatment limited secondary to medical complications (Comment); Patient tolerated treatment well  (AMARO)   Nurse Made Aware Yes   Exercises   THR Supine;Sitting;10 reps;AAROM; Bilateral   Assessment   Prognosis Fair   Problem List Decreased strength;Decreased range of motion;Decreased endurance; Impaired balance;Decreased mobility   Assessment Pt  supine in bed upon my arrival  Pt  reporting fatigue, however agreeable to therapeutic intervention  Performance of HEP supine in bed with cues provided for proper completion  Noted O2 saturation at rest between 94-97%  With performance of HEP drop to 90-92% requiring additional resting period for return  Progressed with transfers being able to complete with standbyA of therapist with cues for hand placement/technique  Progressed with a limited amb  trial with standbyA of therapist  Pt  returned to seated at EOB  Noted with increased time, O2 saturation drop to 86% after completion of amb  trial  Pt  returned to supine in bed  O2 saturation return to 91% after additional resting period supine  Pt  remained supine in bed at end of treatment session  PT will continue to recommend d/c home with increased family support when medically stable  Barriers to Discharge Other (Comment)   Barriers to Discharge Comments medical status   Goals   Patient Goals To feel better  STG Expiration Date 03/29/21   PT Treatment Day 3   Plan   Treatment/Interventions Functional transfer training;LE strengthening/ROM; Therapeutic exercise; Endurance training;Bed mobility;Gait training;Spoke to nursing;Spoke to case management   Progress Slow progress, medical status limitations   PT Frequency 2-3x/wk   Recommendation   PT Discharge Recommendation Return to previous environment with social support   Equipment Recommended Other (Comment)  (none at present continue to assess)   PT - OK to Discharge Yes  (if d/c when medically stable  )   AM-PAC Basic Mobility Inpatient   Turning in Bed Without Bedrails 4   Lying on Back to Sitting on Edge of Flat Bed 4   Moving Bed to Chair 4   Standing Up From Chair 4   Walk in Room 4   Climb 3-5 Stairs 3   Basic Mobility Inpatient Raw Score 23   Basic Mobility Standardized Score 50 88     Reginaldo Hampton, PTA

## 2021-03-22 NOTE — PROGRESS NOTES
[facility]  Progress Note - Mark Parry 1965, 60 y o  male MRN: [id number]  Unit/Bed#: [place] [facility] 2 -01 Encounter: [id number]  Primary Care Provider: Lina Razo MD   Date and time admitted to hospital: 3/13/2021  4:32 PM    * Pneumonia due to COVID-19 virus  Assessment & Plan  60year old male admitted due to acute respiratory failure with hypoxia secondary to COVID-19 pneumonia  Still at 14L   - Continue midflow and titrate daily  - Continue IV steroids    Acute respiratory failure with hypoxia (HCC)  Assessment & Plan  Secondary to above    Rheumatoid arthritis involving both hands with positive rheumatoid factor (HCC)  Assessment & Plan  Continue Plaquenil    Severe recurrent major depression without psychotic features [facility])  Assessment & Plan  Continue Home medications    Gram-positive bacteremia  Assessment & Plan  Possibly contaminant  - Monitor off antibiotics  Serial troponins negative  Results from last 7 days   Lab Units 03/20/21  0503 03/19/21  0444   PROCALCITONIN ng/ml 0 07 0 06                 VTE Pharmacologic Prophylaxis:   Pharmacologic: Enoxaparin (Lovenox)  Mechanical VTE Prophylaxis in Place: Yes    Patient Centered Rounds: I have performed bedside rounds with nursing staff today  Discussions with Specialists or Other Care Team Provider: nursing, pulmonary    Education and Discussions with Family / Patient: patient, es hernandez    Time Spent for Care: 30 minutes  More than 50% of total time spent on counseling and coordination of care as described above  Current Length of Stay: 9 day(s)    Current Patient Status: Inpatient   Certification Statement: The patient will continue to require additional inpatient hospital stay due to iv steroids, pulmonary reevaluation, covid-19 management    Discharge Plan: active    Code Status: Level 1 - Full Code      Subjective:   Patient seen and examined at bedside   Comfortable, but still with high o2 requirements  I updated his son Olga Retana over the telephone to let him know that there is some mild improvement today  Objective:     Vitals:   Temp (24hrs), Av 4 °F (36 3 °C), Min:97 °F (36 1 °C), Max:97 7 °F (36 5 °C)    Temp:  [97 °F (36 1 °C)-97 7 °F (36 5 °C)] 97 6 °F (36 4 °C)  HR:  [58-74] 74  Resp:  [18-24] 22  BP: (117-124)/(77-80) 119/79  SpO2:  [90 %-97 %] 93 %  There is no height or weight on file to calculate BMI  Input and Output Summary (last 24 hours): Intake/Output Summary (Last 24 hours) at 3/22/2021 1620  Last data filed at 3/22/2021 1104  Gross per 24 hour   Intake --   Output 700 ml   Net -700 ml       Physical Exam:     Physical Exam  Vitals signs reviewed  HENT:      Head: Normocephalic  Nose: Nose normal       Mouth/Throat:      Mouth: Mucous membranes are moist    Eyes:      General: No scleral icterus  Extraocular Movements: Extraocular movements intact  Cardiovascular:      Rate and Rhythm: Normal rate  Pulmonary:      Effort: Pulmonary effort is normal  No respiratory distress  Abdominal:      Palpations: Abdomen is soft  Tenderness: There is no abdominal tenderness  Skin:     General: Skin is warm  Neurological:      Mental Status: He is alert  Mental status is at baseline     Psychiatric:         Mood and Affect: Mood normal          Behavior: Behavior normal        Additional Data:     Labs:    Results from last 7 days   Lab Units 21  0516 21  0534   WBC Thousand/uL 13 55* 15 57*   HEMOGLOBIN g/dL 14 5 14 4   HEMATOCRIT % 43 7 43 5   PLATELETS Thousands/uL 358 263   BANDS PCT % 3  --    NEUTROS PCT %  --  88*   LYMPHS PCT %  --  5*   LYMPHO PCT % 5*  --    MONOS PCT %  --  2*   MONO PCT % 4  --    EOS PCT % 0 0     Results from last 7 days   Lab Units 21  0559 21  0535   SODIUM mmol/L 135* 137   POTASSIUM mmol/L 4 4 4 4   CHLORIDE mmol/L 101 101   CO2 mmol/L 25 26   BUN mg/dL 26* 20   CREATININE mg/dL 1 04 0 99   ANION GAP mmol/L 9 10   CALCIUM mg/dL 9 1 9 2   ALBUMIN g/dL  --  2 7*   TOTAL BILIRUBIN mg/dL  --  0 78   ALK PHOS U/L  --  86   ALT U/L  --  78   AST U/L  --  39   GLUCOSE RANDOM mg/dL 114 99                 Results from last 7 days   Lab Units 03/20/21  0503 03/19/21  0444   PROCALCITONIN ng/ml 0 07 0 06           * I Have Reviewed All Lab Data Listed Above  * Additional Pertinent Lab Tests Reviewed: Eric 64 Admission Reviewed    Imaging:    Imaging Reports Reviewed Today Include: No new imaging    Recent Cultures (last 7 days):           Last 24 Hours Medication List:   Current Facility-Administered Medications   Medication Dose Route Frequency Provider Last Rate    albuterol  2 puff Inhalation 4x Daily Jaylin [place] PA-C      ARIPiprazole  10 mg Oral HS Cinda Dennis MD      atorvastatin  40 mg Oral HS Cinda Dennis MD      bisacodyl  10 mg Rectal Daily PRN Cinda Dennis MD      cholecalciferol  2,000 Units Oral Daily Cinda Dennis MD      dexamethasone  0 1 mg/kg Intravenous Q12H Cinda Dennis MD      DULoxetine  60 mg Oral BID Cinda Dennis MD      enoxaparin  1 mg/kg Subcutaneous Q12H [address] [place] [place], KIRK      famotidine  20 mg Oral BID Cinda Dennis MD      hydroxychloroquine  200 mg Oral Daily Cinda Dennis MD      multivitamin-minerals  1 tablet Oral Daily Cinda Dennis MD      polyethylene glycol  17 g Oral Daily PRN Cinda Dennis MD      senna-docusate sodium  1 tablet Oral HS Cinda Dennis MD      traZODone  100 mg Oral HS PRN Cinda Dennis MD          Today, Patient Was Seen By: Carlos Garrison MD    ** Please Note: Dictation voice to text software may have been used in the creation of this document   **

## 2021-03-22 NOTE — PROGRESS NOTES
Progress Note - Pulmonary   Diane Matson 60 y o  male MRN: [id number]  Unit/Bed#: [place] [address] -01 Encounter: [id number]    Assessment:  Acute hypoxic respiratory failure   COVID-19 pneumonia  Rheumatoid arthritis    Plan:  Patient's oxygen requirements are slowly improving currently on 14 L and NRB saturating in the mid 90s  Continue to monitor SpO2 and titrate to maintain saturations greater than 89%  Will likely need Oxymizer when discharged  Treatment per pathway  Dexamethasone  Day 10/10  Received Actemra  Anticoagulation: per protocol  Encourage self-proning, activity as tolerated, incentive spirometry  Ongoing monitoring of inflammatory markers and D-dimer  Will add albuterol inhaler 4 x daily as some wheezing is noted on exam  Continue Plaquenil    Discussed with PT    Subjective:   Patient says his breathing is better than yesterday  He says he doesn't have much cough  Denies pain  No new complaints  Objective:     Vitals: Blood pressure 124/80, pulse 58, temperature (!) 97 2 °F (36 2 °C), resp  rate 20, SpO2 97 %  ,There is no height or weight on file to calculate BMI  Intake/Output Summary (Last 24 hours) at 3/22/2021 1115  Last data filed at 3/22/2021 0854  Gross per 24 hour   Intake --   Output 1050 ml   Net -1050 ml       Invasive Devices     Peripheral Intravenous Line            Peripheral IV 03/21/21 Left Antecubital 1 day                Physical Exam:   General appearance: alert and oriented, in no acute distress  Head: Normocephalic, without obvious abnormality, atraumatic  Eyes: EOMI  No discharge bilaterally  No scleral icterus  Neck: supple, symmetrical, trachea midline  Lungs: Decreased breath sounds with faint expiratory wheezing  Heart: regular rate and rhythm, S1, S2 normal, no murmur, click, rub or gallop  Abdomen:  No appreciable distension or tenderness  Extremities: No edema or tenderness  Skin: No lesions or pallor noted  No rash     Neurologic: Grossly normal Labs: I have personally reviewed pertinent lab results  Imaging and other studies: I have personally reviewed pertinent reports

## 2021-03-22 NOTE — ASSESSMENT & PLAN NOTE
Possibly contaminant  - Monitor off antibiotics  Serial troponins negative      Results from last 7 days   Lab Units 03/20/21  0503 03/19/21  0444   PROCALCITONIN ng/ml 0 07 0 06

## 2021-03-23 NOTE — PLAN OF CARE
Problem: PAIN - ADULT  Goal: Verbalizes/displays adequate comfort level or baseline comfort level  Description: Interventions:  - Encourage patient to monitor pain and request assistance  - Assess pain using appropriate pain scale  - Administer analgesics based on type and severity of pain and evaluate response  - Implement non-pharmacological measures as appropriate and evaluate response  - Consider cultural and social influences on pain and pain management  - Notify physician/advanced practitioner if interventions unsuccessful or patient reports new pain  Outcome: Progressing     Problem: INFECTION - ADULT  Goal: Absence or prevention of progression during hospitalization  Description: INTERVENTIONS:  - Assess and monitor for signs and symptoms of infection  - Monitor lab/diagnostic results  - Monitor all insertion sites, i e  indwelling lines, tubes, and drains  - Monitor endotracheal if appropriate and nasal secretions for changes in amount and color  - [place] appropriate cooling/warming therapies per order  - Administer medications as ordered  - Instruct and encourage patient and family to use good hand hygiene technique  - Identify and instruct in appropriate isolation precautions for identified infection/condition  Outcome: Progressing     Problem: SAFETY ADULT  Goal: Patient will remain free of falls  Description: INTERVENTIONS:  - Assess patient frequently for physical needs  -  Identify cognitive and physical deficits and behaviors that affect risk of falls    -  [place] fall precautions as indicated by assessment   - Educate patient/family on patient safety including physical limitations  - Instruct patient to call for assistance with activity based on assessment  - Modify environment to reduce risk of injury  - Consider OT/PT consult to assist with strengthening/mobility  Outcome: Progressing  Goal: Maintain or return to baseline ADL function  Description: INTERVENTIONS:  -  Assess patient's ability to carry out ADLs; assess patient's baseline for ADL function and identify physical deficits which impact ability to perform ADLs (bathing, care of mouth/teeth, toileting, grooming, dressing, etc )  - Assess/evaluate cause of self-care deficits   - Assess range of motion  - Assess patient's mobility; develop plan if impaired  - Assess patient's need for assistive devices and provide as appropriate  - Encourage maximum independence but intervene and supervise when necessary  - Involve family in performance of ADLs  - Assess for home care needs following discharge   - Consider OT consult to assist with ADL evaluation and planning for discharge  - Provide patient education as appropriate  Outcome: Progressing  Goal: Maintain or return mobility status to optimal level  Description: INTERVENTIONS:  - Assess patient's baseline mobility status (ambulation, transfers, stairs, etc )    - Identify cognitive and physical deficits and behaviors that affect mobility  - Identify mobility aids required to assist with transfers and/or ambulation (gait belt, sit-to-stand, lift, walker, cane, etc )  - [place] fall precautions as indicated by assessment  - Record patient progress and toleration of activity level on Mobility SBAR; progress patient to next Phase/Stage  - Instruct patient to call for assistance with activity based on assessment  - Consider rehabilitation consult to assist with strengthening/weightbearing, etc   Outcome: Progressing     Problem: DISCHARGE PLANNING  Goal: Discharge to home or other facility with appropriate resources  Description: INTERVENTIONS:  - Identify barriers to discharge w/patient and caregiver  - Arrange for needed discharge resources and transportation as appropriate  - Identify discharge learning needs (meds, wound care, etc )  - Arrange for interpretive services to assist at discharge as needed  - Refer to Case Management Department for coordinating discharge planning if the patient needs post-hospital services based on physician/advanced practitioner order or complex needs related to functional status, cognitive ability, or social support system  Outcome: Progressing     Problem: Knowledge Deficit  Goal: Patient/family/caregiver demonstrates understanding of disease process, treatment plan, medications, and discharge instructions  Description: Complete learning assessment and assess knowledge base    Interventions:  - Provide teaching at level of understanding  - Provide teaching via preferred learning methods  Outcome: Progressing     Problem: RESPIRATORY - ADULT  Goal: Achieves optimal ventilation and oxygenation  Description: INTERVENTIONS:  - Assess for changes in respiratory status  - Assess for changes in mentation and behavior  - Position to facilitate oxygenation and minimize respiratory effort  - Oxygen administered by appropriate delivery if ordered  - Initiate smoking cessation education as indicated  - Encourage broncho-pulmonary hygiene including cough, deep breathe, Incentive Spirometry  - Assess the need for suctioning and aspirate as needed  - Assess and instruct to report SOB or any respiratory difficulty  - Respiratory Therapy support as indicated  Outcome: Progressing     Problem: MUSCULOSKELETAL - ADULT  Goal: Maintain or return mobility to safest level of function  Description: INTERVENTIONS:  - Assess patient's ability to carry out ADLs; assess patient's baseline for ADL function and identify physical deficits which impact ability to perform ADLs (bathing, care of mouth/teeth, toileting, grooming, dressing, etc )  - Assess/evaluate cause of self-care deficits   - Assess range of motion  - Assess patient's mobility  - Assess patient's need for assistive devices and provide as appropriate  - Encourage maximum independence but intervene and supervise when necessary  - Involve family in performance of ADLs  - Assess for home care needs following discharge   - Consider OT consult to assist with ADL evaluation and planning for discharge  - Provide patient education as appropriate  Outcome: Progressing  Goal: Maintain proper alignment of affected body part  Description: INTERVENTIONS:  - Support, maintain and protect limb and body alignment  - Provide patient/ family with appropriate education  Outcome: Progressing     Problem: Nutrition/Hydration-ADULT  Goal: Nutrient/Hydration intake appropriate for improving, restoring or maintaining nutritional needs  Description: Monitor and assess patient's nutrition/hydration status for malnutrition  Collaborate with interdisciplinary team and initiate plan and interventions as ordered  Monitor patient's weight and dietary intake as ordered or per policy  Utilize nutrition screening tool and intervene as necessary  Determine patient's food preferences and provide high-protein, high-caloric foods as appropriate  INTERVENTIONS:  - Monitor oral intake, urinary output, labs, and treatment plans  - Assess nutrition and hydration status and recommend course of action  - Evaluate amount of meals eaten  - Assist patient with eating if necessary   - Allow adequate time for meals  - Recommend/ encourage appropriate diets, oral nutritional supplements, and vitamin/mineral supplements  - Order, calculate, and assess calorie counts as needed  - Recommend, monitor, and adjust tube feedings and TPN/PPN based on assessed needs  - Assess need for intravenous fluids  - Provide specific nutrition/hydration education as appropriate  - Include patient/family/caregiver in decisions related to nutrition  Outcome: Progressing     Problem: Potential for Falls  Goal: Patient will remain free of falls  Description: INTERVENTIONS:  - Assess patient frequently for physical needs  -  Identify cognitive and physical deficits and behaviors that affect risk of falls    -  [place] fall precautions as indicated by assessment   - Educate patient/family on patient safety including physical limitations  - Instruct patient to call for assistance with activity based on assessment  - Modify environment to reduce risk of injury  - Consider OT/PT consult to assist with strengthening/mobility  Outcome: Progressing

## 2021-03-23 NOTE — PROGRESS NOTES
Progress Note - Pulmonary   Brenda Finely 60 y o  male MRN: [id number]  Unit/Bed#: [facility] 68 2 -01 Encounter: [id number]    Assessment/Plan: 1    Acute hypoxic respiratory failure secondary to COVID-19        -  patient remains on 15 L/ NRB- with saturations in the low 90, with significant desaturations upon minimal movement        -  will maintain saturations greater than 89%        -  pulmonary toileting:  Deep breathing cough, OOB as tolerated, IS Q 1 hr        -  will likely need oxygen upon discharge, possible Oxymizer    2   COVID-19 3/4/2021        -   3/13/2021- CP        -   3/17/2021, 3/18/2021-  Actemra x 2        -   3/22/2021- completed 10 days of Decadron        -  D-dimer 6 37- currently on 1 mg/kg Lovenox        -  CRP 3 5     Ferritin 394        -  procalcitonin negative x 7    Will order pulse dose IV Solu-Medrol 125 mg as patient is likely having fibrotic changes, trend d dimer    3  Bacteremia       -  3/13/2021- BC 1/2- alpha hemolytic Streptococcus           Chief Complaint:    "I feel so short of breath"    Subjective:    Zoila Burkett was comfortably sitting in his bed  In patient appears significantly tachypneic and short of breath  Patient reports with any movement even eating breakfast his saturations dropped and he becomes significantly short of breath  No significant overnight events reported  Patient currently denying any fevers, chills, sweats, headaches, night sweats, pleuritic chest pain, or palpitations  Objective:    Vitals: Blood pressure 124/83, pulse 65, temperature (!) 97 3 °F (36 3 °C), temperature source Oral, resp  rate (!) 26, SpO2 92 %  15L,There is no height or weight on file to calculate BMI        Intake/Output Summary (Last 24 hours) at 3/23/2021 0985  Last data filed at 3/23/2021 0759  Gross per 24 hour   Intake --   Output 800 ml   Net -800 ml       Invasive Devices     Peripheral Intravenous Line            Peripheral IV 03/21/21 Left Antecubital 2 days Physical Exam:   Physical Exam  Constitutional:       General: He is not in acute distress  Appearance: Normal appearance  He is normal weight  He is not ill-appearing  HENT:      Head: Normocephalic and atraumatic  Nose: Nose normal  No congestion or rhinorrhea  Mouth/Throat:      Mouth: Mucous membranes are dry  Pharynx: No oropharyngeal exudate or posterior oropharyngeal erythema  Neck:      Musculoskeletal: Normal range of motion and neck supple  No neck rigidity or muscular tenderness  Cardiovascular:      Rate and Rhythm: Normal rate and regular rhythm  Pulses: Normal pulses  Heart sounds: Normal heart sounds  No murmur  No friction rub  No gallop  Pulmonary:      Effort: Tachypnea and accessory muscle usage present  No respiratory distress  Breath sounds: Decreased air movement present  No stridor or transmitted upper airway sounds  Decreased breath sounds present  No wheezing, rhonchi or rales  Comments: Significantly diminished aeration  Chest:      Chest wall: No tenderness  Abdominal:      General: Abdomen is flat  Bowel sounds are normal  There is no distension  Palpations: Abdomen is soft  There is no mass  Musculoskeletal: Normal range of motion  General: No swelling or tenderness  Skin:     General: Skin is warm and dry  Coloration: Skin is not jaundiced or pale  Neurological:      General: No focal deficit present  Mental Status: He is alert and oriented to person, place, and time  Mental status is at baseline  Psychiatric:         Mood and Affect: Mood normal          Behavior: Behavior normal          Labs:    I have personally reviewed pertinent lab results CBC:   Lab Results   Component Value Date    WBC 12 55 (H) 03/23/2021    HGB 15 0 03/23/2021    HCT 44 7 03/23/2021    MCV 88 03/23/2021     03/23/2021    MCH 29 5 03/23/2021    MCHC 33 6 03/23/2021    RDW 12 3 03/23/2021    MPV 11 3 03/23/2021 NRBC 0 03/23/2021   , CMP:   Lab Results   Component Value Date    SODIUM 137 03/23/2021    K 4 5 03/23/2021     03/23/2021    CO2 29 03/23/2021    BUN 23 03/23/2021    CREATININE 0 99 03/23/2021    CALCIUM 8 6 03/23/2021    AST 48 (H) 03/23/2021    ALT 87 (H) 03/23/2021    ALKPHOS 103 03/23/2021    EGFR 84 03/23/2021       Imaging and other studies: I have personally reviewed pertinent films in PACS     Chest x-ray 3/23/2021-  Worsening diffuse bilateral ground-glass opacities

## 2021-03-23 NOTE — ASSESSMENT & PLAN NOTE
Possibly contaminant  - Monitor off antibiotics  procalcitonin negative      Results from last 7 days   Lab Units 03/20/21  0503 03/19/21  0444   PROCALCITONIN ng/ml 0 07 0 06

## 2021-03-23 NOTE — PROGRESS NOTES
[facility]  Progress Note - Rakesh Nguyen 1965, 60 y o  male MRN: [id number]  Unit/Bed#: [facility] 2 -01 Encounter: [id number]  Primary Care Provider: Silva Marshall MD   Date and time admitted to hospital: 3/13/2021  4:32 PM    * Pneumonia due to COVID-19 virus  Assessment & Plan  60year old male admitted due to acute respiratory failure with hypoxia secondary to COVID-19 pneumonia  Minimal improvement  Pulmonary concerned that he is going to fibrotic stage   - Continue midflow and titrate daily  - Pulse dose IV steroids per pulmonary  - One time lasix given    Acute respiratory failure with hypoxia (HCC)  Assessment & Plan  Secondary to above    Rheumatoid arthritis involving both hands with positive rheumatoid factor (HCC)  Assessment & Plan  Continue Plaquenil    Severe recurrent major depression without psychotic features [facility])  Assessment & Plan  Continue Home medications    Gram-positive bacteremia  Assessment & Plan  Possibly contaminant  - Monitor off antibiotics  procalcitonin negative  Results from last 7 days   Lab Units 03/20/21  0503 03/19/21  0444   PROCALCITONIN ng/ml 0 07 0 06               VTE Pharmacologic Prophylaxis:   Pharmacologic: Enoxaparin (Lovenox)  Mechanical VTE Prophylaxis in Place: Yes    Patient Centered Rounds: I have performed bedside rounds with nursing staff today  Discussions with Specialists or Other Care Team Provider: Nursing, pulmonary    Education and Discussions with Family / Patient: es lawson    Time Spent for Care: 30 minutes  More than 50% of total time spent on counseling and coordination of care as described above      Current Length of Stay: 10 day(s)    Current Patient Status: Inpatient   Certification Statement: The patient will continue to require additional inpatient hospital stay due to iv lovenox, iv steroids, pulmonary reevaluation    Discharge Plan: active    Code Status: Level 1 - Full Code      Subjective:   Minimal improvement with his oxygen needs  He becomes tachypneic easily on downtitration  Spoke to his son over the telephone and explained to him the slow progress  He is on IV steroids due to concerns for fibrotic stage  Objective:     Vitals:   Temp (24hrs), Av 7 °F (36 5 °C), Min:97 3 °F (36 3 °C), Max:97 9 °F (36 6 °C)    Temp:  [97 3 °F (36 3 °C)-97 9 °F (36 6 °C)] 97 9 °F (36 6 °C)  HR:  [50-80] 80  Resp:  [20-36] 34  BP: (108-132)/(73-84) 116/76  SpO2:  [91 %-98 %] 96 %  There is no height or weight on file to calculate BMI  Input and Output Summary (last 24 hours): Intake/Output Summary (Last 24 hours) at 3/23/2021 1835  Last data filed at 3/23/2021 1501  Gross per 24 hour   Intake --   Output 1050 ml   Net -1050 ml       Physical Exam:     Physical Exam  Vitals signs reviewed  Constitutional:       Appearance: He is ill-appearing and toxic-appearing  HENT:      Head: Normocephalic  Nose: Nose normal       Mouth/Throat:      Mouth: Mucous membranes are moist    Eyes:      General: No scleral icterus  Cardiovascular:      Rate and Rhythm: Normal rate  Pulmonary:      Effort: Respiratory distress present  Breath sounds: Decreased breath sounds present  No wheezing or rhonchi  Abdominal:      Palpations: Abdomen is soft  Tenderness: There is no abdominal tenderness  Skin:     General: Skin is warm  Neurological:      Mental Status: He is alert  Mental status is at baseline     Psychiatric:         Mood and Affect: Mood normal          Behavior: Behavior normal        Additional Data:     Labs:    Results from last 7 days   Lab Units 21  0334 21  0516   WBC Thousand/uL 12 55* 13 55*   HEMOGLOBIN g/dL 15 0 14 5   HEMATOCRIT % 44 7 43 7   PLATELETS Thousands/uL 304 358   BANDS PCT %  --  3   NEUTROS PCT % 91*  --    LYMPHS PCT % 4*  --    LYMPHO PCT %  --  5*   MONOS PCT % 2*  --    MONO PCT %  --  4   EOS PCT % 1 0     Results from last 7 days   Lab Units 03/23/21  0334   SODIUM mmol/L 137   POTASSIUM mmol/L 4 5   CHLORIDE mmol/L 101   CO2 mmol/L 29   BUN mg/dL 23   CREATININE mg/dL 0 99   ANION GAP mmol/L 7   CALCIUM mg/dL 8 6   ALBUMIN g/dL 2 7*   TOTAL BILIRUBIN mg/dL 0 48   ALK PHOS U/L 103   ALT U/L 87*   AST U/L 48*   GLUCOSE RANDOM mg/dL 112                 Results from last 7 days   Lab Units 03/20/21  0503 03/19/21  0444   PROCALCITONIN ng/ml 0 07 0 06           * I Have Reviewed All Lab Data Listed Above  * Additional Pertinent Lab Tests Reviewed: Eric 64 Admission Reviewed    Imaging:    Imaging Reports Reviewed Today Include: xr chest    Recent Cultures (last 7 days):           Last 24 Hours Medication List:   Current Facility-Administered Medications   Medication Dose Route Frequency Provider Last Rate    albuterol  2 puff Inhalation 4x Daily Nathaniel Sloan PA-C      ARIPiprazole  10 mg Oral HS Geraldine Holt MD      atorvastatin  40 mg Oral HS Geraldine Holt MD      bisacodyl  10 mg Rectal Daily PRN Geraldine Holt MD      cholecalciferol  2,000 Units Oral Daily Geraldine Holt MD      DULoxetine  60 mg Oral BID Geraldine Holt MD      enoxaparin  1 mg/kg Subcutaneous Q12H [address] KIRK Mejias      famotidine  20 mg Oral BID Geraldine Holt MD      hydroxychloroquine  200 mg Oral Daily MD Ernesto Rowanksenia Feldmaneva Blunt ON 3/24/2021] methylPREDNISolone sodium succinate  1,000 mg Intravenous Daily Carmen Ledesma DO      multivitamin-minerals  1 tablet Oral Daily Geraldine Holt MD      polyethylene glycol  17 g Oral Daily PRN Geraldine Holt MD      senna-docusate sodium  1 tablet Oral HS Geraldine Holt MD      traZODone  100 mg Oral HS PRN Geraldine Holt MD          Today, Patient Was Seen By: Thomas Evans MD    ** Please Note: Dictation voice to text software may have been used in the creation of this document   **

## 2021-03-23 NOTE — PLAN OF CARE
Problem: PAIN - ADULT  Goal: Verbalizes/displays adequate comfort level or baseline comfort level  Description: Interventions:  - Encourage patient to monitor pain and request assistance  - Assess pain using appropriate pain scale  - Administer analgesics based on type and severity of pain and evaluate response  - Implement non-pharmacological measures as appropriate and evaluate response  - Consider cultural and social influences on pain and pain management  - Notify physician/advanced practitioner if interventions unsuccessful or patient reports new pain  Outcome: Progressing     Problem: INFECTION - ADULT  Goal: Absence or prevention of progression during hospitalization  Description: INTERVENTIONS:  - Assess and monitor for signs and symptoms of infection  - Monitor lab/diagnostic results  - Monitor all insertion sites, i e  indwelling lines, tubes, and drains  - Monitor endotracheal if appropriate and nasal secretions for changes in amount and color  - [place] appropriate cooling/warming therapies per order  - Administer medications as ordered  - Instruct and encourage patient and family to use good hand hygiene technique  - Identify and instruct in appropriate isolation precautions for identified infection/condition  Outcome: Progressing     Problem: SAFETY ADULT  Goal: Patient will remain free of falls  Description: INTERVENTIONS:  - Assess patient frequently for physical needs  -  Identify cognitive and physical deficits and behaviors that affect risk of falls    -  [place] fall precautions as indicated by assessment   - Educate patient/family on patient safety including physical limitations  - Instruct patient to call for assistance with activity based on assessment  - Modify environment to reduce risk of injury  - Consider OT/PT consult to assist with strengthening/mobility  Outcome: Progressing  Goal: Maintain or return to baseline ADL function  Description: INTERVENTIONS:  -  Assess patient's ability to carry out ADLs; assess patient's baseline for ADL function and identify physical deficits which impact ability to perform ADLs (bathing, care of mouth/teeth, toileting, grooming, dressing, etc )  - Assess/evaluate cause of self-care deficits   - Assess range of motion  - Assess patient's mobility; develop plan if impaired  - Assess patient's need for assistive devices and provide as appropriate  - Encourage maximum independence but intervene and supervise when necessary  - Involve family in performance of ADLs  - Assess for home care needs following discharge   - Consider OT consult to assist with ADL evaluation and planning for discharge  - Provide patient education as appropriate  Outcome: Progressing  Goal: Maintain or return mobility status to optimal level  Description: INTERVENTIONS:  - Assess patient's baseline mobility status (ambulation, transfers, stairs, etc )    - Identify cognitive and physical deficits and behaviors that affect mobility  - Identify mobility aids required to assist with transfers and/or ambulation (gait belt, sit-to-stand, lift, walker, cane, etc )  - [place] fall precautions as indicated by assessment  - Record patient progress and toleration of activity level on Mobility SBAR; progress patient to next Phase/Stage  - Instruct patient to call for assistance with activity based on assessment  - Consider rehabilitation consult to assist with strengthening/weightbearing, etc   Outcome: Progressing     Problem: DISCHARGE PLANNING  Goal: Discharge to home or other facility with appropriate resources  Description: INTERVENTIONS:  - Identify barriers to discharge w/patient and caregiver  - Arrange for needed discharge resources and transportation as appropriate  - Identify discharge learning needs (meds, wound care, etc )  - Arrange for interpretive services to assist at discharge as needed  - Refer to Case Management Department for coordinating discharge planning if the patient needs post-hospital services based on physician/advanced practitioner order or complex needs related to functional status, cognitive ability, or social support system  Outcome: Progressing     Problem: Knowledge Deficit  Goal: Patient/family/caregiver demonstrates understanding of disease process, treatment plan, medications, and discharge instructions  Description: Complete learning assessment and assess knowledge base    Interventions:  - Provide teaching at level of understanding  - Provide teaching via preferred learning methods  Outcome: Progressing     Problem: RESPIRATORY - ADULT  Goal: Achieves optimal ventilation and oxygenation  Description: INTERVENTIONS:  - Assess for changes in respiratory status  - Assess for changes in mentation and behavior  - Position to facilitate oxygenation and minimize respiratory effort  - Oxygen administered by appropriate delivery if ordered  - Initiate smoking cessation education as indicated  - Encourage broncho-pulmonary hygiene including cough, deep breathe, Incentive Spirometry  - Assess the need for suctioning and aspirate as needed  - Assess and instruct to report SOB or any respiratory difficulty  - Respiratory Therapy support as indicated  Outcome: Progressing     Problem: MUSCULOSKELETAL - ADULT  Goal: Maintain or return mobility to safest level of function  Description: INTERVENTIONS:  - Assess patient's ability to carry out ADLs; assess patient's baseline for ADL function and identify physical deficits which impact ability to perform ADLs (bathing, care of mouth/teeth, toileting, grooming, dressing, etc )  - Assess/evaluate cause of self-care deficits   - Assess range of motion  - Assess patient's mobility  - Assess patient's need for assistive devices and provide as appropriate  - Encourage maximum independence but intervene and supervise when necessary  - Involve family in performance of ADLs  - Assess for home care needs following discharge   - Consider OT consult to assist with ADL evaluation and planning for discharge  - Provide patient education as appropriate  Outcome: Progressing  Goal: Maintain proper alignment of affected body part  Description: INTERVENTIONS:  - Support, maintain and protect limb and body alignment  - Provide patient/ family with appropriate education  Outcome: Progressing     Problem: Nutrition/Hydration-ADULT  Goal: Nutrient/Hydration intake appropriate for improving, restoring or maintaining nutritional needs  Description: Monitor and assess patient's nutrition/hydration status for malnutrition  Collaborate with interdisciplinary team and initiate plan and interventions as ordered  Monitor patient's weight and dietary intake as ordered or per policy  Utilize nutrition screening tool and intervene as necessary  Determine patient's food preferences and provide high-protein, high-caloric foods as appropriate  INTERVENTIONS:  - Monitor oral intake, urinary output, labs, and treatment plans  - Assess nutrition and hydration status and recommend course of action  - Evaluate amount of meals eaten  - Assist patient with eating if necessary   - Allow adequate time for meals  - Recommend/ encourage appropriate diets, oral nutritional supplements, and vitamin/mineral supplements  - Order, calculate, and assess calorie counts as needed  - Recommend, monitor, and adjust tube feedings and TPN/PPN based on assessed needs  - Assess need for intravenous fluids  - Provide specific nutrition/hydration education as appropriate  - Include patient/family/caregiver in decisions related to nutrition  Outcome: Progressing     Problem: Potential for Falls  Goal: Patient will remain free of falls  Description: INTERVENTIONS:  - Assess patient frequently for physical needs  -  Identify cognitive and physical deficits and behaviors that affect risk of falls    -  [place] fall precautions as indicated by assessment   - Educate patient/family on patient safety including physical limitations  - Instruct patient to call for assistance with activity based on assessment  - Modify environment to reduce risk of injury  - Consider OT/PT consult to assist with strengthening/mobility  Outcome: Progressing

## 2021-03-23 NOTE — ASSESSMENT & PLAN NOTE
60year old male admitted due to acute respiratory failure with hypoxia secondary to COVID-19 pneumonia  Minimal improvement   Pulmonary concerned that he is going to fibrotic stage   - Continue midflow and titrate daily  - Pulse dose IV steroids per pulmonary  - One time lasix given

## 2021-03-23 NOTE — PHYSICAL THERAPY NOTE
Physical Therapy Cancellation Note    Discussed with RN, asking to defer therapeutic intervention at this time due to current medical status  Will cancel and continue to follow as appropriate       Taj Michaud PTA

## 2021-03-23 NOTE — PLAN OF CARE
Problem: PAIN - ADULT  Goal: Verbalizes/displays adequate comfort level or baseline comfort level  Description: Interventions:  - Encourage patient to monitor pain and request assistance  - Assess pain using appropriate pain scale  - Administer analgesics based on type and severity of pain and evaluate response  - Implement non-pharmacological measures as appropriate and evaluate response  - Consider cultural and social influences on pain and pain management  - Notify physician/advanced practitioner if interventions unsuccessful or patient reports new pain  Outcome: Progressing     Problem: INFECTION - ADULT  Goal: Absence or prevention of progression during hospitalization  Description: INTERVENTIONS:  - Assess and monitor for signs and symptoms of infection  - Monitor lab/diagnostic results  - Monitor all insertion sites, i e  indwelling lines, tubes, and drains  - Monitor endotracheal if appropriate and nasal secretions for changes in amount and color  - [place] appropriate cooling/warming therapies per order  - Administer medications as ordered  - Instruct and encourage patient and family to use good hand hygiene technique  - Identify and instruct in appropriate isolation precautions for identified infection/condition  Outcome: Progressing     Problem: SAFETY ADULT  Goal: Patient will remain free of falls  Description: INTERVENTIONS:  - Assess patient frequently for physical needs  -  Identify cognitive and physical deficits and behaviors that affect risk of falls    -  [place] fall precautions as indicated by assessment   - Educate patient/family on patient safety including physical limitations  - Instruct patient to call for assistance with activity based on assessment  - Modify environment to reduce risk of injury  - Consider OT/PT consult to assist with strengthening/mobility  Outcome: Progressing  Goal: Maintain or return to baseline ADL function  Description: INTERVENTIONS:  -  Assess patient's ability to carry out ADLs; assess patient's baseline for ADL function and identify physical deficits which impact ability to perform ADLs (bathing, care of mouth/teeth, toileting, grooming, dressing, etc )  - Assess/evaluate cause of self-care deficits   - Assess range of motion  - Assess patient's mobility; develop plan if impaired  - Assess patient's need for assistive devices and provide as appropriate  - Encourage maximum independence but intervene and supervise when necessary  - Involve family in performance of ADLs  - Assess for home care needs following discharge   - Consider OT consult to assist with ADL evaluation and planning for discharge  - Provide patient education as appropriate  Outcome: Progressing  Goal: Maintain or return mobility status to optimal level  Description: INTERVENTIONS:  - Assess patient's baseline mobility status (ambulation, transfers, stairs, etc )    - Identify cognitive and physical deficits and behaviors that affect mobility  - Identify mobility aids required to assist with transfers and/or ambulation (gait belt, sit-to-stand, lift, walker, cane, etc )  - [place] fall precautions as indicated by assessment  - Record patient progress and toleration of activity level on Mobility SBAR; progress patient to next Phase/Stage  - Instruct patient to call for assistance with activity based on assessment  - Consider rehabilitation consult to assist with strengthening/weightbearing, etc   Outcome: Progressing     Problem: DISCHARGE PLANNING  Goal: Discharge to home or other facility with appropriate resources  Description: INTERVENTIONS:  - Identify barriers to discharge w/patient and caregiver  - Arrange for needed discharge resources and transportation as appropriate  - Identify discharge learning needs (meds, wound care, etc )  - Arrange for interpretive services to assist at discharge as needed  - Refer to Case Management Department for coordinating discharge planning if the patient needs post-hospital services based on physician/advanced practitioner order or complex needs related to functional status, cognitive ability, or social support system  Outcome: Progressing     Problem: Knowledge Deficit  Goal: Patient/family/caregiver demonstrates understanding of disease process, treatment plan, medications, and discharge instructions  Description: Complete learning assessment and assess knowledge base    Interventions:  - Provide teaching at level of understanding  - Provide teaching via preferred learning methods  Outcome: Progressing     Problem: RESPIRATORY - ADULT  Goal: Achieves optimal ventilation and oxygenation  Description: INTERVENTIONS:  - Assess for changes in respiratory status  - Assess for changes in mentation and behavior  - Position to facilitate oxygenation and minimize respiratory effort  - Oxygen administered by appropriate delivery if ordered  - Initiate smoking cessation education as indicated  - Encourage broncho-pulmonary hygiene including cough, deep breathe, Incentive Spirometry  - Assess the need for suctioning and aspirate as needed  - Assess and instruct to report SOB or any respiratory difficulty  - Respiratory Therapy support as indicated  Outcome: Progressing     Problem: MUSCULOSKELETAL - ADULT  Goal: Maintain or return mobility to safest level of function  Description: INTERVENTIONS:  - Assess patient's ability to carry out ADLs; assess patient's baseline for ADL function and identify physical deficits which impact ability to perform ADLs (bathing, care of mouth/teeth, toileting, grooming, dressing, etc )  - Assess/evaluate cause of self-care deficits   - Assess range of motion  - Assess patient's mobility  - Assess patient's need for assistive devices and provide as appropriate  - Encourage maximum independence but intervene and supervise when necessary  - Involve family in performance of ADLs  - Assess for home care needs following discharge   - Consider OT consult to assist with ADL evaluation and planning for discharge  - Provide patient education as appropriate  Outcome: Progressing  Goal: Maintain proper alignment of affected body part  Description: INTERVENTIONS:  - Support, maintain and protect limb and body alignment  - Provide patient/ family with appropriate education  Outcome: Progressing     Problem: Nutrition/Hydration-ADULT  Goal: Nutrient/Hydration intake appropriate for improving, restoring or maintaining nutritional needs  Description: Monitor and assess patient's nutrition/hydration status for malnutrition  Collaborate with interdisciplinary team and initiate plan and interventions as ordered  Monitor patient's weight and dietary intake as ordered or per policy  Utilize nutrition screening tool and intervene as necessary  Determine patient's food preferences and provide high-protein, high-caloric foods as appropriate  INTERVENTIONS:  - Monitor oral intake, urinary output, labs, and treatment plans  - Assess nutrition and hydration status and recommend course of action  - Evaluate amount of meals eaten  - Assist patient with eating if necessary   - Allow adequate time for meals  - Recommend/ encourage appropriate diets, oral nutritional supplements, and vitamin/mineral supplements  - Order, calculate, and assess calorie counts as needed  - Recommend, monitor, and adjust tube feedings and TPN/PPN based on assessed needs  - Assess need for intravenous fluids  - Provide specific nutrition/hydration education as appropriate  - Include patient/family/caregiver in decisions related to nutrition  Outcome: Progressing     Problem: Potential for Falls  Goal: Patient will remain free of falls  Description: INTERVENTIONS:  - Assess patient frequently for physical needs  -  Identify cognitive and physical deficits and behaviors that affect risk of falls    -  [place] fall precautions as indicated by assessment   - Educate patient/family on patient safety including physical limitations  - Instruct patient to call for assistance with activity based on assessment  - Modify environment to reduce risk of injury  - Consider OT/PT consult to assist with strengthening/mobility  Outcome: Progressing

## 2021-03-24 NOTE — PLAN OF CARE
Problem: PAIN - ADULT  Goal: Verbalizes/displays adequate comfort level or baseline comfort level  Description: Interventions:  - Encourage patient to monitor pain and request assistance  - Assess pain using appropriate pain scale  - Administer analgesics based on type and severity of pain and evaluate response  - Implement non-pharmacological measures as appropriate and evaluate response  - Consider cultural and social influences on pain and pain management  - Notify physician/advanced practitioner if interventions unsuccessful or patient reports new pain  Outcome: Progressing     Problem: INFECTION - ADULT  Goal: Absence or prevention of progression during hospitalization  Description: INTERVENTIONS:  - Assess and monitor for signs and symptoms of infection  - Monitor lab/diagnostic results  - Monitor all insertion sites, i e  indwelling lines, tubes, and drains  - Monitor endotracheal if appropriate and nasal secretions for changes in amount and color  - [place] appropriate cooling/warming therapies per order  - Administer medications as ordered  - Instruct and encourage patient and family to use good hand hygiene technique  - Identify and instruct in appropriate isolation precautions for identified infection/condition  Outcome: Progressing     Problem: SAFETY ADULT  Goal: Patient will remain free of falls  Description: INTERVENTIONS:  - Assess patient frequently for physical needs  -  Identify cognitive and physical deficits and behaviors that affect risk of falls    -  [place] fall precautions as indicated by assessment   - Educate patient/family on patient safety including physical limitations  - Instruct patient to call for assistance with activity based on assessment  - Modify environment to reduce risk of injury  - Consider OT/PT consult to assist with strengthening/mobility  Outcome: Progressing  Goal: Maintain or return to baseline ADL function  Description: INTERVENTIONS:  -  Assess patient's ability to carry out ADLs; assess patient's baseline for ADL function and identify physical deficits which impact ability to perform ADLs (bathing, care of mouth/teeth, toileting, grooming, dressing, etc )  - Assess/evaluate cause of self-care deficits   - Assess range of motion  - Assess patient's mobility; develop plan if impaired  - Assess patient's need for assistive devices and provide as appropriate  - Encourage maximum independence but intervene and supervise when necessary  - Involve family in performance of ADLs  - Assess for home care needs following discharge   - Consider OT consult to assist with ADL evaluation and planning for discharge  - Provide patient education as appropriate  Outcome: Progressing  Goal: Maintain or return mobility status to optimal level  Description: INTERVENTIONS:  - Assess patient's baseline mobility status (ambulation, transfers, stairs, etc )    - Identify cognitive and physical deficits and behaviors that affect mobility  - Identify mobility aids required to assist with transfers and/or ambulation (gait belt, sit-to-stand, lift, walker, cane, etc )  - [place] fall precautions as indicated by assessment  - Record patient progress and toleration of activity level on Mobility SBAR; progress patient to next Phase/Stage  - Instruct patient to call for assistance with activity based on assessment  - Consider rehabilitation consult to assist with strengthening/weightbearing, etc   Outcome: Progressing     Problem: DISCHARGE PLANNING  Goal: Discharge to home or other facility with appropriate resources  Description: INTERVENTIONS:  - Identify barriers to discharge w/patient and caregiver  - Arrange for needed discharge resources and transportation as appropriate  - Identify discharge learning needs (meds, wound care, etc )  - Arrange for interpretive services to assist at discharge as needed  - Refer to Case Management Department for coordinating discharge planning if the patient needs post-hospital services based on physician/advanced practitioner order or complex needs related to functional status, cognitive ability, or social support system  Outcome: Progressing     Problem: Knowledge Deficit  Goal: Patient/family/caregiver demonstrates understanding of disease process, treatment plan, medications, and discharge instructions  Description: Complete learning assessment and assess knowledge base    Interventions:  - Provide teaching at level of understanding  - Provide teaching via preferred learning methods  Outcome: Progressing     Problem: RESPIRATORY - ADULT  Goal: Achieves optimal ventilation and oxygenation  Description: INTERVENTIONS:  - Assess for changes in respiratory status  - Assess for changes in mentation and behavior  - Position to facilitate oxygenation and minimize respiratory effort  - Oxygen administered by appropriate delivery if ordered  - Initiate smoking cessation education as indicated  - Encourage broncho-pulmonary hygiene including cough, deep breathe, Incentive Spirometry  - Assess the need for suctioning and aspirate as needed  - Assess and instruct to report SOB or any respiratory difficulty  - Respiratory Therapy support as indicated  Outcome: Progressing     Problem: MUSCULOSKELETAL - ADULT  Goal: Maintain or return mobility to safest level of function  Description: INTERVENTIONS:  - Assess patient's ability to carry out ADLs; assess patient's baseline for ADL function and identify physical deficits which impact ability to perform ADLs (bathing, care of mouth/teeth, toileting, grooming, dressing, etc )  - Assess/evaluate cause of self-care deficits   - Assess range of motion  - Assess patient's mobility  - Assess patient's need for assistive devices and provide as appropriate  - Encourage maximum independence but intervene and supervise when necessary  - Involve family in performance of ADLs  - Assess for home care needs following discharge   - Consider OT consult to assist with ADL evaluation and planning for discharge  - Provide patient education as appropriate  Outcome: Progressing  Goal: Maintain proper alignment of affected body part  Description: INTERVENTIONS:  - Support, maintain and protect limb and body alignment  - Provide patient/ family with appropriate education  Outcome: Progressing     Problem: Nutrition/Hydration-ADULT  Goal: Nutrient/Hydration intake appropriate for improving, restoring or maintaining nutritional needs  Description: Monitor and assess patient's nutrition/hydration status for malnutrition  Collaborate with interdisciplinary team and initiate plan and interventions as ordered  Monitor patient's weight and dietary intake as ordered or per policy  Utilize nutrition screening tool and intervene as necessary  Determine patient's food preferences and provide high-protein, high-caloric foods as appropriate  INTERVENTIONS:  - Monitor oral intake, urinary output, labs, and treatment plans  - Assess nutrition and hydration status and recommend course of action  - Evaluate amount of meals eaten  - Assist patient with eating if necessary   - Allow adequate time for meals  - Recommend/ encourage appropriate diets, oral nutritional supplements, and vitamin/mineral supplements  - Order, calculate, and assess calorie counts as needed  - Recommend, monitor, and adjust tube feedings and TPN/PPN based on assessed needs  - Assess need for intravenous fluids  - Provide specific nutrition/hydration education as appropriate  - Include patient/family/caregiver in decisions related to nutrition  Outcome: Progressing     Problem: Potential for Falls  Goal: Patient will remain free of falls  Description: INTERVENTIONS:  - Assess patient frequently for physical needs  -  Identify cognitive and physical deficits and behaviors that affect risk of falls    -  [place] fall precautions as indicated by assessment   - Educate patient/family on patient safety including physical limitations  - Instruct patient to call for assistance with activity based on assessment  - Modify environment to reduce risk of injury  - Consider OT/PT consult to assist with strengthening/mobility  Outcome: Progressing

## 2021-03-24 NOTE — PROGRESS NOTES
Progress Note - Pulmonary   Hakeem Correa 60 y o  male MRN: [id number]  Unit/Bed#: [place] 2 -01 Encounter: [id number]    Assessment/Plan:    1  Acute hypoxic respiratory failure secondary to COVID-19      -  unfortunately patient's hypoxia continues on 15 L/NRB- 88%      -  will maintain saturations greater than 89%      -  pulmonary toileting:  Deep breathing cough      -  patient will likely need oxygen upon discharge likely Oxymizer    2   COVID-19 3/4/2021        -  Day # 2/3- pulse dose steroids        -   3/13/2021- CP        -   3/17/2021, 3/18/2021-  Actemra x 2        -   3/22/2021- completed 10 days of Decadron        -   3/23/2021- D-dimer 6 37- repeat pending- continue Lovenox 1mg/kg        -  CRP 3 5    Ferritin 394        -  procalcitonin negative x 7-  no indication for antibiotic, will repeat procalcitonin    Will continue with pulse dose steroids patient likely has fibrotic changes, will continue to trend D-dimer    3  Questionable hemoptysis        -  internal medicine reported some disclose that he had blood tinged sputum        -  will need to monitor close        -  discussed with nursing      3  Bacteremia       -  3/13/2021- BC   1/2- alpha hemolytic Streptococcus       -  no repeat BC obtained      Chief Complaint:    "I still feel very short of breath"    Subjective:    Scotty Cardoso was comfortably sitting in his bed  He reports with minimal movement he becomes significantly hypoxic and short of breath  No significant overnight events reported  Patient currently denies any fever, chills, hemoptysis, headaches, night sweats, pleuritic chest pain, or palpitations  Objective:    Vitals: Blood pressure 157/89, pulse 55, temperature 97 6 °F (36 4 °C), temperature source Oral, resp  rate 20, SpO2 (!) 88 %  15L,There is no height or weight on file to calculate BMI        Intake/Output Summary (Last 24 hours) at 3/24/2021 1006  Last data filed at 3/24/2021 0729  Gross per 24 hour   Intake 400 ml   Output 1650 ml   Net -1250 ml       Invasive Devices     Peripheral Intravenous Line            Peripheral IV 03/21/21 Left Antecubital 3 days                Physical Exam:   Physical Exam  Constitutional:       General: He is not in acute distress  Appearance: Normal appearance  He is normal weight  He is not ill-appearing  HENT:      Head: Normocephalic and atraumatic  Nose: Nose normal  No congestion  Mouth/Throat:      Mouth: Mucous membranes are dry  Pharynx: No oropharyngeal exudate or posterior oropharyngeal erythema  Neck:      Musculoskeletal: Normal range of motion and neck supple  No neck rigidity or muscular tenderness  Cardiovascular:      Rate and Rhythm: Normal rate and regular rhythm  Pulses: Normal pulses  Heart sounds: Normal heart sounds  No murmur  No friction rub  No gallop  Pulmonary:      Effort: No tachypnea, bradypnea, accessory muscle usage or respiratory distress  Breath sounds: Decreased air movement present  No stridor or transmitted upper airway sounds  Decreased breath sounds present  No wheezing, rhonchi or rales  Comments: Diminished breath sounds at bases  Chest:      Chest wall: No tenderness  Abdominal:      General: Abdomen is flat  Bowel sounds are normal  There is no distension  Palpations: Abdomen is soft  There is no mass  Musculoskeletal: Normal range of motion  General: No swelling or tenderness  Skin:     General: Skin is warm and dry  Coloration: Skin is not jaundiced or pale  Neurological:      General: No focal deficit present  Mental Status: He is alert and oriented to person, place, and time  Mental status is at baseline  Psychiatric:         Mood and Affect: Mood normal          Behavior: Behavior normal          Labs:  I have personally reviewed pertinent lab results 3/23/2021    Imaging and other studies: I have personally reviewed pertinent films in PACS     Chest x-ray 3/23/2021- Worsening diffuse bilateral ground-glass opacities

## 2021-03-24 NOTE — PROGRESS NOTES
[facility]  Progress Note - Thanh Moat 1965, 60 y o  male MRN: [id number]  Unit/Bed#: Jordon Rueda -01 Encounter: [id number]  Primary Care Provider: Ronak Lara MD   Date and time admitted to hospital: 3/13/2021  4:32 PM    * Pneumonia due to COVID-19 virus  Assessment & Plan  60year old male admitted due to acute respiratory failure with hypoxia secondary to COVID-19 pneumonia  Minimal improvement  Pulmonary concerned that he is going to fibrotic stage  - Unfortunately with minimal improvement  Continue midflow and titrate daily  - Pulse dose IV steroids per pulmonary    Acute respiratory failure with hypoxia [facility])  Assessment & Plan  Secondary to above  D-dimer uptrending  Pulmonary recommending stat CTA pe study    Rheumatoid arthritis involving both hands with positive rheumatoid factor (HCC)  Assessment & Plan  Continue Plaquenil    Severe recurrent major depression without psychotic features [facility])  Assessment & Plan  Continue Home medications    Gram-positive bacteremia  Assessment & Plan  Possibly contaminant  - Monitor off antibiotics  procalcitonin negative  - Repeat blood cultures    Results from last 7 days   Lab Units 03/20/21  0503 03/19/21  0444   PROCALCITONIN ng/ml 0 07 0 06                 VTE Pharmacologic Prophylaxis:   Pharmacologic: Enoxaparin (Lovenox)  Mechanical VTE Prophylaxis in Place: Yes    Patient Centered Rounds: I have performed bedside rounds with nursing staff today  Discussions with Specialists or Other Care Team Provider: Nursing    Education and Discussions with Family / Patient: patient    Time Spent for Care: 30 minutes  More than 50% of total time spent on counseling and coordination of care as described above      Current Length of Stay: 11 day(s)    Current Patient Status: Inpatient   Certification Statement: The patient will continue to require additional inpatient hospital stay due to cta pe study, pulmonary reevaluation, respiratory distress    Discharge Plan: active    Code Status: Level 1 - Full Code      Subjective:   Patient seen and examined at bedside  States that he is short of breath  Discussed his case with pulmonary due to my concerns for decompensation  Currently on full code, I discussed his current condition with his son Gunner Cooper and son Medina Mackenzie  I explained to both of them that if he continues to get worse to a point that intubation is necessary, they are all in agreement  For now, maintain full code status  They will discuss this among themselves right now  He was coughing overnight, but no recurrence of blood tinged sputum  Objective:     Vitals:   Temp (24hrs), Av 7 °F (36 5 °C), Min:97 2 °F (36 2 °C), Max:97 9 °F (36 6 °C)    Temp:  [97 2 °F (36 2 °C)-97 9 °F (36 6 °C)] 97 9 °F (36 6 °C)  HR:  [55-83] 83  Resp:  [20-37] 32  BP: (116-157)/(75-89) 126/75  SpO2:  [88 %-96 %] 93 %  There is no height or weight on file to calculate BMI  Input and Output Summary (last 24 hours): Intake/Output Summary (Last 24 hours) at 3/24/2021 1203  Last data filed at 3/24/2021 0729  Gross per 24 hour   Intake 400 ml   Output 1650 ml   Net -1250 ml       Physical Exam:     Physical Exam  Vitals signs reviewed  Constitutional:       Appearance: He is ill-appearing  HENT:      Head: Normocephalic  Nose: Nose normal       Mouth/Throat:      Mouth: Mucous membranes are dry  Eyes:      General: No scleral icterus  Extraocular Movements: Extraocular movements intact  Cardiovascular:      Rate and Rhythm: Bradycardia present  Pulmonary:      Effort: Respiratory distress present  Breath sounds: Decreased breath sounds present  No wheezing  Abdominal:      General: There is no distension  Palpations: Abdomen is soft  Tenderness: There is no abdominal tenderness  Musculoskeletal: Normal range of motion  Skin:     General: Skin is warm  Neurological:      Mental Status: He is alert   Mental status is at baseline  Psychiatric:         Mood and Affect: Mood normal          Behavior: Behavior normal        Additional Data:     Labs:    Results from last 7 days   Lab Units 03/23/21  0334 03/19/21  0516   WBC Thousand/uL 12 55* 13 55*   HEMOGLOBIN g/dL 15 0 14 5   HEMATOCRIT % 44 7 43 7   PLATELETS Thousands/uL 304 358   BANDS PCT %  --  3   NEUTROS PCT % 91*  --    LYMPHS PCT % 4*  --    LYMPHO PCT %  --  5*   MONOS PCT % 2*  --    MONO PCT %  --  4   EOS PCT % 1 0     Results from last 7 days   Lab Units 03/23/21  0334   SODIUM mmol/L 137   POTASSIUM mmol/L 4 5   CHLORIDE mmol/L 101   CO2 mmol/L 29   BUN mg/dL 23   CREATININE mg/dL 0 99   ANION GAP mmol/L 7   CALCIUM mg/dL 8 6   ALBUMIN g/dL 2 7*   TOTAL BILIRUBIN mg/dL 0 48   ALK PHOS U/L 103   ALT U/L 87*   AST U/L 48*   GLUCOSE RANDOM mg/dL 112                 Results from last 7 days   Lab Units 03/20/21  0503 03/19/21  0444   PROCALCITONIN ng/ml 0 07 0 06           * I Have Reviewed All Lab Data Listed Above  * Additional Pertinent Lab Tests Reviewed:  Eric 66 Admission Reviewed    Imaging:    Imaging Reports Reviewed Today Include: xr chest    Recent Cultures (last 7 days):           Last 24 Hours Medication List:   Current Facility-Administered Medications   Medication Dose Route Frequency Provider Last Rate    albuterol  2 puff Inhalation 4x Daily Denise Tipton PA-C      ARIPiprazole  10 mg Oral HS Patricia Hernández MD      atorvastatin  40 mg Oral HS Patricia Hernández MD      bisacodyl  10 mg Rectal Daily PRN Patricia Hernández MD      cholecalciferol  2,000 Units Oral Daily Patricia Hernández MD      DULoxetine  60 mg Oral BID Patricia Hernández MD      enoxaparin  1 mg/kg Subcutaneous Q12H [address] KIRK Allen      famotidine  20 mg Oral BID Patricia Hernández MD      hydroxychloroquine  200 mg Oral Daily Patricia Hernández MD      methylPREDNISolone sodium succinate  1,000 mg Intravenous Daily Carmen UNGER Dostal, DO 1,000 mg (03/24/21 0179)  multivitamin-minerals  1 tablet Oral Daily Tiana Valenzuela MD      polyethylene glycol  17 g Oral Daily PRN Tiana Valenzuela MD      senna-docusate sodium  1 tablet Oral HS Tiana Valenzuela MD      traZODone  100 mg Oral HS PRN Tiana Valenzuela MD          Today, Patient Was Seen By: Emmet Hammans, MD    ** Please Note: Dictation voice to text software may have been used in the creation of this document   **

## 2021-03-24 NOTE — ASSESSMENT & PLAN NOTE
Possibly contaminant  - Monitor off antibiotics  procalcitonin negative    - Repeat blood cultures    Results from last 7 days   Lab Units 03/20/21  0503 03/19/21  0444   PROCALCITONIN ng/ml 0 07 0 06

## 2021-03-24 NOTE — QUICK NOTE
Pt  SpO2 dropped into 60s while pt was on midflow at 13 lpm with NRB mask on top  Midflow turned up to 15 lpm and SpO2 came up into the high 80s  Pt  Was resting in bed at time of desaturation  Dr Everardo Gillespie made aware  Will continue to monitor

## 2021-03-24 NOTE — ASSESSMENT & PLAN NOTE
60year old male admitted due to acute respiratory failure with hypoxia secondary to COVID-19 pneumonia  Minimal improvement  Pulmonary concerned that he is going to fibrotic stage  - Unfortunately with minimal improvement   Continue midflow and titrate daily  - Pulse dose IV steroids per pulmonary

## 2021-03-24 NOTE — PLAN OF CARE
Problem: PAIN - ADULT  Goal: Verbalizes/displays adequate comfort level or baseline comfort level  Description: Interventions:  - Encourage patient to monitor pain and request assistance  - Assess pain using appropriate pain scale  - Administer analgesics based on type and severity of pain and evaluate response  - Implement non-pharmacological measures as appropriate and evaluate response  - Consider cultural and social influences on pain and pain management  - Notify physician/advanced practitioner if interventions unsuccessful or patient reports new pain  Outcome: Progressing     Problem: INFECTION - ADULT  Goal: Absence or prevention of progression during hospitalization  Description: INTERVENTIONS:  - Assess and monitor for signs and symptoms of infection  - Monitor lab/diagnostic results  - Monitor all insertion sites, i e  indwelling lines, tubes, and drains  - Monitor endotracheal if appropriate and nasal secretions for changes in amount and color  - [place] appropriate cooling/warming therapies per order  - Administer medications as ordered  - Instruct and encourage patient and family to use good hand hygiene technique  - Identify and instruct in appropriate isolation precautions for identified infection/condition  Outcome: Progressing     Problem: SAFETY ADULT  Goal: Patient will remain free of falls  Description: INTERVENTIONS:  - Assess patient frequently for physical needs  -  Identify cognitive and physical deficits and behaviors that affect risk of falls    -  [place] fall precautions as indicated by assessment   - Educate patient/family on patient safety including physical limitations  - Instruct patient to call for assistance with activity based on assessment  - Modify environment to reduce risk of injury  - Consider OT/PT consult to assist with strengthening/mobility  Outcome: Progressing  Goal: Maintain or return to baseline ADL function  Description: INTERVENTIONS:  -  Assess patient's ability to carry out ADLs; assess patient's baseline for ADL function and identify physical deficits which impact ability to perform ADLs (bathing, care of mouth/teeth, toileting, grooming, dressing, etc )  - Assess/evaluate cause of self-care deficits   - Assess range of motion  - Assess patient's mobility; develop plan if impaired  - Assess patient's need for assistive devices and provide as appropriate  - Encourage maximum independence but intervene and supervise when necessary  - Involve family in performance of ADLs  - Assess for home care needs following discharge   - Consider OT consult to assist with ADL evaluation and planning for discharge  - Provide patient education as appropriate  Outcome: Progressing  Goal: Maintain or return mobility status to optimal level  Description: INTERVENTIONS:  - Assess patient's baseline mobility status (ambulation, transfers, stairs, etc )    - Identify cognitive and physical deficits and behaviors that affect mobility  - Identify mobility aids required to assist with transfers and/or ambulation (gait belt, sit-to-stand, lift, walker, cane, etc )  - [place] fall precautions as indicated by assessment  - Record patient progress and toleration of activity level on Mobility SBAR; progress patient to next Phase/Stage  - Instruct patient to call for assistance with activity based on assessment  - Consider rehabilitation consult to assist with strengthening/weightbearing, etc   Outcome: Progressing     Problem: DISCHARGE PLANNING  Goal: Discharge to home or other facility with appropriate resources  Description: INTERVENTIONS:  - Identify barriers to discharge w/patient and caregiver  - Arrange for needed discharge resources and transportation as appropriate  - Identify discharge learning needs (meds, wound care, etc )  - Arrange for interpretive services to assist at discharge as needed  - Refer to Case Management Department for coordinating discharge planning if the patient needs post-hospital services based on physician/advanced practitioner order or complex needs related to functional status, cognitive ability, or social support system  Outcome: Progressing     Problem: Knowledge Deficit  Goal: Patient/family/caregiver demonstrates understanding of disease process, treatment plan, medications, and discharge instructions  Description: Complete learning assessment and assess knowledge base    Interventions:  - Provide teaching at level of understanding  - Provide teaching via preferred learning methods  Outcome: Progressing     Problem: RESPIRATORY - ADULT  Goal: Achieves optimal ventilation and oxygenation  Description: INTERVENTIONS:  - Assess for changes in respiratory status  - Assess for changes in mentation and behavior  - Position to facilitate oxygenation and minimize respiratory effort  - Oxygen administered by appropriate delivery if ordered  - Initiate smoking cessation education as indicated  - Encourage broncho-pulmonary hygiene including cough, deep breathe, Incentive Spirometry  - Assess the need for suctioning and aspirate as needed  - Assess and instruct to report SOB or any respiratory difficulty  - Respiratory Therapy support as indicated  Outcome: Progressing     Problem: MUSCULOSKELETAL - ADULT  Goal: Maintain or return mobility to safest level of function  Description: INTERVENTIONS:  - Assess patient's ability to carry out ADLs; assess patient's baseline for ADL function and identify physical deficits which impact ability to perform ADLs (bathing, care of mouth/teeth, toileting, grooming, dressing, etc )  - Assess/evaluate cause of self-care deficits   - Assess range of motion  - Assess patient's mobility  - Assess patient's need for assistive devices and provide as appropriate  - Encourage maximum independence but intervene and supervise when necessary  - Involve family in performance of ADLs  - Assess for home care needs following discharge   - Consider OT consult to assist with ADL evaluation and planning for discharge  - Provide patient education as appropriate  Outcome: Progressing  Goal: Maintain proper alignment of affected body part  Description: INTERVENTIONS:  - Support, maintain and protect limb and body alignment  - Provide patient/ family with appropriate education  Outcome: Progressing     Problem: Nutrition/Hydration-ADULT  Goal: Nutrient/Hydration intake appropriate for improving, restoring or maintaining nutritional needs  Description: Monitor and assess patient's nutrition/hydration status for malnutrition  Collaborate with interdisciplinary team and initiate plan and interventions as ordered  Monitor patient's weight and dietary intake as ordered or per policy  Utilize nutrition screening tool and intervene as necessary  Determine patient's food preferences and provide high-protein, high-caloric foods as appropriate  INTERVENTIONS:  - Monitor oral intake, urinary output, labs, and treatment plans  - Assess nutrition and hydration status and recommend course of action  - Evaluate amount of meals eaten  - Assist patient with eating if necessary   - Allow adequate time for meals  - Recommend/ encourage appropriate diets, oral nutritional supplements, and vitamin/mineral supplements  - Order, calculate, and assess calorie counts as needed  - Recommend, monitor, and adjust tube feedings and TPN/PPN based on assessed needs  - Assess need for intravenous fluids  - Provide specific nutrition/hydration education as appropriate  - Include patient/family/caregiver in decisions related to nutrition  Outcome: Progressing     Problem: Potential for Falls  Goal: Patient will remain free of falls  Description: INTERVENTIONS:  - Assess patient frequently for physical needs  -  Identify cognitive and physical deficits and behaviors that affect risk of falls    -  [place] fall precautions as indicated by assessment   - Educate patient/family on patient safety including physical limitations  - Instruct patient to call for assistance with activity based on assessment  - Modify environment to reduce risk of injury  - Consider OT/PT consult to assist with strengthening/mobility  Outcome: Progressing

## 2021-03-25 NOTE — PROGRESS NOTES
[address] [facility]  Progress Note - Ruby Tanja 1965, 60 y o  male MRN: [id number]  Unit/Bed#: Marinu Mohit -01 Encounter: [id number]  Primary Care Provider: Dorsey Gosselin, MD   Date and time admitted to hospital: 3/13/2021  4:32 PM    * Pneumonia due to COVID-19 virus  Assessment & Plan  60year old male admitted due to acute respiratory failure with hypoxia secondary to COVID-19 pneumonia  Minimal improvement  Pulmonary concerned that he is going to fibrotic stage  - Unfortunately, still with guarded prognosis  Last day of pulse therapy today  14L today  - Family meeting via [employer] phone  number [phone]    - Solumedrol IV BID to be started belinda  Acute respiratory failure with hypoxia [facility])  Assessment & Plan  Secondary to above  CT-scan reviewed which showed pneumothorax and pneumomediastinum  Concern for rapid decompensation if an intervention is done given his poor pulmonary reserve, thus pulmonary prefers close monitoring instead  Rheumatoid arthritis involving both hands with positive rheumatoid factor (HCC)  Assessment & Plan  Continue Plaquenil    Severe recurrent major depression without psychotic features [facility])  Assessment & Plan  Continue Home medications    Gram-positive bacteremia  Assessment & Plan  Possibly contaminant  - Monitor off antibiotics  procalcitonin negative  - Repeat blood cultures pending    Results from last 7 days   Lab Units 03/25/21  0429 03/24/21  1158 03/24/21  1022 03/20/21  0503   PROCALCITONIN ng/ml 0 06  --  0 10 0 07   BLOOD CULTURE   --  Received in Microbiology Lab  Culture in Progress  Received in Microbiology Lab  Culture in Progress  --   --                  VTE Pharmacologic Prophylaxis:   Pharmacologic: Enoxaparin (Lovenox)  Mechanical VTE Prophylaxis in Place: Yes    Patient Centered Rounds: I have performed bedside rounds with nursing staff today      Discussions with Specialists or Other Care Team Provider: Nursing    Education and Discussions with Family / Patient: patient    Time Spent for Care: 30 minutes  More than 50% of total time spent on counseling and coordination of care as described above  Current Length of Stay: 12 day(s)    Current Patient Status: Inpatient   Certification Statement: The patient will continue to require additional inpatient hospital stay due to iv steroids, pulmonary reevaluation, family rediscussion, acute respiratory failure with hypoxia    Discharge Plan: active    Code Status: Level 1 - Full Code      Subjective:   I conducted a family meeting via telephone with his son, the patient, and an   I explained to him the current medical state he is in  This includes a guarded prognosis given his COVID-19 status progressing to fibrotic disease, pneumomediastinum, and pneumothorax  They are aware of the need for continued oxygen therapy and an attempt for chest tube placement may be life-threatening given his already guarded state  They would like to maintain level 1 status which includes CPR and intubation if necessary  Their questions were addressed to their satisfaction  Objective:     Vitals:   Temp (24hrs), Av 7 °F (36 5 °C), Min:97 5 °F (36 4 °C), Max:97 9 °F (36 6 °C)    Temp:  [97 5 °F (36 4 °C)-97 9 °F (36 6 °C)] 97 6 °F (36 4 °C)  HR:  [61-89] 81  Resp:  [20-34] 20  BP: (116-134)/(74-87) 123/84  SpO2:  [91 %-97 %] 97 %  There is no height or weight on file to calculate BMI  Input and Output Summary (last 24 hours): Intake/Output Summary (Last 24 hours) at 3/25/2021 1856  Last data filed at 3/25/2021 1701  Gross per 24 hour   Intake --   Output 1000 ml   Net -1000 ml       Physical Exam:     Physical Exam  Vitals signs reviewed  Constitutional:       General: He is in acute distress  Appearance: He is ill-appearing  HENT:      Head: Normocephalic        Nose: Nose normal       Mouth/Throat:      Mouth: Mucous membranes are moist    Eyes: General: No scleral icterus  Extraocular Movements: Extraocular movements intact  Cardiovascular:      Rate and Rhythm: Normal rate and regular rhythm  Pulmonary:      Effort: Respiratory distress present  Breath sounds: Decreased breath sounds present  No wheezing  Abdominal:      General: There is no distension  Palpations: Abdomen is soft  Tenderness: There is no abdominal tenderness  Skin:     General: Skin is warm  Neurological:      Mental Status: He is alert  Mental status is at baseline  Psychiatric:         Mood and Affect: Mood normal          Behavior: Behavior normal        Additional Data:     Labs:    Results from last 7 days   Lab Units 03/25/21  0429 03/23/21  0334 03/19/21  0516   WBC Thousand/uL 24 65* 12 55* 13 55*   HEMOGLOBIN g/dL 15 8 15 0 14 5   HEMATOCRIT % 47 7 44 7 43 7   PLATELETS Thousands/uL 343 304 358   BANDS PCT %  --   --  3   NEUTROS PCT %  --  91*  --    LYMPHS PCT %  --  4*  --    LYMPHO PCT %  --   --  5*   MONOS PCT %  --  2*  --    MONO PCT %  --   --  4   EOS PCT %  --  1 0     Results from last 7 days   Lab Units 03/25/21  0429 03/23/21  0334   SODIUM mmol/L 138 137   POTASSIUM mmol/L 4 5 4 5   CHLORIDE mmol/L 104 101   CO2 mmol/L 26 29   BUN mg/dL 22 23   CREATININE mg/dL 1 00 0 99   ANION GAP mmol/L 8 7   CALCIUM mg/dL 9 3 8 6   ALBUMIN g/dL  --  2 7*   TOTAL BILIRUBIN mg/dL  --  0 48   ALK PHOS U/L  --  103   ALT U/L  --  87*   AST U/L  --  48*   GLUCOSE RANDOM mg/dL 141* 112                 Results from last 7 days   Lab Units 03/25/21  0429 03/24/21  1022 03/20/21  0503 03/19/21  0444   PROCALCITONIN ng/ml 0 06 0 10 0 07 0 06           * I Have Reviewed All Lab Data Listed Above  * Additional Pertinent Lab Tests Reviewed:  Eric 66 Admission Reviewed    Imaging:    Imaging Reports Reviewed Today Include: cta chest pe study    Recent Cultures (last 7 days):     Results from last 7 days   Lab Units 03/24/21  1158   BLOOD CULTURE  Received in Microbiology Lab  Culture in Progress  Received in Microbiology Lab  Culture in Progress  Last 24 Hours Medication List:   Current Facility-Administered Medications   Medication Dose Route Frequency Provider Last Rate    albuterol  2 puff Inhalation 4x Daily KWAME Jordan      ARIPiprazole  10 mg Oral HS Peter Hook MD      atorvastatin  40 mg Oral HS Peter Hook MD      bisacodyl  10 mg Rectal Daily PRN Peter Hook MD      cholecalciferol  2,000 Units Oral Daily Peter Hook MD      DULoxetine  60 mg Oral BID Peter Hook MD      enoxaparin  1 mg/kg Subcutaneous Q12H [facility] KIRK Wallace      famotidine  20 mg Oral BID Peter Hook MD      hydroxychloroquine  200 mg Oral Daily Peter oHok MD      methylPREDNISolone sodium succinate  1,000 mg Intravenous Daily Ollie Ferraro DO Stopped (03/25/21 9599)    multivitamin-minerals  1 tablet Oral Daily Peter Hook MD      polyethylene glycol  17 g Oral Daily PRN Peter Hook MD      senna-docusate sodium  1 tablet Oral HS Peter Hook MD      traZODone  100 mg Oral HS PRN Peter Hook MD          Today, Patient Was Seen By: Mitzi Carranza MD    ** Please Note: Dictation voice to text software may have been used in the creation of this document   **

## 2021-03-25 NOTE — ASSESSMENT & PLAN NOTE
Possibly contaminant  - Monitor off antibiotics  procalcitonin negative  - Repeat blood cultures pending    Results from last 7 days   Lab Units 03/25/21  0429 03/24/21  1158 03/24/21  1022 03/20/21  0503   PROCALCITONIN ng/ml 0 06  --  0 10 0 07   BLOOD CULTURE   --  Received in Microbiology Lab  Culture in Progress  Received in Microbiology Lab  Culture in Progress    --   --

## 2021-03-25 NOTE — PLAN OF CARE
Problem: PAIN - ADULT  Goal: Verbalizes/displays adequate comfort level or baseline comfort level  Description: Interventions:  - Encourage patient to monitor pain and request assistance  - Assess pain using appropriate pain scale  - Administer analgesics based on type and severity of pain and evaluate response  - Implement non-pharmacological measures as appropriate and evaluate response  - Consider cultural and social influences on pain and pain management  - Notify physician/advanced practitioner if interventions unsuccessful or patient reports new pain  Outcome: Progressing     Problem: INFECTION - ADULT  Goal: Absence or prevention of progression during hospitalization  Description: INTERVENTIONS:  - Assess and monitor for signs and symptoms of infection  - Monitor lab/diagnostic results  - Monitor all insertion sites, i e  indwelling lines, tubes, and drains  - Monitor endotracheal if appropriate and nasal secretions for changes in amount and color  - [place] appropriate cooling/warming therapies per order  - Administer medications as ordered  - Instruct and encourage patient and family to use good hand hygiene technique  - Identify and instruct in appropriate isolation precautions for identified infection/condition  Outcome: Progressing     Problem: SAFETY ADULT  Goal: Patient will remain free of falls  Description: INTERVENTIONS:  - Assess patient frequently for physical needs  -  Identify cognitive and physical deficits and behaviors that affect risk of falls    -  [place] fall precautions as indicated by assessment   - Educate patient/family on patient safety including physical limitations  - Instruct patient to call for assistance with activity based on assessment  - Modify environment to reduce risk of injury  - Consider OT/PT consult to assist with strengthening/mobility  Outcome: Progressing  Goal: Maintain or return to baseline ADL function  Description: INTERVENTIONS:  -  Assess patient's ability to carry out ADLs; assess patient's baseline for ADL function and identify physical deficits which impact ability to perform ADLs (bathing, care of mouth/teeth, toileting, grooming, dressing, etc )  - Assess/evaluate cause of self-care deficits   - Assess range of motion  - Assess patient's mobility; develop plan if impaired  - Assess patient's need for assistive devices and provide as appropriate  - Encourage maximum independence but intervene and supervise when necessary  - Involve family in performance of ADLs  - Assess for home care needs following discharge   - Consider OT consult to assist with ADL evaluation and planning for discharge  - Provide patient education as appropriate  Outcome: Progressing  Goal: Maintain or return mobility status to optimal level  Description: INTERVENTIONS:  - Assess patient's baseline mobility status (ambulation, transfers, stairs, etc )    - Identify cognitive and physical deficits and behaviors that affect mobility  - Identify mobility aids required to assist with transfers and/or ambulation (gait belt, sit-to-stand, lift, walker, cane, etc )  - [place] fall precautions as indicated by assessment  - Record patient progress and toleration of activity level on Mobility SBAR; progress patient to next Phase/Stage  - Instruct patient to call for assistance with activity based on assessment  - Consider rehabilitation consult to assist with strengthening/weightbearing, etc   Outcome: Progressing     Problem: DISCHARGE PLANNING  Goal: Discharge to home or other facility with appropriate resources  Description: INTERVENTIONS:  - Identify barriers to discharge w/patient and caregiver  - Arrange for needed discharge resources and transportation as appropriate  - Identify discharge learning needs (meds, wound care, etc )  - Arrange for interpretive services to assist at discharge as needed  - Refer to Case Management Department for coordinating discharge planning if the patient needs post-hospital services based on physician/advanced practitioner order or complex needs related to functional status, cognitive ability, or social support system  Outcome: Progressing     Problem: Knowledge Deficit  Goal: Patient/family/caregiver demonstrates understanding of disease process, treatment plan, medications, and discharge instructions  Description: Complete learning assessment and assess knowledge base    Interventions:  - Provide teaching at level of understanding  - Provide teaching via preferred learning methods  Outcome: Progressing     Problem: RESPIRATORY - ADULT  Goal: Achieves optimal ventilation and oxygenation  Description: INTERVENTIONS:  - Assess for changes in respiratory status  - Assess for changes in mentation and behavior  - Position to facilitate oxygenation and minimize respiratory effort  - Oxygen administered by appropriate delivery if ordered  - Initiate smoking cessation education as indicated  - Encourage broncho-pulmonary hygiene including cough, deep breathe, Incentive Spirometry  - Assess the need for suctioning and aspirate as needed  - Assess and instruct to report SOB or any respiratory difficulty  - Respiratory Therapy support as indicated  Outcome: Progressing     Problem: MUSCULOSKELETAL - ADULT  Goal: Maintain or return mobility to safest level of function  Description: INTERVENTIONS:  - Assess patient's ability to carry out ADLs; assess patient's baseline for ADL function and identify physical deficits which impact ability to perform ADLs (bathing, care of mouth/teeth, toileting, grooming, dressing, etc )  - Assess/evaluate cause of self-care deficits   - Assess range of motion  - Assess patient's mobility  - Assess patient's need for assistive devices and provide as appropriate  - Encourage maximum independence but intervene and supervise when necessary  - Involve family in performance of ADLs  - Assess for home care needs following discharge   - Consider OT consult to assist with ADL evaluation and planning for discharge  - Provide patient education as appropriate  Outcome: Progressing  Goal: Maintain proper alignment of affected body part  Description: INTERVENTIONS:  - Support, maintain and protect limb and body alignment  - Provide patient/ family with appropriate education  Outcome: Progressing     Problem: Nutrition/Hydration-ADULT  Goal: Nutrient/Hydration intake appropriate for improving, restoring or maintaining nutritional needs  Description: Monitor and assess patient's nutrition/hydration status for malnutrition  Collaborate with interdisciplinary team and initiate plan and interventions as ordered  Monitor patient's weight and dietary intake as ordered or per policy  Utilize nutrition screening tool and intervene as necessary  Determine patient's food preferences and provide high-protein, high-caloric foods as appropriate  INTERVENTIONS:  - Monitor oral intake, urinary output, labs, and treatment plans  - Assess nutrition and hydration status and recommend course of action  - Evaluate amount of meals eaten  - Assist patient with eating if necessary   - Allow adequate time for meals  - Recommend/ encourage appropriate diets, oral nutritional supplements, and vitamin/mineral supplements  - Order, calculate, and assess calorie counts as needed  - Recommend, monitor, and adjust tube feedings and TPN/PPN based on assessed needs  - Assess need for intravenous fluids  - Provide specific nutrition/hydration education as appropriate  - Include patient/family/caregiver in decisions related to nutrition  Outcome: Progressing     Problem: Potential for Falls  Goal: Patient will remain free of falls  Description: INTERVENTIONS:  - Assess patient frequently for physical needs  -  Identify cognitive and physical deficits and behaviors that affect risk of falls    -  [place] fall precautions as indicated by assessment   - Educate patient/family on patient safety including physical limitations  - Instruct patient to call for assistance with activity based on assessment  - Modify environment to reduce risk of injury  - Consider OT/PT consult to assist with strengthening/mobility  Outcome: Progressing

## 2021-03-25 NOTE — TELEPHONE ENCOUNTER
Pt wife is concerned about his condition will like doctor to call her and informed about his health

## 2021-03-25 NOTE — PHYSICAL THERAPY NOTE
Physical Therapy Progress Note     03/25/21 1035   PT Last Visit   PT Visit Date 03/25/21   Note Type   Note Type Treatment   Pain Assessment   Pain Assessment Tool Pain Assessment not indicated - pt denies pain   Pain Score No Pain   Restrictions/Precautions   Weight Bearing Precautions Per Order No   Other Precautions O2;Fall Risk;Multiple lines;Telemetry; Airborne/isolation;Contact/isolation  (15L midflow, 15L non-rebreather)   General   Chart Reviewed Yes   Response to Previous Treatment Patient reporting fatigue but able to participate  Family/Caregiver Present No   Subjective   Subjective Willing to participate in therapy this AM    Bed Mobility   Supine to Sit 5  Supervision   Additional items Assist x 1;HOB elevated; Bedrails;Leg ; Increased time required;Verbal cues;LE management   Sit to Supine 5  Supervision   Additional items Assist x 1;Bedrails;Leg ; Increased time required;Verbal cues;LE management   Balance   Static Sitting Normal   Dynamic Sitting Good   Endurance Deficit   Endurance Deficit Yes   Endurance Deficit Description AMARO, medical status   Activity Tolerance   Activity Tolerance Patient limited by fatigue;Treatment limited secondary to medical complications (Comment)   Medical Staff Made Aware [place]    Nurse Made Aware Yes   Exercises   THR Supine;10 reps;AAROM; Bilateral   Assessment   Prognosis Fair   Problem List Decreased strength;Decreased range of motion;Decreased endurance; Impaired balance;Decreased mobility; Decreased safety awareness   Assessment Pt  supine in bed upon my arrival  Pt  reporting fatigue, however agreeable to therapeutic intervention  O2 saturation measured supine at rest between 89-92%  Performance of HEP supine in bed with cues provided for proper completion  Noted with increased exertion O2 saturation drop to 83%, requiring additional resting period for return  Pt  progressed with transfer with positioning seated at EOB   With additional time noted drop in O2 saturation to 77%  Thus returned to supine in bed with positioning side lying for respiratory status  After additional time O2 returned to 88%  RN made aware at end of treatment session  Pt  remained supine in bed with alarm active at end of treatment session  Discussed with PT Camilla Luna will recommend at this time d/c to rehab with pulmonary focus when medically stable for continued improvement of noted impairments  Barriers to Discharge Other (Comment)   Barriers to Discharge Comments medical status   Goals   Patient Goals To rest    STG Expiration Date 03/29/21   PT Treatment Day 4   Plan   Treatment/Interventions LE strengthening/ROM; Functional transfer training; Therapeutic exercise; Endurance training;Bed mobility;Spoke to nursing;Spoke to case management;OT  (PT Camilla Luna)   Progress Slow progress, medical status limitations   PT Frequency 2-3x/wk   Recommendation   PT Discharge Recommendation Post-Acute Rehabilitation Services; Other (Comment)  (rehab with pulmonary focus)   PT - OK to Discharge Yes  (if d/c when medically stable  )   AM-PAC Basic Mobility Inpatient   Turning in Bed Without Bedrails 4   Lying on Back to Sitting on Edge of Flat Bed 4   Moving Bed to Chair 4   Standing Up From Chair 4   Walk in Room 4   Climb 3-5 Stairs 3   Basic Mobility Inpatient Raw Score 23   Basic Mobility Standardized Score 50 88     Rafael Montero, PTA

## 2021-03-25 NOTE — ASSESSMENT & PLAN NOTE
Secondary to above  CT-scan reviewed which showed pneumothorax and pneumomediastinum  Concern for rapid decompensation if an intervention is done given his poor pulmonary reserve, thus pulmonary prefers close monitoring instead

## 2021-03-25 NOTE — PLAN OF CARE
Problem: OCCUPATIONAL THERAPY ADULT  Goal: Performs self-care activities at highest level of function for planned discharge setting  See evaluation for individualized goals  Description: Treatment Interventions: ADL retraining, Functional transfer training, UE strengthening/ROM, Endurance training, Patient/family training, Equipment evaluation/education, Compensatory technique education, Energy conservation, Activityengagement          See flowsheet documentation for full assessment, interventions and recommendations  Outcome: Progressing  Note: Limitation: Decreased ADL status, Decreased UE strength, Decreased Safe judgement during ADL, Decreased endurance, Decreased high-level ADLs  Prognosis: Good  Assessment: Pt seen for OT tx session w/ focus on self care, bed mobility, UE exercises, and activity tolerance  Denies pain  Pt on 15L midlfow and re-breather mask  SPO2 94% 15L at rest  Pt supine, reinforced side lying and prone to assist w/ increasing respiratory status  Minimal lunch consumed  Educated on increasing intake for healing  Adjust socks w/ MI long sit in bed for support  Tolerated UE/LE exercises to increase activity tolerance and conserve energy  Extended rest breaks provided between sets and exercises  Performed all bed mobility w/ (S) and increased time  Sat on EOB x 5 mins w/ F+ balance  Returned to bed  SPO2 ranged from 77% 15L midflow -> 94% 15L midflow t/o session  Sats drop w/ activity but drop drastically s/p resting from activity  Self engages in PLB, but needs cues to take rest breaks to conserve energy  Functionally pt does well, but activity tolerance remains significantly deconditioned and below baseline  Continue OT to increase tolerance and education on energy conservation  Recommendation for returning home w/ support and pulmonary rehab at this time        OT Discharge Recommendation: Return to previous environment with social support(Pulmonary rehab )  OT - OK to Discharge: No

## 2021-03-25 NOTE — UTILIZATION REVIEW
Continued Stay Review    Date: 3/25/21                         Current Patient Class: Inpatient Current Level of Care: Level 2 Stepdown    HPI:55 y o  male initially admitted on  3/13  for acute hypoxic resp failure 2/2  Severe Covid 19 PNA   3/25 Pulmonology: Currently on 15L midflow oxygen with 15 L NRB  Patient has not significantly improved despite aggressive COVID treatment and pulse dose steroids  Last day of pulse dose steroids is today, then will start Solu-Medrol 40 mg IV BID  Labs reveal  worsening leukocytosis, D-dimer elevated, but improved  CTA shows diffuse B/L GGO, pneumomediastinum and small left pneumothorax  Monitor pneumothorax closely  Coarse breath sounds on physical exam      Pertinent Labs/Diagnostic Results:     3/24 chest CTA: 1  No development of pulmonary embolism  2  Significant worsening of extensive bilateral groundglass opacities compatible with progression of COVID-19 pneumonia  3  Interval development of a minimal left pneumothorax and small amount of mediastinal emphysema which has been described as a spontaneous occurrence in some patients with COVID-19 infection  3/23 chest x-ray:  Diffuse bilateral groundglass opacities with some worsening in the right lung base  Findings presumably secondary to Covid-19 pneumonia        Results from last 7 days   Lab Units 03/25/21  0429 03/23/21  0334 03/19/21  0516   WBC Thousand/uL 24 65* 12 55* 13 55*   HEMOGLOBIN g/dL 15 8 15 0 14 5   HEMATOCRIT % 47 7 44 7 43 7   PLATELETS Thousands/uL 343 304 358   NEUTROS ABS Thousands/µL  --  11 46*  --    BANDS PCT %  --   --  3         Results from last 7 days   Lab Units 03/25/21  0429 03/23/21  0334 03/19/21  0559   SODIUM mmol/L 138 137 135*   POTASSIUM mmol/L 4 5 4 5 4 4   CHLORIDE mmol/L 104 101 101   CO2 mmol/L 26 29 25   ANION GAP mmol/L 8 7 9   BUN mg/dL 22 23 26*   CREATININE mg/dL 1 00 0 99 1 04   EGFR ml/min/1 73sq m 83 84 79   CALCIUM mg/dL 9 3 8 6 9 1   MAGNESIUM mg/dL  -- 2 1 2 4   PHOSPHORUS mg/dL  --  3 3  --      Results from last 7 days   Lab Units 03/23/21  0334   AST U/L 48*   ALT U/L 87*   ALK PHOS U/L 103   TOTAL PROTEIN g/dL 6 3*   ALBUMIN g/dL 2 7*   TOTAL BILIRUBIN mg/dL 0 48         Results from last 7 days   Lab Units 03/25/21  0429 03/23/21  0334 03/19/21  0559   GLUCOSE RANDOM mg/dL 141* 112 114           Results from last 7 days   Lab Units 03/25/21  0429 03/24/21  1022 03/23/21  0334 03/19/21  0516   D-DIMER QUANTITATIVE ug/ml FEU 2 76* 7 46* 6 37* 1 89*             Results from last 7 days   Lab Units 03/24/21  1022 03/20/21  0503 03/19/21  0444   PROCALCITONIN ng/ml 0 10 0 07 0 06                     Results from last 7 days   Lab Units 03/23/21  0334 03/19/21  0736   FERRITIN ng/mL 336 394*             Results from last 7 days   Lab Units 03/24/21  1022 03/23/21  0329 03/19/21  0559   CRP mg/L <3 0 3 5* 51 8*           Results from last 7 days   Lab Units 03/24/21  1158   BLOOD CULTURE  Received in Microbiology Lab  Culture in Progress  Received in Microbiology Lab  Culture in Progress       Results from last 7 days   Lab Units 03/19/21  0516   TOTAL COUNTED  100           Vital Signs:     Date/Time  Temp  Pulse  Resp  BP  MAP (mmHg)  SpO2  FiO2 (%)  Calculated FIO2 (%) - Nasal Cannula  O2 Flow Rate (L/min)  Nasal Cannula O2 Flow Rate (L/min)  O2 Device    03/25/21 12:08:51  97 6 °F (36 4 °C)  82  34Abnormal   121/82  95  92 %  --  80  --  15 L/min  Mid flow nasal cannula     O2 Device: with NRB at 03/25/21 1208   03/25/21 07:14:17  97 8 °F (36 6 °C)  65  20  122/87  99  94 %  --  80  --  15 L/min  Mid flow nasal cannula     O2 Device: and NRB at 03/25/21 0714   03/25/21 04:23:26  97 5 °F (36 4 °C)  83  26Abnormal   134/87  103  93 %  --  --  --  --  --    03/25/21 0400  --  --  --  --  --  --  --  80  15 L/min  15 L/min  Mid flow nasal cannula     O2 Device: +15 NRB at 03/25/21 0400 03/25/21 0000  --  --  --  --  --  --  --  80  15 L/min  15 L/min  High flow nasal cannula     O2 Device: +15 NRB at 03/25/21 0000   03/24/21 23:55:56  97 9 °F (36 6 °C)  61  30Abnormal   124/83  97  91 %  --  --  --  --  --    03/24/21 1942  --  --  --  --  --  94 %  --  80  15 L/min  15 L/min  Mid flow nasal cannula     O2 Device: + 15 NRB at 03/24/21 1942 03/24/21 19:32:33  97 5 °F (36 4 °C)  89  30Abnormal   116/74  88  95 %  --  --  --  --  --    03/24/21 14:26:29  97 6 °F (36 4 °C)  85  28Abnormal   110/70  83  95 %  --  80  --  15 L/min  Mid flow nasal cannula     O2 Device: and nonrebreather at 03/24/21 1426   03/24/21 11:49:45  97 9 °F (36 6 °C)  83  32Abnormal   126/75  92  93 %  --  80  15 L/min  15 L/min  Mid flow nasal cannula    03/24/21 07:29:06  97 6 °F (36 4 °C)  55  20  157/89  112  88 %Abnormal   --  72  --  13 L/min  Mid flow nasal cannula     O2 Device: and non rebreather at 03/24/21 0729   03/24/21 0254  --  --  --  --  --  90 %  --  72  15 L/min  13 L/min  Mid flow nasal cannula     O2 Device: +15 NRB at 03/24/21 0254   03/24/21 02:46:44  --  65  --  116/75  89  91 %  --  --  --  --  --    03/23/21 23:37:16  97 2 °F (36 2 °C)Abnormal   63  32Abnormal   124/75  91  91 %  --  --  --  --  --    03/23/21 2330  --  --  --  --  --  --  --  72  15 L/min  13 L/min  Mid flow nasal cannula     O2 Device: + 15 NRB at 03/23/21 2330 03/23/21 1930  --  --  --  --  --  --  --  72  15 L/min  13 L/min  Mid flow nasal cannula     O2 Device: +15 NRB at 03/23/21 1930 03/23/21 19:27:01  97 8 °F (36 6 °C)  60  37Abnormal   125/79  94  94 %  --  --  --  --  --    03/23/21 1836  --  --  --  --  --  --  50  --  --  --  --    03/23/21 1835  97 9 °F (36 6 °C)  --  34Abnormal   116/76  --  94 %  --  72  --  13 L/min  Mid flow nasal cannula     O2 Device: with NRB at 03/23/21 1835 03/23/21 1825  97 9 °F (36 6 °C)  --  34Abnormal   116/76  --  --  --  --  --  --  --    03/23/21 1500  --  --  --  --  --  --  50  --  --  --  --    03/23/21 14:52:56  97 9 °F (36 6 °C)  80  36Abnormal   118/75 89  96 %  --  72  --  13 L/min  Mid flow nasal cannula     O2 Device: with NRB at 03/23/21 1452   03/23/21 1140  --  --  --  --  --  --  50  --  --  --  --    03/23/21 11:33:48  97 7 °F (36 5 °C)  59  36Abnormal   108/73  85  94 %  --  72  --  13 L/min  Mid flow nasal cannula     O2 Device: with NRB at 03/23/21 1133   03/23/21 07:59:25  97 3 °F (36 3 °C)Abnormal   65  26Abnormal   124/83  97  92 %  --  72  --  13 L/min  Mid flow nasal cannula     O2 Device:  with NRB at 03/23/21 0759   03/23/21 0730  --  --  --  --  --  --  50  --  --  --  --    03/23/21 0330  --  --  --  --  --  95 %  --  72  15 L/min  13 L/min  Mid flow nasal cannula     O2 Device: +15 NRB at 03/23/21 0330   03/23/21 03:17:19  97 7 °F (36 5 °C)  50Abnormal   20  132/84  100  98 %  --  72  15 L/min  13 L/min  Mid flow nasal cannula           Medications:   Scheduled Medications:      albuterol, 2 puff, Inhalation, 4x Daily  ARIPiprazole, 10 mg, Oral, HS  atorvastatin, 40 mg, Oral, HS  cholecalciferol, 2,000 Units, Oral, Daily  DULoxetine, 60 mg, Oral, BID  enoxaparin, 1 mg/kg, Subcutaneous, Q12H WILLAM  famotidine, 20 mg, Oral, BID  hydroxychloroquine, 200 mg, Oral, Daily  methylPREDNISolone sodium succinate, 1,000 mg, Intravenous, Daily  multivitamin-minerals, 1 tablet, Oral, Daily  senna-docusate sodium, 1 tablet, Oral, HS      Continuous IV Infusions: None  PRN Meds:  bisacodyl, 10 mg, Rectal, Daily PRN  polyethylene glycol, 17 g, Oral, Daily PRN  traZODone, 100 mg, Oral, HS PRN        Discharge Plan: D          Network Utilization Review Department  ATTENTION: Please call with any questions or concerns to [phone] and carefully listen to the prompts so that you are directed to the right person  All voicemails are confidential   [facility] all requests for admission clinical reviews, approved or denied determinations and any other requests to dedicated fax number below belonging to the campus where the patient is receiving treatment   List of dedicated fax numbers for the Facilities:  [address] [place] [address] DENIALS (Administrative/Medical Necessity) [phone]   [address] (Maternity/NICU/Pediatrics) [phone] [address] Mike Luna 1523 (  Cole Saavedra "Katrina" 103) [address] [place] [address] [address] [facility] [address] [phone]

## 2021-03-25 NOTE — PROGRESS NOTES
Progress Note - Pulmonary   Bulmaro Moore 60 y o  male MRN: [id number]  Unit/Bed#: [facility] 2 -01 Encounter: [id number]      Assessment/Plan:         1  Acute hypoxic respiratory failure secondary to COVID 19  1  Titrate oxygen as needed to maintain SpO2 >/=88%  2  Pulmonary toilet: IS, cough deep breathe  3  Side lying and prone position  4  Current O2 needs: 15L midlfow with 15L NRB  2  COVID 19 moderate protocol started  1  Recommendations  1  Continue severe protocol with pulse dose steroids  2  Labs  1  CRP <3 0 on 3/24  2  Procalcitonin negative x 5  3  D-dimer 2 76-decreased from 7 46  4  Ferritin 336-down from 394  5   on 3/14  6  Troponin  Negative x 4  7  HIV non reactive  8  hepatitis panel non reactive   3  Medications  1  Vitamin C/D/ZinC  2  pepcid  3  lipitor  4  Anticoagulation lovenox 1mg/kg BID  5  Remdesivir NA on plaquenil  6  decadron dosing : completed 10 days 3/23/21  7  day 3 of pulse dose steroids  8  Diuretics NA  9  Actemra  3/17/2021 and 3/18/2021  10  Convalsecent plasma NA  11  Antibiotics NA   3  Pneumomediastinum  1  No additional imaging indicated at this time  4  RA on Plaquenil  5  Depression  6  GPC bacteremia-contaminent          Subjective:     Carrolyn Stage was seen in bed  Reports dyspnea but denies additional complaints    Objective:         Vitals: Blood pressure 122/87, pulse 65, temperature 97 8 °F (36 6 °C), temperature source Oral, resp  rate 20, SpO2 94 %  , 15 L midflow and 15 L NRB, There is no height or weight on file to calculate BMI        Intake/Output Summary (Last 24 hours) at 3/25/2021 1123  Last data filed at 3/25/2021 0849  Gross per 24 hour   Intake --   Output 1150 ml   Net -1150 ml         Physical Exam  Gen: Awake, alert, oriented x 3, no acute distress  HEENT: Mucous membranes moist, no oral lesions, no thrush, oxygen via Midflow and NRB  NECK: no accessory muscle use, JVP not elevated  Cardiac: Regular, single S1, single S2, no murmurs, no rubs, no gallops  Lungs: clear breath sounds  Abdomen: normoactive bowel sounds, soft nontender, nondistended, no rebound or rigidity, no guarding  Extremities: no cyanosis, no clubbing, no edema    Labs: I have personally reviewed pertinent lab results  , CBC:   Lab Results   Component Value Date    WBC 24 65 (H) 03/25/2021    HGB 15 8 03/25/2021    HCT 47 7 03/25/2021    MCV 88 03/25/2021     03/25/2021    MCH 29 2 03/25/2021    MCHC 33 1 03/25/2021    RDW 12 9 03/25/2021    MPV 11 3 03/25/2021   , CMP:   Lab Results   Component Value Date    SODIUM 138 03/25/2021    K 4 5 03/25/2021     03/25/2021    CO2 26 03/25/2021    BUN 22 03/25/2021    CREATININE 1 00 03/25/2021    CALCIUM 9 3 03/25/2021    EGFR 83 03/25/2021     Imaging and other studies: no new pulmonary imaging to review today      KIRK Valdes

## 2021-03-25 NOTE — PLAN OF CARE
Problem: PHYSICAL THERAPY ADULT  Goal: Performs mobility at highest level of function for planned discharge setting  See evaluation for individualized goals  Description: Treatment/Interventions: Functional transfer training, LE strengthening/ROM, Elevations, Therapeutic exercise, Endurance training, Patient/family training, Equipment eval/education, Bed mobility, Gait training, Continued evaluation, Spoke to nursing, OT          See flowsheet documentation for full assessment, interventions and recommendations  Outcome: Progressing  Note: Prognosis: Fair  Problem List: Decreased strength, Decreased range of motion, Decreased endurance, Impaired balance, Decreased mobility, Decreased safety awareness  Assessment: Pt  supine in bed upon my arrival  Pt  reporting fatigue, however agreeable to therapeutic intervention  O2 saturation measured supine at rest between 89-92%  Performance of HEP supine in bed with cues provided for proper completion  Noted with increased exertion O2 saturation drop to 83%, requiring additional resting period for return  Pt  progressed with transfer with positioning seated at EOB  With additional time noted drop in O2 saturation to 77%  Thus returned to supine in bed with positioning side lying for respiratory status  After additional time O2 returned to 88%  RN made aware at end of treatment session  Pt  remained supine in bed with alarm active at end of treatment session  Discussed with PT Bryson Santoyo will recommend at this time d/c to rehab with pulmonary focus when medically stable for continued improvement of noted impairments  Barriers to Discharge: Other (Comment)  Barriers to Discharge Comments: medical status  PT Discharge Recommendation: Post-Acute Rehabilitation Services, Other (Comment)(rehab with pulmonary focus)     PT - OK to Discharge: Yes(if d/c when medically stable )    See flowsheet documentation for full assessment

## 2021-03-25 NOTE — ASSESSMENT & PLAN NOTE
60year old male admitted due to acute respiratory failure with hypoxia secondary to COVID-19 pneumonia  Minimal improvement  Pulmonary concerned that he is going to fibrotic stage  - Unfortunately, still with guarded prognosis  Last day of pulse therapy today  14L today  - Family meeting via to-BBB phone  number [phone]    - Solumedrol IV BID to be started belinda

## 2021-03-25 NOTE — OCCUPATIONAL THERAPY NOTE
[address] Progress Note     Patient Name: Ruby Agrawal  DQMIU'D Date: 3/25/2021  Problem List  Principal Problem:    Pneumonia due to COVID-19 virus  Active Problems:    Rheumatoid arthritis involving both hands with positive rheumatoid factor ([facility] )    Severe recurrent major depression without psychotic features ([facility] )    Acute respiratory failure with hypoxia ([facility] )    Gram-positive bacteremia          03/25/21 1036   OT Last Visit   OT Visit Date 03/25/21   Note Type   Note Type Treatment   Restrictions/Precautions   Weight Bearing Precautions Per Order No   Other Precautions O2;Fall Risk;Telemetry; Airborne/isolation;Contact/isolation   Pain Assessment   Pain Assessment Tool Pain Assessment not indicated - pt denies pain   ADL   Eating Assistance 7  Independent   Eating Deficit Setup   Eating Comments Ate minimal lunch    LB Dressing Assistance 6  Modified independent   LB Dressing Deficit Other (Comment)  (Adjust socks via long sit in bed )   Bed Mobility   Supine to Sit 5  Supervision   Additional items Increased time required   Sit to Supine 5  Supervision   Additional items Increased time required   Additional Comments Pt in supine upon arrival and returned to supine at end of session  Reinforced education from previous sessions about side lying or prone for increased respiratory status  Therapeutic Exercise - ROM   UE-ROM   (Shld flexion, Elbow flexion, Abduction, and hands x 10 reps)   Cognition   Overall Cognitive Status WFL   Arousal/Participation Cooperative   Attention Within functional limits   Orientation Level Oriented X4   Memory Within functional limits   Following Commands Follows multistep commands without difficulty   Comments Pleasant and cooperative  Good effort during therapy sessions  Additional Activities   Additional Activities Comments Pt needed cues to take breaks due to O2 sats      Activity Tolerance   Activity Tolerance Treatment limited secondary to medical complications (Comment)  (Decreased o2 sats s/p activity )   Medical Staff Made Aware [facility]    Assessment   Assessment Pt seen for OT tx session w/ focus on self care, bed mobility, UE exercises, and activity tolerance  Denies pain  Pt on 15L midlfow and re-breather mask  SPO2 94% 15L at rest  Pt supine, reinforced side lying and prone to assist w/ increasing respiratory status  Minimal lunch consumed  Educated on increasing intake for healing  Adjust socks w/ MI long sit in bed for support  Tolerated UE/LE exercises to increase activity tolerance and conserve energy  Extended rest breaks provided between sets and exercises  Performed all bed mobility w/ (S) and increased time  Sat on EOB x 5 mins w/ F+ balance  Returned to bed  SPO2 ranged from 77% 15L midflow -> 94% 15L midflow t/o session  Sats drop w/ activity but drop drastically s/p resting from activity  Self engages in PLB, but needs cues to take rest breaks to conserve energy  Functionally pt does well, but activity tolerance remains significantly deconditioned and below baseline  Continue OT to increase tolerance and education on energy conservation  Recommendation for returning home w/ support and pulmonary rehab at this time  Plan   Treatment Interventions ADL retraining;UE strengthening/ROM; Endurance training;Energy conservation; Activityengagement   Goal Expiration Date 03/30/21   OT Treatment Day 2   OT Frequency 2-3x/wk   Recommendation   OT Discharge Recommendation Return to previous environment with social support  (Pulmonary rehab )   AM-PAC Daily Activity Inpatient   Lower Body Dressing 3   Bathing 3   Toileting 3   Upper Body Dressing 3   Grooming 4   Eating 4   Daily Activity Raw Score 20   Daily Activity Standardized Score (Calc for Raw Score >=11) 42 03   AM-PAC Applied Cognition Inpatient   Following a Speech/Presentation 4   Understanding Ordinary Conversation 4   Taking Medications 3   Remembering Where Things Are Placed or Put Away 3   Remembering List of 4-5 Errands 3   Taking Care of Complicated Tasks 3   Applied Cognition Raw Score 20   Applied Cognition Standardized Score 41 76      [facility] Laisha MACK, OTR/L

## 2021-03-26 PROBLEM — J93.11 PRIMARY SPONTANEOUS PNEUMOTHORAX: Status: ACTIVE | Noted: 2021-01-01

## 2021-03-26 PROBLEM — J98.2 PNEUMOMEDIASTINUM (HCC): Status: ACTIVE | Noted: 2021-01-01

## 2021-03-26 NOTE — PHYSICAL THERAPY NOTE
03/26/21 1036   PT Last Visit   PT Visit Date 03/26/21   Note Type   Note Type Treatment   Pain Assessment   Pain Assessment Tool 0-10   Pain Score No Pain   Restrictions/Precautions   Weight Bearing Precautions Per Order No   Other Precautions Contact/isolation; Airborne/isolation; Bed Alarm;Multiple lines;Telemetry;O2;Fall Risk   General   Chart Reviewed Yes   Family/Caregiver Present No   Cognition   Overall Cognitive Status WFL   Orientation Level Oriented X4   Subjective   Subjective pt agreeable to participate in PT tx today  Bed Mobility   Supine to Sit 5  Supervision   Additional items Assist x 1   Sit to Supine 5  Supervision   Additional items Assist x 1   Additional Comments pt left in prone position after tx today   Transfers   Sit to Stand 5  Supervision   Additional items Assist x 1   Stand to Sit 5  Supervision   Additional items Assist x 1   Ambulation/Elevation   Gait pattern Decreased foot clearance;Narrow TIARA; Ataxia; Step to;Short stride   Gait Assistance 4  Minimal assist   Additional items Assist x 1   Assistive Device None   Distance 10'x1   Balance   Static Sitting Normal   Dynamic Sitting Good   Static Standing Fair +   Dynamic Standing Fair -   Ambulatory Fair -   Endurance Deficit   Endurance Deficit Yes   Endurance Deficit Description AMARO, medical status   Activity Tolerance   Activity Tolerance Treatment limited secondary to medical complications (Comment)   Nurse Made Aware TIM Macias   Exercises   Quad Sets Supine;10 reps;AROM; Bilateral   Heelslides Supine;10 reps;AROM; Bilateral   Hip Abduction Supine;10 reps;AROM; Bilateral   Hip Adduction Supine;10 reps;AROM; Bilateral   Ankle Pumps Supine;20 reps;AROM; Bilateral   Assessment   Prognosis Guarded   Problem List Decreased strength;Decreased endurance; Impaired balance;Decreased mobility  (o2 desaturation with activity)   Assessment Pt was laying in supine in be upon start of PT tx  Pt on 15L of o2 , and 15L of secondary o2 mask    Pt o2 SATs at 91% prior to start of tx  Pt performed exercises in supine as indicated on flowsheet , with 2-3 minutes recovery between for o2 desat  Pt dropped to 86% with exercises  Pt sat EPB for approx 1 minute his o2 level was at 90% , he was able to stand and perform gait training for 10'x1 then returned tositting on EOB  his O2 sat level dropped to 77% while sitting  Ihad pt lay back down into sidelying position to try and assist in his recovery of o2 SAT level   Nadia Lacy came into room to assist me in postioning pt into prone position to further assist pt in recovery of his o2 SAT level as he dropped down 36% at his lowest  Pt was responsive throughout desat episode and after approx 7-8 minutes he did recover to 93% while in prone position  Pt was left in prone position at end of tx session  After discussing with nurse post tx, she states she will discuss with pts MD about whether or not they want to continue PT tx or at what level of PT tx to attempt with pt  I will discuss this tx session with supervising Physical Therapist to also assess plan moving forward  Barriers to Discharge Comments medical status   Goals   Patient Goals none staed today   STG Expiration Date 03/29/21   PT Treatment Day 5   Plan   Treatment/Interventions Functional transfer training;LE strengthening/ROM; Therapeutic exercise; Endurance training;Patient/family training;Equipment eval/education; Bed mobility;Gait training;Continued evaluation;Spoke to nursing   Progress Slow progress, medical status limitations   PT Frequency   (2-3x/week)   Recommendation   PT Discharge Recommendation Post-Acute Rehabilitation Services  (with pulmonary focus)   AM-PAC Basic Mobility Inpatient   Turning in Bed Without Bedrails 4   Lying on Back to Sitting on Edge of Flat Bed 4   Moving Bed to Chair 3   Standing Up From Chair 4   Walk in Room 3   Climb 3-5 Stairs 3   Basic Mobility Inpatient Raw Score 21   Basic Mobility Standardized Score 45 55   Sayra Navarro Alvaro Lopes, PTA

## 2021-03-26 NOTE — QUICK NOTE
He desaturated twice below <60% twice this morning  One after physical therapy  The other while resting comfortably at bedside  He continues to have tachypnea, accessory muscle use, and respiratory distress  He was placed on a high flow nasal cannula  Due to instability, Dr Kike Walters (from pulmonary) believes that he may benefit from a higher level of care thus was accepted in the ICU  I spoke to his son Arelis Singleton via the telephone and updated him regarding his father's change of status

## 2021-03-26 NOTE — PROGRESS NOTES
Patient was working minimally with PT when oxygen saturation started to dropped into the low 60's  Once patient was back into bed oxygen dropped into the 30's but quickly came back up into the 70's  Once I put patient into the prone position with 15L midflow and 15L NRB, oxygen came up to 91%  Educated patient to maintain in the prone position for as long as possible  During this time (about 40 minutes later) patient rang his call bell and I saw his oxygen dropping into the 60's again and patient was visibly in distress, at this time me and respiratory therapist went into room right away, patient was placed on High Flow oxygen with NRB mask  Provider was notified at this time  Patient oxygen saturation came up to 92%  Patient is currently resting prone with High Flow and NRB in place  Transfer orders are placed for higher level of care

## 2021-03-26 NOTE — RAPID RESPONSE
Rapid Response Note  Pramod Michaels 60 y o  male MRN: [id number]  Unit/Bed#: ICU 06 Encounter: [id number]    Rapid Response Notification(s):   Response called date/time:  3/26/2021 12:31 PM  Response team arrival date/time:  3/26/2021 12:32 PM  Rapid response location:  [facility]  Primary reason for rapid response call:  Acute change in O2 sat    Rapid Response Intervention(s):   Airway:  Nasal airway  Breathing:  Oxygen  Circulation:  None  Fluids administered:  None  Medications administered:  None       Background/Situation:   Pramod Michaels is a 60 y o  male history of depression, rheumatoid arthritis who presents with worsening shortness of breath after being diagnosed with COVID-19 infection March 4, 2021  Patient was initially admitted at [facility] and was discharged home without requiring any oxygen  Patient had to be readmitted for acute hypoxic respiratory failure secondary to COVID-19 infection  Patient was started on the severe COVID pathway  Throughout the patient's hospitalization he had required high-flow nasal cannula  Patient eventually developed pneumothorax and pneumomediastinum noted on CTA done on 03/24/2021  Today the patient continued to desaturate down to the 50s, was transitioned to high-flow with non-rebreather, which seemed to have worked momentarily with gradual worsening  Rapid response was initiated, patient was transported to the ICU for potential intubation  Review of Systems   Respiratory: Positive for shortness of breath  All other systems reviewed and are negative  Objective:   Vitals:    03/26/21 0920 03/26/21 1111 03/26/21 1234 03/26/21 1236   BP:  104/65 119/85    BP Location:  Right arm Left arm    Pulse:  98 102 90   Resp:  (!) 32 (!) 45 (!) 34   Temp:  97 6 °F (36 4 °C) (!) 96 °F (35 6 °C)    TempSrc:  Axillary Oral    SpO2: 92% 92% (!) 69% 95%   Weight:   82 3 kg (181 lb 7 oz)      Physical Exam  HENT:      Head: Normocephalic and atraumatic  Cardiovascular:      Rate and Rhythm: Normal rate and regular rhythm  Pulmonary:      Effort: Respiratory distress present  Neurological:      Mental Status: He is alert  Assessment:   · Acute hypoxic respiratory failure secondary to COVID-19 infection    Plan:   · Will continue patient on severe COVID-19 pathway  · Prone patient with high-flow nasal cannula  · Being transported to ICU to monitor for respiratory deterioration  Rapid Response Outcome:   Condition:  In stable condition  Transfer:  Transfer to ICU  Primary service notified of transfer: Yes    Handoff report: in person    Code Status: Level 1 (Full Code)      Family notified of transfer: yes  Family member contacted:  Contacted per SLIM     Portions of the record may have been created with voice recognition software  Occasional wrong word or "sound a like" substitutions may have occurred due to the inherent limitations of voice recognition software  Read the chart carefully and recognize, using context, where substitutions have occurred      Jessa Whitmore MD

## 2021-03-26 NOTE — CASE MANAGEMENT
GMLOS:  6            LOS: 13  BUNDLED?no  UNPLANNED READMISSION LEVEL: low  30 DAY READMISSION? Yes- previously admitted at [facility] for Covid PNA being mild and d/c home now returning with worsening symptoms,      Pt admitted with Covid PNA now with likely fibrotic changes per pulmonary requiring 13L mid flow/NRB  He was not on O2 PTA and will likely need Home O2 study prior to d/c with pulse ox being provided  Search [facility] previously completed with pt being 100% eligible for financial assistance if needed  Pt speaks mainly Icelandic but able to answer general questions  Pt lives w/ his wife (has health issues herself) and son (works) in a [address] with 4[address] and FF to 2nd floor where bed/full bath are present  Had no difficulty navigating steps PTA  Pt is independent with all aspects of care, ambulating without an AD  He is retired and drives self to appointments  Has a h/o depression with no [facility] admissions (in past 2 years) with pt stable on medications prescribed by PCP  Denies D&A, legal issues nor having a POA  PCP is Dr Nehemiah Ulrich and he uses [employer] for prescriptions  Pt would have a ride home at d/c  No further questions/concerns voiced at present  Will continue to follow to assist with dc poc

## 2021-03-26 NOTE — QUICK NOTE
ED I fired for [address] 2 to bed 225 with a score 63 6  I called the registered nurse Juvencio Waggoner, who informed me that the patient was working with physical therapy and had temporarily dropped his oxygen saturation level  He is currently 94% saturated on mid flow 15 L and non-rebreather over the mid flow  Patient is stable

## 2021-03-26 NOTE — PLAN OF CARE
Problem: PAIN - ADULT  Goal: Verbalizes/displays adequate comfort level or baseline comfort level  Description: Interventions:  - Encourage patient to monitor pain and request assistance  - Assess pain using appropriate pain scale  - Administer analgesics based on type and severity of pain and evaluate response  - Implement non-pharmacological measures as appropriate and evaluate response  - Consider cultural and social influences on pain and pain management  - Notify physician/advanced practitioner if interventions unsuccessful or patient reports new pain  Outcome: Progressing     Problem: INFECTION - ADULT  Goal: Absence or prevention of progression during hospitalization  Description: INTERVENTIONS:  - Assess and monitor for signs and symptoms of infection  - Monitor lab/diagnostic results  - Monitor all insertion sites, i e  indwelling lines, tubes, and drains  - Monitor endotracheal if appropriate and nasal secretions for changes in amount and color  - [place] appropriate cooling/warming therapies per order  - Administer medications as ordered  - Instruct and encourage patient and family to use good hand hygiene technique  - Identify and instruct in appropriate isolation precautions for identified infection/condition  Outcome: Progressing     Problem: SAFETY ADULT  Goal: Patient will remain free of falls  Description: INTERVENTIONS:  - Assess patient frequently for physical needs  -  Identify cognitive and physical deficits and behaviors that affect risk of falls    -  [place] fall precautions as indicated by assessment   - Educate patient/family on patient safety including physical limitations  - Instruct patient to call for assistance with activity based on assessment  - Modify environment to reduce risk of injury  - Consider OT/PT consult to assist with strengthening/mobility  Outcome: Progressing  Goal: Maintain or return to baseline ADL function  Description: INTERVENTIONS:  -  Assess patient's ability to carry out ADLs; assess patient's baseline for ADL function and identify physical deficits which impact ability to perform ADLs (bathing, care of mouth/teeth, toileting, grooming, dressing, etc )  - Assess/evaluate cause of self-care deficits   - Assess range of motion  - Assess patient's mobility; develop plan if impaired  - Assess patient's need for assistive devices and provide as appropriate  - Encourage maximum independence but intervene and supervise when necessary  - Involve family in performance of ADLs  - Assess for home care needs following discharge   - Consider OT consult to assist with ADL evaluation and planning for discharge  - Provide patient education as appropriate  Outcome: Progressing  Goal: Maintain or return mobility status to optimal level  Description: INTERVENTIONS:  - Assess patient's baseline mobility status (ambulation, transfers, stairs, etc )    - Identify cognitive and physical deficits and behaviors that affect mobility  - Identify mobility aids required to assist with transfers and/or ambulation (gait belt, sit-to-stand, lift, walker, cane, etc )  - [place] fall precautions as indicated by assessment  - Record patient progress and toleration of activity level on Mobility SBAR; progress patient to next Phase/Stage  - Instruct patient to call for assistance with activity based on assessment  - Consider rehabilitation consult to assist with strengthening/weightbearing, etc   Outcome: Progressing     Problem: DISCHARGE PLANNING  Goal: Discharge to home or other facility with appropriate resources  Description: INTERVENTIONS:  - Identify barriers to discharge w/patient and caregiver  - Arrange for needed discharge resources and transportation as appropriate  - Identify discharge learning needs (meds, wound care, etc )  - Arrange for interpretive services to assist at discharge as needed  - Refer to Case Management Department for coordinating discharge planning if the patient needs post-hospital services based on physician/advanced practitioner order or complex needs related to functional status, cognitive ability, or social support system  Outcome: Progressing     Problem: Knowledge Deficit  Goal: Patient/family/caregiver demonstrates understanding of disease process, treatment plan, medications, and discharge instructions  Description: Complete learning assessment and assess knowledge base    Interventions:  - Provide teaching at level of understanding  - Provide teaching via preferred learning methods  Outcome: Progressing     Problem: RESPIRATORY - ADULT  Goal: Achieves optimal ventilation and oxygenation  Description: INTERVENTIONS:  - Assess for changes in respiratory status  - Assess for changes in mentation and behavior  - Position to facilitate oxygenation and minimize respiratory effort  - Oxygen administered by appropriate delivery if ordered  - Initiate smoking cessation education as indicated  - Encourage broncho-pulmonary hygiene including cough, deep breathe, Incentive Spirometry  - Assess the need for suctioning and aspirate as needed  - Assess and instruct to report SOB or any respiratory difficulty  - Respiratory Therapy support as indicated  Outcome: Progressing     Problem: MUSCULOSKELETAL - ADULT  Goal: Maintain or return mobility to safest level of function  Description: INTERVENTIONS:  - Assess patient's ability to carry out ADLs; assess patient's baseline for ADL function and identify physical deficits which impact ability to perform ADLs (bathing, care of mouth/teeth, toileting, grooming, dressing, etc )  - Assess/evaluate cause of self-care deficits   - Assess range of motion  - Assess patient's mobility  - Assess patient's need for assistive devices and provide as appropriate  - Encourage maximum independence but intervene and supervise when necessary  - Involve family in performance of ADLs  - Assess for home care needs following discharge   - Consider OT consult to assist with ADL evaluation and planning for discharge  - Provide patient education as appropriate  Outcome: Progressing  Goal: Maintain proper alignment of affected body part  Description: INTERVENTIONS:  - Support, maintain and protect limb and body alignment  - Provide patient/ family with appropriate education  Outcome: Progressing     Problem: Nutrition/Hydration-ADULT  Goal: Nutrient/Hydration intake appropriate for improving, restoring or maintaining nutritional needs  Description: Monitor and assess patient's nutrition/hydration status for malnutrition  Collaborate with interdisciplinary team and initiate plan and interventions as ordered  Monitor patient's weight and dietary intake as ordered or per policy  Utilize nutrition screening tool and intervene as necessary  Determine patient's food preferences and provide high-protein, high-caloric foods as appropriate  INTERVENTIONS:  - Monitor oral intake, urinary output, labs, and treatment plans  - Assess nutrition and hydration status and recommend course of action  - Evaluate amount of meals eaten  - Assist patient with eating if necessary   - Allow adequate time for meals  - Recommend/ encourage appropriate diets, oral nutritional supplements, and vitamin/mineral supplements  - Order, calculate, and assess calorie counts as needed  - Recommend, monitor, and adjust tube feedings and TPN/PPN based on assessed needs  - Assess need for intravenous fluids  - Provide specific nutrition/hydration education as appropriate  - Include patient/family/caregiver in decisions related to nutrition  Outcome: Progressing     Problem: Potential for Falls  Goal: Patient will remain free of falls  Description: INTERVENTIONS:  - Assess patient frequently for physical needs  -  Identify cognitive and physical deficits and behaviors that affect risk of falls    -  [place] fall precautions as indicated by assessment   - Educate patient/family on patient safety including physical limitations  - Instruct patient to call for assistance with activity based on assessment  - Modify environment to reduce risk of injury  - Consider OT/PT consult to assist with strengthening/mobility  Outcome: Progressing

## 2021-03-26 NOTE — PROGRESS NOTES
Progress Note - Pulmonary   Rigoberto Calix 60 y o  male MRN: [id number]  Unit/Bed#: ICU 06 Encounter: [id number]      Assessment and Plan:  Acute hypoxic respiratory failure 2/2 COVID pneumonia  COVID19 pneumonia  Left pneumothorax  Abnormal d-dimer  Pneumomediastinum  Rheumatoid arthritis on Plaquenil  Severe recurrent major depression  GPC bacteremia - contaminant    - check CXR - has pneumothorax worsened? - I lowered flow rate on high flow to 50L and removed NRB  His pulse ox is % at rest  I told him to use NRB if he is moving around in bed  - Completed pulse dose steroids x 3 days  Will start IV steroids daily  - cont  Full dose a/c    Discussed with Dr Shawna Leyva  I was present at RRT  Guarded prognosis  Pt is >20 days post COVID and remains with refractory hypoxia, new pneumothorax and pneumomediastinum, now transferred back to ICU  At risk for sudden death and decompensation  At high risk of needing intubation  Subjective:   RRT this morning for hypoxia on NRB and 15L  Transferred to ICU  Found lying in bed with NRB and high flow oxygen  State he feels the same as yesterday  No new complaints  Objective:   Afebrile  Vitals: Blood pressure 119/85, pulse 90, temperature (!) 96 °F (35 6 °C), temperature source Oral, resp  rate (!) 34, weight 82 3 kg (181 lb 7 oz), SpO2 95 %  , 74W250%+NRB, Body mass index is 26 79 kg/m²  Intake/Output Summary (Last 24 hours) at 3/26/2021 1334  Last data filed at 3/26/2021 1234  Gross per 24 hour   Intake --   Output 400 ml   Net -400 ml       Physical Exam  GEN: WDWN, nad, comfortable  HEENT: NCAT, EOMI  CVS: Regular, no m/r/g  LUNGS: diminished but clear, no wheezes or rales  ABD: soft, nd, nt  EXT: No c/c/e  NEURO: No focal deficits  MS: Moving all extremities  SKIN: warm, dry  PSYCH: calm, cooperative      Labs: I have personally reviewed pertinent lab results    Results from last 7 days   Lab Units 03/25/21  0429 03/23/21  0334   WBC Thousand/uL 24 65* 12 55*   HEMOGLOBIN g/dL 15 8 15 0   HEMATOCRIT % 47 7 44 7   PLATELETS Thousands/uL 343 304   NEUTROS PCT %  --  91*   MONOS PCT %  --  2*      Results from last 7 days   Lab Units 03/25/21  0429 03/23/21  0334   POTASSIUM mmol/L 4 5 4 5   CHLORIDE mmol/L 104 101   CO2 mmol/L 26 29   BUN mg/dL 22 23   CREATININE mg/dL 1 00 0 99   CALCIUM mg/dL 9 3 8 6   ALK PHOS U/L  --  103   ALT U/L  --  87*   AST U/L  --  48*     Results from last 7 days   Lab Units 03/23/21  0334   MAGNESIUM mg/dL 2 1     Results from last 7 days   Lab Units 03/23/21  0334   PHOSPHORUS mg/dL 3 3              0   Lab Value Date/Time    TROPONINI <0 02 03/14/2021 0457    TROPONINI <0 01 03/13/2021 1221    TROPONINI <0 01 03/10/2021 1136    TROPONINI <0 01 04/18/2019 1835     Labs per my review reveal mildly elevated creatinine, hyperglycemia, leukocytosis worse, normal hemoglobin      Microbiology:  3/24 blood cultures x 2 NGTD    No new imaging  [facility]

## 2021-03-26 NOTE — ASSESSMENT & PLAN NOTE
Secondary to COVID-19  Rapidly deterioratating with vital sign instability  CT-scan reviewed which showed pneumothorax and pneumomediastinum  Deteriorating, will transfer to ICU for higher level of care  Full Code

## 2021-03-26 NOTE — PLAN OF CARE
Problem: PAIN - ADULT  Goal: Verbalizes/displays adequate comfort level or baseline comfort level  Description: Interventions:  - Encourage patient to monitor pain and request assistance  - Assess pain using appropriate pain scale  - Administer analgesics based on type and severity of pain and evaluate response  - Implement non-pharmacological measures as appropriate and evaluate response  - Consider cultural and social influences on pain and pain management  - Notify physician/advanced practitioner if interventions unsuccessful or patient reports new pain  Outcome: Progressing     Problem: INFECTION - ADULT  Goal: Absence or prevention of progression during hospitalization  Description: INTERVENTIONS:  - Assess and monitor for signs and symptoms of infection  - Monitor lab/diagnostic results  - Monitor all insertion sites, i e  indwelling lines, tubes, and drains  - Monitor endotracheal if appropriate and nasal secretions for changes in amount and color  - [place] appropriate cooling/warming therapies per order  - Administer medications as ordered  - Instruct and encourage patient and family to use good hand hygiene technique  - Identify and instruct in appropriate isolation precautions for identified infection/condition  Outcome: Progressing     Problem: SAFETY ADULT  Goal: Patient will remain free of falls  Description: INTERVENTIONS:  - Assess patient frequently for physical needs  -  Identify cognitive and physical deficits and behaviors that affect risk of falls    -  [place] fall precautions as indicated by assessment   - Educate patient/family on patient safety including physical limitations  - Instruct patient to call for assistance with activity based on assessment  - Modify environment to reduce risk of injury  - Consider OT/PT consult to assist with strengthening/mobility  Outcome: Progressing  Goal: Maintain or return to baseline ADL function  Description: INTERVENTIONS:  -  Assess patient's ability to carry out ADLs; assess patient's baseline for ADL function and identify physical deficits which impact ability to perform ADLs (bathing, care of mouth/teeth, toileting, grooming, dressing, etc )  - Assess/evaluate cause of self-care deficits   - Assess range of motion  - Assess patient's mobility; develop plan if impaired  - Assess patient's need for assistive devices and provide as appropriate  - Encourage maximum independence but intervene and supervise when necessary  - Involve family in performance of ADLs  - Assess for home care needs following discharge   - Consider OT consult to assist with ADL evaluation and planning for discharge  - Provide patient education as appropriate  Outcome: Progressing  Goal: Maintain or return mobility status to optimal level  Description: INTERVENTIONS:  - Assess patient's baseline mobility status (ambulation, transfers, stairs, etc )    - Identify cognitive and physical deficits and behaviors that affect mobility  - Identify mobility aids required to assist with transfers and/or ambulation (gait belt, sit-to-stand, lift, walker, cane, etc )  - [place] fall precautions as indicated by assessment  - Record patient progress and toleration of activity level on Mobility SBAR; progress patient to next Phase/Stage  - Instruct patient to call for assistance with activity based on assessment  - Consider rehabilitation consult to assist with strengthening/weightbearing, etc   Outcome: Progressing     Problem: DISCHARGE PLANNING  Goal: Discharge to home or other facility with appropriate resources  Description: INTERVENTIONS:  - Identify barriers to discharge w/patient and caregiver  - Arrange for needed discharge resources and transportation as appropriate  - Identify discharge learning needs (meds, wound care, etc )  - Arrange for interpretive services to assist at discharge as needed  - Refer to Case Management Department for coordinating discharge planning if the patient needs post-hospital services based on physician/advanced practitioner order or complex needs related to functional status, cognitive ability, or social support system  Outcome: Progressing     Problem: Knowledge Deficit  Goal: Patient/family/caregiver demonstrates understanding of disease process, treatment plan, medications, and discharge instructions  Description: Complete learning assessment and assess knowledge base    Interventions:  - Provide teaching at level of understanding  - Provide teaching via preferred learning methods  Outcome: Progressing     Problem: RESPIRATORY - ADULT  Goal: Achieves optimal ventilation and oxygenation  Description: INTERVENTIONS:  - Assess for changes in respiratory status  - Assess for changes in mentation and behavior  - Position to facilitate oxygenation and minimize respiratory effort  - Oxygen administered by appropriate delivery if ordered  - Initiate smoking cessation education as indicated  - Encourage broncho-pulmonary hygiene including cough, deep breathe, Incentive Spirometry  - Assess the need for suctioning and aspirate as needed  - Assess and instruct to report SOB or any respiratory difficulty  - Respiratory Therapy support as indicated  Outcome: Progressing     Problem: MUSCULOSKELETAL - ADULT  Goal: Maintain or return mobility to safest level of function  Description: INTERVENTIONS:  - Assess patient's ability to carry out ADLs; assess patient's baseline for ADL function and identify physical deficits which impact ability to perform ADLs (bathing, care of mouth/teeth, toileting, grooming, dressing, etc )  - Assess/evaluate cause of self-care deficits   - Assess range of motion  - Assess patient's mobility  - Assess patient's need for assistive devices and provide as appropriate  - Encourage maximum independence but intervene and supervise when necessary  - Involve family in performance of ADLs  - Assess for home care needs following discharge   - Consider OT consult to assist with ADL evaluation and planning for discharge  - Provide patient education as appropriate  Outcome: Progressing  Goal: Maintain proper alignment of affected body part  Description: INTERVENTIONS:  - Support, maintain and protect limb and body alignment  - Provide patient/ family with appropriate education  Outcome: Progressing     Problem: Nutrition/Hydration-ADULT  Goal: Nutrient/Hydration intake appropriate for improving, restoring or maintaining nutritional needs  Description: Monitor and assess patient's nutrition/hydration status for malnutrition  Collaborate with interdisciplinary team and initiate plan and interventions as ordered  Monitor patient's weight and dietary intake as ordered or per policy  Utilize nutrition screening tool and intervene as necessary  Determine patient's food preferences and provide high-protein, high-caloric foods as appropriate  INTERVENTIONS:  - Monitor oral intake, urinary output, labs, and treatment plans  - Assess nutrition and hydration status and recommend course of action  - Evaluate amount of meals eaten  - Assist patient with eating if necessary   - Allow adequate time for meals  - Recommend/ encourage appropriate diets, oral nutritional supplements, and vitamin/mineral supplements  - Order, calculate, and assess calorie counts as needed  - Recommend, monitor, and adjust tube feedings and TPN/PPN based on assessed needs  - Assess need for intravenous fluids  - Provide specific nutrition/hydration education as appropriate  - Include patient/family/caregiver in decisions related to nutrition  Outcome: Progressing     Problem: Potential for Falls  Goal: Patient will remain free of falls  Description: INTERVENTIONS:  - Assess patient frequently for physical needs  -  Identify cognitive and physical deficits and behaviors that affect risk of falls    -  [place] fall precautions as indicated by assessment   - Educate patient/family on patient safety including physical limitations  - Instruct patient to call for assistance with activity based on assessment  - Modify environment to reduce risk of injury  - Consider OT/PT consult to assist with strengthening/mobility  Outcome: Progressing

## 2021-03-26 NOTE — ASSESSMENT & PLAN NOTE
60year old male admitted due to acute respiratory failure with hypoxia secondary to COVID-19 pneumonia  Poor prognosis  Completed treatment, currently on iv steroids BID  Completed pulse steroid therapy  Transfer to ICU

## 2021-03-26 NOTE — PLAN OF CARE
Problem: PHYSICAL THERAPY ADULT  Goal: Performs mobility at highest level of function for planned discharge setting  See evaluation for individualized goals  Description: Treatment/Interventions: Functional transfer training, LE strengthening/ROM, Elevations, Therapeutic exercise, Endurance training, Patient/family training, Equipment eval/education, Bed mobility, Gait training, Continued evaluation, Spoke to nursing, OT          See flowsheet documentation for full assessment, interventions and recommendations  Note: Prognosis: Guarded  Problem List: Decreased strength, Decreased endurance, Impaired balance, Decreased mobility(o2 desaturation with activity)  Assessment: Pt was laying in supine in be upon start of PT tx  Pt on 15L of o2 , and 15L of secondary o2 mask   Pt o2 SATs at 91% prior to start of tx  Pt performed exercises in supine as indicated on flowsheet , with 2-3 minutes recovery between for o2 desat  Pt dropped to 86% with exercises  Pt sat EPB for approx 1 minute his o2 level was at 90% , he was able to stand and perform gait training for 10'x1 then returned tositting on EOB  his O2 sat level dropped to 77% while sitting  Ihad pt lay back down into sidelying position to try and assist in his recovery of o2 SAT level   Leida Andujar came into room to assist me in postioning pt into prone position to further assist pt in recovery of his o2 SAT level as he dropped down 36% at his lowest  Pt was responsive throughout desat episode and after approx 7-8 minutes he did recover to 93% while in prone position  Pt was left in prone position at end of tx session  After discussing with nurse post tx, she states she will discuss with pts MD about whether or not they want to continue PT tx or at what level of PT tx to attempt with pt  I will discuss this tx session with supervising Physical Therapist to also assess plan moving forward  Barriers to Discharge:  Other (Comment)  Barriers to Discharge Comments: medical status  PT Discharge Recommendation: Post-Acute Rehabilitation Services(with pulmonary focus)     PT - OK to Discharge: Yes(if d/c when medically stable )    See flowsheet documentation for full assessment

## 2021-03-26 NOTE — PROGRESS NOTES
Carl[facility] 46  Progress Note - Alicia Carias 1965, 60 y o  male MRN: [id number]  Unit/Bed#: [place] [address] -01 Encounter: [id number]  Primary Care Provider: Cb Balderrama MD   Date and time admitted to hospital: 3/13/2021  4:32 PM        * Acute respiratory failure with hypoxia [facility])  Assessment & Plan  Secondary to COVID-19  Rapidly deterioratating with vital sign instability  CT-scan reviewed which showed pneumothorax and pneumomediastinum  Deteriorating, will transfer to ICU for higher level of care  Full Code  Pneumonia due to COVID-19 virus  Assessment & Plan  60year old male admitted due to acute respiratory failure with hypoxia secondary to COVID-19 pneumonia  Poor prognosis  Completed treatment, currently on iv steroids BID  Completed pulse steroid therapy  Transfer to ICU  Rheumatoid arthritis involving both hands with positive rheumatoid factor (HCC)  Assessment & Plan  Continue Plaquenil    Severe recurrent major depression without psychotic features [facility])  Assessment & Plan  Continue Home medications    Critical care time  Performed by Dr Lidia Saldana   Total Critical care time 36 minutes:  Due to high probability of clinically significant, life threatening deterioration, the patient required might highest level of preparedness to intervene emergently and I personally spent this critical care time directly and personally managing the patient  This critical care time included reviewing his chart; examining patient, reviewing vitals, arranging high flow management with respiratory therapy due to persistent acute respiratory failure with hypoxia despite being on midflow, discussion with ICU to transfer patient for higher level of care  This critical care time was performed to assess and manage the high probability of imminent, life-threatening deterioration that could result in multi-organ failure and death   It was exclusive of separately billable procedures and treating other patients and teaching time  Please see MDM section and the rest  Of the note for further information n patient assessment and treatment  VTE Pharmacologic Prophylaxis:   Pharmacologic: Enoxaparin (Lovenox)  Mechanical VTE Prophylaxis in Place: Yes    Patient Centered Rounds: I have performed bedside rounds with nursing staff today  Discussions with Specialists or Other Care Team Provider: Nursing, critical care    Education and Discussions with Family / Patient: patient, son Gunner Cooper    Time Spent for Care: 1 hour  More than 50% of total time spent on counseling and coordination of care as described above  Current Length of Stay: 13 day(s)    Current Patient Status: Inpatient   Certification Statement: The patient will continue to require additional inpatient hospital stay due to ICU management for acute resrpitatory failure with hypoxia secondary to COVID-19, iv steroids    Discharge Plan: active    Code Status: Level 1 - Full Code      Subjective:   Patient seen and examined at bedside  Decompensated twice <60% saturation twice today  One after physical therapy, another after rest  Currently on high flow  Tachypneic with accessory muscle use  Spoke to ICU, who stated that he would benefit from a higher level of care as a result of this  Objective:     Vitals:   Temp (24hrs), Av 7 °F (35 9 °C), Min:92 8 °F (33 8 °C), Max:97 8 °F (36 6 °C)    Temp:  [92 8 °F (33 8 °C)-97 8 °F (36 6 °C)] 97 6 °F (36 4 °C)  HR:  [54-98] 98  Resp:  [20-32] 32  BP: (104-123)/(65-84) 104/65  SpO2:  [91 %-97 %] 92 %  There is no height or weight on file to calculate BMI  Input and Output Summary (last 24 hours): Intake/Output Summary (Last 24 hours) at 3/26/2021 1223  Last data filed at 3/25/2021 1701  Gross per 24 hour   Intake --   Output 300 ml   Net -300 ml       Physical Exam:     Physical Exam  Vitals signs reviewed  Constitutional:       General: He is in acute distress  Appearance: He is ill-appearing and toxic-appearing  HENT:      Head: Normocephalic  Nose: Nose normal       Mouth/Throat:      Mouth: Mucous membranes are dry  Eyes:      General: No scleral icterus  Cardiovascular:      Rate and Rhythm: Regular rhythm  Tachycardia present  Pulmonary:      Effort: Respiratory distress present  Breath sounds: Rhonchi present  No wheezing  Abdominal:      Palpations: Abdomen is soft  Tenderness: There is no abdominal tenderness  Musculoskeletal:      Right lower leg: No edema  Left lower leg: No edema  Skin:     General: Skin is warm  Neurological:      Mental Status: He is alert  Mental status is at baseline  Psychiatric:      Comments: anxious       Additional Data:     Labs:    Results from last 7 days   Lab Units 03/25/21  0429 03/23/21  0334   WBC Thousand/uL 24 65* 12 55*   HEMOGLOBIN g/dL 15 8 15 0   HEMATOCRIT % 47 7 44 7   PLATELETS Thousands/uL 343 304   NEUTROS PCT %  --  91*   LYMPHS PCT %  --  4*   MONOS PCT %  --  2*   EOS PCT %  --  1     Results from last 7 days   Lab Units 03/25/21  0429 03/23/21  0334   SODIUM mmol/L 138 137   POTASSIUM mmol/L 4 5 4 5   CHLORIDE mmol/L 104 101   CO2 mmol/L 26 29   BUN mg/dL 22 23   CREATININE mg/dL 1 00 0 99   ANION GAP mmol/L 8 7   CALCIUM mg/dL 9 3 8 6   ALBUMIN g/dL  --  2 7*   TOTAL BILIRUBIN mg/dL  --  0 48   ALK PHOS U/L  --  103   ALT U/L  --  87*   AST U/L  --  48*   GLUCOSE RANDOM mg/dL 141* 112                 Results from last 7 days   Lab Units 03/25/21  0429 03/24/21  1022 03/20/21  0503   PROCALCITONIN ng/ml 0 06 0 10 0 07           * I Have Reviewed All Lab Data Listed Above  * Additional Pertinent Lab Tests Reviewed: Eric 64 Admission Reviewed    Imaging:    Imaging Reports Reviewed Today Include: No new imaging    Recent Cultures (last 7 days):     Results from last 7 days   Lab Units 03/24/21  1158   BLOOD CULTURE  No Growth at 24 hrs    No Growth at 24 hrs        Last 24 Hours Medication List:   Current Facility-Administered Medications   Medication Dose Route Frequency Provider Last Rate    albuterol  2 puff Inhalation 4x Daily Monique Canales PA-C      ARIPiprazole  10 mg Oral HS Jeff Valadez MD      atorvastatin  40 mg Oral HS Jeff Valadez MD      bisacodyl  10 mg Rectal Daily PRN Jeff Valadez MD      cholecalciferol  2,000 Units Oral Daily Jeff Valadez MD      DULoxetine  60 mg Oral BID Jeff Valadez MD      enoxaparin  1 mg/kg Subcutaneous Q12H [facility] KIRK Streeter      famotidine  20 mg Oral BID Jeff Valadez MD      hydroxychloroquine  200 mg Oral Daily Jeff Valadez MD      multivitamin-minerals  1 tablet Oral Daily Jeff Valadez MD      polyethylene glycol  17 g Oral Daily PRN Jeff Valadez MD      senna-docusate sodium  1 tablet Oral HS Jeff Valadez MD      traZODone  100 mg Oral HS PRN Jeff Valadez MD          Today, Patient Was Seen By: Laura Ghosh MD    ** Please Note: Dictation voice to text software may have been used in the creation of this document   **

## 2021-03-27 NOTE — PLAN OF CARE
Problem: PAIN - ADULT  Goal: Verbalizes/displays adequate comfort level or baseline comfort level  Description: Interventions:  - Encourage patient to monitor pain and request assistance  - Assess pain using appropriate pain scale  - Administer analgesics based on type and severity of pain and evaluate response  - Implement non-pharmacological measures as appropriate and evaluate response  - Consider cultural and social influences on pain and pain management  - Notify physician/advanced practitioner if interventions unsuccessful or patient reports new pain  Outcome: Progressing     Problem: INFECTION - ADULT  Goal: Absence or prevention of progression during hospitalization  Description: INTERVENTIONS:  - Assess and monitor for signs and symptoms of infection  - Monitor lab/diagnostic results  - Monitor all insertion sites, i e  indwelling lines, tubes, and drains  - Monitor endotracheal if appropriate and nasal secretions for changes in amount and color  - [place] appropriate cooling/warming therapies per order  - Administer medications as ordered  - Instruct and encourage patient and family to use good hand hygiene technique  - Identify and instruct in appropriate isolation precautions for identified infection/condition  Outcome: Progressing     Problem: SAFETY ADULT  Goal: Patient will remain free of falls  Description: INTERVENTIONS:  - Assess patient frequently for physical needs  -  Identify cognitive and physical deficits and behaviors that affect risk of falls    -  [place] fall precautions as indicated by assessment   - Educate patient/family on patient safety including physical limitations  - Instruct patient to call for assistance with activity based on assessment  - Modify environment to reduce risk of injury  - Consider OT/PT consult to assist with strengthening/mobility  Outcome: Progressing  Goal: Maintain or return to baseline ADL function  Description: INTERVENTIONS:  -  Assess patient's ability to carry out ADLs; assess patient's baseline for ADL function and identify physical deficits which impact ability to perform ADLs (bathing, care of mouth/teeth, toileting, grooming, dressing, etc )  - Assess/evaluate cause of self-care deficits   - Assess range of motion  - Assess patient's mobility; develop plan if impaired  - Assess patient's need for assistive devices and provide as appropriate  - Encourage maximum independence but intervene and supervise when necessary  - Involve family in performance of ADLs  - Assess for home care needs following discharge   - Consider OT consult to assist with ADL evaluation and planning for discharge  - Provide patient education as appropriate  Outcome: Progressing  Goal: Maintain or return mobility status to optimal level  Description: INTERVENTIONS:  - Assess patient's baseline mobility status (ambulation, transfers, stairs, etc )    - Identify cognitive and physical deficits and behaviors that affect mobility  - Identify mobility aids required to assist with transfers and/or ambulation (gait belt, sit-to-stand, lift, walker, cane, etc )  - [place] fall precautions as indicated by assessment  - Record patient progress and toleration of activity level on Mobility SBAR; progress patient to next Phase/Stage  - Instruct patient to call for assistance with activity based on assessment  - Consider rehabilitation consult to assist with strengthening/weightbearing, etc   Outcome: Progressing     Problem: DISCHARGE PLANNING  Goal: Discharge to home or other facility with appropriate resources  Description: INTERVENTIONS:  - Identify barriers to discharge w/patient and caregiver  - Arrange for needed discharge resources and transportation as appropriate  - Identify discharge learning needs (meds, wound care, etc )  - Arrange for interpretive services to assist at discharge as needed  - Refer to Case Management Department for coordinating discharge planning if the patient needs post-hospital services based on physician/advanced practitioner order or complex needs related to functional status, cognitive ability, or social support system  Outcome: Progressing     Problem: Knowledge Deficit  Goal: Patient/family/caregiver demonstrates understanding of disease process, treatment plan, medications, and discharge instructions  Description: Complete learning assessment and assess knowledge base    Interventions:  - Provide teaching at level of understanding  - Provide teaching via preferred learning methods  Outcome: Progressing     Problem: RESPIRATORY - ADULT  Goal: Achieves optimal ventilation and oxygenation  Description: INTERVENTIONS:  - Assess for changes in respiratory status  - Assess for changes in mentation and behavior  - Position to facilitate oxygenation and minimize respiratory effort  - Oxygen administered by appropriate delivery if ordered  - Initiate smoking cessation education as indicated  - Encourage broncho-pulmonary hygiene including cough, deep breathe, Incentive Spirometry  - Assess the need for suctioning and aspirate as needed  - Assess and instruct to report SOB or any respiratory difficulty  - Respiratory Therapy support as indicated  Outcome: Progressing     Problem: MUSCULOSKELETAL - ADULT  Goal: Maintain or return mobility to safest level of function  Description: INTERVENTIONS:  - Assess patient's ability to carry out ADLs; assess patient's baseline for ADL function and identify physical deficits which impact ability to perform ADLs (bathing, care of mouth/teeth, toileting, grooming, dressing, etc )  - Assess/evaluate cause of self-care deficits   - Assess range of motion  - Assess patient's mobility  - Assess patient's need for assistive devices and provide as appropriate  - Encourage maximum independence but intervene and supervise when necessary  - Involve family in performance of ADLs  - Assess for home care needs following discharge   - Consider OT consult to assist with ADL evaluation and planning for discharge  - Provide patient education as appropriate  Outcome: Progressing  Goal: Maintain proper alignment of affected body part  Description: INTERVENTIONS:  - Support, maintain and protect limb and body alignment  - Provide patient/ family with appropriate education  Outcome: Progressing     Problem: Nutrition/Hydration-ADULT  Goal: Nutrient/Hydration intake appropriate for improving, restoring or maintaining nutritional needs  Description: Monitor and assess patient's nutrition/hydration status for malnutrition  Collaborate with interdisciplinary team and initiate plan and interventions as ordered  Monitor patient's weight and dietary intake as ordered or per policy  Utilize nutrition screening tool and intervene as necessary  Determine patient's food preferences and provide high-protein, high-caloric foods as appropriate  INTERVENTIONS:  - Monitor oral intake, urinary output, labs, and treatment plans  - Assess nutrition and hydration status and recommend course of action  - Evaluate amount of meals eaten  - Assist patient with eating if necessary   - Allow adequate time for meals  - Recommend/ encourage appropriate diets, oral nutritional supplements, and vitamin/mineral supplements  - Order, calculate, and assess calorie counts as needed  - Recommend, monitor, and adjust tube feedings and TPN/PPN based on assessed needs  - Assess need for intravenous fluids  - Provide specific nutrition/hydration education as appropriate  - Include patient/family/caregiver in decisions related to nutrition  Outcome: Progressing     Problem: Potential for Falls  Goal: Patient will remain free of falls  Description: INTERVENTIONS:  - Assess patient frequently for physical needs  -  Identify cognitive and physical deficits and behaviors that affect risk of falls    -  [place] fall precautions as indicated by assessment   - Educate patient/family on patient safety including physical limitations  - Instruct patient to call for assistance with activity based on assessment  - Modify environment to reduce risk of injury  - Consider OT/PT consult to assist with strengthening/mobility  Outcome: Progressing     Problem: Prexisting or High Potential for Compromised Skin Integrity  Goal: Skin integrity is maintained or improved  Description: INTERVENTIONS:  - Identify patients at risk for skin breakdown  - Assess and monitor skin integrity  - Assess and monitor nutrition and hydration status  - Monitor labs   - Assess for incontinence   - Turn and reposition patient  - Assist with mobility/ambulation  - Relieve pressure over bony prominences  - Avoid friction and shearing  - Provide appropriate hygiene as needed including keeping skin clean and dry  - Evaluate need for skin moisturizer/barrier cream  - Collaborate with interdisciplinary team   - Patient/family teaching  - Consider wound care consult   Outcome: Progressing

## 2021-03-27 NOTE — ASSESSMENT & PLAN NOTE
· Completed treatment of pulse dose steroids with no improvement  · Steroids BID as of 3/26  · Continue HFNC  · Poor prognosis  · Likely developing fibrosis

## 2021-03-27 NOTE — ASSESSMENT & PLAN NOTE
· Secondary to COVID-19  Has been requiring midflow during hospitalization stay but on 3/26 decompensated with O2 saturations in the 60% with slow improvement   Transferred to ICU  · On HFNC maintain O2 saturation >88%  · Encourage self prone  · Completed pulse steroid therapy transitioned to Solu-medrol BID 3/26  · Has pneumothorax and pneumomediastinum on CT but no evidence on CXR

## 2021-03-27 NOTE — PROGRESS NOTES
[address] [place] [address]  Progress Note - Esperanza Escamilla 1965, 60 y o  male MRN: [id number]  Unit/Bed#: ICU 06 Encounter: [id number]  Primary Care Provider: Kimberly Andrews MD   Date and time admitted to hospital: 3/13/2021  4:32 PM    * Acute respiratory failure with hypoxia [facility])  Assessment & Plan  · Secondary to COVID-19  Has been requiring midflow during hospitalization stay but on 3/26 decompensated with O2 saturations in the 60% with slow improvement  Transferred to ICU  · On HFNC maintain O2 saturation >88%  · Encourage self prone  · Completed pulse steroid therapy transitioned to Solu-medrol BID 3/26  · Has pneumothorax and pneumomediastinum on CT but no evidence on CXR    Pneumonia due to COVID-19 virus  Assessment & Plan  · Completed treatment of pulse dose steroids with no improvement  · Steroids BID as of 3/26  · Continue HFNC  · Poor prognosis  · Likely developing fibrosis       Primary spontaneous pneumothorax  Assessment & Plan  · Has been stable  · No need for chest tube    Pneumomediastinum [facility])  Assessment & Plan  · Has been stable  · Cxr on 3/26 no evidence     Rheumatoid arthritis involving both hands with positive rheumatoid factor (HCC)  Assessment & Plan  · Continue on Plaquenil    Severe recurrent major depression without psychotic features ([facility] )  Assessment & Plan  · Continue on home medications    Gram-positive bacteremia  Assessment & Plan  · Contaminant   · Repeat blood culture-negative        ----------------------------------------------------------------------------------------  HPI/24hr events: Remained on high flow nasal cannula without non-rebreather overnight  He still is complaining of dyspnea with exertion   O2 saturation >95% overnight    Disposition: Continue Stepdown Level 1 level of care   Code Status: Level 1 - Full Code  ---------------------------------------------------------------------------------------  SUBJECTIVE  Dyspnea with exertion    Review of Systems   Constitutional: Negative  HENT: Negative  Respiratory: Positive for shortness of breath  Dyspnea with exertion     Cardiovascular: Negative  Negative for chest pain  Gastrointestinal: Negative  Negative for abdominal pain  Genitourinary: Negative  Musculoskeletal: Negative  Neurological: Negative  Psychiatric/Behavioral: Negative  All other systems reviewed and are negative  Review of systems was reviewed and negative unless stated above in HPI/24-hour events   ---------------------------------------------------------------------------------------  OBJECTIVE    Vitals   Vitals:    21 0130 21 0230 21 0330 21 0403   BP: 138/88 135/82 123/80    Pulse: 60 60 (!) 52    Resp: (!) 36 (!) 33 (!) 26    Temp:    (!) 97 4 °F (36 3 °C)   TempSrc:    Temporal   SpO2: 95% 90% 98%    Weight:         Temp (24hrs), Av °F (36 1 °C), Min:92 8 °F (33 8 °C), Max:98 4 °F (36 9 °C)  Current: Temperature: (!) 97 4 °F (36 3 °C)          Respiratory:  SpO2: SpO2: 98 %  Nasal Cannula O2 Flow Rate (L/min): 14 L/min    Invasive/non-invasive ventilation settings   Respiratory    Lab Data (Last 4 hours)    None         O2/Vent Data (Last 4 hours)    None                Physical Exam  Vitals signs reviewed  Constitutional:       General: He is not in acute distress  Appearance: Normal appearance  HENT:      Head: Normocephalic  Nose: Nose normal  No congestion  Mouth/Throat:      Mouth: Mucous membranes are moist       Pharynx: Oropharynx is clear  Eyes:      General: No scleral icterus  Extraocular Movements: Extraocular movements intact  Conjunctiva/sclera: Conjunctivae normal       Pupils: Pupils are equal, round, and reactive to light  Neck:      Musculoskeletal: Normal range of motion and neck supple  Cardiovascular:      Rate and Rhythm: Normal rate  Pulses: Normal pulses        Heart sounds: Normal heart sounds  No murmur  Pulmonary:      Breath sounds: Rhonchi present  Comments: 100% hfnc  Abdominal:      General: Bowel sounds are normal  There is no distension  Palpations: Abdomen is soft  Musculoskeletal: Normal range of motion  General: No swelling  Lymphadenopathy:      Cervical: No cervical adenopathy  Skin:     General: Skin is warm and dry  Capillary Refill: Capillary refill takes less than 2 seconds  Neurological:      General: No focal deficit present  Mental Status: He is alert and oriented to person, place, and time  Mental status is at baseline  Cranial Nerves: No cranial nerve deficit  Laboratory and Diagnostics:  Results from last 7 days   Lab Units 03/27/21  0411 03/25/21  0429 03/23/21  0334   WBC Thousand/uL 17 35* 24 65* 12 55*   HEMOGLOBIN g/dL 14 7 15 8 15 0   HEMATOCRIT % 43 9 47 7 44 7   PLATELETS Thousands/uL 215 343 304   NEUTROS PCT %  --   --  91*   MONOS PCT %  --   --  2*     Results from last 7 days   Lab Units 03/27/21  0411 03/25/21  0429 03/23/21  0334   SODIUM mmol/L 137 138 137   POTASSIUM mmol/L 4 6 4 5 4 5   CHLORIDE mmol/L 103 104 101   CO2 mmol/L 27 26 29   ANION GAP mmol/L 7 8 7   BUN mg/dL 24 22 23   CREATININE mg/dL 0 83 1 00 0 99   CALCIUM mg/dL 9 0 9 3 8 6   GLUCOSE RANDOM mg/dL 131 141* 112   ALT U/L  --   --  87*   AST U/L  --   --  48*   ALK PHOS U/L  --   --  103   ALBUMIN g/dL  --   --  2 7*   TOTAL BILIRUBIN mg/dL  --   --  0 48     Results from last 7 days   Lab Units 03/23/21  0334   MAGNESIUM mg/dL 2 1   PHOSPHORUS mg/dL 3 3                   ABG:    VBG:    Results from last 7 days   Lab Units 03/25/21  0429 03/24/21  1022 03/20/21  0503   PROCALCITONIN ng/ml 0 06 0 10 0 07       Micro  Results from last 7 days   Lab Units 03/24/21  1158   BLOOD CULTURE  No Growth at 48 hrs  No Growth at 48 hrs         EKG:    Imaging: no new imaging    Intake and Output  I/O       03/25 0701 - 03/26 0700 03/26 0701 - 03/27 0700 P O   400    Total Intake(mL/kg)  400 (4 9)    Urine (mL/kg/hr) 400 1500 (0 8)    Total Output 400 1500    Net -400 -1100                Height and Weights      IBW: -88 kg  Body mass index is 26 79 kg/m²  Weight (last 2 days)     Date/Time   Weight    03/26/21 1234   82 3 (181 44)                Nutrition       Diet Orders   (From admission, onward)             Start     Ordered    03/23/21 1352  Dietary nutrition supplements  Once     Question Answer Comment   Select Supplement: Ensure Enlive-Vanilla    Frequency Breakfast, Dinner        03/23/21 1351    03/13/21 1722  Diet Regular; Regular House  Diet effective now     Question Answer Comment   Diet Type Regular    Regular Regular House    RD to adjust diet per protocol?  Yes        03/13/21 1722                  Active Medications  Scheduled Meds:  Current Facility-Administered Medications   Medication Dose Route Frequency Provider Last Rate    albuterol  2 puff Inhalation 4x Daily Rey Meier MD      ARIPiprazole  10 mg Oral HS Rey Meier MD      atorvastatin  40 mg Oral HS Rey Meier MD      bisacodyl  10 mg Rectal Daily PRN Rey Meier MD      cholecalciferol  2,000 Units Oral Daily Rey Meier MD      DULoxetine  60 mg Oral BID Rey Meier MD      enoxaparin  1 mg/kg Subcutaneous Q12H [address] Rey Meier MD      famotidine  20 mg Oral BID Rey Meier MD      hydroxychloroquine  200 mg Oral Daily Rey Meier MD      methylPREDNISolone sodium succinate  40 mg Intravenous Q12H [address] Carmen Ledesma DO      multivitamin-minerals  1 tablet Oral Daily Rey Meeir MD      polyethylene glycol  17 g Oral Daily PRN Rey Meier MD      senna-docusate sodium  1 tablet Oral HS Rey Meier MD      traZODone  100 mg Oral HS PRN Rey Meier MD       Continuous Infusions:     PRN Meds:   bisacodyl, 10 mg, Daily PRN  polyethylene glycol, 17 g, Daily PRN  traZODone, 100 mg, HS PRN        Invasive Devices Review  Invasive Devices Peripheral Intravenous Line            Peripheral IV 03/24/21 Right Antecubital 2 days                Rationale for remaining devices:    ---------------------------------------------------------------------------------------  Advance Directive and Living Will:      Power of :    POLST:    ---------------------------------------------------------------------------------------  Care Time Delivered:   No Critical Care time spent       KIRK Calhoun      Portions of the record may have been created with voice recognition software  Occasional wrong word or "sound a like" substitutions may have occurred due to the inherent limitations of voice recognition software    Read the chart carefully and recognize, using context, where substitutions have occurred

## 2021-03-28 PROBLEM — R04.0 EPISTAXIS: Status: ACTIVE | Noted: 2021-01-01

## 2021-03-28 NOTE — ASSESSMENT & PLAN NOTE
· Secondary to COVID-19  Has been requiring midflow during hospitalization stay but on 3/26 decompensated with O2 saturations in the 60% with slow improvement   Transferred to ICU  · On HFNC maintain O2 saturation >88%, wean as tolerated  · Encourage self prone  · Completed pulse steroid therapy transitioned to Solu-medrol BID 3/26 -- continue Solu-Medrol 40 mg b i d   · Has left pneumothorax and pneumomediastinum on CT -- follow-up chest x-ray this morning

## 2021-03-28 NOTE — QUICK NOTE
Spoke with patient's son over the phone, updated of current plan/management  All questions/concerns were addressed

## 2021-03-28 NOTE — ASSESSMENT & PLAN NOTE
· On anticoagulation and HFNC   · Most likely secondary to continuous oxygen therapy; no episodes of hypertension  · Nasal saline spray to prevent dryness/crust formation  · Rhino rocket applied

## 2021-03-28 NOTE — PLAN OF CARE
Problem: PAIN - ADULT  Goal: Verbalizes/displays adequate comfort level or baseline comfort level  Description: Interventions:  - Encourage patient to monitor pain and request assistance  - Assess pain using appropriate pain scale  - Administer analgesics based on type and severity of pain and evaluate response  - Implement non-pharmacological measures as appropriate and evaluate response  - Consider cultural and social influences on pain and pain management  - Notify physician/advanced practitioner if interventions unsuccessful or patient reports new pain  Outcome: Progressing     Problem: INFECTION - ADULT  Goal: Absence or prevention of progression during hospitalization  Description: INTERVENTIONS:  - Assess and monitor for signs and symptoms of infection  - Monitor lab/diagnostic results  - Monitor all insertion sites, i e  indwelling lines, tubes, and drains  - Monitor endotracheal if appropriate and nasal secretions for changes in amount and color  - [place] appropriate cooling/warming therapies per order  - Administer medications as ordered  - Instruct and encourage patient and family to use good hand hygiene technique  - Identify and instruct in appropriate isolation precautions for identified infection/condition  Outcome: Progressing     Problem: SAFETY ADULT  Goal: Patient will remain free of falls  Description: INTERVENTIONS:  - Assess patient frequently for physical needs  -  Identify cognitive and physical deficits and behaviors that affect risk of falls    -  [place] fall precautions as indicated by assessment   - Educate patient/family on patient safety including physical limitations  - Instruct patient to call for assistance with activity based on assessment  - Modify environment to reduce risk of injury  - Consider OT/PT consult to assist with strengthening/mobility  Outcome: Progressing  Goal: Maintain or return to baseline ADL function  Description: INTERVENTIONS:  -  Assess patient's ability to carry out ADLs; assess patient's baseline for ADL function and identify physical deficits which impact ability to perform ADLs (bathing, care of mouth/teeth, toileting, grooming, dressing, etc )  - Assess/evaluate cause of self-care deficits   - Assess range of motion  - Assess patient's mobility; develop plan if impaired  - Assess patient's need for assistive devices and provide as appropriate  - Encourage maximum independence but intervene and supervise when necessary  - Involve family in performance of ADLs  - Assess for home care needs following discharge   - Consider OT consult to assist with ADL evaluation and planning for discharge  - Provide patient education as appropriate  Outcome: Progressing  Goal: Maintain or return mobility status to optimal level  Description: INTERVENTIONS:  - Assess patient's baseline mobility status (ambulation, transfers, stairs, etc )    - Identify cognitive and physical deficits and behaviors that affect mobility  - Identify mobility aids required to assist with transfers and/or ambulation (gait belt, sit-to-stand, lift, walker, cane, etc )  - [place] fall precautions as indicated by assessment  - Record patient progress and toleration of activity level on Mobility SBAR; progress patient to next Phase/Stage  - Instruct patient to call for assistance with activity based on assessment  - Consider rehabilitation consult to assist with strengthening/weightbearing, etc   Outcome: Progressing     Problem: DISCHARGE PLANNING  Goal: Discharge to home or other facility with appropriate resources  Description: INTERVENTIONS:  - Identify barriers to discharge w/patient and caregiver  - Arrange for needed discharge resources and transportation as appropriate  - Identify discharge learning needs (meds, wound care, etc )  - Arrange for interpretive services to assist at discharge as needed  - Refer to Case Management Department for coordinating discharge planning if the patient needs post-hospital services based on physician/advanced practitioner order or complex needs related to functional status, cognitive ability, or social support system  Outcome: Progressing     Problem: Knowledge Deficit  Goal: Patient/family/caregiver demonstrates understanding of disease process, treatment plan, medications, and discharge instructions  Description: Complete learning assessment and assess knowledge base    Interventions:  - Provide teaching at level of understanding  - Provide teaching via preferred learning methods  Outcome: Progressing     Problem: RESPIRATORY - ADULT  Goal: Achieves optimal ventilation and oxygenation  Description: INTERVENTIONS:  - Assess for changes in respiratory status  - Assess for changes in mentation and behavior  - Position to facilitate oxygenation and minimize respiratory effort  - Oxygen administered by appropriate delivery if ordered  - Initiate smoking cessation education as indicated  - Encourage broncho-pulmonary hygiene including cough, deep breathe, Incentive Spirometry  - Assess the need for suctioning and aspirate as needed  - Assess and instruct to report SOB or any respiratory difficulty  - Respiratory Therapy support as indicated  Outcome: Progressing     Problem: MUSCULOSKELETAL - ADULT  Goal: Maintain or return mobility to safest level of function  Description: INTERVENTIONS:  - Assess patient's ability to carry out ADLs; assess patient's baseline for ADL function and identify physical deficits which impact ability to perform ADLs (bathing, care of mouth/teeth, toileting, grooming, dressing, etc )  - Assess/evaluate cause of self-care deficits   - Assess range of motion  - Assess patient's mobility  - Assess patient's need for assistive devices and provide as appropriate  - Encourage maximum independence but intervene and supervise when necessary  - Involve family in performance of ADLs  - Assess for home care needs following discharge   - Consider OT consult to assist with ADL evaluation and planning for discharge  - Provide patient education as appropriate  Outcome: Progressing  Goal: Maintain proper alignment of affected body part  Description: INTERVENTIONS:  - Support, maintain and protect limb and body alignment  - Provide patient/ family with appropriate education  Outcome: Progressing     Problem: Nutrition/Hydration-ADULT  Goal: Nutrient/Hydration intake appropriate for improving, restoring or maintaining nutritional needs  Description: Monitor and assess patient's nutrition/hydration status for malnutrition  Collaborate with interdisciplinary team and initiate plan and interventions as ordered  Monitor patient's weight and dietary intake as ordered or per policy  Utilize nutrition screening tool and intervene as necessary  Determine patient's food preferences and provide high-protein, high-caloric foods as appropriate  INTERVENTIONS:  - Monitor oral intake, urinary output, labs, and treatment plans  - Assess nutrition and hydration status and recommend course of action  - Evaluate amount of meals eaten  - Assist patient with eating if necessary   - Allow adequate time for meals  - Recommend/ encourage appropriate diets, oral nutritional supplements, and vitamin/mineral supplements  - Order, calculate, and assess calorie counts as needed  - Recommend, monitor, and adjust tube feedings and TPN/PPN based on assessed needs  - Assess need for intravenous fluids  - Provide specific nutrition/hydration education as appropriate  - Include patient/family/caregiver in decisions related to nutrition  Outcome: Progressing     Problem: Potential for Falls  Goal: Patient will remain free of falls  Description: INTERVENTIONS:  - Assess patient frequently for physical needs  -  Identify cognitive and physical deficits and behaviors that affect risk of falls    -  [place] fall precautions as indicated by assessment   - Educate patient/family on patient safety including physical limitations  - Instruct patient to call for assistance with activity based on assessment  - Modify environment to reduce risk of injury  - Consider OT/PT consult to assist with strengthening/mobility  Outcome: Progressing     Problem: Prexisting or High Potential for Compromised Skin Integrity  Goal: Skin integrity is maintained or improved  Description: INTERVENTIONS:  - Identify patients at risk for skin breakdown  - Assess and monitor skin integrity  - Assess and monitor nutrition and hydration status  - Monitor labs   - Assess for incontinence   - Turn and reposition patient  - Assist with mobility/ambulation  - Relieve pressure over bony prominences  - Avoid friction and shearing  - Provide appropriate hygiene as needed including keeping skin clean and dry  - Evaluate need for skin moisturizer/barrier cream  - Collaborate with interdisciplinary team   - Patient/family teaching  - Consider wound care consult   Outcome: Progressing

## 2021-03-28 NOTE — PLAN OF CARE
Problem: PAIN - ADULT  Goal: Verbalizes/displays adequate comfort level or baseline comfort level  Description: Interventions:  - Encourage patient to monitor pain and request assistance  - Assess pain using appropriate pain scale  - Administer analgesics based on type and severity of pain and evaluate response  - Implement non-pharmacological measures as appropriate and evaluate response  - Consider cultural and social influences on pain and pain management  - Notify physician/advanced practitioner if interventions unsuccessful or patient reports new pain  Outcome: Progressing     Problem: INFECTION - ADULT  Goal: Absence or prevention of progression during hospitalization  Description: INTERVENTIONS:  - Assess and monitor for signs and symptoms of infection  - Monitor lab/diagnostic results  - Monitor all insertion sites, i e  indwelling lines, tubes, and drains  - Monitor endotracheal if appropriate and nasal secretions for changes in amount and color  - [place] appropriate cooling/warming therapies per order  - Administer medications as ordered  - Instruct and encourage patient and family to use good hand hygiene technique  - Identify and instruct in appropriate isolation precautions for identified infection/condition  Outcome: Progressing     Problem: SAFETY ADULT  Goal: Patient will remain free of falls  Description: INTERVENTIONS:  - Assess patient frequently for physical needs  -  Identify cognitive and physical deficits and behaviors that affect risk of falls    -  [place] fall precautions as indicated by assessment   - Educate patient/family on patient safety including physical limitations  - Instruct patient to call for assistance with activity based on assessment  - Modify environment to reduce risk of injury  - Consider OT/PT consult to assist with strengthening/mobility  Outcome: Progressing  Goal: Maintain or return to baseline ADL function  Description: INTERVENTIONS:  -  Assess patient's ability to carry out ADLs; assess patient's baseline for ADL function and identify physical deficits which impact ability to perform ADLs (bathing, care of mouth/teeth, toileting, grooming, dressing, etc )  - Assess/evaluate cause of self-care deficits   - Assess range of motion  - Assess patient's mobility; develop plan if impaired  - Assess patient's need for assistive devices and provide as appropriate  - Encourage maximum independence but intervene and supervise when necessary  - Involve family in performance of ADLs  - Assess for home care needs following discharge   - Consider OT consult to assist with ADL evaluation and planning for discharge  - Provide patient education as appropriate  Outcome: Progressing  Goal: Maintain or return mobility status to optimal level  Description: INTERVENTIONS:  - Assess patient's baseline mobility status (ambulation, transfers, stairs, etc )    - Identify cognitive and physical deficits and behaviors that affect mobility  - Identify mobility aids required to assist with transfers and/or ambulation (gait belt, sit-to-stand, lift, walker, cane, etc )  - [place] fall precautions as indicated by assessment  - Record patient progress and toleration of activity level on Mobility SBAR; progress patient to next Phase/Stage  - Instruct patient to call for assistance with activity based on assessment  - Consider rehabilitation consult to assist with strengthening/weightbearing, etc   Outcome: Progressing     Problem: DISCHARGE PLANNING  Goal: Discharge to home or other facility with appropriate resources  Description: INTERVENTIONS:  - Identify barriers to discharge w/patient and caregiver  - Arrange for needed discharge resources and transportation as appropriate  - Identify discharge learning needs (meds, wound care, etc )  - Arrange for interpretive services to assist at discharge as needed  - Refer to Case Management Department for coordinating discharge planning if the patient needs post-hospital services based on physician/advanced practitioner order or complex needs related to functional status, cognitive ability, or social support system  Outcome: Progressing     Problem: Knowledge Deficit  Goal: Patient/family/caregiver demonstrates understanding of disease process, treatment plan, medications, and discharge instructions  Description: Complete learning assessment and assess knowledge base    Interventions:  - Provide teaching at level of understanding  - Provide teaching via preferred learning methods  Outcome: Progressing     Problem: RESPIRATORY - ADULT  Goal: Achieves optimal ventilation and oxygenation  Description: INTERVENTIONS:  - Assess for changes in respiratory status  - Assess for changes in mentation and behavior  - Position to facilitate oxygenation and minimize respiratory effort  - Oxygen administered by appropriate delivery if ordered  - Initiate smoking cessation education as indicated  - Encourage broncho-pulmonary hygiene including cough, deep breathe, Incentive Spirometry  - Assess the need for suctioning and aspirate as needed  - Assess and instruct to report SOB or any respiratory difficulty  - Respiratory Therapy support as indicated  Outcome: Progressing     Problem: MUSCULOSKELETAL - ADULT  Goal: Maintain or return mobility to safest level of function  Description: INTERVENTIONS:  - Assess patient's ability to carry out ADLs; assess patient's baseline for ADL function and identify physical deficits which impact ability to perform ADLs (bathing, care of mouth/teeth, toileting, grooming, dressing, etc )  - Assess/evaluate cause of self-care deficits   - Assess range of motion  - Assess patient's mobility  - Assess patient's need for assistive devices and provide as appropriate  - Encourage maximum independence but intervene and supervise when necessary  - Involve family in performance of ADLs  - Assess for home care needs following discharge   - Consider OT consult to assist with ADL evaluation and planning for discharge  - Provide patient education as appropriate  Outcome: Progressing  Goal: Maintain proper alignment of affected body part  Description: INTERVENTIONS:  - Support, maintain and protect limb and body alignment  - Provide patient/ family with appropriate education  Outcome: Progressing     Problem: Nutrition/Hydration-ADULT  Goal: Nutrient/Hydration intake appropriate for improving, restoring or maintaining nutritional needs  Description: Monitor and assess patient's nutrition/hydration status for malnutrition  Collaborate with interdisciplinary team and initiate plan and interventions as ordered  Monitor patient's weight and dietary intake as ordered or per policy  Utilize nutrition screening tool and intervene as necessary  Determine patient's food preferences and provide high-protein, high-caloric foods as appropriate  INTERVENTIONS:  - Monitor oral intake, urinary output, labs, and treatment plans  - Assess nutrition and hydration status and recommend course of action  - Evaluate amount of meals eaten  - Assist patient with eating if necessary   - Allow adequate time for meals  - Recommend/ encourage appropriate diets, oral nutritional supplements, and vitamin/mineral supplements  - Order, calculate, and assess calorie counts as needed  - Recommend, monitor, and adjust tube feedings and TPN/PPN based on assessed needs  - Assess need for intravenous fluids  - Provide specific nutrition/hydration education as appropriate  - Include patient/family/caregiver in decisions related to nutrition  Outcome: Progressing     Problem: Potential for Falls  Goal: Patient will remain free of falls  Description: INTERVENTIONS:  - Assess patient frequently for physical needs  -  Identify cognitive and physical deficits and behaviors that affect risk of falls    -  [place] fall precautions as indicated by assessment   - Educate patient/family on patient safety including physical limitations  - Instruct patient to call for assistance with activity based on assessment  - Modify environment to reduce risk of injury  - Consider OT/PT consult to assist with strengthening/mobility  Outcome: Progressing     Problem: Prexisting or High Potential for Compromised Skin Integrity  Goal: Skin integrity is maintained or improved  Description: INTERVENTIONS:  - Identify patients at risk for skin breakdown  - Assess and monitor skin integrity  - Assess and monitor nutrition and hydration status  - Monitor labs   - Assess for incontinence   - Turn and reposition patient  - Assist with mobility/ambulation  - Relieve pressure over bony prominences  - Avoid friction and shearing  - Provide appropriate hygiene as needed including keeping skin clean and dry  - Evaluate need for skin moisturizer/barrier cream  - Collaborate with interdisciplinary team   - Patient/family teaching  - Consider wound care consult   Outcome: Progressing

## 2021-03-28 NOTE — PROGRESS NOTES
[facility]  Progress Note - Brenda Rasmussen 1965, 60 y o  male MRN: [id number]  Unit/Bed#: ICU 06 Encounter: [id number]  Primary Care Provider: Everardo Kapoor MD   Date and time admitted to hospital: 3/13/2021  4:32 PM    * Acute respiratory failure with hypoxia [facility])  Assessment & Plan  · Secondary to COVID-19  Has been requiring midflow during hospitalization stay but on 3/26 decompensated with O2 saturations in the 60% with slow improvement   Transferred to ICU  · On HFNC maintain O2 saturation >88%, wean as tolerated  · Encourage self prone  · Completed pulse steroid therapy transitioned to Solu-medrol BID 3/26 -- continue Solu-Medrol 40 mg b i d   · Has left pneumothorax and pneumomediastinum on CT -- follow-up chest x-ray this morning    Pneumonia due to COVID-19 virus  Assessment & Plan  · Completed treatment of pulse dose steroids with no improvement  · Steroids Solu-Medrol 40 BID as of 3/26  · Continue HFNC  · Likely developing fibrosis   · Continue systemic anticoagulation with Lovenox  · Continue multivitamins, vitamin D3 and atorvastatin      Primary spontaneous pneumothorax  Assessment & Plan  · Has been stable  · No need for chest tube    Pneumomediastinum [facility])  Assessment & Plan  · Has been stable  · Cxr on 3/26 no evidence     Rheumatoid arthritis involving both hands with positive rheumatoid factor (HCC)  Assessment & Plan  · Continue on Plaquenil    Severe recurrent major depression without psychotic features ([facility] [address] )  Assessment & Plan  · Continue on home medications    Gram-positive bacteremia  Assessment & Plan  · Contaminant   · Repeat blood culture-negative        ----------------------------------------------------------------------------------------  HPI/24hr events:     No overnight events    Disposition: Transfer to Stepdown Level 2  Code Status: Level 1 - Full Code  ---------------------------------------------------------------------------------------  SUBJECTIVE  Patient states that he feels okay, denies any chest pain, abdominal pain, headache, nausea or vomiting  Review of Systems  Review of systems was reviewed and negative unless stated above in HPI/24-hour events   ---------------------------------------------------------------------------------------  OBJECTIVE    Vitals   Vitals:    21 2200 21 0000 21 0400 21 0600   BP: 141/80 130/87 121/82 136/82   BP Location:       Pulse: 80 74 60 62   Resp: (!) 32 (!) 32 22 (!) 26   Temp:   98 1 °F (36 7 °C)    TempSrc:   Temporal    SpO2: 96% (!) 83% 96% 98%   Weight:       Height:         Temp (24hrs), Av 1 °F (36 7 °C), Min:97 3 °F (36 3 °C), Max:98 5 °F (36 9 °C)  Current: Temperature: 98 1 °F (36 7 °C)          Respiratory:  SpO2 Device: O2 Device: High flow nasal cannula  Nasal Cannula O2 Flow Rate (L/min): 14 L/min    Invasive/non-invasive ventilation settings   Respiratory    Lab Data (Last 4 hours)    None         O2/Vent Data (Last 4 hours)    None                Physical Exam  Vitals signs reviewed  Constitutional:       General: He is not in acute distress  Appearance: He is not ill-appearing  Eyes:      General: No scleral icterus  Cardiovascular:      Rate and Rhythm: Normal rate and regular rhythm  Heart sounds: No murmur  Pulmonary:      Effort: Pulmonary effort is normal  No respiratory distress  Breath sounds: No wheezing or rales  Abdominal:      General: There is no distension  Palpations: Abdomen is soft  There is no mass  Tenderness: There is no abdominal tenderness  Musculoskeletal:      Right lower leg: No edema  Left lower leg: No edema  Skin:     General: Skin is warm  Capillary Refill: Capillary refill takes less than 2 seconds  Neurological:      General: No focal deficit present        Mental Status: He is oriented to person, place, and time  Psychiatric:         Mood and Affect: Mood normal           Laboratory and Diagnostics:  Results from last 7 days   Lab Units 03/28/21  0446 03/27/21  0411 03/25/21  0429 03/23/21  0334   WBC Thousand/uL 13 03* 17 35* 24 65* 12 55*   HEMOGLOBIN g/dL 14 9 14 7 15 8 15 0   HEMATOCRIT % 44 8 43 9 47 7 44 7   PLATELETS Thousands/uL 182 215 343 304   NEUTROS PCT % 90*  --   --  91*   MONOS PCT % 3*  --   --  2*     Results from last 7 days   Lab Units 03/28/21  0501 03/27/21  0411 03/25/21  0429 03/23/21  0334   SODIUM mmol/L 137 137 138 137   POTASSIUM mmol/L 4 3 4 6 4 5 4 5   CHLORIDE mmol/L 101 103 104 101   CO2 mmol/L 27 27 26 29   ANION GAP mmol/L 9 7 8 7   BUN mg/dL 21 24 22 23   CREATININE mg/dL 0 69 0 83 1 00 0 99   CALCIUM mg/dL 8 8 9 0 9 3 8 6   GLUCOSE RANDOM mg/dL 124 131 141* 112   ALT U/L  --   --   --  87*   AST U/L  --   --   --  48*   ALK PHOS U/L  --   --   --  103   ALBUMIN g/dL  --   --   --  2 7*   TOTAL BILIRUBIN mg/dL  --   --   --  0 48     Results from last 7 days   Lab Units 03/23/21  0334   MAGNESIUM mg/dL 2 1   PHOSPHORUS mg/dL 3 3                   ABG:    VBG:    Results from last 7 days   Lab Units 03/25/21  0429 03/24/21  1022   PROCALCITONIN ng/ml 0 06 0 10       Micro  Results from last 7 days   Lab Units 03/24/21  1158   BLOOD CULTURE  No Growth at 72 hrs  No Growth at 72 hrs  EKG:  Sinus rhythm  Imaging: I have personally reviewed pertinent reports  Intake and Output  I/O       03/26 0701 - 03/27 0700 03/27 0701 - 03/28 0700 03/28 0701 - 03/29 0700    P  O  400 640     Total Intake(mL/kg) 400 (4 9) 640 (7 8)     Urine (mL/kg/hr) 1500 (0 8) 1020 (0 5)     Stool  0     Total Output 1500 1020     Net -1100 -380            Unmeasured Stool Occurrence  1 x           Height and Weights   Height: 5' 9" (175 3 cm)  IBW: 70 7 kg  Body mass index is 26 79 kg/m²    Weight (last 2 days)     Date/Time   Weight    03/26/21 1234   82 3 (181 44) Nutrition       Diet Orders   (From admission, onward)             Start     Ordered    03/23/21 1352  Dietary nutrition supplements  Once     Question Answer Comment   Select Supplement: Ensure Enlive-Vanilla    Frequency Breakfast, Dinner        03/23/21 1351    03/13/21 1722  Diet Regular; Regular House  Diet effective now     Question Answer Comment   Diet Type Regular    Regular Regular House    RD to adjust diet per protocol?  Yes        03/13/21 1722                  Active Medications  Scheduled Meds:  Current Facility-Administered Medications   Medication Dose Route Frequency Provider Last Rate    albuterol  2 puff Inhalation 4x Daily Nitza Jeter MD      ARIPiprazole  10 mg Oral HS Nitza Jeter MD      atorvastatin  40 mg Oral HS Nitza Jeter MD      bisacodyl  10 mg Rectal Daily PRN Nitza Jeter MD      cholecalciferol  2,000 Units Oral Daily Nitza Jeter MD      DULoxetine  60 mg Oral BID Nitza Jeter MD      enoxaparin  1 mg/kg Subcutaneous Q12H [facility] Nitza Jeter MD      famotidine  20 mg Oral BID Nitza Jeter MD      hydroxychloroquine  200 mg Oral Daily Nitza Jeter MD      methylPREDNISolone sodium succinate  40 mg Intravenous Q12H [facility] Carmen Ledesma, DO      multivitamin-minerals  1 tablet Oral Daily Nitza Jeter MD      polyethylene glycol  17 g Oral Daily PRN Nitza Jeter MD      senna-docusate sodium  1 tablet Oral HS Nitza Jeter MD      traZODone  100 mg Oral HS PRN Nitza Jeter MD       Continuous Infusions:     PRN Meds:   bisacodyl, 10 mg, Daily PRN  polyethylene glycol, 17 g, Daily PRN  traZODone, 100 mg, HS PRN        Invasive Devices Review  Invasive Devices     Peripheral Intravenous Line            Peripheral IV 03/28/21 Left;Proximal;Ventral (anterior) Forearm less than 1 day                ---------------------------------------------------------------------------------------  Advance Directive and Living Will:      Power of :    POLST: ---------------------------------------------------------------------------------------      Julieth Kohli MD      Portions of the record may have been created with voice recognition software  Occasional wrong word or "sound a like" substitutions may have occurred due to the inherent limitations of voice recognition software    Read the chart carefully and recognize, using context, where substitutions have occurred

## 2021-03-28 NOTE — ASSESSMENT & PLAN NOTE
· Completed treatment of pulse dose steroids with no improvement  · Steroids Solu-Medrol 40 BID as of 3/26  · Continue HFNC  · Likely developing fibrosis   · Continue systemic anticoagulation with Lovenox  · Continue multivitamins, vitamin D3 and atorvastatin

## 2021-03-29 NOTE — PHYSICAL THERAPY NOTE
Physical Therapy Cancellation Note    Discussed with RN, pt  Not appropriate for therapeutic intervention at this time due to current medical status  Will cancel and continue to follow as appropriate       Yany Boards, PTA

## 2021-03-29 NOTE — DEATH NOTE
INPATIENT DEATH NOTE  Colby Queen 60 y o  male MRN: [id number]  Unit/Bed#: ICU 06 Encounter: [id number]    Date, Time and Cause of Death    Date of Death: 3/29/21  Time of Death:  6:33 PM  Preliminary Cause of Death: COVID-19 with cardiovascular comorbidity  Entered by: Blanca Seaman[AK1 1]     Attribution     AK1  Isaias Merritt 42, CRNP 21 18:38           Patient's Information  Pronounced by: Luis Michelle  Did the patient's death occur in the ED?: No  Did the patient's death occur in the OR?: No  Did the patient's death occur less than 10 days post-op?: No  Did the patient's death occur within 24 hours of admission?: No  Was code status DNR at the time of death?: Yes    PHYSICAL EXAM:  Unresponsive to noxious stimuli, Spontaneous respirations absent, Carotid pulse absent, Heart sounds absent, Pupillary light reflex absent and Corneal blink reflex absent    Medical Examiner notification criteria:  NONE APPLICABLE   Medical Examiner's office notified?:  No, does not meet ME notification criteria   Medical Examiner accepted case?:  No  Name of Medical Examiner:     Family Notification  Was the family notified?: Yes  Date Notified: 21  Time Notified: 7626  Notified by: Luis Michelle  Name of Family Notified of Death: Saurabh Soto Person Relationship to Patient: Son  Family Notification Route: Telephone  Was the family told to contact a  home?: No    Autopsy Options:  Decision for post-mortem examination not yet made by next of kin      Primary Service Attending Physician notified?:  yes - Attending:  Ollie Ferraro DO    Physician/Resident responsible for completing Discharge Summary:  Luis Michelle

## 2021-03-29 NOTE — PROGRESS NOTES
Respirations more labored, increased rate and use of accessory muscles  Resident made aware, will discuss with attending  Awaiting ENT consult  Continues to actively bleed from nares and post nasal  Resident made aware

## 2021-03-29 NOTE — QUICK NOTE
Spoke with patient's son Lidia Rochas over the phone  Updated him of current status and plan/management  All questions/concerns were addressed  Addendum: Updated patient's son Lidia Everett regarding plan/recommendations from ENT  They understand that intubation is a possibility

## 2021-03-29 NOTE — ASSESSMENT & PLAN NOTE
· On anticoagulation and HFNC   · Most likely secondary to continuous oxygen therapy; no episodes of hypertension  · Nasal saline spray to prevent dryness/crust formation  · Rhino rocket applied -- with persistent bleeding  · ENT consult

## 2021-03-29 NOTE — ASSESSMENT & PLAN NOTE
· Completed treatment of pulse dose steroids with no improvement  · Steroids Solu-Medrol 40 BID as of 3/26 -- transitioned to daily yesterday  · Continue BiPAP and wean as tolerated  · Likely developing fibrosis   · Continue systemic anticoagulation with Lovenox 1 milligram/kilogram every 12 hours  · Continue multivitamins, vitamin D3 and atorvastatin

## 2021-03-29 NOTE — OCCUPATIONAL THERAPY NOTE
OCCUPATIONAL THERAPY CANCELLATION     OT reviewed chart  Per PTA, whom spoke with RN, pt is not medically appropriate for OT intervention at this time  Will f/u as able and appropriate       Sindi Felix MS, OTR/L

## 2021-03-29 NOTE — PROCEDURES
Intubation    Date/Time: 3/29/2021 5:55 PM  Performed by: KIRK Lau  Authorized by: KIRK Lau     Patient location:  Bedside  Consent:     Consent obtained:  Emergent situation  Universal protocol:     Patient identity confirmed:  Arm band and provided demographic data  Pre-procedure details:     Patient status:  Unresponsive  Indications:     Indications for intubation: respiratory failure and airway protection    Procedure details:     Preoxygenation:  Bag valve mask    CPR in progress: yes      Intubation method:  Oral    Oral intubation technique:  Glidescope    Laryngoscope blade:   Mac 3    Tube size (mm):  8 0    Tube type:  Cuffed and hi-lo    Number of attempts:  1    Ventilation between attempts: no      Tube visualized through cords: yes    Placement assessment:     Tube secured with:  ETT martinez    Breath sounds:  Equal    Placement verification: chest rise, condensation, CXR verification, direct visualization and ETCO2 detector

## 2021-03-29 NOTE — ASSESSMENT & PLAN NOTE
· Secondary to COVID-19  Has been requiring midflow during hospitalization stay but on 3/26 decompensated with O2 saturations in the 60% with slow improvement   Transferred to ICU  · Epistaxis while on high-flow nasal cannula, currently on BiPAP, wean as tolerated and maintain O2 sats greater than 88%  · Encourage self prone  · Completed pulse steroid therapy transitioned to Solu-medrol BID 3/26 -- was started on Solu-Medrol 40 mg b i d , transitioned to daily dose on 03/20  · Has left pneumothorax and pneumomediastinum on CT -- follow-up chest x-ray showed no increase in size

## 2021-03-29 NOTE — CONSULTS
Consultation - ENT   Alicia Carias 60 y o  male MRN: [id number]  Unit/Bed#: ICU 06 Encounter: [id number]      Assessment/Plan     Assessment:  Epistaxis bilateral with severely deviated nasal septum left  Acute hypoxic respiratory failure  Covid 19 pneumonia  Left pneumothorax  Abnormal d-dimer  Rheumatoid arthritis on Plaquenil  Severe recurrent major depression  Plan:  Nasal cautery performed with endoscopy today on left with placement of 4 5 cm Merocel pack on left  - check for coagulopathy (noting since therapeutic Lovenox stopped, bleeding on right has stopped)  - If able, avoid anticoagulation - aware based on patients multiple medical issues may not be feasible  - Medical decision not to tamper with scab/clot right naris due to patients tenuous respiratory status - fear that if bleeding recurred would have to pack patient bilaterally hastening his need for  intubation  May moisturize nasal vaults (including left side with merocel pack) with saline/ ocean spray every 1-2 hours  - humidify oxygen  - if bleeding starts on right, place pack 7 5 cm rhino rocket  Will check patient in AM     History of Present Illness   Physician Requesting Consult: Taz Uriostegui DO  Reason for Consult / Principal Problem: bilateral epistaxis  HPI: Alicia Carias is a 60y o  year old male who presents with bilateral epistaxis in the setting of cold with positive noted one week prior to admission on March 10, 2021  Patient has severe pulmonary fibrosis and has avoided intubation to this point  He developed bilateral nasal bleeding with Merocel pack placed on the left  Bleeding on the right stop spontaneously after Lovenox was discontinued earlier this morning  Despite packing, patient had some bloody drainage that he felt in his throat earlier today  Inpatient consult to ENT  Consult performed by:  Juan Fisher DO  Consult ordered by: Sharan Gorman MD          Review of Systems   A 12-point, complete review of systems was reviewed and negative except as stated above and on medical notes/H/P      Historical Information   Past Medical History:   Diagnosis Date    Anemia     b12    Arthritis     Back pain     Depression     GERD (gastroesophageal reflux disease)     Headache     Herniated cervical disc     Herniated lumbar intervertebral disc     Malabsorption syndrome     post gastric bypass    Obesity     Vitamin B12 deficiency 2014    Vitamin D deficiency 2014    Wears glasses      Past Surgical History:   Procedure Laterality Date    CARPAL TUNNEL RELEASE Bilateral     CHOLECYSTECTOMY      COLONOSCOPY  2014    normal exam    EGD  12/2019    pouchitis    ESOPHAGOGASTRODUODENOSCOPY  2015    biopsy taken,     GASTRIC BYPASS  2010    HEMORROIDECTOMY  06/2016    AR LAP,CHOLECYSTECTOMY/GRAPH N/A 4/26/2019    Procedure: LAPAROSCOPIC CHOLECYSTECTOMY;  Surgeon: Magali Casiano MD;  Location: [address] [place] [address];  Service: General    VASECTOMY       Social History   Social History     Substance and Sexual Activity   Alcohol Use Not Currently    Alcohol/week: 3 0 standard drinks    Types: 3 Cans of beer per week    Frequency: Monthly or less    Drinks per session: 1 or 2    Binge frequency: Never    Comment: "socially"     Social History     Substance and Sexual Activity   Drug Use Not Currently     E-Cigarette/Vaping    E-Cigarette Use Never User      E-Cigarette/Vaping Substances     Social History     Tobacco Use   Smoking Status Never Smoker   Smokeless Tobacco Never Used   Tobacco Comment    NO TOBACCO USE     Family History: non-contributory    Meds/Allergies   all current active meds have been reviewed, current meds:   Current Facility-Administered Medications   Medication Dose Route Frequency    albuterol (PROVENTIL HFA,VENTOLIN HFA) inhaler 2 puff  2 puff Inhalation 4x Daily    ARIPiprazole (ABILIFY) tablet 10 mg  10 mg Oral HS    atorvastatin (LIPITOR) tablet 40 mg  40 mg Oral HS    bisacodyl (DULCOLAX) rectal suppository 10 mg  10 mg Rectal Daily PRN    cholecalciferol (VITAMIN D3) tablet 2,000 Units  2,000 Units Oral Daily    DULoxetine (CYMBALTA) delayed release capsule 60 mg  60 mg Oral BID    famotidine (PEPCID) tablet 20 mg  20 mg Oral BID    hydroxychloroquine (PLAQUENIL) tablet 200 mg  200 mg Oral Daily    midazolam (VERSED) 2 mg/2 mL injection **ADS Override Pull**        multivitamin-minerals (CENTRUM ADULTS) tablet 1 tablet  1 tablet Oral Daily    polyethylene glycol (MIRALAX) packet 17 g  17 g Oral Daily PRN    [START ON 3/30/2021] predniSONE tablet 50 mg  50 mg Oral Daily    senna-docusate sodium (SENOKOT S) 8 6-50 mg per tablet 1 tablet  1 tablet Oral HS    sodium chloride (OCEAN) 0 65 % nasal spray 1 spray  1 spray Each Nare Q2H PRN    traZODone (DESYREL) tablet 100 mg  100 mg Oral HS PRN    and PTA meds:   Prior to Admission Medications   Prescriptions Last Dose Informant Patient Reported? Taking?    ARIPiprazole (ABILIFY) 10 mg tablet  Self Yes No   Sig: daily at bedtime    DULoxetine (CYMBALTA) 60 mg delayed release capsule  Self Yes No   Sig: Take 60 mg by mouth 2 (two) times a day    Multiple Vitamins-Minerals (MULTIVITAMIN ADULT PO) Not Taking at Unknown time Self Yes No   Sig: take 2 tablets by oral route daily   acetaminophen (TYLENOL) 325 mg tablet   No No   Sig: Take 2 tablets (650 mg total) by mouth every 6 (six) hours as needed for mild pain   aluminum-magnesium hydroxide-simethicone (MYLANTA) 200-200-20 mg/5 mL suspension Not Taking at Unknown time  No No   Sig: Take 30 mL by mouth every 6 (six) hours as needed for indigestion or heartburn   Patient not taking: Reported on 3/13/2021   ascorbic acid (VITAMIN C) 1000 MG tablet Not Taking at Unknown time  No No   Sig: Take 1 tablet (1,000 mg total) by mouth every 12 (twelve) hours for 12 doses   Patient not taking: Reported on 3/13/2021   benzonatate (TESSALON) 200 MG capsule   No No   Sig: Take 1 capsule (200 mg total) by mouth 3 (three) times a day as needed for cough   calcium citrate (CALCITRATE) 950 MG tablet Not Taking at Unknown time Self Yes No   Sig: Take 1 tablet by mouth daily   cholecalciferol (VITAMIN D3) 1,000 units tablet Not Taking at Unknown time  No No   Sig: Take 2 tablets (2,000 Units total) by mouth daily   Patient not taking: Reported on 3/13/2021   dextromethorphan-guaiFENesin (ROBITUSSIN DM)  mg/5 mL syrup Not Taking at Unknown time  No No   Sig: Take 10 mL by mouth every 4 (four) hours as needed for cough   Patient not taking: Reported on 3/13/2021   hydroxychloroquine (PLAQUENIL) 200 mg tablet   No No   Sig: Take 1 tablet (200 mg total) by mouth daily   predniSONE 20 mg tablet   No No   Sig: Take 1 tablet (20 mg total) by mouth daily   traZODone (DESYREL) 100 mg tablet  Self Yes No   Sig: take 2 tablet by oral route  every night   zinc sulfate (ZINCATE) 220 mg capsule Not Taking at Unknown time  No No   Sig: Take 1 capsule (220 mg total) by mouth daily for 6 doses   Patient not taking: Reported on 3/13/2021      Facility-Administered Medications: None       Allergies   Allergen Reactions    No Known Allergies        Objective       Intake/Output Summary (Last 24 hours) at 3/29/2021 1506  Last data filed at 3/29/2021 0800  Gross per 24 hour   Intake 0 ml   Output 1450 ml   Net -1450 ml       Invasive Devices     Peripheral Intravenous Line            Peripheral IV 03/28/21 Left;Proximal;Ventral (anterior) Forearm 1 day                Physical Exam   Nose:  Merocel partially in left nasal vault carefully removed  Large clot right naris - dried blood; no active bleeding  Nasal Endoscopy with 0 degree rigid Jackson anyi scope:   Floor of Nose:  Dried blood clot obscures left nasal vault    Septum: Deviated to left with large horizontal floor spur along left nasal vault - left nasal vault packed with Lidocaine phenylephrine on cotton pledget x 3 minutes    Removed and spur cauterized at superior/anterior surface with active bleeding stopped  A 4 5 cm merocel was carefully placed on left while performing Wilbarger maneuver to access nasal vault  Merocel was hydrated with lido/phenylephrine  Endoscopy left side:  Inferior turbinate left:  Normal in size, pink, moist, no bleeding    Inferior meatus: Normal appearing moist mucosa with no lesions     Middle turbinates:  Normal in size, pink, moist, no abnormalities or mucopus    Superior turbinates: Normal in size, pink, moist    Superior meatus: Normal appearing moist mucosa with no lesion  Some old blood - suctioned with no active bleeding    Middle meatus And ostiomeatal unit:  Normal mucosa without any pus, polyps  Sphenoid os: Normal mucosa without mucopus or polyps    Sphenoethmoid recess: pink, moist with old blood no polyps     Nasopharynx:  No masses  Smooth pink mucosa old blood but no active bleeding  Eustachian tube orifice:  Pink mucosa, patent, no obstruction  Patient tolerated procedure without difficulty  Oropharynx: no active bleeding  Small clot left oropharynx partially debrided  Patient instructed to avoid eating/drinking ×30 minutes or until anesthetic effect completely dissipated  Lab Results:   I have personally reviewed pertinent lab results  , CBC:   Lab Results   Component Value Date    HGB 16 4 03/29/2021   , CMP:   Lab Results   Component Value Date    SODIUM 138 03/29/2021    K 4 7 03/29/2021     03/29/2021    CO2 25 03/29/2021    BUN 37 (H) 03/29/2021    CREATININE 0 97 03/29/2021    CALCIUM 9 1 03/29/2021    EGFR 86 03/29/2021   , Coags:   Lab Results   Component Value Date    INR 1 23 (H) 03/29/2021     Imaging Studies: I have personally reviewed pertinent reports  EKG, Pathology, and Other Studies: I have personally reviewed pertinent reports        Code Status: Level 1 - Full Code  Advance Directive and Living Will:      Power of :    POLST: Counseling/Coordination of Care: Total floor / unit time spent today 30 minutes  Greater than 50% of total time was spent with the patient and / or family counseling and / or coordination of care  A description of the counseling / coordination of care: plan discussed with ICU attending noting proactive decision to not manipulate right nasal vault for fear that if bleeding occurred packing would be required and hasten patient's need for intubation

## 2021-03-29 NOTE — PROGRESS NOTES
Active nosebleed from left nares  Right nares appears to be occluded with clot  Patient nods "Yes" when asked if he feels bleeding/drainage down throat  Placed on hi-flow O2 orally per KIRK Melendez  Left nares being packed by KIRK Melendez at this time

## 2021-03-29 NOTE — PROGRESS NOTES
[address]  Progress Note - Alicia Carias 1965, 60 y o  male MRN: [id number]  Unit/Bed#: ICU 06 Encounter: [id number]  Primary Care Provider: bC Balderrama MD   Date and time admitted to hospital: 3/13/2021  4:32 PM    * Acute respiratory failure with hypoxia [facility])  Assessment & Plan  · Secondary to COVID-19  Has been requiring midflow during hospitalization stay but on 3/26 decompensated with O2 saturations in the 60% with slow improvement   Transferred to ICU  · Epistaxis while on high-flow nasal cannula, currently on BiPAP, wean as tolerated and maintain O2 sats greater than 88%  · Encourage self prone  · Completed pulse steroid therapy transitioned to Solu-medrol BID 3/26 -- was started on Solu-Medrol 40 mg b i d , transitioned to daily dose on 03/20  · Has left pneumothorax and pneumomediastinum on CT -- follow-up chest x-ray showed no increase in size    Pneumonia due to COVID-19 virus  Assessment & Plan  · Completed treatment of pulse dose steroids with no improvement  · Steroids Solu-Medrol 40 BID as of 3/26 -- transitioned to daily yesterday  · Continue BiPAP and wean as tolerated  · Likely developing fibrosis   · Continue systemic anticoagulation with Lovenox 1 milligram/kilogram every 12 hours  · Continue multivitamins, vitamin D3 and atorvastatin      Primary spontaneous pneumothorax  Assessment & Plan  · Has been stable  · No need for chest tube    Pneumomediastinum [facility])  Assessment & Plan  · Has been stable  · Cxr on 3/26 no evidence     Rheumatoid arthritis involving both hands with positive rheumatoid factor (HCC)  Assessment & Plan  · Continue on Plaquenil    Severe recurrent major depression without psychotic features ([facility] [address] )  Assessment & Plan  · Continue on home medications    Gram-positive bacteremia  Assessment & Plan  · Contaminant   · Repeat blood culture-negative    Epistaxis  Assessment & Plan  · On anticoagulation and HFNC   · Most likely secondary to continuous oxygen therapy; no episodes of hypertension  · Nasal saline spray to prevent dryness/crust formation  · Rhino rocket applied -- with persistent bleeding  · ENT consult          ----------------------------------------------------------------------------------------  HPI/24hr events: This is a 47-year-old gentleman with history of rheumatoid arthritis, depression who presented with shortness of breath on 03/13  Nine days prior to hospital admission, patient tested positive for COVID (3/4/2021)  He was a rapid response on 03/26 due to desaturation  below less than 60%  Patient was then transferred to the ICU  At this point patient was more than 20 days post COVID and still with refractory hypoxia, also had new pneumothorax and pneumomediastinum  Placed on high-flow oxygen  He had already completed 10 days of Decadron but was restarted on Solu-Medrol on transfer to ICU due to persistent ground-glass opacity on imaging with refractory hypoxia  Patient had episodes of epistaxis yesterday, stopped and then recurred last night while on high-flow nasal cannula, patient was then put on non-rebreather, remained stable overnight while on the non-rebreather however at around 5:30 a m  This morning, patient became be tachypneic, tachycardic with noted increase in work of breathing, patient was then switched from non-rebreather to BiPAP, was also given one time dose of 50 mcg fentanyl, still tachycardic however less tachypneic and overall states he feels much more comfortable  Disposition: Continue Stepdown Level 1 level of care   Code Status: Level 1 - Full Code  ---------------------------------------------------------------------------------------  SUBJECTIVE  Patient reports that he feels much comfortable now while he is on the BiPAP compared to earlier this morning, denies any chest pain, abdominal pain, nausea or vomiting       Review of Systems  Review of systems was reviewed and negative unless stated above in HPI/24-hour events   ---------------------------------------------------------------------------------------  OBJECTIVE    Vitals   Vitals:    21 0200 21 0400 21 0600 21 0732   BP: 141/95 151/99 (!) 148/101    Pulse: 102 (!) 106 (!) 124    Resp: (!) 33 (!) 40 (!) 41    Temp:       TempSrc:       SpO2: (!) 87% (!) 75% 94% 95%   Weight:       Height:         Temp (24hrs), Av 7 °F (36 5 °C), Min:97 2 °F (36 2 °C), Max:98 4 °F (36 9 °C)  Current: Temperature: 98 4 °F (36 9 °C)          Respiratory:  SpO2 Device: O2 Device: Non-rebreather mask  Nasal Cannula O2 Flow Rate (L/min): 15 L/min    Invasive/non-invasive ventilation settings   Respiratory    Lab Data (Last 4 hours)    None         O2/Vent Data (Last 4 hours)       0549  0732        Non-Invasive Ventilation Mode BiPAP BiPAP                  Physical Exam  Vitals signs reviewed  Constitutional:       General: He is not in acute distress  Eyes:      General: No scleral icterus  Cardiovascular:      Rate and Rhythm: Regular rhythm  Tachycardia present  Heart sounds: No murmur  No gallop  Pulmonary:      Effort: No respiratory distress  Breath sounds: No wheezing or rales  Abdominal:      General: Bowel sounds are normal  There is no distension  Palpations: Abdomen is soft  There is no mass  Tenderness: There is no abdominal tenderness  Musculoskeletal:      Right lower leg: No edema  Left lower leg: No edema  Skin:     Coloration: Skin is not jaundiced  Neurological:      General: No focal deficit present  Mental Status: He is alert and oriented to person, place, and time  Mental status is at baseline     Psychiatric:         Mood and Affect: Mood normal            Laboratory and Diagnostics:  Results from last 7 days   Lab Units 21  0446 21  0411 21  0429 21  0334   WBC Thousand/uL 13 03* 17 35* 24 65* 12 55*   HEMOGLOBIN g/dL 14 9 14 7 15 8 15 0   HEMATOCRIT % 44 8 43 9 47 7 44 7   PLATELETS Thousands/uL 182 215 343 304   NEUTROS PCT % 90*  --   --  91*   MONOS PCT % 3*  --   --  2*     Results from last 7 days   Lab Units 03/28/21  0501 03/27/21  0411 03/25/21  0429 03/23/21  0334   SODIUM mmol/L 137 137 138 137   POTASSIUM mmol/L 4 3 4 6 4 5 4 5   CHLORIDE mmol/L 101 103 104 101   CO2 mmol/L 27 27 26 29   ANION GAP mmol/L 9 7 8 7   BUN mg/dL 21 24 22 23   CREATININE mg/dL 0 69 0 83 1 00 0 99   CALCIUM mg/dL 8 8 9 0 9 3 8 6   GLUCOSE RANDOM mg/dL 124 131 141* 112   ALT U/L  --   --   --  87*   AST U/L  --   --   --  48*   ALK PHOS U/L  --   --   --  103   ALBUMIN g/dL  --   --   --  2 7*   TOTAL BILIRUBIN mg/dL  --   --   --  0 48     Results from last 7 days   Lab Units 03/23/21  0334   MAGNESIUM mg/dL 2 1   PHOSPHORUS mg/dL 3 3                   ABG:    VBG:    Results from last 7 days   Lab Units 03/25/21  0429 03/24/21  1022   PROCALCITONIN ng/ml 0 06 0 10       Micro  Results from last 7 days   Lab Units 03/24/21  1158   BLOOD CULTURE  No Growth After 4 Days  No Growth After 4 Days  EKG:  Sinus tachycardia with heart rate in the 110s to 120s  Imaging: I have personally reviewed pertinent reports  (repeat chest x-ray done today showed no progression of small left loculated pneumothorax)    Intake and Output  I/O       03/27 0701 - 03/28 0700 03/28 0701 - 03/29 0700    P  O  640     Total Intake(mL/kg) 640 (7 8)     Urine (mL/kg/hr) 1020 (0 5) 1850 (0 9)    Stool 0 0    Total Output 1020 1850    Net -380 -1850          Unmeasured Stool Occurrence 1 x 1 x          Height and Weights   Height: 5' 9" (175 3 cm)  IBW: 70 7 kg  Body mass index is 26 79 kg/m²    Weight (last 2 days)     None            Nutrition       Diet Orders   (From admission, onward)             Start     Ordered    03/23/21 1352  Dietary nutrition supplements  Once     Question Answer Comment   Select Supplement: Ensure Enlive-Vanilla    Frequency Breakfast, Dinner 03/23/21 1351    03/13/21 1722  Diet Regular; Regular House  Diet effective now     Question Answer Comment   Diet Type Regular    Regular Regular House    RD to adjust diet per protocol?  Yes        03/13/21 1722                  Active Medications  Scheduled Meds:  Current Facility-Administered Medications   Medication Dose Route Frequency Provider Last Rate    albuterol  2 puff Inhalation 4x Daily Keyona Thakkar, MD      ARIPiprazole  10 mg Oral HS Keyona Falling, MD      atorvastatin  40 mg Oral HS Keyona Thakkar, MD      bisacodyl  10 mg Rectal Daily PRN Keyona Thakkar, MD      cholecalciferol  2,000 Units Oral Daily Murphyy Falling, MD      DULoxetine  60 mg Oral BID Murphyy Falling, MD      enoxaparin  1 mg/kg Subcutaneous Q12H [address] Keyona Thakkar, MD      famotidine  20 mg Oral BID Murphyy Falling, MD      hydroxychloroquine  200 mg Oral Daily Murphyy Falling, MD      methylPREDNISolone sodium succinate  40 mg Intravenous Daily Maryana Pineda MD      multivitamin-minerals  1 tablet Oral Daily Keyona Thakkar, MD      oxymetazoline  2 spray Each Nare Q12H [address] KIRK Bardales      polyethylene glycol  17 g Oral Daily PRN Keyona Thakkar, MD      senna-docusate sodium  1 tablet Oral HS Keyona Thakkar, MD      sodium chloride  1 spray Each Nare Q2H PRN Maryaan Pineda MD      traZODone  100 mg Oral HS PRN Keyona Thakkar, MD       Continuous Infusions:     PRN Meds:   bisacodyl, 10 mg, Daily PRN  polyethylene glycol, 17 g, Daily PRN  sodium chloride, 1 spray, Q2H PRN  traZODone, 100 mg, HS PRN        Invasive Devices Review  Invasive Devices     Peripheral Intravenous Line            Peripheral IV 03/28/21 Left;Proximal;Ventral (anterior) Forearm 1 day                ---------------------------------------------------------------------------------------  Advance Directive and Living Will:      Power of :    POLST: ---------------------------------------------------------------------------------------      Maryana Pineda MD      Portions of the record may have been created with voice recognition software  Occasional wrong word or "sound a like" substitutions may have occurred due to the inherent limitations of voice recognition software    Read the chart carefully and recognize, using context, where substitutions have occurred

## 2021-03-29 NOTE — PROGRESS NOTES
Interval Progress Note - Critical Care   Alicia Carias 60 y o  male MRN: [id number]  Unit/Bed#: ICU 06 Encounter: [id number]    Patient became unresponsive with fixed and dilated pupils  A stat cat scan and ABG were ordered  The precedex infusion was discontinued  Prior to leaving for cat scan the patient had agonal respirations and developed bradycardia then asystole  A code was started and the patient was intubated and received several rounds of epinephrine and sodium bicarbonate  See code record for further details         KIRK Cuenca

## 2021-03-29 NOTE — DISCHARGE SUMMARY
Discharge Summary - Ruby Agrawal 60 y o  male MRN: [id number]    Unit/Bed#: ICU 06 Encounter: [id number] PCP: Dorsey Gosselin, MD    Admission Date:   Admission Orders (From admission, onward)     Ordered        21 1647  Inpatient Admission  Once                     Admitting Diagnosis: COVID-19 [U07 1]    HPI: Per,   Anne Cotton is a 60 y o  male who presents with SOB  Hx of depression and rheumatoid arthritis  Found to be COVID positive on 3/4/2021 transfer from [address] [place] [address] on 3/13/2021 due to not having ICU back up  Currently SOB on 50L high flow at 50%  Denies any CP, N/V, abd pain or swelling  CTA chest shows extensive opacities bilaterally  Procedures Performed:   Orders Placed This Encounter   Procedures    Intubation       Summary of Hospital Course: Patient was admitted to [address] [place] [address] were he underwent treatment for moderate COVID disease  Over the course of his admission the patient had ongoing hypoxia and increasing oxygen demands  He was transferred to the ICU due to post COVID pulmonary fibrosis and the need for HFNC as well as pulse dose steroids  The patient then developed epistaxis likely due to therapeutic lovenox in the setting of an elevated D-dimer and COVID  ENT was consulted and the patients nares were packed  On 3/29, the patient required BIPAP due to hypoxia and work of breathing  He then became unresponsive with fixed and dilated pupils  A stat can scan and ABG were ordered Unfortunately, the patient became agonal and asystolic prior to going to the Cat scan  The patient was coded for approximately 40 minutes with a brief return of spontaneous respirations  The patient was pronounced  at 26  The family was notified       Significant Findings, Care, Treatment and Services Provided:         Disposition:      Final Diagnosis: Cardiac arrest secondary to acute hypoxic respiratory failure in the setting of covid pneumonia    Resolved Problems  Date Reviewed: 3/26/2021    None          Condition at Time of Death:     Date, Time and Cause of Death    Date of Death: 3/29/21  Time of Death:  6:33 PM  Preliminary Cause of Death: COVID-19 with cardiovascular comorbidity  Entered by: Katie Seaman[AK1 1]     Attribution     [address] 21 18:38          Death Note:    INPATIENT DEATH NOTE  Cal [place] 60 y o  male MRN: [id number]  Unit/Bed#: ICU 06 Encounter: [id number]    Date, Time and Cause of Death    Date of Death: 3/29/21  Time of Death:  6:33 PM  Preliminary Cause of Death: COVID-19 with cardiovascular comorbidity  Entered by: Blanca Seaman[AK1 1]     Attribution     [address] 21 18:38           Patient's Information  Pronounced by: Karlee Ferguson  Did the patient's death occur in the ED?: No  Did the patient's death occur in the OR?: No  Did the patient's death occur less than 10 days post-op?: No  Did the patient's death occur within 24 hours of admission?: No  Was code status DNR at the time of death?: Yes    PHYSICAL EXAM:  Unresponsive to noxious stimuli, Spontaneous respirations absent, Carotid pulse absent, Heart sounds absent, Pupillary light reflex absent and Corneal blink reflex absent    Medical Examiner notification criteria:  NONE APPLICABLE   Medical Examiner's office notified?:  No, does not meet ME notification criteria   Medical Examiner accepted case?:  No  Name of Medical Examiner:     Family Notification  Was the family notified?: Yes  Date Notified: 21  Time Notified: 1501  Notified by: Karlee Ferguson  Name of Family Notified of Death: Pauline Covert Person Relationship to Patient: Son  Family Notification Route: Telephone  Was the family told to contact a  home?: No    Autopsy Options:  Decision for post-mortem examination not yet made by next of kin      Primary Service Attending Physician notified?:  yes - Attending:  Ora Gonzalez DO    Physician/Resident responsible for completing Discharge Summary:  Charlee Lynch

## 2021-03-30 RX ORDER — EPINEPHRINE 0.1 MG/ML
SYRINGE (ML) INJECTION CODE/TRAUMA/SEDATION MEDICATION
Status: COMPLETED | OUTPATIENT
Start: 2021-01-01 | End: 2021-01-01

## 2021-03-30 RX ORDER — SODIUM BICARBONATE 84 MG/ML
INJECTION, SOLUTION INTRAVENOUS CODE/TRAUMA/SEDATION MEDICATION
Status: COMPLETED | OUTPATIENT
Start: 2021-01-01 | End: 2021-01-01

## 2021-03-30 RX ORDER — CALCIUM CHLORIDE 100 MG/ML
SYRINGE (ML) INTRAVENOUS CODE/TRAUMA/SEDATION MEDICATION
Status: COMPLETED | OUTPATIENT
Start: 2021-01-01 | End: 2021-01-01

## 2022-05-12 NOTE — PROGRESS NOTES
Assessment/Plan:   Mixed hyperlipidemia  Recheck labs  - Lipid panel; Future      Chronic pain of left knee  Over the patient to do physical therapy  - XR knee 3 vw left non injury; Future  - traMADol-acetaminophen (ULTRACET) 37 5-325 mg per tablet; Take 1 tablet by mouth every 8 (eight) hours as needed for moderate pain  Dispense: 60 tablet; Refill: 3    Chronic pain of right knee  Advised the patient to take tramadol common Tylenol, since NSAID as will be no appropriate for a patient with status post bariatric surgery and active gastritis  - Uric acid; Future  - XR knee 3 vw right non injury; Future    Allergic sinusitis  We spoke about  possible causes of his allergies, patient will continue with the OTC antiallergic meds  I explained to patient that  antibiotics are not recommended at this time  Use Olopatadine for patient's eyes discomfort and fluticasone nasal spray as needed  he will return as needed  - fluticasone (FLONASE) 50 mcg/act nasal spray; 1 spray into each nostril daily  Dispense: 16 g; Refill: 0      Hyperparathyroidism ([address] )  Will repeat parathyroid hormone and check for hypercalcemia  This is possible related to vitamin D and calcium deficiency after bariatric surgery  We discussed about the need of vitamins and calcium replacement  Rheumatoid arthritis involving both hands with positive rheumatoid factor ([address] )  He does not have any signs of rheumatoid arthritis activities  I am going to check ccp antibodies and rheumatoid factor  Diagnoses and all orders for this visit:    Needs flu shot  -     influenza vaccine, [id number], quadrivalent, recombinant, PF, 0 5 mL, for patients 18 yr+ (FLUBLOK)    Mixed hyperlipidemia  -     Lipid panel; Future    Chronic pain of left knee  -     XR knee 3 vw left non injury; Future  -     Discontinue: traMADol-acetaminophen (ULTRACET) 37 5-325 mg per tablet;  Take 1 tablet by mouth every 8 (eight) hours as needed for moderate pain  - traMADol-acetaminophen (ULTRACET) 37 5-325 mg per tablet; Take 1 tablet by mouth every 8 (eight) hours as needed for moderate pain    Chronic pain of right knee  -     Uric acid; Future  -     XR knee 3 vw right non injury; Future    Allergic sinusitis  -     fluticasone (FLONASE) 50 mcg/act nasal spray; 1 spray into each nostril daily    Rheumatoid arthritis involving both hands with positive rheumatoid factor (HCC)  -     Cyclic citrul peptide antibody, IgG; Future  -     Rheumatoid Arthritis Factor; Future  -     triamcinolone acetonide (KENALOG-40) 40 mg/mL injection 80 mg  -     C-reactive protein; Future    Hyperparathyroidism ([facility] [address] )  -     PTH, intact; Future          Subjective:      Patient ID: Opal Darden is a 62 y o  male  Patient is complaining of bilateral knee pain started one month ago, he has history of rheumatoid arthritis  He is not taking any medication for rheumatoid arthritis  He also has bilateral shoulders and elbows pain  He does not have hands pain  He does not have morning stiffness  As her complains of runny nose, nasal congestion and sinus pressure  Is past medical history there is elevation of parathyroid hormone  There is no history hypercalcemia  Patient is having stomach problems and he had  an EGD with Neymar Gary yesterday  Biopsies pending possible Helicobacter pylori  Is very upset about no having water in his house  He will follow up with psychiatrist's next week for anger management  The following portions of the patient's history were reviewed and updated as appropriate: allergies, current medications, past family history, past medical history, past social history, past surgical history and problem list     Review of Systems   Constitutional: Negative for diaphoresis, fatigue, fever and unexpected weight change  HENT: Positive for congestion, ear pain, nosebleeds, postnasal drip, rhinorrhea, sinus pain and sore throat  Respiratory: Positive for cough  Negative for apnea, choking, chest tightness and shortness of breath  Cardiovascular: Negative for chest pain, palpitations and leg swelling  Gastrointestinal: Negative for abdominal distention, abdominal pain, anal bleeding, blood in stool and constipation  Musculoskeletal: Positive for arthralgias  Negative for back pain, gait problem and joint swelling  Neurological: Negative for dizziness, facial asymmetry, light-headedness and headaches  Psychiatric/Behavioral: Negative for behavioral problems, dysphoric mood, self-injury and suicidal ideas  The patient is nervous/anxious  He denies any homicidal ideas         Objective:      /70 (BP Location: Left arm, Patient Position: Sitting, Cuff Size: Standard)   Pulse 72   Temp 97 9 °F (36 6 °C) (Oral)   Resp 16   Ht 5' 8" (1 727 m)   Wt 88 5 kg (195 lb)   SpO2 99%   BMI 29 65 kg/m²          Physical Exam   Constitutional: He is oriented to person, place, and time  He is not intubated  HENT:   Nasal congestion and sinus pain  Neck: No JVD present  No tracheal tenderness present  Carotid bruit is not present  No neck rigidity  No edema present  No thyroid mass and no thyromegaly present  Cardiovascular: Normal rate, regular rhythm, normal heart sounds and normal pulses  No extrasystoles are present  Exam reveals no distant heart sounds and no friction rub  Pulmonary/Chest: Effort normal and breath sounds normal  No stridor  No apnea, no tachypnea and no bradypnea  He is not intubated  Abdominal: Soft  Bowel sounds are normal  He exhibits no abdominal bruit  There is no hepatosplenomegaly, splenomegaly or hepatomegaly  There is no CVA tenderness  No hernia  Musculoskeletal: Normal range of motion  He exhibits tenderness (But no signs of active rheumatoid arthritis )  He exhibits no deformity  Neurological: He is alert and oriented to person, place, and time  He has normal reflexes     Skin: Skin is warm and dry  Psychiatric: He has a normal mood and affect  His behavior is normal  Judgment and thought content normal    Nursing note and vitals reviewed  No

## 2023-06-03 NOTE — ASSESSMENT & PLAN NOTE
Continue Plaquenil 66-year-old male with CAD s/p recent mLAD stent 5/22/23, HTN, HLD, GERD and BPH s/p TURP presented to the ED from cardiologist office for episodes of palpitations associated with LH/dizziness, nausea, diaphoresis, anxiety, blurry vision.    1. Palpitations - tele strip shows sinus tachycardia to 134 not SVT   2. CAD s/p stent  3. HTN  4. HLD    RECOMMENDATIONS:   - Check formal echocardiogram  - Repeat orthostatics to assess if patient truly has POTS or was patient dehydrated  - TSH normal  - Monitor on telemetry  - Keep Mg > 2 and K > 4  - Continue DAPT  - Continue Metoprolol Tartrate 50mg Q12H  - Continue Atorvastatin   - Continue antihypertensives  - If work-up is negative and no telemetry events patient can likely follow-up with his outpatient cardiologist     Note not final until signed by attending       Kelby Salazar MD  Cardiology Fellow PGY-5  Phone: [phone]    For all New Consults  [email or web address]   Login: [email or web address]

## (undated) DEVICE — SUT VICRYL 0 UR-6 27 IN J603H

## (undated) DEVICE — NEEDLE BLUNT 18 G X 1 1/2IN

## (undated) DEVICE — TAUT CATH INTRODUCER 4.5 FR

## (undated) DEVICE — HYGIENE-FILTER  FOR SINGLE USE

## (undated) DEVICE — SWITCH BLADE TIP CURVED METZ

## (undated) DEVICE — TROCAR: Brand: KII FIOS FIRST ENTRY

## (undated) DEVICE — ALLENTOWN LAP CHOLE APP PACK: Brand: CARDINAL HEALTH

## (undated) DEVICE — STERILE POLYISOPRENE POWDER-FREE SURGICAL GLOVES: Brand: PROTEXIS

## (undated) DEVICE — SUT MONOCRYL 4-0 PS-2 27 IN Y426H

## (undated) DEVICE — IV CATH 14 G X 1.75

## (undated) DEVICE — INTENDED FOR TISSUE SEPARATION, AND OTHER PROCEDURES THAT REQUIRE A SHARP SURGICAL BLADE TO PUNCTURE OR CUT.: Brand: BARD-PARKER SAFETY BLADES SIZE 11, STERILE

## (undated) DEVICE — DRAPE C-ARM X-RAY

## (undated) DEVICE — LIGAMAX 5 MM ENDOSCOPIC MULTIPLE CLIP APPLIER: Brand: LIGAMAX

## (undated) DEVICE — SWABSTCK, BENZOIN TINCTURE, 1/PK, STRL: Brand: APLICARE

## (undated) DEVICE — SYRINGE 30ML LL

## (undated) DEVICE — 3M™ STERI-STRIP™ REINFORCED ADHESIVE SKIN CLOSURES, R1547, 1/2 IN X 4 IN (12 MM X 100 MM), 6 STRIPS/ENVELOPE: Brand: 3M™ STERI-STRIP™

## (undated) DEVICE — CHLORAPREP HI-LITE 26ML ORANGE

## (undated) DEVICE — TROCAR: Brand: KII® SLEEVE

## (undated) DEVICE — GARMENT,MEDLINE,DVT,INT,CALF,FOAM,MED: Brand: MEDLINE

## (undated) DEVICE — PAD GROUNDING ADULT

## (undated) DEVICE — IRRIG ENDO FLO TUBING

## (undated) DEVICE — ENDOPATH 5MM CURVED SCISSORS WITH MONOPOLAR CAUTERY: Brand: ENDOPATH

## (undated) DEVICE — INSUFFLATION NEEDLE TO ESTABLISH PNEUMOPERITONEUM.: Brand: INSUFFLATION NEEDLE

## (undated) DEVICE — 3L THIN WALL CAN: Brand: CRD